# Patient Record
Sex: MALE | Race: WHITE | NOT HISPANIC OR LATINO | Employment: OTHER | URBAN - METROPOLITAN AREA
[De-identification: names, ages, dates, MRNs, and addresses within clinical notes are randomized per-mention and may not be internally consistent; named-entity substitution may affect disease eponyms.]

---

## 2019-05-27 ENCOUNTER — TRANSFERRED RECORDS (OUTPATIENT)
Dept: HEALTH INFORMATION MANAGEMENT | Facility: CLINIC | Age: 64
End: 2019-05-27

## 2019-05-28 ENCOUNTER — TRANSFERRED RECORDS (OUTPATIENT)
Dept: HEALTH INFORMATION MANAGEMENT | Facility: CLINIC | Age: 64
End: 2019-05-28

## 2019-05-28 ENCOUNTER — APPOINTMENT (OUTPATIENT)
Dept: GENERAL RADIOLOGY | Facility: CLINIC | Age: 64
DRG: 025 | End: 2019-05-28
Attending: STUDENT IN AN ORGANIZED HEALTH CARE EDUCATION/TRAINING PROGRAM
Payer: COMMERCIAL

## 2019-05-28 ENCOUNTER — ANESTHESIA EVENT (OUTPATIENT)
Dept: SURGERY | Facility: CLINIC | Age: 64
DRG: 025 | End: 2019-05-28
Payer: COMMERCIAL

## 2019-05-28 ENCOUNTER — HOSPITAL ENCOUNTER (INPATIENT)
Facility: CLINIC | Age: 64
LOS: 4 days | Discharge: HOME | DRG: 025 | End: 2019-06-01
Attending: NEUROLOGICAL SURGERY | Admitting: NEUROLOGICAL SURGERY
Payer: COMMERCIAL

## 2019-05-28 ENCOUNTER — HOSPITAL ENCOUNTER (INPATIENT)
Facility: CLINIC | Age: 64
Setting detail: SURGERY ADMIT
End: 2019-05-28
Attending: NEUROLOGICAL SURGERY | Admitting: NEUROLOGICAL SURGERY
Payer: COMMERCIAL

## 2019-05-28 DIAGNOSIS — D49.6 BRAIN TUMOR (H): Primary | ICD-10-CM

## 2019-05-28 LAB
ABO + RH BLD: NORMAL
ABO + RH BLD: NORMAL
ANION GAP SERPL CALCULATED.3IONS-SCNC: 6 MMOL/L (ref 3–14)
APTT PPP: 25 SEC (ref 22–37)
BASOPHILS # BLD AUTO: 0.1 10E9/L (ref 0–0.2)
BASOPHILS NFR BLD AUTO: 0.6 %
BLD GP AB SCN SERPL QL: NORMAL
BLOOD BANK CMNT PATIENT-IMP: NORMAL
BUN SERPL-MCNC: 15 MG/DL (ref 7–30)
CALCIUM SERPL-MCNC: 9.3 MG/DL (ref 8.5–10.1)
CHLORIDE SERPL-SCNC: 105 MMOL/L (ref 94–109)
CO2 SERPL-SCNC: 28 MMOL/L (ref 20–32)
CREAT SERPL-MCNC: 1.09 MG/DL (ref 0.66–1.25)
DIFFERENTIAL METHOD BLD: NORMAL
EOSINOPHIL # BLD AUTO: 0.3 10E9/L (ref 0–0.7)
EOSINOPHIL NFR BLD AUTO: 2.5 %
ERYTHROCYTE [DISTWIDTH] IN BLOOD BY AUTOMATED COUNT: 13.7 % (ref 10–15)
GFR SERPL CREATININE-BSD FRML MDRD: 72 ML/MIN/{1.73_M2}
GLUCOSE SERPL-MCNC: 111 MG/DL (ref 70–99)
HCT VFR BLD AUTO: 50.3 % (ref 40–53)
HGB BLD-MCNC: 16.5 G/DL (ref 13.3–17.7)
IMM GRANULOCYTES # BLD: 0 10E9/L (ref 0–0.4)
IMM GRANULOCYTES NFR BLD: 0.4 %
INR PPP: 1.04 (ref 0.86–1.14)
LYMPHOCYTES # BLD AUTO: 2.4 10E9/L (ref 0.8–5.3)
LYMPHOCYTES NFR BLD AUTO: 23.8 %
MAGNESIUM SERPL-MCNC: 2.3 MG/DL (ref 1.6–2.3)
MCH RBC QN AUTO: 29.3 PG (ref 26.5–33)
MCHC RBC AUTO-ENTMCNC: 32.8 G/DL (ref 31.5–36.5)
MCV RBC AUTO: 89 FL (ref 78–100)
MONOCYTES # BLD AUTO: 1.2 10E9/L (ref 0–1.3)
MONOCYTES NFR BLD AUTO: 12.1 %
NEUTROPHILS # BLD AUTO: 6.2 10E9/L (ref 1.6–8.3)
NEUTROPHILS NFR BLD AUTO: 60.6 %
NRBC # BLD AUTO: 0 10*3/UL
NRBC BLD AUTO-RTO: 0 /100
PHOSPHATE SERPL-MCNC: 4.1 MG/DL (ref 2.5–4.5)
PLATELET # BLD AUTO: 326 10E9/L (ref 150–450)
POTASSIUM SERPL-SCNC: 3.3 MMOL/L (ref 3.4–5.3)
RBC # BLD AUTO: 5.64 10E12/L (ref 4.4–5.9)
SODIUM SERPL-SCNC: 139 MMOL/L (ref 133–144)
SPECIMEN EXP DATE BLD: NORMAL
WBC # BLD AUTO: 10.2 10E9/L (ref 4–11)

## 2019-05-28 PROCEDURE — 84100 ASSAY OF PHOSPHORUS: CPT | Performed by: STUDENT IN AN ORGANIZED HEALTH CARE EDUCATION/TRAINING PROGRAM

## 2019-05-28 PROCEDURE — 12000001 ZZH R&B MED SURG/OB UMMC

## 2019-05-28 PROCEDURE — 36415 COLL VENOUS BLD VENIPUNCTURE: CPT | Performed by: STUDENT IN AN ORGANIZED HEALTH CARE EDUCATION/TRAINING PROGRAM

## 2019-05-28 PROCEDURE — 25800030 ZZH RX IP 258 OP 636: Performed by: STUDENT IN AN ORGANIZED HEALTH CARE EDUCATION/TRAINING PROGRAM

## 2019-05-28 PROCEDURE — 25000132 ZZH RX MED GY IP 250 OP 250 PS 637: Performed by: STUDENT IN AN ORGANIZED HEALTH CARE EDUCATION/TRAINING PROGRAM

## 2019-05-28 PROCEDURE — 93010 ELECTROCARDIOGRAM REPORT: CPT | Performed by: INTERNAL MEDICINE

## 2019-05-28 PROCEDURE — 86901 BLOOD TYPING SEROLOGIC RH(D): CPT | Performed by: STUDENT IN AN ORGANIZED HEALTH CARE EDUCATION/TRAINING PROGRAM

## 2019-05-28 PROCEDURE — 85610 PROTHROMBIN TIME: CPT | Performed by: STUDENT IN AN ORGANIZED HEALTH CARE EDUCATION/TRAINING PROGRAM

## 2019-05-28 PROCEDURE — 86850 RBC ANTIBODY SCREEN: CPT | Performed by: STUDENT IN AN ORGANIZED HEALTH CARE EDUCATION/TRAINING PROGRAM

## 2019-05-28 PROCEDURE — 86900 BLOOD TYPING SEROLOGIC ABO: CPT | Performed by: STUDENT IN AN ORGANIZED HEALTH CARE EDUCATION/TRAINING PROGRAM

## 2019-05-28 PROCEDURE — 80048 BASIC METABOLIC PNL TOTAL CA: CPT | Performed by: STUDENT IN AN ORGANIZED HEALTH CARE EDUCATION/TRAINING PROGRAM

## 2019-05-28 PROCEDURE — 40000141 ZZH STATISTIC PERIPHERAL IV START W/O US GUIDANCE

## 2019-05-28 PROCEDURE — 85730 THROMBOPLASTIN TIME PARTIAL: CPT | Performed by: STUDENT IN AN ORGANIZED HEALTH CARE EDUCATION/TRAINING PROGRAM

## 2019-05-28 PROCEDURE — 40000065 ZZH STATISTIC EKG NON-CHARGEABLE

## 2019-05-28 PROCEDURE — 71045 X-RAY EXAM CHEST 1 VIEW: CPT

## 2019-05-28 PROCEDURE — 85025 COMPLETE CBC W/AUTO DIFF WBC: CPT | Performed by: STUDENT IN AN ORGANIZED HEALTH CARE EDUCATION/TRAINING PROGRAM

## 2019-05-28 PROCEDURE — 25000128 H RX IP 250 OP 636: Performed by: STUDENT IN AN ORGANIZED HEALTH CARE EDUCATION/TRAINING PROGRAM

## 2019-05-28 PROCEDURE — 83735 ASSAY OF MAGNESIUM: CPT | Performed by: STUDENT IN AN ORGANIZED HEALTH CARE EDUCATION/TRAINING PROGRAM

## 2019-05-28 RX ORDER — POTASSIUM CHLORIDE 29.8 MG/ML
20 INJECTION INTRAVENOUS
Status: DISCONTINUED | OUTPATIENT
Start: 2019-05-28 | End: 2019-05-30

## 2019-05-28 RX ORDER — ACETAMINOPHEN 650 MG/1
650 SUPPOSITORY RECTAL EVERY 4 HOURS PRN
Status: DISCONTINUED | OUTPATIENT
Start: 2019-05-28 | End: 2019-05-30

## 2019-05-28 RX ORDER — CEFAZOLIN SODIUM 2 G/100ML
2 INJECTION, SOLUTION INTRAVENOUS
Status: DISCONTINUED | OUTPATIENT
Start: 2019-05-28 | End: 2019-05-30 | Stop reason: HOSPADM

## 2019-05-28 RX ORDER — SPIRONOLACTONE 25 MG
12.5 TABLET ORAL DAILY
Status: COMPLETED | OUTPATIENT
Start: 2019-05-29 | End: 2019-05-29

## 2019-05-28 RX ORDER — BISACODYL 10 MG
10 SUPPOSITORY, RECTAL RECTAL DAILY PRN
Status: DISCONTINUED | OUTPATIENT
Start: 2019-05-28 | End: 2019-05-30

## 2019-05-28 RX ORDER — LOSARTAN POTASSIUM 100 MG/1
100 TABLET ORAL AT BEDTIME
Status: COMPLETED | OUTPATIENT
Start: 2019-05-28 | End: 2019-05-28

## 2019-05-28 RX ORDER — PANTOPRAZOLE SODIUM 40 MG/1
40 TABLET, DELAYED RELEASE ORAL
Status: DISCONTINUED | OUTPATIENT
Start: 2019-05-28 | End: 2019-05-30

## 2019-05-28 RX ORDER — LATANOPROST 50 UG/ML
1 SOLUTION/ DROPS OPHTHALMIC AT BEDTIME
Status: DISCONTINUED | OUTPATIENT
Start: 2019-05-28 | End: 2019-05-30

## 2019-05-28 RX ORDER — METOCLOPRAMIDE HYDROCHLORIDE 5 MG/ML
10 INJECTION INTRAMUSCULAR; INTRAVENOUS EVERY 6 HOURS PRN
Status: DISCONTINUED | OUTPATIENT
Start: 2019-05-28 | End: 2019-05-30

## 2019-05-28 RX ORDER — POTASSIUM CL/LIDO/0.9 % NACL 10MEQ/0.1L
10 INTRAVENOUS SOLUTION, PIGGYBACK (ML) INTRAVENOUS
Status: DISCONTINUED | OUTPATIENT
Start: 2019-05-28 | End: 2019-05-30

## 2019-05-28 RX ORDER — POTASSIUM CHLORIDE 7.45 MG/ML
10 INJECTION INTRAVENOUS
Status: DISCONTINUED | OUTPATIENT
Start: 2019-05-28 | End: 2019-05-30

## 2019-05-28 RX ORDER — PROCHLORPERAZINE 25 MG
25 SUPPOSITORY, RECTAL RECTAL EVERY 12 HOURS PRN
Status: DISCONTINUED | OUTPATIENT
Start: 2019-05-28 | End: 2019-05-30

## 2019-05-28 RX ORDER — SODIUM CHLORIDE 9 MG/ML
INJECTION, SOLUTION INTRAVENOUS CONTINUOUS
Status: DISCONTINUED | OUTPATIENT
Start: 2019-05-28 | End: 2019-05-30 | Stop reason: HOSPADM

## 2019-05-28 RX ORDER — MAGNESIUM CARB/ALUMINUM HYDROX 105-160MG
148 TABLET,CHEWABLE ORAL
Status: DISCONTINUED | OUTPATIENT
Start: 2019-05-28 | End: 2019-05-30

## 2019-05-28 RX ORDER — OXYCODONE HYDROCHLORIDE 5 MG/1
5-10 TABLET ORAL EVERY 4 HOURS PRN
Status: DISCONTINUED | OUTPATIENT
Start: 2019-05-28 | End: 2019-05-30

## 2019-05-28 RX ORDER — MAGNESIUM SULFATE HEPTAHYDRATE 40 MG/ML
4 INJECTION, SOLUTION INTRAVENOUS EVERY 4 HOURS PRN
Status: DISCONTINUED | OUTPATIENT
Start: 2019-05-28 | End: 2019-05-30

## 2019-05-28 RX ORDER — HYDROCHLOROTHIAZIDE 12.5 MG/1
25 CAPSULE ORAL DAILY
Status: DISCONTINUED | OUTPATIENT
Start: 2019-05-29 | End: 2019-05-30

## 2019-05-28 RX ORDER — ONDANSETRON 2 MG/ML
4 INJECTION INTRAMUSCULAR; INTRAVENOUS EVERY 6 HOURS PRN
Status: DISCONTINUED | OUTPATIENT
Start: 2019-05-28 | End: 2019-05-30

## 2019-05-28 RX ORDER — ATORVASTATIN CALCIUM 80 MG/1
80 TABLET, FILM COATED ORAL EVERY EVENING
Status: DISCONTINUED | OUTPATIENT
Start: 2019-05-28 | End: 2019-05-29

## 2019-05-28 RX ORDER — PROCHLORPERAZINE MALEATE 5 MG
10 TABLET ORAL EVERY 6 HOURS PRN
Status: DISCONTINUED | OUTPATIENT
Start: 2019-05-28 | End: 2019-05-30

## 2019-05-28 RX ORDER — POTASSIUM CHLORIDE 750 MG/1
20-40 TABLET, EXTENDED RELEASE ORAL
Status: DISCONTINUED | OUTPATIENT
Start: 2019-05-28 | End: 2019-05-30

## 2019-05-28 RX ORDER — AMOXICILLIN 250 MG
2 CAPSULE ORAL 2 TIMES DAILY
Status: DISCONTINUED | OUTPATIENT
Start: 2019-05-28 | End: 2019-05-30

## 2019-05-28 RX ORDER — METOCLOPRAMIDE 10 MG/1
10 TABLET ORAL EVERY 6 HOURS PRN
Status: DISCONTINUED | OUTPATIENT
Start: 2019-05-28 | End: 2019-05-30

## 2019-05-28 RX ORDER — ONDANSETRON 4 MG/1
4 TABLET, ORALLY DISINTEGRATING ORAL EVERY 6 HOURS PRN
Status: DISCONTINUED | OUTPATIENT
Start: 2019-05-28 | End: 2019-05-30

## 2019-05-28 RX ORDER — LIDOCAINE 40 MG/G
CREAM TOPICAL
Status: DISCONTINUED | OUTPATIENT
Start: 2019-05-28 | End: 2019-05-30 | Stop reason: HOSPADM

## 2019-05-28 RX ORDER — POLYETHYLENE GLYCOL 3350 17 G/17G
17 POWDER, FOR SOLUTION ORAL DAILY PRN
Status: DISCONTINUED | OUTPATIENT
Start: 2019-05-28 | End: 2019-05-30

## 2019-05-28 RX ORDER — DEXAMETHASONE 4 MG/1
4 TABLET ORAL EVERY 6 HOURS SCHEDULED
Status: DISCONTINUED | OUTPATIENT
Start: 2019-05-29 | End: 2019-05-30

## 2019-05-28 RX ORDER — DEXAMETHASONE SODIUM PHOSPHATE 4 MG/ML
4 INJECTION, SOLUTION INTRA-ARTICULAR; INTRALESIONAL; INTRAMUSCULAR; INTRAVENOUS; SOFT TISSUE EVERY 6 HOURS
Status: DISCONTINUED | OUTPATIENT
Start: 2019-05-28 | End: 2019-05-28

## 2019-05-28 RX ORDER — POTASSIUM CHLORIDE 1.5 G/1.58G
20-40 POWDER, FOR SOLUTION ORAL
Status: DISCONTINUED | OUTPATIENT
Start: 2019-05-28 | End: 2019-05-30

## 2019-05-28 RX ORDER — CEFAZOLIN SODIUM 1 G/3ML
1 INJECTION, POWDER, FOR SOLUTION INTRAMUSCULAR; INTRAVENOUS SEE ADMIN INSTRUCTIONS
Status: DISCONTINUED | OUTPATIENT
Start: 2019-05-28 | End: 2019-05-30 | Stop reason: HOSPADM

## 2019-05-28 RX ORDER — AMOXICILLIN 250 MG
1 CAPSULE ORAL 2 TIMES DAILY
Status: DISCONTINUED | OUTPATIENT
Start: 2019-05-28 | End: 2019-05-30

## 2019-05-28 RX ORDER — ACETAMINOPHEN 325 MG/1
650 TABLET ORAL EVERY 4 HOURS PRN
Status: DISCONTINUED | OUTPATIENT
Start: 2019-05-28 | End: 2019-05-30

## 2019-05-28 RX ORDER — NALOXONE HYDROCHLORIDE 0.4 MG/ML
.1-.4 INJECTION, SOLUTION INTRAMUSCULAR; INTRAVENOUS; SUBCUTANEOUS
Status: DISCONTINUED | OUTPATIENT
Start: 2019-05-28 | End: 2019-05-30

## 2019-05-28 RX ADMIN — LOSARTAN POTASSIUM 100 MG: 100 TABLET ORAL at 21:39

## 2019-05-28 RX ADMIN — PANTOPRAZOLE SODIUM 40 MG: 40 TABLET, DELAYED RELEASE ORAL at 19:08

## 2019-05-28 RX ADMIN — ATORVASTATIN CALCIUM 80 MG: 80 TABLET, FILM COATED ORAL at 20:58

## 2019-05-28 RX ADMIN — LATANOPROST 1 DROP: 50 SOLUTION OPHTHALMIC at 21:39

## 2019-05-28 RX ADMIN — SODIUM CHLORIDE, PRESERVATIVE FREE: 5 INJECTION INTRAVENOUS at 21:02

## 2019-05-28 RX ADMIN — DEXAMETHASONE SODIUM PHOSPHATE 4 MG: 4 INJECTION, SOLUTION INTRAMUSCULAR; INTRAVENOUS at 19:08

## 2019-05-28 ASSESSMENT — VISUAL ACUITY
OU: GLASSES
OU: GLASSES

## 2019-05-28 ASSESSMENT — ACTIVITIES OF DAILY LIVING (ADL)
WHICH_OF_THE_ABOVE_FUNCTIONAL_RISKS_HAD_A_RECENT_ONSET_OR_CHANGE?: COMMUNICATION/SPEECH
TRANSFERRING: 0-->INDEPENDENT
SWALLOWING: 0-->SWALLOWS FOODS/LIQUIDS WITHOUT DIFFICULTY
RETIRED_COMMUNICATION: 0-->UNDERSTANDS/COMMUNICATES WITHOUT DIFFICULTY
ADLS_ACUITY_SCORE: 12
FALL_HISTORY_WITHIN_LAST_SIX_MONTHS: NO
TOILETING: 0-->INDEPENDENT
DRESS: 0-->INDEPENDENT
COGNITION: 0 - NO COGNITION ISSUES REPORTED
BATHING: 0-->INDEPENDENT
AMBULATION: 0-->INDEPENDENT
RETIRED_EATING: 0-->INDEPENDENT

## 2019-05-28 ASSESSMENT — LIFESTYLE VARIABLES: TOBACCO_USE: 1

## 2019-05-28 NOTE — H&P
[facility]  NEUROSURGERY H&P NOTE    Reason for Admission:  Brain tumor    History of Present Illness:  Mr. Farfan is a 64 year old right handed man who works as an , who was admitted to [facility] in [place] today for progressive short term memory loss over the past several weeks. Wife reports that he has difficulty concentrating and sometimes forgets to reply to people. He denies word finding difficulties however. He also reports having gait disturbance and feels he walks slower and more deliberate. He has intermittent headaches which have not changed from baseline. No nausea or vomiting. He was brought to [facility] ED and head CT/MRI revealed a large left frontal mass concerning for high grade glioma. He was then transferred to [facility] for higher level of care. Patient takes aspirin 81mg for primary prevention but his last dose was on 19. He has not had any seizures so far.    PAST MEDICAL HISTORY:   1- Hypertension  2- Hyperlipidemia  3- Osteoarthritis  4- GERD  5- Cervical spine injury 30 years ago    PAST SURGICAL HISTORY:   1- Gastric implant (compatible with MRI up to 1.5T)    FAMILY HISTORY:   Mother  from stroke  Father  from heart attack    SOCIAL HISTORY:   Social History     Tobacco Use     Smoking status: Not on file   Substance Use Topics     Alcohol use: Not on file       MEDICATIONS:  No current outpatient medications on file.     Allergies:  Allergies not on file    ROS: 10 point ROS of systems including Constitutional, Eyes, Respiratory, Cardiovascular, Gastroenterology, Genitourinary, Integumentary, Muscularskeletal, Psychiatric were all negative except for pertinent positives noted in my HPI.    EXAM:  There were no vitals taken for this visit.  Neuro:   AAOx3, NAD.   PERRL, EOMI. Face symmetric, tongue midline.   Motor: 5/5 BUE, 5/5 BLE. No pronator drift.  Sensation: intact to light touch and pinprick throughout.    Reflexes 2+ throughout.   Normal FNF, normal HTS.   Gait deferred.    LABS/IMAGING:  MRI brain w/wo contrast (5/27/19): left frontal heterogeneously enhancing mass that extends across the corpus callosum, and associated with vasogenic edema. Findings concerning for high grade glioma.    ASSESSMENT:  Mr. Farfan is a 64 year old man with high grade left frontal mass.    RECOMMENDATIONS:  - Admit to 6A  - Decadron 4mg Q6 hrs for treatment of cerebral edema  - Protonix for peptic ulcer prophylaxis while on steroids  - Jono and consent for left craniotomy and tumor resection  - OR scheduled on 5/30/19 with Dr. Downing  - Keep NPO on Thursday midnight  - Consult anesthesia for pre-op evaluation and risk assessment on 5/29/19  - IV fluids at 75cc/hr  - CT stealth with fiducials on 5/29/19  - Hold aspirin  - EKG, chest x-ray    The patient was discussed with Dr. Gilbert, neurosurgery chief resident, and Dr. Downing, neurosurgery staff.  -----------------------------------  Milton Roy MD, MS  Neurosurgery PGY-2  Pager #[phone]

## 2019-05-28 NOTE — ANESTHESIA PREPROCEDURE EVALUATION
Anesthesia Pre-Procedure Evaluation    Patient: Gopi Farfan   MRN:     [id number] Gender:   male   Age:    64 year old :      1955        Preoperative Diagnosis: Brain Tumor   Procedure(s):  Stealth Guided Left Craniotomy And Tumor Resection     No past medical history on file.   No past surgical history on file.       Anesthesia Evaluation     . Pt has had prior anesthetic.     No history of anesthetic complications          ROS/MED HX    ENT/Pulmonary:     (+)tobacco use (Quit ), Past use , . .    Neurologic: Comment:   CT/MRI :large left frontal mass concerning for high grade glioma    Progressive short term memory loss     MRI brain w/wo contrast (19): left frontal heterogeneously enhancing mass that extends across the corpus callosum, and associated with vasogenic edema. Findings concerning for high grade glioma.      (+)migraines, other neuro     Cardiovascular:     (+) Dyslipidemia, hypertension----. Taking blood thinners (ASA 81mg, last dose ) : . . . :. . Previous cardiac testing Echodate:2017results:  ECHO:  Final Impressions:   1. Normal left ventricular size, normal wall thickness, normal global systolic function, calculated EF of 59 %. Indeterminate filling pressure.   2. Right ventricular cavity size is normal, global systolic RV function is normal. Estimated RVSP 42 mmHg. Estimated RVSP 42 mmHg.   3. Trace mitral regurgitation.   4. Trileaflet aortic valve without stenosis or regurgitation.   5. Mildly enlarged left atrium.   6. No pericardial effusion.  Stress Testdate:2017 results:  STRESS ECHO:Final Impressions:   1. Negative stress echo for ischemia with a non specific blunted increase in EF post exercise.   2. Post stress, normal left ventricular size, normal global systolic function with an estimated EF of 60 to 65%.  ECG reviewed date:19 results:  EKG: Sinus rhythm in 70's, no ectopy, no ST changes. Flattened T waves in inferior leads (III, AVF), QTc 450ms.    date: results:          METS/Exercise Tolerance:     Hematologic:  - neg hematologic  ROS       Musculoskeletal: Comment: Cervicalgia     (+) arthritis,  other musculoskeletal-       GI/Hepatic:     (+) GERD Asymptomatic on medication,       Renal/Genitourinary: Comment: GFR 50-60's   - ROS Renal section negative       Endo: Comment: Initiated on 5/28/19 - Decadron 4mg Q6 hrs for treatment of cerebral edema        Psychiatric:  - neg psychiatric ROS       Infectious Disease:  - neg infectious disease ROS       Malignancy:      - no malignancy   Other:                         PHYSICAL EXAM:   Mental Status/Neuro: A/A/O   Airway: Facies: Feasible  Mallampati: I  Mouth/Opening: Full  TM distance: > 6 cm  Neck ROM: Full   Respiratory: Auscultation: CTAB     Resp. Rate: Normal     Resp. Effort: Normal      CV: Rhythm: Regular  Rate: Age appropriate  Heart: Normal Sounds   Comments:      Dental:  Dental Comments: Multiple missing teeth; poor dentition                Lab Results   Component Value Date    WBC 10.2 05/28/2019    HGB 16.5 05/28/2019    HCT 50.3 05/28/2019     05/28/2019       Preop Vitals  BP Readings from Last 3 Encounters:   05/28/19 126/76    Pulse Readings from Last 3 Encounters:   05/28/19 68      Resp Readings from Last 3 Encounters:   05/28/19 16    SpO2 Readings from Last 3 Encounters:   05/28/19 98%      Temp Readings from Last 1 Encounters:   05/28/19 36.2  C (97.2  F) (Oral)    Ht Readings from Last 1 Encounters:   No data found for Ht      Wt Readings from Last 1 Encounters:   No data found for Wt    There is no height or weight on file to calculate BMI.     LDA:            Assessment:   ASA SCORE: 3    NPO Status: > 6 hours since completed [employer]   Documentation: H&P complete; Preop Testing complete; Consents complete   Proceeding: Proceed without further delay  Tobacco Use:  NO Active use of Tobacco/UNKNOWN Tobacco use status     Plan:   Anes. Type:  General   Pre-Induction: None    Induction:  IV (Standard)   Airway: Oral ETT   Access/Monitoring: PIV; 2nd PIV; A-Line   Maintenance: Balanced   Emergence: Procedure Site   Logistics: Same Day Surgery     Postop Pain/Sedation Strategy:  Standard-Options: Opioids PRN     PONV Management:  Adult Risk Factors:, Non-Smoker, Postop Opioids  Prevention: Ondansetron; Dexamethasone     CONSENT: Direct conversation   Plan and risks discussed with: Patient                            Derek Bone MD

## 2019-05-29 LAB
ALBUMIN UR-MCNC: NEGATIVE MG/DL
ANION GAP SERPL CALCULATED.3IONS-SCNC: 8 MMOL/L (ref 3–14)
APPEARANCE UR: CLEAR
BILIRUB UR QL STRIP: NEGATIVE
BUN SERPL-MCNC: 14 MG/DL (ref 7–30)
CALCIUM SERPL-MCNC: 8.7 MG/DL (ref 8.5–10.1)
CHLORIDE SERPL-SCNC: 106 MMOL/L (ref 94–109)
CO2 SERPL-SCNC: 25 MMOL/L (ref 20–32)
COLOR UR AUTO: YELLOW
CREAT SERPL-MCNC: 0.94 MG/DL (ref 0.66–1.25)
ERYTHROCYTE [DISTWIDTH] IN BLOOD BY AUTOMATED COUNT: 13.7 % (ref 10–15)
GFR SERPL CREATININE-BSD FRML MDRD: 86 ML/MIN/{1.73_M2}
GLUCOSE SERPL-MCNC: 148 MG/DL (ref 70–99)
GLUCOSE UR STRIP-MCNC: 70 MG/DL
HCT VFR BLD AUTO: 48.2 % (ref 40–53)
HGB BLD-MCNC: 15.6 G/DL (ref 13.3–17.7)
HGB UR QL STRIP: ABNORMAL
INTERPRETATION ECG - MUSE: NORMAL
KETONES UR STRIP-MCNC: NEGATIVE MG/DL
LEUKOCYTE ESTERASE UR QL STRIP: NEGATIVE
MCH RBC QN AUTO: 29.6 PG (ref 26.5–33)
MCHC RBC AUTO-ENTMCNC: 32.4 G/DL (ref 31.5–36.5)
MCV RBC AUTO: 92 FL (ref 78–100)
MUCOUS THREADS #/AREA URNS LPF: PRESENT /LPF
NITRATE UR QL: NEGATIVE
PH UR STRIP: 5.5 PH (ref 5–7)
PLATELET # BLD AUTO: 281 10E9/L (ref 150–450)
POTASSIUM SERPL-SCNC: 3.8 MMOL/L (ref 3.4–5.3)
RBC # BLD AUTO: 5.27 10E12/L (ref 4.4–5.9)
RBC #/AREA URNS AUTO: 0 /HPF (ref 0–2)
SODIUM SERPL-SCNC: 139 MMOL/L (ref 133–144)
SOURCE: ABNORMAL
SP GR UR STRIP: 1.02 (ref 1–1.03)
TRANS CELLS #/AREA URNS HPF: <1 /HPF (ref 0–1)
UROBILINOGEN UR STRIP-MCNC: NORMAL MG/DL (ref 0–2)
WBC # BLD AUTO: 8.2 10E9/L (ref 4–11)
WBC #/AREA URNS AUTO: 1 /HPF (ref 0–5)

## 2019-05-29 PROCEDURE — 25000132 ZZH RX MED GY IP 250 OP 250 PS 637: Performed by: STUDENT IN AN ORGANIZED HEALTH CARE EDUCATION/TRAINING PROGRAM

## 2019-05-29 PROCEDURE — 25000131 ZZH RX MED GY IP 250 OP 636 PS 637: Performed by: STUDENT IN AN ORGANIZED HEALTH CARE EDUCATION/TRAINING PROGRAM

## 2019-05-29 PROCEDURE — 80048 BASIC METABOLIC PNL TOTAL CA: CPT | Performed by: STUDENT IN AN ORGANIZED HEALTH CARE EDUCATION/TRAINING PROGRAM

## 2019-05-29 PROCEDURE — 36415 COLL VENOUS BLD VENIPUNCTURE: CPT | Performed by: STUDENT IN AN ORGANIZED HEALTH CARE EDUCATION/TRAINING PROGRAM

## 2019-05-29 PROCEDURE — 81001 URINALYSIS AUTO W/SCOPE: CPT | Performed by: STUDENT IN AN ORGANIZED HEALTH CARE EDUCATION/TRAINING PROGRAM

## 2019-05-29 PROCEDURE — 25000132 ZZH RX MED GY IP 250 OP 250 PS 637: Performed by: NURSE PRACTITIONER

## 2019-05-29 PROCEDURE — 85027 COMPLETE CBC AUTOMATED: CPT | Performed by: STUDENT IN AN ORGANIZED HEALTH CARE EDUCATION/TRAINING PROGRAM

## 2019-05-29 PROCEDURE — 12000001 ZZH R&B MED SURG/OB UMMC

## 2019-05-29 RX ORDER — ASPIRIN 81 MG/1
81 TABLET ORAL DAILY
Status: ON HOLD | COMMUNITY
End: 2019-06-01

## 2019-05-29 RX ORDER — HYDROCHLOROTHIAZIDE 25 MG/1
25 TABLET ORAL DAILY
COMMUNITY
Start: 2017-01-05 | End: 2019-12-16 | Stop reason: ALTCHOICE

## 2019-05-29 RX ORDER — LOSARTAN POTASSIUM 100 MG/1
100 TABLET ORAL EVERY EVENING
COMMUNITY
Start: 2019-05-24

## 2019-05-29 RX ORDER — ATORVASTATIN CALCIUM 80 MG/1
40 TABLET, FILM COATED ORAL EVERY EVENING
COMMUNITY
Start: 2019-05-24

## 2019-05-29 RX ORDER — LATANOPROST 50 UG/ML
1 SOLUTION/ DROPS OPHTHALMIC EVERY EVENING
COMMUNITY
Start: 2017-05-16

## 2019-05-29 RX ORDER — SPIRONOLACTONE 25 MG/1
12.5 TABLET ORAL DAILY
COMMUNITY
Start: 2017-01-05 | End: 2019-12-16 | Stop reason: ALTCHOICE

## 2019-05-29 RX ORDER — ESOMEPRAZOLE MAGNESIUM 40 MG/1
40 CAPSULE, DELAYED RELEASE ORAL 2 TIMES DAILY PRN
COMMUNITY
Start: 2017-01-05 | End: 2019-06-13

## 2019-05-29 RX ORDER — ATORVASTATIN CALCIUM 40 MG/1
40 TABLET, FILM COATED ORAL EVERY EVENING
Status: DISCONTINUED | OUTPATIENT
Start: 2019-05-29 | End: 2019-05-30

## 2019-05-29 RX ADMIN — LATANOPROST 1 DROP: 50 SOLUTION OPHTHALMIC at 21:06

## 2019-05-29 RX ADMIN — ATORVASTATIN CALCIUM 40 MG: 40 TABLET, FILM COATED ORAL at 19:58

## 2019-05-29 RX ADMIN — HYDROCHLOROTHIAZIDE 25 MG: 12.5 CAPSULE ORAL at 08:31

## 2019-05-29 RX ADMIN — DEXAMETHASONE 4 MG: 4 TABLET ORAL at 17:58

## 2019-05-29 RX ADMIN — Medication 12.5 MG: at 08:30

## 2019-05-29 RX ADMIN — DEXAMETHASONE 4 MG: 4 TABLET ORAL at 13:29

## 2019-05-29 RX ADMIN — SENNOSIDES AND DOCUSATE SODIUM 1 TABLET: 8.6; 5 TABLET ORAL at 08:32

## 2019-05-29 RX ADMIN — DEXAMETHASONE 4 MG: 4 TABLET ORAL at 00:16

## 2019-05-29 RX ADMIN — DEXAMETHASONE 4 MG: 4 TABLET ORAL at 07:13

## 2019-05-29 RX ADMIN — PANTOPRAZOLE SODIUM 40 MG: 40 TABLET, DELAYED RELEASE ORAL at 08:30

## 2019-05-29 ASSESSMENT — ACTIVITIES OF DAILY LIVING (ADL)
ADLS_ACUITY_SCORE: 12

## 2019-05-29 ASSESSMENT — VISUAL ACUITY
OU: GLASSES;NORMAL ACUITY
OU: GLASSES;NORMAL ACUITY

## 2019-05-29 NOTE — PLAN OF CARE
Status: Pt admitted for large L frontal mass.   Vitals: VSS on RA  Neuros: AO4, strength 5/5 throughout,  per pt memory deficits. L droop, diminished hearing in the L at baseline. Per report, word finding difficulty, none noticed this shift   IV: PIV SL. Fluids to begin at 0000  Resp/trach: Clear throughout  Diet: Regular diet, tolerating well. NPO at midnight   Bowel status: BM yesterday the 28th   : Voiding spontaneously, pre-op UA to be collected, pt aware  Skin: Intact   Pain: Denies  Activity: Up in room independently. Showered this AM in anticipation for CT with fiducials, CT moved to tomorrow morning   Social: Many friends and family present throughout the shift, very supportive  Plan: CT planned for tomorrow morning. OR at 1530 for crani and resection. Continue to monitor and follow POC

## 2019-05-29 NOTE — PLAN OF CARE
Status: large left frontal mass  Vitals: VSS ex shell on RA  Neuros: A&Ox4, L facial droop, intermittent difficulty word finding, memory loss since february.   IV: PIV infusing 75ml/hr  Resp/trach: WNL  Diet: Regular tolerated well  Bowel status: Last BM today  : Voiding spont.  Skin: Intact  Pain: Denies  Activity: SBA independant  Social: Wife present and supportive  Plan: CT and fiducial's tomorrow, Surgery for Left crani and tumor resection Thursday. Continue to monitor and follow POC. NPO tomorrow at midnight.

## 2019-05-29 NOTE — PROGRESS NOTES
Neurosurgery Progress Note      S: No Acute Events Overnight.     O:  Exam:  General: Awake;  Alert, In No Acute Distress  Pulm: Breathing Comfortably on Room Air  Mental status: Oriented x Person, Place and Time  Cranial Nerves: Cranial Nerves II-XII Intact Bilaterally  Strength:      Del Tr Bi WE WF Gr  R 5 5 5 5 5 5  L 5 5 5 5 5 5     HF KE KF DF PF   R 5 5 5 5 5   L 5 5 5 5 5     Pronator Drift: Absent  Sensory: Intact to Light Touch    Assessment: Mr. Farfan is a 64 year old male with a newly diagnosed Left Frontal Brain Tumor. MRI Images concerning for primary intracranial CNS tumor.     Plan by problem:     Neuro:   Neuro Examination Q 4 HR  CT Stealth w/ Fiducials for Pre-Operative Planning  Plan for Surgical Resection on Thursday 5/30/2019 w/ Dr. Downing  Anti-epileptics: Not Indicated; No H/O Seizures w/ presentation   Decadron 4 mg Q 6 HR for treatment of cerebral edema    Cardiovascular: Maintain SBP < 140 mmHg; Obtain Pre-Op EKG; Continue to hold home Aspirin (Last Dose 5/26/2019)    Pulmonary: Breathing Comfortably on Room Air; Pre-Operative Chest X-Ray to assess for Acute Cardiopulmonary Process    Gastrointestinal:Regular Diet; Protonix 40 mg Daily while on Decadron; H/O Gastric Implant for GERD (MRI Compatible)    Renal: Voiding     Heme: Pre-Operative Labs (CBC w/ Platelets, PT/INR, Type and Screen)    Endocrine: Insulin Sliding Scale while on Decadron     Infectious: Obtain Pre-Operative UA     PT/OT: Evaluate Post-Operatively     DVT prophylaxis: Sequential compression devices to Bilateral Lower Extremities    DISPO:  6A    Barriers: surgery    -----------------------------------  Milton Roy MD, MS  Neurosurgery PGY-2

## 2019-05-29 NOTE — PLAN OF CARE
/66 (BP Location: Right arm)   Pulse 76   Temp 97.1  F (36.2  C) (Oral)   Resp 18   Wt 97.1 kg (214 lb)   SpO2 96%   Status:  large left frontal mass  VS: VSS ex intermittently shell on RA  Neuro: A&Ox4, L facial droop, intermittent difficulty word finding, memory loss since february.   Respiratory: LS CTA  GI: Regular tolerated well, Last BM yesterday  : Voiding spont  IV: PIV infusing 75ml/hr  Skin: Intact  Pain: Denies  Activity: SBA independent  Drains: Denies  Social: Wife present and supportive  See flow sheets for further details and assessments.  Plan of Care: CT and fiducial's today, Surgery for Left crani and tumor resection Tomorrow 5/30. Continue to monitor and follow POC. NPO tomorrow at midnight.

## 2019-05-30 ENCOUNTER — APPOINTMENT (OUTPATIENT)
Dept: CT IMAGING | Facility: CLINIC | Age: 64
DRG: 025 | End: 2019-05-30
Attending: STUDENT IN AN ORGANIZED HEALTH CARE EDUCATION/TRAINING PROGRAM
Payer: COMMERCIAL

## 2019-05-30 ENCOUNTER — ANESTHESIA (OUTPATIENT)
Dept: SURGERY | Facility: CLINIC | Age: 64
DRG: 025 | End: 2019-05-30
Payer: COMMERCIAL

## 2019-05-30 LAB
ANION GAP SERPL CALCULATED.3IONS-SCNC: 8 MMOL/L (ref 3–14)
BASE DEFICIT BLDA-SCNC: 0.7 MMOL/L
BASE EXCESS BLDA CALC-SCNC: 0 MMOL/L
BASE EXCESS BLDA CALC-SCNC: 0.1 MMOL/L
BUN SERPL-MCNC: 17 MG/DL (ref 7–30)
CA-I BLD-MCNC: 4.5 MG/DL (ref 4.4–5.2)
CA-I BLD-MCNC: 4.5 MG/DL (ref 4.4–5.2)
CA-I BLD-MCNC: 4.7 MG/DL (ref 4.4–5.2)
CALCIUM SERPL-MCNC: 8.7 MG/DL (ref 8.5–10.1)
CHLORIDE SERPL-SCNC: 107 MMOL/L (ref 94–109)
CO2 SERPL-SCNC: 26 MMOL/L (ref 20–32)
CREAT SERPL-MCNC: 0.93 MG/DL (ref 0.66–1.25)
ERYTHROCYTE [DISTWIDTH] IN BLOOD BY AUTOMATED COUNT: 13.8 % (ref 10–15)
GFR SERPL CREATININE-BSD FRML MDRD: 87 ML/MIN/{1.73_M2}
GLUCOSE BLD-MCNC: 143 MG/DL (ref 70–99)
GLUCOSE BLD-MCNC: 171 MG/DL (ref 70–99)
GLUCOSE BLD-MCNC: 178 MG/DL (ref 70–99)
GLUCOSE BLDC GLUCOMTR-MCNC: 115 MG/DL (ref 70–99)
GLUCOSE BLDC GLUCOMTR-MCNC: 119 MG/DL (ref 70–99)
GLUCOSE BLDC GLUCOMTR-MCNC: 157 MG/DL (ref 70–99)
GLUCOSE SERPL-MCNC: 138 MG/DL (ref 70–99)
HCO3 BLD-SCNC: 23 MMOL/L (ref 21–28)
HCO3 BLD-SCNC: 23 MMOL/L (ref 21–28)
HCO3 BLD-SCNC: 24 MMOL/L (ref 21–28)
HCT VFR BLD AUTO: 46.3 % (ref 40–53)
HGB BLD-MCNC: 12.5 G/DL (ref 13.3–17.7)
HGB BLD-MCNC: 13.1 G/DL (ref 13.3–17.7)
HGB BLD-MCNC: 15 G/DL (ref 13.3–17.7)
HGB BLD-MCNC: 15.5 G/DL (ref 13.3–17.7)
MCH RBC QN AUTO: 30.1 PG (ref 26.5–33)
MCHC RBC AUTO-ENTMCNC: 33.5 G/DL (ref 31.5–36.5)
MCV RBC AUTO: 90 FL (ref 78–100)
O2/TOTAL GAS SETTING VFR VENT: 30 %
O2/TOTAL GAS SETTING VFR VENT: 30 %
O2/TOTAL GAS SETTING VFR VENT: ABNORMAL %
PCO2 BLD: 32 MM HG (ref 35–45)
PCO2 BLD: 34 MM HG (ref 35–45)
PCO2 BLD: 35 MM HG (ref 35–45)
PH BLD: 7.44 PH (ref 7.35–7.45)
PH BLD: 7.44 PH (ref 7.35–7.45)
PH BLD: 7.47 PH (ref 7.35–7.45)
PLATELET # BLD AUTO: 310 10E9/L (ref 150–450)
PO2 BLD: 85 MM HG (ref 80–105)
PO2 BLD: 87 MM HG (ref 80–105)
PO2 BLD: 91 MM HG (ref 80–105)
POTASSIUM BLD-SCNC: 3.2 MMOL/L (ref 3.4–5.3)
POTASSIUM BLD-SCNC: 3.2 MMOL/L (ref 3.4–5.3)
POTASSIUM BLD-SCNC: 3.3 MMOL/L (ref 3.4–5.3)
POTASSIUM SERPL-SCNC: 3.6 MMOL/L (ref 3.4–5.3)
RADIOLOGIST FLAGS: ABNORMAL
RBC # BLD AUTO: 5.15 10E12/L (ref 4.4–5.9)
SODIUM BLD-SCNC: 137 MMOL/L (ref 133–144)
SODIUM BLD-SCNC: 137 MMOL/L (ref 133–144)
SODIUM BLD-SCNC: 138 MMOL/L (ref 133–144)
SODIUM SERPL-SCNC: 141 MMOL/L (ref 133–144)
WBC # BLD AUTO: 10.9 10E9/L (ref 4–11)

## 2019-05-30 PROCEDURE — 27210995 ZZH RX 272: Performed by: NEUROLOGICAL SURGERY

## 2019-05-30 PROCEDURE — 84295 ASSAY OF SERUM SODIUM: CPT | Performed by: NEUROLOGICAL SURGERY

## 2019-05-30 PROCEDURE — 84132 ASSAY OF SERUM POTASSIUM: CPT | Performed by: NEUROLOGICAL SURGERY

## 2019-05-30 PROCEDURE — 25000125 ZZHC RX 250: Performed by: NURSE ANESTHETIST, CERTIFIED REGISTERED

## 2019-05-30 PROCEDURE — 88307 TISSUE EXAM BY PATHOLOGIST: CPT | Performed by: NEUROLOGICAL SURGERY

## 2019-05-30 PROCEDURE — C1713 ANCHOR/SCREW BN/BN,TIS/BN: HCPCS | Performed by: NEUROLOGICAL SURGERY

## 2019-05-30 PROCEDURE — 36000074 ZZH SURGERY LEVEL 6 1ST 30 MIN - UMMC: Performed by: NEUROLOGICAL SURGERY

## 2019-05-30 PROCEDURE — 25000566 ZZH SEVOFLURANE, EA 15 MIN: Performed by: NEUROLOGICAL SURGERY

## 2019-05-30 PROCEDURE — 84132 ASSAY OF SERUM POTASSIUM: CPT | Performed by: PHYSICIAN ASSISTANT

## 2019-05-30 PROCEDURE — 25800030 ZZH RX IP 258 OP 636: Performed by: NEUROLOGICAL SURGERY

## 2019-05-30 PROCEDURE — 25000128 H RX IP 250 OP 636: Performed by: NURSE ANESTHETIST, CERTIFIED REGISTERED

## 2019-05-30 PROCEDURE — 80048 BASIC METABOLIC PNL TOTAL CA: CPT | Performed by: STUDENT IN AN ORGANIZED HEALTH CARE EDUCATION/TRAINING PROGRAM

## 2019-05-30 PROCEDURE — 88275 CYTOGENETICS 100-300: CPT | Performed by: PATHOLOGY

## 2019-05-30 PROCEDURE — 27210794 ZZH OR GENERAL SUPPLY STERILE: Performed by: NEUROLOGICAL SURGERY

## 2019-05-30 PROCEDURE — 00000159 ZZHCL STATISTIC H-SEND OUTS PREP: Performed by: NEUROLOGICAL SURGERY

## 2019-05-30 PROCEDURE — 71000014 ZZH RECOVERY PHASE 1 LEVEL 2 FIRST HR: Performed by: NEUROLOGICAL SURGERY

## 2019-05-30 PROCEDURE — 36415 COLL VENOUS BLD VENIPUNCTURE: CPT | Performed by: STUDENT IN AN ORGANIZED HEALTH CARE EDUCATION/TRAINING PROGRAM

## 2019-05-30 PROCEDURE — 00000146 ZZHCL STATISTIC GLUCOSE BY METER IP

## 2019-05-30 PROCEDURE — 25000131 ZZH RX MED GY IP 250 OP 636 PS 637: Performed by: STUDENT IN AN ORGANIZED HEALTH CARE EDUCATION/TRAINING PROGRAM

## 2019-05-30 PROCEDURE — 25800030 ZZH RX IP 258 OP 636: Performed by: NURSE ANESTHETIST, CERTIFIED REGISTERED

## 2019-05-30 PROCEDURE — C1763 CONN TISS, NON-HUMAN: HCPCS | Performed by: NEUROLOGICAL SURGERY

## 2019-05-30 PROCEDURE — 00B70ZZ EXCISION OF CEREBRAL HEMISPHERE, OPEN APPROACH: ICD-10-PCS | Performed by: NEUROLOGICAL SURGERY

## 2019-05-30 PROCEDURE — 82947 ASSAY GLUCOSE BLOOD QUANT: CPT | Performed by: PHYSICIAN ASSISTANT

## 2019-05-30 PROCEDURE — 00B70ZX EXCISION OF CEREBRAL HEMISPHERE, OPEN APPROACH, DIAGNOSTIC: ICD-10-PCS | Performed by: NEUROLOGICAL SURGERY

## 2019-05-30 PROCEDURE — 25000128 H RX IP 250 OP 636: Performed by: NURSE PRACTITIONER

## 2019-05-30 PROCEDURE — 25800030 ZZH RX IP 258 OP 636: Performed by: ANESTHESIOLOGY

## 2019-05-30 PROCEDURE — 70450 CT HEAD/BRAIN W/O DYE: CPT

## 2019-05-30 PROCEDURE — 85027 COMPLETE CBC AUTOMATED: CPT | Performed by: STUDENT IN AN ORGANIZED HEALTH CARE EDUCATION/TRAINING PROGRAM

## 2019-05-30 PROCEDURE — 88161 CYTOPATH SMEAR OTHER SOURCE: CPT | Performed by: NEUROLOGICAL SURGERY

## 2019-05-30 PROCEDURE — 36000076 ZZH SURGERY LEVEL 6 EA 15 ADDTL MIN - UMMC: Performed by: NEUROLOGICAL SURGERY

## 2019-05-30 PROCEDURE — 25000125 ZZHC RX 250: Performed by: STUDENT IN AN ORGANIZED HEALTH CARE EDUCATION/TRAINING PROGRAM

## 2019-05-30 PROCEDURE — 40000170 ZZH STATISTIC PRE-PROCEDURE ASSESSMENT II: Performed by: NEUROLOGICAL SURGERY

## 2019-05-30 PROCEDURE — 25800030 ZZH RX IP 258 OP 636: Performed by: STUDENT IN AN ORGANIZED HEALTH CARE EDUCATION/TRAINING PROGRAM

## 2019-05-30 PROCEDURE — 88271 CYTOGENETICS DNA PROBE: CPT | Performed by: PATHOLOGY

## 2019-05-30 PROCEDURE — 25000132 ZZH RX MED GY IP 250 OP 250 PS 637: Performed by: STUDENT IN AN ORGANIZED HEALTH CARE EDUCATION/TRAINING PROGRAM

## 2019-05-30 PROCEDURE — P9041 ALBUMIN (HUMAN),5%, 50ML: HCPCS | Performed by: NURSE ANESTHETIST, CERTIFIED REGISTERED

## 2019-05-30 PROCEDURE — 27810169 ZZH OR IMPLANT GENERAL: Performed by: NEUROLOGICAL SURGERY

## 2019-05-30 PROCEDURE — 37000009 ZZH ANESTHESIA TECHNICAL FEE, EACH ADDTL 15 MIN: Performed by: NEUROLOGICAL SURGERY

## 2019-05-30 PROCEDURE — 25000125 ZZHC RX 250: Performed by: NEUROLOGICAL SURGERY

## 2019-05-30 PROCEDURE — 82330 ASSAY OF CALCIUM: CPT | Performed by: PHYSICIAN ASSISTANT

## 2019-05-30 PROCEDURE — 84295 ASSAY OF SERUM SODIUM: CPT | Performed by: PHYSICIAN ASSISTANT

## 2019-05-30 PROCEDURE — 37000008 ZZH ANESTHESIA TECHNICAL FEE, 1ST 30 MIN: Performed by: NEUROLOGICAL SURGERY

## 2019-05-30 PROCEDURE — 87641 MR-STAPH DNA AMP PROBE: CPT | Performed by: INTERNAL MEDICINE

## 2019-05-30 PROCEDURE — 88341 IMHCHEM/IMCYTCHM EA ADD ANTB: CPT | Performed by: NEUROLOGICAL SURGERY

## 2019-05-30 PROCEDURE — 82330 ASSAY OF CALCIUM: CPT | Performed by: NEUROLOGICAL SURGERY

## 2019-05-30 PROCEDURE — 82803 BLOOD GASES ANY COMBINATION: CPT | Performed by: NEUROLOGICAL SURGERY

## 2019-05-30 PROCEDURE — 20000004 ZZH R&B ICU UMMC

## 2019-05-30 PROCEDURE — 82803 BLOOD GASES ANY COMBINATION: CPT | Performed by: PHYSICIAN ASSISTANT

## 2019-05-30 PROCEDURE — 25000128 H RX IP 250 OP 636: Performed by: NEUROLOGICAL SURGERY

## 2019-05-30 PROCEDURE — 88331 PATH CONSLTJ SURG 1 BLK 1SPC: CPT | Performed by: NEUROLOGICAL SURGERY

## 2019-05-30 PROCEDURE — 25000132 ZZH RX MED GY IP 250 OP 250 PS 637: Performed by: NURSE ANESTHETIST, CERTIFIED REGISTERED

## 2019-05-30 PROCEDURE — 87640 STAPH A DNA AMP PROBE: CPT | Performed by: INTERNAL MEDICINE

## 2019-05-30 PROCEDURE — 82947 ASSAY GLUCOSE BLOOD QUANT: CPT | Performed by: NEUROLOGICAL SURGERY

## 2019-05-30 PROCEDURE — 8E09XBZ COMPUTER ASSISTED PROCEDURE OF HEAD AND NECK REGION: ICD-10-PCS | Performed by: NEUROLOGICAL SURGERY

## 2019-05-30 PROCEDURE — 88342 IMHCHEM/IMCYTCHM 1ST ANTB: CPT | Performed by: NEUROLOGICAL SURGERY

## 2019-05-30 DEVICE — IMP PLATE SYN STR DOG BONE LOW PROFILE 2H TI 421.502: Type: IMPLANTABLE DEVICE | Site: SCALP | Status: FUNCTIONAL

## 2019-05-30 DEVICE — IMP BUR HOLE COVER 17MM LOW PROFILE TI 421.527: Type: IMPLANTABLE DEVICE | Site: SCALP | Status: FUNCTIONAL

## 2019-05-30 DEVICE — GRAFT DURAGEN 3X3" ID330: Type: IMPLANTABLE DEVICE | Site: SCALP | Status: FUNCTIONAL

## 2019-05-30 DEVICE — IMP PLATE SYN X LOW PROFILE 4H TI 421.510: Type: IMPLANTABLE DEVICE | Site: SCALP | Status: FUNCTIONAL

## 2019-05-30 DEVICE — IMP SCR SYN MATRIX LOW PROFILE 2.0X04MM EMERG 04.503.114.01: Type: IMPLANTABLE DEVICE | Site: SCALP | Status: FUNCTIONAL

## 2019-05-30 RX ORDER — FENTANYL CITRATE 50 UG/ML
25-50 INJECTION, SOLUTION INTRAMUSCULAR; INTRAVENOUS EVERY 5 MIN PRN
Status: DISCONTINUED | OUTPATIENT
Start: 2019-05-30 | End: 2019-05-30 | Stop reason: HOSPADM

## 2019-05-30 RX ORDER — OXYCODONE HYDROCHLORIDE 5 MG/1
5-10 TABLET ORAL
Status: DISCONTINUED | OUTPATIENT
Start: 2019-05-30 | End: 2019-06-01 | Stop reason: HOSPADM

## 2019-05-30 RX ORDER — HYDROMORPHONE HYDROCHLORIDE 1 MG/ML
.3-.5 INJECTION, SOLUTION INTRAMUSCULAR; INTRAVENOUS; SUBCUTANEOUS
Status: DISCONTINUED | OUTPATIENT
Start: 2019-05-30 | End: 2019-06-01 | Stop reason: HOSPADM

## 2019-05-30 RX ORDER — POTASSIUM CL/LIDO/0.9 % NACL 10MEQ/0.1L
10 INTRAVENOUS SOLUTION, PIGGYBACK (ML) INTRAVENOUS
Status: DISCONTINUED | OUTPATIENT
Start: 2019-05-30 | End: 2019-06-01 | Stop reason: HOSPADM

## 2019-05-30 RX ORDER — SODIUM CHLORIDE, SODIUM LACTATE, POTASSIUM CHLORIDE, CALCIUM CHLORIDE 600; 310; 30; 20 MG/100ML; MG/100ML; MG/100ML; MG/100ML
INJECTION, SOLUTION INTRAVENOUS CONTINUOUS
Status: DISCONTINUED | OUTPATIENT
Start: 2019-05-30 | End: 2019-05-30 | Stop reason: HOSPADM

## 2019-05-30 RX ORDER — ZINC SULFATE 50(220)MG
220 CAPSULE ORAL DAILY
Status: DISCONTINUED | OUTPATIENT
Start: 2019-05-31 | End: 2019-06-01 | Stop reason: HOSPADM

## 2019-05-30 RX ORDER — PANTOPRAZOLE SODIUM 40 MG/1
40 TABLET, DELAYED RELEASE ORAL
Status: DISCONTINUED | OUTPATIENT
Start: 2019-05-31 | End: 2019-06-01 | Stop reason: HOSPADM

## 2019-05-30 RX ORDER — ONDANSETRON 4 MG/1
4-8 TABLET, ORALLY DISINTEGRATING ORAL EVERY 6 HOURS PRN
Status: DISCONTINUED | OUTPATIENT
Start: 2019-05-30 | End: 2019-06-01 | Stop reason: HOSPADM

## 2019-05-30 RX ORDER — CEFAZOLIN SODIUM 2 G/100ML
2 INJECTION, SOLUTION INTRAVENOUS
Status: COMPLETED | OUTPATIENT
Start: 2019-05-30 | End: 2019-05-30

## 2019-05-30 RX ORDER — ACETAMINOPHEN 325 MG/1
650 TABLET ORAL EVERY 4 HOURS PRN
Status: DISCONTINUED | OUTPATIENT
Start: 2019-05-30 | End: 2019-06-01 | Stop reason: HOSPADM

## 2019-05-30 RX ORDER — POTASSIUM CHLORIDE 1.5 G/1.58G
20-40 POWDER, FOR SOLUTION ORAL
Status: DISCONTINUED | OUTPATIENT
Start: 2019-05-30 | End: 2019-06-01 | Stop reason: HOSPADM

## 2019-05-30 RX ORDER — SODIUM CHLORIDE 9 MG/ML
INJECTION, SOLUTION INTRAVENOUS CONTINUOUS
Status: DISCONTINUED | OUTPATIENT
Start: 2019-05-30 | End: 2019-05-31

## 2019-05-30 RX ORDER — NICOTINE POLACRILEX 4 MG
15-30 LOZENGE BUCCAL
Status: DISCONTINUED | OUTPATIENT
Start: 2019-05-30 | End: 2019-06-01 | Stop reason: HOSPADM

## 2019-05-30 RX ORDER — OXYCODONE HYDROCHLORIDE 5 MG/1
5 TABLET ORAL EVERY 4 HOURS PRN
Status: DISCONTINUED | OUTPATIENT
Start: 2019-05-30 | End: 2019-05-30

## 2019-05-30 RX ORDER — HYDROCHLOROTHIAZIDE 25 MG/1
25 TABLET ORAL DAILY
Status: DISCONTINUED | OUTPATIENT
Start: 2019-05-31 | End: 2019-06-01 | Stop reason: HOSPADM

## 2019-05-30 RX ORDER — POTASSIUM CHLORIDE 7.45 MG/ML
INJECTION INTRAVENOUS PRN
Status: DISCONTINUED | OUTPATIENT
Start: 2019-05-30 | End: 2019-05-30

## 2019-05-30 RX ORDER — DEXAMETHASONE 4 MG/1
4 TABLET ORAL EVERY 6 HOURS SCHEDULED
Status: DISCONTINUED | OUTPATIENT
Start: 2019-05-31 | End: 2019-05-31

## 2019-05-30 RX ORDER — LIDOCAINE 40 MG/G
CREAM TOPICAL
Status: DISCONTINUED | OUTPATIENT
Start: 2019-05-30 | End: 2019-06-01 | Stop reason: HOSPADM

## 2019-05-30 RX ORDER — MEPERIDINE HYDROCHLORIDE 25 MG/ML
12.5 INJECTION INTRAMUSCULAR; INTRAVENOUS; SUBCUTANEOUS
Status: DISCONTINUED | OUTPATIENT
Start: 2019-05-30 | End: 2019-05-30 | Stop reason: HOSPADM

## 2019-05-30 RX ORDER — BUPIVACAINE HYDROCHLORIDE AND EPINEPHRINE 2.5; 5 MG/ML; UG/ML
INJECTION, SOLUTION INFILTRATION; PERINEURAL PRN
Status: DISCONTINUED | OUTPATIENT
Start: 2019-05-30 | End: 2019-05-30 | Stop reason: HOSPADM

## 2019-05-30 RX ORDER — FENTANYL CITRATE 50 UG/ML
25-50 INJECTION, SOLUTION INTRAMUSCULAR; INTRAVENOUS
Status: DISCONTINUED | OUTPATIENT
Start: 2019-05-30 | End: 2019-05-30 | Stop reason: HOSPADM

## 2019-05-30 RX ORDER — CEFAZOLIN SODIUM 1 G/3ML
1 INJECTION, POWDER, FOR SOLUTION INTRAMUSCULAR; INTRAVENOUS SEE ADMIN INSTRUCTIONS
Status: DISCONTINUED | OUTPATIENT
Start: 2019-05-30 | End: 2019-05-30 | Stop reason: HOSPADM

## 2019-05-30 RX ORDER — ONDANSETRON 2 MG/ML
INJECTION INTRAMUSCULAR; INTRAVENOUS PRN
Status: DISCONTINUED | OUTPATIENT
Start: 2019-05-30 | End: 2019-05-30

## 2019-05-30 RX ORDER — MAGNESIUM SULFATE HEPTAHYDRATE 40 MG/ML
4 INJECTION, SOLUTION INTRAVENOUS EVERY 4 HOURS PRN
Status: DISCONTINUED | OUTPATIENT
Start: 2019-05-30 | End: 2019-06-01 | Stop reason: HOSPADM

## 2019-05-30 RX ORDER — ASCORBIC ACID 250 MG
500 TABLET,CHEWABLE ORAL 2 TIMES DAILY
Status: DISCONTINUED | OUTPATIENT
Start: 2019-05-30 | End: 2019-06-01 | Stop reason: HOSPADM

## 2019-05-30 RX ORDER — LABETALOL 20 MG/4 ML (5 MG/ML) INTRAVENOUS SYRINGE
PRN
Status: DISCONTINUED | OUTPATIENT
Start: 2019-05-30 | End: 2019-05-30

## 2019-05-30 RX ORDER — LABETALOL HYDROCHLORIDE 5 MG/ML
INJECTION, SOLUTION INTRAVENOUS PRN
Status: DISCONTINUED | OUTPATIENT
Start: 2019-05-30 | End: 2019-05-30

## 2019-05-30 RX ORDER — GLYCOPYRROLATE 0.2 MG/ML
INJECTION, SOLUTION INTRAMUSCULAR; INTRAVENOUS PRN
Status: DISCONTINUED | OUTPATIENT
Start: 2019-05-30 | End: 2019-05-30

## 2019-05-30 RX ORDER — LIDOCAINE HYDROCHLORIDE 20 MG/ML
INJECTION, SOLUTION INFILTRATION; PERINEURAL PRN
Status: DISCONTINUED | OUTPATIENT
Start: 2019-05-30 | End: 2019-05-30

## 2019-05-30 RX ORDER — SPIRONOLACTONE 25 MG
12.5 TABLET ORAL DAILY
Status: DISCONTINUED | OUTPATIENT
Start: 2019-05-31 | End: 2019-06-01 | Stop reason: HOSPADM

## 2019-05-30 RX ORDER — POLYETHYLENE GLYCOL 3350 17 G/17G
17 POWDER, FOR SOLUTION ORAL 3 TIMES DAILY
Status: DISCONTINUED | OUTPATIENT
Start: 2019-05-31 | End: 2019-06-01 | Stop reason: HOSPADM

## 2019-05-30 RX ORDER — DEXAMETHASONE SODIUM PHOSPHATE 4 MG/ML
4 INJECTION, SOLUTION INTRA-ARTICULAR; INTRALESIONAL; INTRAMUSCULAR; INTRAVENOUS; SOFT TISSUE
Status: DISCONTINUED | OUTPATIENT
Start: 2019-05-30 | End: 2019-05-30 | Stop reason: HOSPADM

## 2019-05-30 RX ORDER — ALBUMIN, HUMAN INJ 5% 5 %
SOLUTION INTRAVENOUS CONTINUOUS PRN
Status: DISCONTINUED | OUTPATIENT
Start: 2019-05-30 | End: 2019-05-30

## 2019-05-30 RX ORDER — ONDANSETRON 4 MG/1
4 TABLET, ORALLY DISINTEGRATING ORAL EVERY 30 MIN PRN
Status: DISCONTINUED | OUTPATIENT
Start: 2019-05-30 | End: 2019-05-30 | Stop reason: HOSPADM

## 2019-05-30 RX ORDER — LABETALOL 20 MG/4 ML (5 MG/ML) INTRAVENOUS SYRINGE
10
Status: DISCONTINUED | OUTPATIENT
Start: 2019-05-30 | End: 2019-05-30 | Stop reason: HOSPADM

## 2019-05-30 RX ORDER — ATORVASTATIN CALCIUM 40 MG/1
40 TABLET, FILM COATED ORAL DAILY
Status: DISCONTINUED | OUTPATIENT
Start: 2019-05-31 | End: 2019-06-01 | Stop reason: HOSPADM

## 2019-05-30 RX ORDER — EPHEDRINE SULFATE 50 MG/ML
INJECTION, SOLUTION INTRAMUSCULAR; INTRAVENOUS; SUBCUTANEOUS PRN
Status: DISCONTINUED | OUTPATIENT
Start: 2019-05-30 | End: 2019-05-30

## 2019-05-30 RX ORDER — POTASSIUM CHLORIDE 7.45 MG/ML
10 INJECTION INTRAVENOUS
Status: DISCONTINUED | OUTPATIENT
Start: 2019-05-30 | End: 2019-06-01 | Stop reason: HOSPADM

## 2019-05-30 RX ORDER — ONDANSETRON 2 MG/ML
4 INJECTION INTRAMUSCULAR; INTRAVENOUS EVERY 30 MIN PRN
Status: DISCONTINUED | OUTPATIENT
Start: 2019-05-30 | End: 2019-05-30 | Stop reason: HOSPADM

## 2019-05-30 RX ORDER — LOSARTAN POTASSIUM 100 MG/1
100 TABLET ORAL DAILY
Status: DISCONTINUED | OUTPATIENT
Start: 2019-05-31 | End: 2019-06-01 | Stop reason: HOSPADM

## 2019-05-30 RX ORDER — PROPOFOL 10 MG/ML
INJECTION, EMULSION INTRAVENOUS PRN
Status: DISCONTINUED | OUTPATIENT
Start: 2019-05-30 | End: 2019-05-30

## 2019-05-30 RX ORDER — NALOXONE HYDROCHLORIDE 0.4 MG/ML
.1-.4 INJECTION, SOLUTION INTRAMUSCULAR; INTRAVENOUS; SUBCUTANEOUS
Status: DISCONTINUED | OUTPATIENT
Start: 2019-05-30 | End: 2019-05-30 | Stop reason: HOSPADM

## 2019-05-30 RX ORDER — HYDRALAZINE HYDROCHLORIDE 20 MG/ML
2.5-5 INJECTION INTRAMUSCULAR; INTRAVENOUS EVERY 10 MIN PRN
Status: DISCONTINUED | OUTPATIENT
Start: 2019-05-30 | End: 2019-05-30 | Stop reason: HOSPADM

## 2019-05-30 RX ORDER — LIDOCAINE 40 MG/G
CREAM TOPICAL
Status: DISCONTINUED | OUTPATIENT
Start: 2019-05-30 | End: 2019-05-30 | Stop reason: HOSPADM

## 2019-05-30 RX ORDER — ONDANSETRON 2 MG/ML
4-8 INJECTION INTRAMUSCULAR; INTRAVENOUS EVERY 6 HOURS PRN
Status: DISCONTINUED | OUTPATIENT
Start: 2019-05-30 | End: 2019-06-01 | Stop reason: HOSPADM

## 2019-05-30 RX ORDER — DEXTROSE MONOHYDRATE 25 G/50ML
25-50 INJECTION, SOLUTION INTRAVENOUS
Status: DISCONTINUED | OUTPATIENT
Start: 2019-05-30 | End: 2019-06-01 | Stop reason: HOSPADM

## 2019-05-30 RX ORDER — HYDROMORPHONE HYDROCHLORIDE 1 MG/ML
.3-.5 INJECTION, SOLUTION INTRAMUSCULAR; INTRAVENOUS; SUBCUTANEOUS EVERY 5 MIN PRN
Status: DISCONTINUED | OUTPATIENT
Start: 2019-05-30 | End: 2019-05-30 | Stop reason: HOSPADM

## 2019-05-30 RX ORDER — LABETALOL 20 MG/4 ML (5 MG/ML) INTRAVENOUS SYRINGE
10-40 EVERY 10 MIN PRN
Status: DISCONTINUED | OUTPATIENT
Start: 2019-05-30 | End: 2019-06-01 | Stop reason: HOSPADM

## 2019-05-30 RX ORDER — HYDROMORPHONE HYDROCHLORIDE 1 MG/ML
.3-.5 INJECTION, SOLUTION INTRAMUSCULAR; INTRAVENOUS; SUBCUTANEOUS EVERY 10 MIN PRN
Status: DISCONTINUED | OUTPATIENT
Start: 2019-05-30 | End: 2019-05-30 | Stop reason: HOSPADM

## 2019-05-30 RX ORDER — POTASSIUM CHLORIDE 29.8 MG/ML
20 INJECTION INTRAVENOUS
Status: DISCONTINUED | OUTPATIENT
Start: 2019-05-30 | End: 2019-06-01 | Stop reason: HOSPADM

## 2019-05-30 RX ORDER — MEPERIDINE HYDROCHLORIDE 25 MG/ML
12.5 INJECTION INTRAMUSCULAR; INTRAVENOUS; SUBCUTANEOUS EVERY 5 MIN PRN
Status: DISCONTINUED | OUTPATIENT
Start: 2019-05-30 | End: 2019-05-30 | Stop reason: HOSPADM

## 2019-05-30 RX ORDER — HYDRALAZINE HYDROCHLORIDE 20 MG/ML
10-20 INJECTION INTRAMUSCULAR; INTRAVENOUS EVERY 30 MIN PRN
Status: DISCONTINUED | OUTPATIENT
Start: 2019-05-30 | End: 2019-06-01 | Stop reason: HOSPADM

## 2019-05-30 RX ORDER — LATANOPROST 50 UG/ML
1 SOLUTION/ DROPS OPHTHALMIC EVERY EVENING
Status: DISCONTINUED | OUTPATIENT
Start: 2019-05-30 | End: 2019-06-01 | Stop reason: HOSPADM

## 2019-05-30 RX ORDER — ALBUTEROL SULFATE 0.83 MG/ML
2.5 SOLUTION RESPIRATORY (INHALATION) EVERY 4 HOURS PRN
Status: DISCONTINUED | OUTPATIENT
Start: 2019-05-30 | End: 2019-05-30 | Stop reason: HOSPADM

## 2019-05-30 RX ORDER — AMOXICILLIN 250 MG
2 CAPSULE ORAL 2 TIMES DAILY
Status: DISCONTINUED | OUTPATIENT
Start: 2019-05-30 | End: 2019-06-01 | Stop reason: HOSPADM

## 2019-05-30 RX ORDER — ESOMEPRAZOLE MAGNESIUM 40 MG/1
40 CAPSULE, DELAYED RELEASE ORAL
Status: DISCONTINUED | OUTPATIENT
Start: 2019-05-31 | End: 2019-05-30 | Stop reason: ALTCHOICE

## 2019-05-30 RX ORDER — CALCIUM CARBONATE 500(1250)
500 TABLET ORAL 2 TIMES DAILY WITH MEALS
Status: DISCONTINUED | OUTPATIENT
Start: 2019-05-31 | End: 2019-06-01 | Stop reason: HOSPADM

## 2019-05-30 RX ORDER — POTASSIUM CHLORIDE 750 MG/1
20-40 TABLET, EXTENDED RELEASE ORAL
Status: DISCONTINUED | OUTPATIENT
Start: 2019-05-30 | End: 2019-06-01 | Stop reason: HOSPADM

## 2019-05-30 RX ORDER — NALOXONE HYDROCHLORIDE 0.4 MG/ML
.1-.4 INJECTION, SOLUTION INTRAMUSCULAR; INTRAVENOUS; SUBCUTANEOUS
Status: DISCONTINUED | OUTPATIENT
Start: 2019-05-30 | End: 2019-06-01 | Stop reason: HOSPADM

## 2019-05-30 RX ORDER — FENTANYL CITRATE 50 UG/ML
INJECTION, SOLUTION INTRAMUSCULAR; INTRAVENOUS PRN
Status: DISCONTINUED | OUTPATIENT
Start: 2019-05-30 | End: 2019-05-30

## 2019-05-30 RX ORDER — NALOXONE HYDROCHLORIDE 0.4 MG/ML
.1-.4 INJECTION, SOLUTION INTRAMUSCULAR; INTRAVENOUS; SUBCUTANEOUS
Status: DISCONTINUED | OUTPATIENT
Start: 2019-05-30 | End: 2019-05-30

## 2019-05-30 RX ADMIN — FENTANYL CITRATE 100 MCG: 50 INJECTION, SOLUTION INTRAMUSCULAR; INTRAVENOUS at 14:47

## 2019-05-30 RX ADMIN — Medication 5 MG: at 20:31

## 2019-05-30 RX ADMIN — HYDROCHLOROTHIAZIDE 25 MG: 12.5 CAPSULE ORAL at 08:58

## 2019-05-30 RX ADMIN — HYDROMORPHONE HYDROCHLORIDE 0.5 MG: 1 INJECTION, SOLUTION INTRAMUSCULAR; INTRAVENOUS; SUBCUTANEOUS at 21:08

## 2019-05-30 RX ADMIN — PANTOPRAZOLE SODIUM 40 MG: 40 TABLET, DELAYED RELEASE ORAL at 08:58

## 2019-05-30 RX ADMIN — ROCURONIUM BROMIDE 10 MG: 10 INJECTION INTRAVENOUS at 20:10

## 2019-05-30 RX ADMIN — DEXAMETHASONE 4 MG: 4 TABLET ORAL at 12:41

## 2019-05-30 RX ADMIN — POLYETHYLENE GLYCOL 400 AND PROPYLENE GLYCOL 3 DROP: 4; 3 SOLUTION/ DROPS OPHTHALMIC at 14:20

## 2019-05-30 RX ADMIN — Medication 5 MG: at 16:17

## 2019-05-30 RX ADMIN — HYDROMORPHONE HYDROCHLORIDE 0.5 MG: 1 INJECTION, SOLUTION INTRAMUSCULAR; INTRAVENOUS; SUBCUTANEOUS at 20:11

## 2019-05-30 RX ADMIN — Medication 5 MG: at 20:19

## 2019-05-30 RX ADMIN — PHENYLEPHRINE HYDROCHLORIDE 100 MCG: 10 INJECTION INTRAVENOUS at 21:17

## 2019-05-30 RX ADMIN — ROCURONIUM BROMIDE 20 MG: 10 INJECTION INTRAVENOUS at 16:06

## 2019-05-30 RX ADMIN — PHENYLEPHRINE HYDROCHLORIDE 100 MCG: 10 INJECTION INTRAVENOUS at 21:13

## 2019-05-30 RX ADMIN — Medication 5 MG: at 19:18

## 2019-05-30 RX ADMIN — ROCURONIUM BROMIDE 20 MG: 10 INJECTION INTRAVENOUS at 19:11

## 2019-05-30 RX ADMIN — Medication 5 MG: at 19:07

## 2019-05-30 RX ADMIN — SODIUM CHLORIDE, POTASSIUM CHLORIDE, SODIUM LACTATE AND CALCIUM CHLORIDE: 600; 310; 30; 20 INJECTION, SOLUTION INTRAVENOUS at 14:00

## 2019-05-30 RX ADMIN — Medication 5 MG: at 20:37

## 2019-05-30 RX ADMIN — PROPOFOL 50 MG: 10 INJECTION, EMULSION INTRAVENOUS at 21:08

## 2019-05-30 RX ADMIN — ALBUMIN HUMAN: 0.05 INJECTION, SOLUTION INTRAVENOUS at 19:46

## 2019-05-30 RX ADMIN — SODIUM CHLORIDE: 9 INJECTION, SOLUTION INTRAVENOUS at 22:09

## 2019-05-30 RX ADMIN — ROCURONIUM BROMIDE 10 MG: 10 INJECTION INTRAVENOUS at 20:23

## 2019-05-30 RX ADMIN — Medication 5 MG: at 20:23

## 2019-05-30 RX ADMIN — POTASSIUM CHLORIDE 10 MEQ: 7.46 INJECTION, SOLUTION INTRAVENOUS at 17:10

## 2019-05-30 RX ADMIN — SUGAMMADEX 200 MG: 100 INJECTION, SOLUTION INTRAVENOUS at 21:25

## 2019-05-30 RX ADMIN — PROPOFOL 50 MG: 10 INJECTION, EMULSION INTRAVENOUS at 15:31

## 2019-05-30 RX ADMIN — FENTANYL CITRATE 100 MCG: 50 INJECTION, SOLUTION INTRAMUSCULAR; INTRAVENOUS at 14:18

## 2019-05-30 RX ADMIN — Medication 5 MG: at 20:40

## 2019-05-30 RX ADMIN — CEFAZOLIN SODIUM 2 G: 2 INJECTION, SOLUTION INTRAVENOUS at 15:15

## 2019-05-30 RX ADMIN — ONDANSETRON 4 MG: 2 INJECTION INTRAMUSCULAR; INTRAVENOUS at 20:57

## 2019-05-30 RX ADMIN — AMINOLEVULINIC ACID HYDROCHLORIDE 1941 MG: 1500 POWDER, FOR SOLUTION ORAL at 12:37

## 2019-05-30 RX ADMIN — CEFAZOLIN 1 G: 1 INJECTION, POWDER, FOR SOLUTION INTRAMUSCULAR; INTRAVENOUS at 17:20

## 2019-05-30 RX ADMIN — DEXAMETHASONE 4 MG: 4 TABLET ORAL at 01:18

## 2019-05-30 RX ADMIN — ROCURONIUM BROMIDE 10 MG: 10 INJECTION INTRAVENOUS at 17:43

## 2019-05-30 RX ADMIN — FENTANYL CITRATE 50 MCG: 50 INJECTION, SOLUTION INTRAMUSCULAR; INTRAVENOUS at 15:20

## 2019-05-30 RX ADMIN — Medication 5 MG: at 19:28

## 2019-05-30 RX ADMIN — Medication 5 MG: at 19:01

## 2019-05-30 RX ADMIN — LABETALOL 20 MG/4 ML (5 MG/ML) INTRAVENOUS SYRINGE 5 MG: at 15:44

## 2019-05-30 RX ADMIN — PHENYLEPHRINE HYDROCHLORIDE 100 MCG: 10 INJECTION INTRAVENOUS at 18:51

## 2019-05-30 RX ADMIN — PHENYLEPHRINE HYDROCHLORIDE 100 MCG: 10 INJECTION INTRAVENOUS at 18:33

## 2019-05-30 RX ADMIN — Medication 5 MG: at 16:00

## 2019-05-30 RX ADMIN — Medication 5 MG: at 19:58

## 2019-05-30 RX ADMIN — ROCURONIUM BROMIDE 10 MG: 10 INJECTION INTRAVENOUS at 17:45

## 2019-05-30 RX ADMIN — PHENYLEPHRINE HYDROCHLORIDE 100 MCG: 10 INJECTION INTRAVENOUS at 21:23

## 2019-05-30 RX ADMIN — SODIUM CHLORIDE, PRESERVATIVE FREE: 5 INJECTION INTRAVENOUS at 01:19

## 2019-05-30 RX ADMIN — ALBUMIN HUMAN: 0.05 INJECTION, SOLUTION INTRAVENOUS at 16:47

## 2019-05-30 RX ADMIN — Medication 5 MG: at 18:52

## 2019-05-30 RX ADMIN — PHENYLEPHRINE HYDROCHLORIDE 0.1 MCG/KG/MIN: 10 INJECTION INTRAVENOUS at 16:18

## 2019-05-30 RX ADMIN — FENTANYL CITRATE 150 MCG: 50 INJECTION, SOLUTION INTRAMUSCULAR; INTRAVENOUS at 15:38

## 2019-05-30 RX ADMIN — GLYCOPYRROLATE 0.2 MG: 0.2 INJECTION, SOLUTION INTRAMUSCULAR; INTRAVENOUS at 16:07

## 2019-05-30 RX ADMIN — SENNOSIDES AND DOCUSATE SODIUM 2 TABLET: 8.6; 5 TABLET ORAL at 08:57

## 2019-05-30 RX ADMIN — LABETALOL HYDROCHLORIDE 10 MG: 5 INJECTION INTRAVENOUS at 21:27

## 2019-05-30 RX ADMIN — ROCURONIUM BROMIDE 50 MG: 10 INJECTION INTRAVENOUS at 14:59

## 2019-05-30 RX ADMIN — ALBUMIN HUMAN: 0.05 INJECTION, SOLUTION INTRAVENOUS at 16:56

## 2019-05-30 RX ADMIN — DEXAMETHASONE 4 MG: 4 TABLET ORAL at 07:01

## 2019-05-30 RX ADMIN — PROPOFOL 80 MG: 10 INJECTION, EMULSION INTRAVENOUS at 14:35

## 2019-05-30 RX ADMIN — HUMAN INSULIN 2 UNITS/HR: 100 INJECTION, SOLUTION SUBCUTANEOUS at 17:05

## 2019-05-30 RX ADMIN — LIDOCAINE HYDROCHLORIDE 100 MG: 20 INJECTION, SOLUTION INFILTRATION; PERINEURAL at 18:48

## 2019-05-30 RX ADMIN — POTASSIUM CHLORIDE 10 MEQ: 7.46 INJECTION, SOLUTION INTRAVENOUS at 20:04

## 2019-05-30 RX ADMIN — CEFAZOLIN 1 G: 1 INJECTION, POWDER, FOR SOLUTION INTRAMUSCULAR; INTRAVENOUS at 21:07

## 2019-05-30 RX ADMIN — POTASSIUM CHLORIDE 10 MEQ: 7.46 INJECTION, SOLUTION INTRAVENOUS at 19:08

## 2019-05-30 RX ADMIN — PHENYLEPHRINE HYDROCHLORIDE 150 MCG: 10 INJECTION INTRAVENOUS at 18:31

## 2019-05-30 RX ADMIN — LIDOCAINE HYDROCHLORIDE 100 MG: 20 INJECTION, SOLUTION INFILTRATION; PERINEURAL at 14:18

## 2019-05-30 RX ADMIN — Medication 5 MG: at 16:15

## 2019-05-30 RX ADMIN — CEFAZOLIN 1 G: 1 INJECTION, POWDER, FOR SOLUTION INTRAMUSCULAR; INTRAVENOUS at 19:11

## 2019-05-30 RX ADMIN — PHENYLEPHRINE HYDROCHLORIDE: 10 INJECTION INTRAVENOUS at 19:57

## 2019-05-30 RX ADMIN — PROPOFOL 30 MG: 10 INJECTION, EMULSION INTRAVENOUS at 21:22

## 2019-05-30 RX ADMIN — PHENYLEPHRINE HYDROCHLORIDE: 10 INJECTION INTRAVENOUS at 19:28

## 2019-05-30 RX ADMIN — LABETALOL 20 MG/4 ML (5 MG/ML) INTRAVENOUS SYRINGE 10 MG: at 15:53

## 2019-05-30 RX ADMIN — HYDROMORPHONE HYDROCHLORIDE 0.5 MG: 1 INJECTION, SOLUTION INTRAMUSCULAR; INTRAVENOUS; SUBCUTANEOUS at 18:25

## 2019-05-30 RX ADMIN — SODIUM CHLORIDE, POTASSIUM CHLORIDE, SODIUM LACTATE AND CALCIUM CHLORIDE: 600; 310; 30; 20 INJECTION, SOLUTION INTRAVENOUS at 17:46

## 2019-05-30 RX ADMIN — SODIUM CHLORIDE, POTASSIUM CHLORIDE, SODIUM LACTATE AND CALCIUM CHLORIDE: 600; 310; 30; 20 INJECTION, SOLUTION INTRAVENOUS at 18:42

## 2019-05-30 RX ADMIN — PROPOFOL 50 MG: 10 INJECTION, EMULSION INTRAVENOUS at 18:25

## 2019-05-30 RX ADMIN — ROCURONIUM BROMIDE 20 MG: 10 INJECTION INTRAVENOUS at 16:53

## 2019-05-30 RX ADMIN — Medication 5 MG: at 19:40

## 2019-05-30 RX ADMIN — Medication 5 MG: at 20:28

## 2019-05-30 RX ADMIN — FENTANYL CITRATE 100 MCG: 50 INJECTION, SOLUTION INTRAMUSCULAR; INTRAVENOUS at 15:32

## 2019-05-30 RX ADMIN — PROPOFOL 120 MG: 10 INJECTION, EMULSION INTRAVENOUS at 14:19

## 2019-05-30 RX ADMIN — ROCURONIUM BROMIDE 20 MG: 10 INJECTION INTRAVENOUS at 18:28

## 2019-05-30 RX ADMIN — ROCURONIUM BROMIDE 50 MG: 10 INJECTION INTRAVENOUS at 14:20

## 2019-05-30 RX ADMIN — Medication 5 MG: at 16:06

## 2019-05-30 RX ADMIN — POTASSIUM CHLORIDE 10 MEQ: 7.46 INJECTION, SOLUTION INTRAVENOUS at 18:10

## 2019-05-30 ASSESSMENT — ACTIVITIES OF DAILY LIVING (ADL)
ADLS_ACUITY_SCORE: 12

## 2019-05-30 ASSESSMENT — VISUAL ACUITY: OU: NORMAL ACUITY

## 2019-05-30 NOTE — ANESTHESIA PROCEDURE NOTES
Arterial Line Procedure Note  Staff:     Anesthesiologist:  Merlene Mane MD    Resident/CRNA:  Ashlee Hurd MD    Arterial line performed by resident/CRNA in presence of a teaching physician    Location: In OR After Induction  Procedure Start/Stop Times:     patient identified, IV checked, site marked, risks and benefits discussed, informed consent, monitors and equipment checked, pre-op evaluation and at physician/surgeon's request      Correct Patient: Yes      Correct Position: Yes      Correct Site: Yes      Correct Procedure: Yes      Correct Laterality:  Yes    Site Marked:  Yes  Line Placement:     Procedure:  Arterial Line    Insertion Site:  Radial    Insertion laterality:  Right    Skin Prep: Chloraprep      Patient Prep: patient draped, mask, sterile gloves and hat      Local skin infiltration:  None    Catheter size:  20 gauge, 12 cm    Cath secured with: suture      Dressing:  Tegaderm    Complications:  None obvious    Arterial waveform: Yes      IBP within 10% of NIBP: Yes

## 2019-05-30 NOTE — PLAN OF CARE
[facility] contacted 6A floor from read results of CT scan: Possible early ventricular enlargement w/pt w/brain mass. Writer contacted NSG team (Rocio ESCALANTE NP) returned call immediately, Assigned floor RN also updated on CT results, the plan is for OR this afternoon, will continue to monitor.

## 2019-05-30 NOTE — PROGRESS NOTES
Status: L frontal mass, OR today for crani and resection  Vitals: VSS/A on RA  Neuros: [place] L ear, A&Ox4, STMD, L facial droop, word-finding diff  IV: PIV, NS@75mL/hr  Resp/trach: LS clear  Diet: NPO since midnight  Bowel status: BS+  : Voiding spont  Skin: intact  Pain: denies  Activity: independent  Social: family at bedside  Plan: Going to 3C at this time.

## 2019-05-30 NOTE — PLAN OF CARE
Status: Pt admitted for large L frontal mass; OR scheduled this afternoon for  crani.   Vitals: VSS on RA  Neuros: A&O x4; STDM. L droop, [place] in L. Word-finding per report; none noted this shift   IV: PIV running NS at 75  mL/hr  Resp/trach: LS clear  Diet: NPO since midnight   Bowel status: BS +  : Voiding spontaneously  Skin: Intact   Pain: Denies  Activity: Up independently.   Social: Wife in room and supportive  Plan: CT and fiducials this AM. OR at 1530 for crani and resection. Continue to monitor and follow POC

## 2019-05-30 NOTE — PHARMACY-ADMISSION MEDICATION HISTORY
Admission medication history interview status for the 5/28/2019 admission is complete. See Epic admission navigator for allergy information, pharmacy, prior to admission medications and immunization status.     Medication history interview sources:  patient, wife, Nahid    Changes made to PTA medication list (reason)  Added: all meds were added to list  Deleted: none  Changed: none    Additional medication history information (including reliability of information, actions taken by pharmacist):  - Patient and his wife recognized all his medications and knew when he took them. They were unsure about the dosage strengths of some medications, [facility]   - Patient reported that he has not been needing esomeprazole recently, with the last dose about a month ago, but would still like to have it in case he needs them again.      Prior to Admission medications    Medication Sig Last Dose Taking? Auth Provider   aspirin 81 MG EC tablet Take 81 mg by mouth daily 5/26/2019 Yes Unknown, Entered By History   atorvastatin (LIPITOR) 80 MG tablet Take 40 mg (0.5 of the 80 mg tablet) by mouth daily 5/26/2019 Yes Unknown, Entered By History   esomeprazole (NEXIUM) 40 MG DR capsule Take 40 mg by mouth 2 times daily as needed  Past Month Yes Unknown, Entered By History   hydrochlorothiazide (HYDRODIURIL) 25 MG tablet Take 25 mg by mouth daily 5/26/2019 Yes Unknown, Entered By History   latanoprost (XALATAN) 0.005 % ophthalmic solution Place 1 drop into both eyes every evening  5/26/2019 Yes Unknown, Entered By History   losartan (COZAAR) 100 MG tablet Take 100 mg by mouth daily 5/26/2019 Yes Unknown, Entered By History   spironolactone (ALDACTONE) 25 MG tablet Take 12.5 mg (0.5 of the 25 mg tablet) by mouth daily 5/26/2019 Yes Unknown, Entered By History         Medication history completed by: Jaleesa Martin, PharmD  5/30/2019 9:00 AM

## 2019-05-30 NOTE — PROGRESS NOTES
"S: No Acute Events Overnight.     O:  Exam:  General: Awake;  Alert, In No Acute Distress  Pulm: Breathing Comfortably on Room Air  Mental status: Oriented x Person, Place and Time  Cranial Nerves: Cranial Nerves II-XII Intact Bilaterally  Strength:      Del Tr Bi WE WF Gr  R 5 5 5 5 5 5  L 5 5 5 5 5 5     HF KE KF DF PF   R 5 5 5 5 5   L 5 5 5 5 5     Pronator Drift: Absent  Sensory: Intact to Light Touch    Assessment: Mr. Farfan is a 64 year old male with a newly diagnosed Left Frontal Brain Tumor. MRI Images concerning for primary intracranial CNS tumor.     Plan by problem:     Neuro:   Neuro Examination Q 4 HR  CT Stealth w/ Fiducials for Pre-Operative Planning  Plan for Surgical Resection on Thursday 5/30/2019 w/ Dr. Downing  Anti-epileptics: Not Indicated; No H/O Seizures w/ presentation   Decadron 4 mg Q 6 HR for treatment of cerebral edema    Cardiovascular: Maintain SBP < 140 mmHg; Continue to hold home Aspirin (Last Dose 5/26/2019)    Pulmonary: Breathing Comfortably on Room Air    Gastrointestinal: NPO for Surgical Procedure;  Protonix 40 mg Daily while on Decadron; H/O Gastric Implant for GERD (MRI Compatible)    Renal: Voiding     Heme: No Issues    Endocrine: Insulin Sliding Scale while on Decadron     Infectious: No Issues     PT/OT: Evaluate Post-Operatively     DVT prophylaxis: Sequential compression devices to Bilateral Lower Extremities    DISPO:  6A    Barriers: surgery    Hyde \"Topher\" MD [place]   Neurosurgery, PGY-2    Please contact neurosurgery resident on call with questions.    Dial * * [phone], enter [phone] when prompted.     "

## 2019-05-31 ENCOUNTER — APPOINTMENT (OUTPATIENT)
Dept: OCCUPATIONAL THERAPY | Facility: CLINIC | Age: 64
DRG: 025 | End: 2019-05-31
Attending: STUDENT IN AN ORGANIZED HEALTH CARE EDUCATION/TRAINING PROGRAM
Payer: COMMERCIAL

## 2019-05-31 ENCOUNTER — APPOINTMENT (OUTPATIENT)
Dept: MRI IMAGING | Facility: CLINIC | Age: 64
DRG: 025 | End: 2019-05-31
Attending: STUDENT IN AN ORGANIZED HEALTH CARE EDUCATION/TRAINING PROGRAM
Payer: COMMERCIAL

## 2019-05-31 ENCOUNTER — APPOINTMENT (OUTPATIENT)
Dept: PHYSICAL THERAPY | Facility: CLINIC | Age: 64
DRG: 025 | End: 2019-05-31
Attending: STUDENT IN AN ORGANIZED HEALTH CARE EDUCATION/TRAINING PROGRAM
Payer: COMMERCIAL

## 2019-05-31 LAB
ANION GAP SERPL CALCULATED.3IONS-SCNC: 7 MMOL/L (ref 3–14)
BUN SERPL-MCNC: 14 MG/DL (ref 7–30)
CALCIUM SERPL-MCNC: 7.7 MG/DL (ref 8.5–10.1)
CHLORIDE SERPL-SCNC: 107 MMOL/L (ref 94–109)
CO2 SERPL-SCNC: 27 MMOL/L (ref 20–32)
CREAT SERPL-MCNC: 0.9 MG/DL (ref 0.66–1.25)
ERYTHROCYTE [DISTWIDTH] IN BLOOD BY AUTOMATED COUNT: 14.1 % (ref 10–15)
ERYTHROCYTE [DISTWIDTH] IN BLOOD BY AUTOMATED COUNT: 14.2 % (ref 10–15)
GFR SERPL CREATININE-BSD FRML MDRD: >90 ML/MIN/{1.73_M2}
GLUCOSE BLDC GLUCOMTR-MCNC: 162 MG/DL (ref 70–99)
GLUCOSE BLDC GLUCOMTR-MCNC: 177 MG/DL (ref 70–99)
GLUCOSE BLDC GLUCOMTR-MCNC: 195 MG/DL (ref 70–99)
GLUCOSE BLDC GLUCOMTR-MCNC: 210 MG/DL (ref 70–99)
GLUCOSE SERPL-MCNC: 140 MG/DL (ref 70–99)
HBA1C MFR BLD: 5.7 % (ref 0–5.6)
HCT VFR BLD AUTO: 33.5 % (ref 40–53)
HCT VFR BLD AUTO: 35 % (ref 40–53)
HGB BLD-MCNC: 11 G/DL (ref 13.3–17.7)
HGB BLD-MCNC: 11.5 G/DL (ref 13.3–17.7)
MAGNESIUM SERPL-MCNC: 1.5 MG/DL (ref 1.6–2.3)
MAGNESIUM SERPL-MCNC: 2.7 MG/DL (ref 1.6–2.3)
MCH RBC QN AUTO: 29.6 PG (ref 26.5–33)
MCH RBC QN AUTO: 29.6 PG (ref 26.5–33)
MCHC RBC AUTO-ENTMCNC: 32.8 G/DL (ref 31.5–36.5)
MCHC RBC AUTO-ENTMCNC: 32.9 G/DL (ref 31.5–36.5)
MCV RBC AUTO: 90 FL (ref 78–100)
MCV RBC AUTO: 90 FL (ref 78–100)
MRSA DNA SPEC QL NAA+PROBE: NEGATIVE
PHOSPHATE SERPL-MCNC: 4.3 MG/DL (ref 2.5–4.5)
PLATELET # BLD AUTO: 231 10E9/L (ref 150–450)
PLATELET # BLD AUTO: 250 10E9/L (ref 150–450)
POTASSIUM SERPL-SCNC: 3.3 MMOL/L (ref 3.4–5.3)
POTASSIUM SERPL-SCNC: 4 MMOL/L (ref 3.4–5.3)
RBC # BLD AUTO: 3.71 10E12/L (ref 4.4–5.9)
RBC # BLD AUTO: 3.89 10E12/L (ref 4.4–5.9)
SODIUM SERPL-SCNC: 141 MMOL/L (ref 133–144)
SPECIMEN SOURCE: NORMAL
WBC # BLD AUTO: 16.1 10E9/L (ref 4–11)
WBC # BLD AUTO: 17.1 10E9/L (ref 4–11)

## 2019-05-31 PROCEDURE — 25000131 ZZH RX MED GY IP 250 OP 636 PS 637: Performed by: STUDENT IN AN ORGANIZED HEALTH CARE EDUCATION/TRAINING PROGRAM

## 2019-05-31 PROCEDURE — 84100 ASSAY OF PHOSPHORUS: CPT | Performed by: NEUROLOGICAL SURGERY

## 2019-05-31 PROCEDURE — 97165 OT EVAL LOW COMPLEX 30 MIN: CPT | Mod: GO | Performed by: OCCUPATIONAL THERAPIST

## 2019-05-31 PROCEDURE — 97535 SELF CARE MNGMENT TRAINING: CPT | Mod: GO | Performed by: OCCUPATIONAL THERAPIST

## 2019-05-31 PROCEDURE — 97110 THERAPEUTIC EXERCISES: CPT | Mod: GO | Performed by: OCCUPATIONAL THERAPIST

## 2019-05-31 PROCEDURE — 97161 PT EVAL LOW COMPLEX 20 MIN: CPT | Mod: GP

## 2019-05-31 PROCEDURE — 97530 THERAPEUTIC ACTIVITIES: CPT | Mod: GO | Performed by: OCCUPATIONAL THERAPIST

## 2019-05-31 PROCEDURE — 97116 GAIT TRAINING THERAPY: CPT | Mod: GP

## 2019-05-31 PROCEDURE — 83735 ASSAY OF MAGNESIUM: CPT | Performed by: NEUROLOGICAL SURGERY

## 2019-05-31 PROCEDURE — 97530 THERAPEUTIC ACTIVITIES: CPT | Mod: GP

## 2019-05-31 PROCEDURE — A9585 GADOBUTROL INJECTION: HCPCS | Performed by: NEUROLOGICAL SURGERY

## 2019-05-31 PROCEDURE — 25000132 ZZH RX MED GY IP 250 OP 250 PS 637: Performed by: STUDENT IN AN ORGANIZED HEALTH CARE EDUCATION/TRAINING PROGRAM

## 2019-05-31 PROCEDURE — 70553 MRI BRAIN STEM W/O & W/DYE: CPT

## 2019-05-31 PROCEDURE — 80048 BASIC METABOLIC PNL TOTAL CA: CPT | Performed by: NEUROLOGICAL SURGERY

## 2019-05-31 PROCEDURE — 12000001 ZZH R&B MED SURG/OB UMMC

## 2019-05-31 PROCEDURE — 84132 ASSAY OF SERUM POTASSIUM: CPT | Performed by: STUDENT IN AN ORGANIZED HEALTH CARE EDUCATION/TRAINING PROGRAM

## 2019-05-31 PROCEDURE — 83036 HEMOGLOBIN GLYCOSYLATED A1C: CPT | Performed by: NEUROLOGICAL SURGERY

## 2019-05-31 PROCEDURE — 83735 ASSAY OF MAGNESIUM: CPT | Performed by: STUDENT IN AN ORGANIZED HEALTH CARE EDUCATION/TRAINING PROGRAM

## 2019-05-31 PROCEDURE — 25000128 H RX IP 250 OP 636: Performed by: STUDENT IN AN ORGANIZED HEALTH CARE EDUCATION/TRAINING PROGRAM

## 2019-05-31 PROCEDURE — 25500064 ZZH RX 255 OP 636: Performed by: NEUROLOGICAL SURGERY

## 2019-05-31 PROCEDURE — 36415 COLL VENOUS BLD VENIPUNCTURE: CPT | Performed by: STUDENT IN AN ORGANIZED HEALTH CARE EDUCATION/TRAINING PROGRAM

## 2019-05-31 PROCEDURE — 85027 COMPLETE CBC AUTOMATED: CPT | Performed by: NEUROLOGICAL SURGERY

## 2019-05-31 PROCEDURE — 00000146 ZZHCL STATISTIC GLUCOSE BY METER IP

## 2019-05-31 RX ORDER — DEXAMETHASONE 4 MG/1
4 TABLET ORAL EVERY 8 HOURS SCHEDULED
Status: DISCONTINUED | OUTPATIENT
Start: 2019-05-31 | End: 2019-05-31

## 2019-05-31 RX ORDER — DEXAMETHASONE 2 MG/1
2 TABLET ORAL EVERY 8 HOURS SCHEDULED
Status: DISCONTINUED | OUTPATIENT
Start: 2019-06-06 | End: 2019-05-31

## 2019-05-31 RX ORDER — GADOBUTROL 604.72 MG/ML
10 INJECTION INTRAVENOUS ONCE
Status: COMPLETED | OUTPATIENT
Start: 2019-05-31 | End: 2019-05-31

## 2019-05-31 RX ORDER — DEXAMETHASONE 1 MG
2 TABLET ORAL DAILY
Status: DISCONTINUED | OUTPATIENT
Start: 2019-06-12 | End: 2019-06-01

## 2019-05-31 RX ORDER — DEXAMETHASONE 1 MG
3 TABLET ORAL EVERY 8 HOURS SCHEDULED
Status: DISCONTINUED | OUTPATIENT
Start: 2019-06-04 | End: 2019-06-01

## 2019-05-31 RX ORDER — DEXAMETHASONE 1 MG
2 TABLET ORAL EVERY 8 HOURS SCHEDULED
Status: DISCONTINUED | OUTPATIENT
Start: 2019-06-08 | End: 2019-06-01

## 2019-05-31 RX ORDER — DEXAMETHASONE 2 MG/1
2 TABLET ORAL DAILY
Status: DISCONTINUED | OUTPATIENT
Start: 2019-06-09 | End: 2019-05-31

## 2019-05-31 RX ORDER — DEXAMETHASONE 4 MG/1
4 TABLET ORAL EVERY 8 HOURS SCHEDULED
Status: DISCONTINUED | OUTPATIENT
Start: 2019-05-31 | End: 2019-06-01

## 2019-05-31 RX ADMIN — ZINC SULFATE CAP 220 MG (50 MG ELEMENTAL ZN) 220 MG: 220 (50 ZN) CAP at 08:25

## 2019-05-31 RX ADMIN — PANTOPRAZOLE SODIUM 40 MG: 40 TABLET, DELAYED RELEASE ORAL at 08:24

## 2019-05-31 RX ADMIN — INSULIN ASPART 1 UNITS: 100 INJECTION, SOLUTION INTRAVENOUS; SUBCUTANEOUS at 08:34

## 2019-05-31 RX ADMIN — SENNOSIDES AND DOCUSATE SODIUM 2 TABLET: 8.6; 5 TABLET ORAL at 00:16

## 2019-05-31 RX ADMIN — ACETAMINOPHEN 650 MG: 325 TABLET, FILM COATED ORAL at 14:10

## 2019-05-31 RX ADMIN — POTASSIUM CHLORIDE 40 MEQ: 750 TABLET, EXTENDED RELEASE ORAL at 04:43

## 2019-05-31 RX ADMIN — CALCIUM 500 MG: 500 TABLET ORAL at 08:25

## 2019-05-31 RX ADMIN — POLYETHYLENE GLYCOL 3350 17 G: 17 POWDER, FOR SOLUTION ORAL at 08:25

## 2019-05-31 RX ADMIN — MAGNESIUM SULFATE HEPTAHYDRATE 4 G: 40 INJECTION, SOLUTION INTRAVENOUS at 06:16

## 2019-05-31 RX ADMIN — INSULIN ASPART 1 UNITS: 100 INJECTION, SOLUTION INTRAVENOUS; SUBCUTANEOUS at 17:14

## 2019-05-31 RX ADMIN — DEXAMETHASONE 4 MG: 4 TABLET ORAL at 14:53

## 2019-05-31 RX ADMIN — SENNOSIDES AND DOCUSATE SODIUM 2 TABLET: 8.6; 5 TABLET ORAL at 08:25

## 2019-05-31 RX ADMIN — SENNOSIDES AND DOCUSATE SODIUM 2 TABLET: 8.6; 5 TABLET ORAL at 19:49

## 2019-05-31 RX ADMIN — Medication 500 MG: at 00:16

## 2019-05-31 RX ADMIN — LATANOPROST 1 DROP: 50 SOLUTION OPHTHALMIC at 19:58

## 2019-05-31 RX ADMIN — ATORVASTATIN CALCIUM 40 MG: 40 TABLET, FILM COATED ORAL at 08:25

## 2019-05-31 RX ADMIN — DEXAMETHASONE 4 MG: 4 TABLET ORAL at 06:03

## 2019-05-31 RX ADMIN — ACETAMINOPHEN 650 MG: 325 TABLET, FILM COATED ORAL at 09:41

## 2019-05-31 RX ADMIN — Medication 20000 UNITS: at 08:25

## 2019-05-31 RX ADMIN — LATANOPROST 1 DROP: 50 SOLUTION OPHTHALMIC at 00:17

## 2019-05-31 RX ADMIN — HYDROCHLOROTHIAZIDE 25 MG: 25 TABLET ORAL at 08:26

## 2019-05-31 RX ADMIN — CALCIUM 500 MG: 500 TABLET ORAL at 17:22

## 2019-05-31 RX ADMIN — Medication 12.5 MG: at 08:28

## 2019-05-31 RX ADMIN — POTASSIUM CHLORIDE 20 MEQ: 750 TABLET, EXTENDED RELEASE ORAL at 22:05

## 2019-05-31 RX ADMIN — VITAMIN E CAP 100 UNIT 100 UNITS: 100 CAP at 08:25

## 2019-05-31 RX ADMIN — DEXAMETHASONE 4 MG: 4 TABLET ORAL at 22:05

## 2019-05-31 RX ADMIN — DEXAMETHASONE 4 MG: 4 TABLET ORAL at 00:16

## 2019-05-31 RX ADMIN — LOSARTAN POTASSIUM 100 MG: 100 TABLET, FILM COATED ORAL at 08:24

## 2019-05-31 RX ADMIN — POTASSIUM CHLORIDE 20 MEQ: 750 TABLET, EXTENDED RELEASE ORAL at 06:40

## 2019-05-31 RX ADMIN — ACETAMINOPHEN 650 MG: 325 TABLET, FILM COATED ORAL at 19:58

## 2019-05-31 RX ADMIN — GADOBUTROL 10 ML: 604.72 INJECTION INTRAVENOUS at 16:12

## 2019-05-31 RX ADMIN — Medication 500 MG: at 19:49

## 2019-05-31 RX ADMIN — INSULIN ASPART 2 UNITS: 100 INJECTION, SOLUTION INTRAVENOUS; SUBCUTANEOUS at 11:44

## 2019-05-31 RX ADMIN — Medication 500 MG: at 08:25

## 2019-05-31 ASSESSMENT — VISUAL ACUITY
OU: NORMAL ACUITY

## 2019-05-31 ASSESSMENT — ACTIVITIES OF DAILY LIVING (ADL)
ADLS_ACUITY_SCORE: 12
ADLS_ACUITY_SCORE: 11
IADL_COMMENTS: OT: PT WAS IND, FAMILY CAN A PRN
ADLS_ACUITY_SCORE: 12
ADLS_ACUITY_SCORE: 11

## 2019-05-31 ASSESSMENT — PAIN DESCRIPTION - DESCRIPTORS
DESCRIPTORS: SORE;ACHING
DESCRIPTORS: SORE
DESCRIPTORS: SORE

## 2019-05-31 ASSESSMENT — MIFFLIN-ST. JEOR: SCORE: 1897.75

## 2019-05-31 NOTE — ANESTHESIA CARE TRANSFER NOTE
Patient: Gopi Farfan    Procedure(s):  Stealth Guided Left Craniotomy And Tumor Resection    Diagnosis: Brain Tumor  Diagnosis Additional Information: No value filed.    Anesthesia Type:   No value filed.     Note:  Airway :Face Mask  Patient transferred to:PACU  Handoff Report: Identifed the Patient, Identified the Reponsible Provider, Reviewed the pertinent medical history, Discussed the surgical course, Reviewed Intra-OP anesthesia mangement and issues during anesthesia, Set expectations for post-procedure period and Allowed opportunity for questions and acknowledgement of understanding      Vitals: (Last set prior to Anesthesia Care Transfer)    CRNA VITALS  5/30/2019 2102 - 5/30/2019 2136 5/30/2019             Resp Rate (observed):  20    Resp Rate (set):  10                Electronically Signed By: LEONID Menard CRNA  May 30, 2019  9:36 PM

## 2019-05-31 NOTE — PROGRESS NOTES
Neurosurgery Progress Note      S: underwent surgery yesterday. Denies headaches, nausea, vomiting.     O:  Exam:  General: Awake;  Alert, In No Acute Distress  Pulm: Breathing Comfortably on Room Air  Mental status: Oriented x Person, Place and Time; intact repetition and naming of common objects  Cranial Nerves: brow lift, shoulder shrug, tongue protrusion, and smile symmetric. Extraocular muscles intact. Pupils react equally and bilaterally.   Strength:      Del Tr Bi WE WF Gr  R 5 5 5 5 5 5  L 5 5 5 5 5 5     HF KE KF DF PF   R 5 5 5 5 5   L 5 5 5 5 5     Pronator Drift: Absent  Sensory: Intact to Light Touch    Assessment: Mr. Farfan is a 64 year old male with a newly diagnosed left frontal brain tumor. Status post craniotomy for resection 5/30/19. Final pathology: pending.    Plan by problem:     Neuro:   PT/OT  Decadron taper for treatment of cerebral edema  MRI brain post-op to assess extent of tumor resection     Cardiovascular: Maintain SBP < 140 mmHg; Continue to hold home Aspirin (Last Dose 5/26/2019)    Pulmonary: incentive spirometry     Gastrointestinal: Advance diet as tolerated;  Protonix 40 mg Daily while on Decadron; H/O Gastric Implant for GERD (MRI Compatible)    Renal: monitor intake and output     Heme: No Issues    Endocrine: Insulin Sliding Scale while on Decadron     Infectious: monitor for fever     PT/OT: Evaluate Post-Operatively     DVT prophylaxis: Sequential compression devices to Bilateral Lower Extremities    DISPO: 4A followed by 6A pending review of MRI    Barriers: evaluation by therapies, post-op brain MRI      LEONID Ceballos, CNP  Department of Neurosurgery  Pager: [phone]

## 2019-05-31 NOTE — PROGRESS NOTES
Admitted/transferred from:   Reason for admission/transfer: TRANSFER FROM PACU, post-op Craniotomy.  Patient status upon admission/transfer: Stable  Interventions: N/A  Plan: Continue to monitor  2 RN skin assessment: completed by Perez Duron RN and Padmini Givens RN.   Result of skin assessment and interventions/actions: Devices intact, Craniotomy incision.   Height, weight, drug calc weight: done   Patient belongings: Suitcase and several bags  MDRO education (if applicable): N/A

## 2019-05-31 NOTE — PROGRESS NOTES
05/31/19 0800   Quick Adds   Type of Visit Initial Occupational Therapy Evaluation   Living Environment   Lives With spouse   Living Arrangements house   Home Accessibility stairs within home   Number of Stairs, Within Home, Primary   (18)   Stair Railings, Within Home, Primary railing on left side (ascending)   Transportation Anticipated family or friend will provide;car, drives self   Living Environment Comment Pt lives with SO in 2 story home, bedroom and BR on upper level.. full flight of stairs with L ascending handrail. 1 LUÍS home (threshold) Pt was driving prior to sx.    Self-Care   Usual Activity Tolerance excellent   Current Activity Tolerance good   Regular Exercise No   Equipment Currently Used at Home none   Activity/Exercise/Self-Care Comment Pt was indep with self cares, working as an  prior to admit    Functional Level   Ambulation 0-->independent   Transferring 0-->independent   Toileting 0-->independent   Bathing 0-->independent   Dressing 0-->independent   Eating 0-->independent   Communication 0-->understands/communicates without difficulty   Swallowing 0-->swallows foods/liquids without difficulty   Cognition 0 - no cognition issues reported   Fall history within last six months no   Which of the above functional risks had a recent onset or change? ambulation;transferring   Prior Functional Level Comment Pt was indep with mobility prior to admission   General Information   Onset of Illness/Injury or Date of Surgery - Date 05/28/19   Referring Physician Barrett Mcdaniels MD   Patient/Family Goals Statement 64 year old right handed man who works as an , who was admitted to [facility] in [place] today for progressive short term memory loss over the past several weeks. Wife reports that he has difficulty concentrating and sometimes forgets to reply to people. He denies word finding difficulties however. He also reports having gait disturbance and feels he walks slower and  "more deliberate. He has intermittent headaches which have not changed from baseline. No nausea or vomiting. 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 He was then transferred to [facility] for higher level of care. Patient takes aspirin 81mg for primary prevention but his last dose was on 5/26/19. He has not had any seizures so far.. post op crani 5/30.   Additional Occupational Profile Info/Pertinent History of Current Problem per chart \"Mr. aFrfan is a 64 year old male with a newly diagnosed left frontal brain tumor. Status post craniotomy for resection 5/30/19\"   Precautions/Limitations fall precautions  (crani)   Weight-Bearing Status - LUE   (10# lifting restriction 2/2 crani)   Weight-Bearing Status - RUE   (10# lifting restriction 2/2 crani)   Visual Perception   Visual Perception No deficits were identified   Sensory Examination   Sensory Quick Adds No deficits were identified   Range of Motion (ROM)   ROM Comment OT: BUE WFL   Strength   Strength Comments OT: BUE WFL, not formally test 2/2 post surgical precautions   Muscle Tone Assessment   Muscle Tone Comments OT: BUE overall deconditioned   Mobility   Bed Mobility Comments OT: SBA after education   Transfer Skills   Transfer Comments OT: CGA   Instrumental Activities of Daily Living (IADL)   IADL Comments OT: Pt was ind, family can A prn   Activities of Daily Living Analysis   Impairments Contributing to Impaired Activities of Daily Living cognition impaired;balance impaired;strength decreased;post surgical precautions   General Therapy Interventions   Planned Therapy Interventions ADL retraining;IADL retraining;balance training;bed mobility training;cognition;strengthening;transfer training;home program guidelines;progressive activity/exercise   Clinical Impression   Criteria for Skilled Therapeutic Interventions Met yes, treatment indicated   OT Diagnosis decreased ind in ADLS/IADLS " "  Influenced by the following impairments generalized weakness and post surgical precautions   Assessment of Occupational Performance 1-3 Performance Deficits   Identified Performance Deficits decreased ind in ADLS/IADLS   Clinical Decision Making (Complexity) Low complexity   Therapy Frequency daily   Predicted Duration of Therapy Intervention (days/wks) 6/5/19   Anticipated Discharge Disposition Home with Outpatient Therapy   Risks and Benefits of Treatment have been explained. Yes   Patient, Family & other staff in agreement with plan of care Yes   [facility] TM \"6 Clicks\"   2016, Trustees of [facility], under license to [employer].  All rights reserved.   6 Clicks Short Forms Daily Activity Inpatient Short Form   [facility]  \"6 Clicks\" Daily Activity Inpatient Short Form   1. Putting on and taking off regular lower body clothing? 3 - A Little   2. Bathing (including washing, rinsing, drying)? 3 - A Little   3. Toileting, which includes using toilet, bedpan or urinal? 3 - A Little   4. Putting on and taking off regular upper body clothing? 3 - A Little   5. Taking care of personal grooming such as brushing teeth? 4 - None   6. Eating meals? 4 - None   Daily Activity Raw Score (Score out of 24.Lower scores equate to lower levels of function) 20   Total Evaluation Time   Total Evaluation Time (Minutes) 6     "

## 2019-05-31 NOTE — PLAN OF CARE
Discharge Planner PT   Patient plan for discharge: home   Current status: PT eval completed, tx indicated. Pt completes bed mobility and supine>sit with HOB slightly elevated and supervision. STS with minor UE use. Ambulated ~15' with CGA and LE started to buckle, chair pulled in and seated rest break provided. Ambulated additional ~150' with CGA - no knee buckling noted. Utilized BR- mod indep. AVSS on RA.   Barriers to return to prior living situation: medical needs, stairs   Recommendations for discharge: anticipate home with support of family when medically stable   Rationale for recommendations: would benefit from ongoing PT for initiation of stair training as well as higher level balance to promote safe return to PLOF.        Entered by: Latosha Flores 05/31/2019 2:22 PM

## 2019-05-31 NOTE — BRIEF OP NOTE
[facility] Brief Operative Note    Pre-operative diagnosis: Brain Tumor   Post-operative diagnosis Intrinsic brain tumor   Procedure: Procedure(s):  Stealth Guided Left Craniotomy And Tumor Resection   Surgeon(s): Surgeon(s) and Role:     * Abdullahi Downing MD - Primary     * Wsiam Foley MD - Resident - Assisting     * Barrett Mcdaniels MD - Resident - Assisting   Estimated blood loss: 1000 mL    Specimens: ID Type Source Tests Collected by Time Destination   A : LEFT FRONTAL MASS Tissue Brain SURGICAL PATHOLOGY EXAM Abdullahi Downing MD 5/30/2019  4:17 PM    B : Left Frontal Mass Tissue Brain SURGICAL PATHOLOGY EXAM Abdullahi Downing MD 5/30/2019  4:41 PM    C : LEFT Frontal Mass Tissue Brain SURGICAL PATHOLOGY EXAM Abdullahi Downing MD 5/30/2019  6:20 PM       Findings: Oligo vs. glial

## 2019-05-31 NOTE — ANESTHESIA POSTPROCEDURE EVALUATION
Anesthesia POST Procedure Evaluation    Patient: Gopi Farfan   MRN:     [id number] Gender:   male   Age:    64 year old :      1955        Preoperative Diagnosis: Brain Tumor   Procedure(s):  Stealth Guided Left Craniotomy And Tumor Resection   Postop Comments: No value filed.       Anesthesia Type:  General  No value filed.    Reportable Event: NO     PAIN: Uncomplicated   Sign Out status: Comfortable, Well controlled pain     PONV: No PONV   Sign Out status:  No Nausea or Vomiting     Neuro/Psych: Uneventful perioperative course   Sign Out Status: Preoperative baseline; Age appropriate mentation     Airway/Resp.: Uneventful perioperative course   Sign Out Status: Non labored breathing, age appropriate RR; Resp. Status within EXPECTED Parameters     CV: Uneventful perioperative course   Sign Out status: Appropriate BP and perfusion indices; Appropriate HR/Rhythm     Disposition:   Sign Out in:  ICU  Disposition:  ICU  Recovery Course: Recovery in ICU  Follow-Up: Not required           Last Anesthesia Record Vitals:  CRNA VITALS  2019 2102 - 2019 2202      2019             Resp Rate (observed):  20    Resp Rate (set):  10          Last PACU Vitals:  Vitals Value Taken Time   /76 2019  9:40 PM   Temp 37.2  C (98.9  F) 2019  9:40 PM   Pulse     Resp 20 2019  9:40 PM   SpO2 97 % 2019  9:40 PM   Temp src     NIBP     Pulse     SpO2     Resp     Temp     Ht Rate     Temp 2           Electronically Signed By: Brando Figueroa MD, May 30, 2019, 10:10 PM

## 2019-05-31 NOTE — PLAN OF CARE
Discharge Planner OT   Patient plan for discharge: home  Current status: OT educated pt on ADLS and ADL transfers w/in post surgical precautions, pt CGA-SBA LB dressing and functional transfers during OT tx session.   Barriers to return to prior living situation: medical status  Recommendations for discharge: Home w/ OP therapy  Rationale for recommendations: to increase ind in ADLS/IADLS       Entered by: Thu Cervantes 05/31/2019 4:16 PM

## 2019-05-31 NOTE — ANESTHESIA CARE TRANSFER NOTE
Patient: Gopi Farfan    Procedure(s):  Stealth Guided Left Craniotomy And Tumor Resection    Diagnosis: Brain Tumor  Diagnosis Additional Information: No value filed.    Anesthesia Type:   No value filed.     Note:  Anesthesia Care Transfer Notewriter    Vitals: (Last set prior to Anesthesia Care Transfer)    CRNA VITALS  5/30/2019 2102 - 5/30/2019 2136 5/30/2019             Resp Rate (observed):  20    Resp Rate (set):  10                Electronically Signed By: LEONID Menard CRNA  May 30, 2019  9:36 PM

## 2019-05-31 NOTE — PLAN OF CARE
D/I: Patient on unit 4A Surgical/Neuro ICU     Neuro: Intact; A&O x 4; Follows commands; Speech clear/logical; Denies numbness and tingling; c/o L. Jaw pain declines intervention; +5 str in all extremities.    CV: Afebrile; SB-SR, slight ST depression; BP nominal, MAP> 65; CMS intact; Good pulses.  Pulm:4L NC sating mid 90s; clear/dim; good cough.  GI:clear liquid diet, advanced as tolerated; BS active; No BM overnight;  Endo:  upon arrival from PACU  : Douglas removed @ 0600, UO nominal. Due to void @ 1000.   Skin: L. craniotomy  Incision and devices.   Access:  PIV x 2.  Drains: N/A  Gtt:NS 75  Labs/Replacement: K 3.3, replacing PO; Mg 1.5, replacing IV.     A: Stable    P: Will continue to monitor Pt closely and notify MD of any significant changes.

## 2019-06-01 ENCOUNTER — APPOINTMENT (OUTPATIENT)
Dept: PHYSICAL THERAPY | Facility: CLINIC | Age: 64
DRG: 025 | End: 2019-06-01
Attending: NEUROLOGICAL SURGERY
Payer: COMMERCIAL

## 2019-06-01 ENCOUNTER — APPOINTMENT (OUTPATIENT)
Dept: OCCUPATIONAL THERAPY | Facility: CLINIC | Age: 64
DRG: 025 | End: 2019-06-01
Attending: NEUROLOGICAL SURGERY
Payer: COMMERCIAL

## 2019-06-01 VITALS
HEART RATE: 77 BPM | WEIGHT: 227.74 LBS | HEIGHT: 74 IN | RESPIRATION RATE: 18 BRPM | DIASTOLIC BLOOD PRESSURE: 73 MMHG | OXYGEN SATURATION: 96 % | BODY MASS INDEX: 29.23 KG/M2 | TEMPERATURE: 98.4 F | SYSTOLIC BLOOD PRESSURE: 126 MMHG

## 2019-06-01 LAB
ANION GAP SERPL CALCULATED.3IONS-SCNC: 4 MMOL/L (ref 3–14)
BUN SERPL-MCNC: 13 MG/DL (ref 7–30)
CALCIUM SERPL-MCNC: 9.3 MG/DL (ref 8.5–10.1)
CHLORIDE SERPL-SCNC: 102 MMOL/L (ref 94–109)
CO2 SERPL-SCNC: 29 MMOL/L (ref 20–32)
CREAT SERPL-MCNC: 0.88 MG/DL (ref 0.66–1.25)
ERYTHROCYTE [DISTWIDTH] IN BLOOD BY AUTOMATED COUNT: 14.1 % (ref 10–15)
GFR SERPL CREATININE-BSD FRML MDRD: >90 ML/MIN/{1.73_M2}
GLUCOSE BLDC GLUCOMTR-MCNC: 143 MG/DL (ref 70–99)
GLUCOSE SERPL-MCNC: 140 MG/DL (ref 70–99)
HCT VFR BLD AUTO: 36.8 % (ref 40–53)
HGB BLD-MCNC: 12.1 G/DL (ref 13.3–17.7)
MAGNESIUM SERPL-MCNC: 2.4 MG/DL (ref 1.6–2.3)
MCH RBC QN AUTO: 30 PG (ref 26.5–33)
MCHC RBC AUTO-ENTMCNC: 32.9 G/DL (ref 31.5–36.5)
MCV RBC AUTO: 91 FL (ref 78–100)
PHOSPHATE SERPL-MCNC: 2.8 MG/DL (ref 2.5–4.5)
PLATELET # BLD AUTO: 261 10E9/L (ref 150–450)
POTASSIUM SERPL-SCNC: 3.9 MMOL/L (ref 3.4–5.3)
RBC # BLD AUTO: 4.04 10E12/L (ref 4.4–5.9)
SODIUM SERPL-SCNC: 135 MMOL/L (ref 133–144)
WBC # BLD AUTO: 17.8 10E9/L (ref 4–11)

## 2019-06-01 PROCEDURE — 00000146 ZZHCL STATISTIC GLUCOSE BY METER IP

## 2019-06-01 PROCEDURE — 97535 SELF CARE MNGMENT TRAINING: CPT | Mod: GO

## 2019-06-01 PROCEDURE — 84100 ASSAY OF PHOSPHORUS: CPT | Performed by: NEUROLOGICAL SURGERY

## 2019-06-01 PROCEDURE — 25000132 ZZH RX MED GY IP 250 OP 250 PS 637: Performed by: STUDENT IN AN ORGANIZED HEALTH CARE EDUCATION/TRAINING PROGRAM

## 2019-06-01 PROCEDURE — 80048 BASIC METABOLIC PNL TOTAL CA: CPT | Performed by: NEUROLOGICAL SURGERY

## 2019-06-01 PROCEDURE — 83735 ASSAY OF MAGNESIUM: CPT | Performed by: NEUROLOGICAL SURGERY

## 2019-06-01 PROCEDURE — 36415 COLL VENOUS BLD VENIPUNCTURE: CPT | Performed by: NEUROLOGICAL SURGERY

## 2019-06-01 PROCEDURE — 97116 GAIT TRAINING THERAPY: CPT | Mod: GP | Performed by: PHYSICAL THERAPIST

## 2019-06-01 PROCEDURE — 85027 COMPLETE CBC AUTOMATED: CPT | Performed by: NEUROLOGICAL SURGERY

## 2019-06-01 PROCEDURE — 25000131 ZZH RX MED GY IP 250 OP 636 PS 637: Performed by: STUDENT IN AN ORGANIZED HEALTH CARE EDUCATION/TRAINING PROGRAM

## 2019-06-01 PROCEDURE — 97530 THERAPEUTIC ACTIVITIES: CPT | Mod: GP | Performed by: PHYSICAL THERAPIST

## 2019-06-01 RX ORDER — DEXAMETHASONE 1.5 MG/1
3 TABLET ORAL EVERY 12 HOURS
Qty: 16 TABLET | Refills: 0 | Status: SHIPPED | OUTPATIENT
Start: 2019-06-05 | End: 2019-06-14

## 2019-06-01 RX ORDER — DEXAMETHASONE 1 MG
2 TABLET ORAL DAILY
Status: DISCONTINUED | OUTPATIENT
Start: 2019-06-14 | End: 2019-06-01 | Stop reason: HOSPADM

## 2019-06-01 RX ORDER — DEXAMETHASONE 4 MG/1
4 TABLET ORAL EVERY 12 HOURS
Qty: 8 TABLET | Refills: 0 | Status: SHIPPED | OUTPATIENT
Start: 2019-06-01 | End: 2019-06-14

## 2019-06-01 RX ORDER — DEXAMETHASONE 1 MG
2 TABLET ORAL EVERY 12 HOURS SCHEDULED
Status: DISCONTINUED | OUTPATIENT
Start: 2019-06-09 | End: 2019-06-01 | Stop reason: HOSPADM

## 2019-06-01 RX ORDER — DEXAMETHASONE 1 MG
3 TABLET ORAL EVERY 12 HOURS SCHEDULED
Status: DISCONTINUED | OUTPATIENT
Start: 2019-06-05 | End: 2019-06-01 | Stop reason: HOSPADM

## 2019-06-01 RX ORDER — DEXAMETHASONE 2 MG/1
2 TABLET ORAL EVERY 12 HOURS
Qty: 8 TABLET | Refills: 0 | Status: SHIPPED | OUTPATIENT
Start: 2019-06-09 | End: 2019-06-14

## 2019-06-01 RX ORDER — ASPIRIN 81 MG/1
81 TABLET ORAL DAILY
COMMUNITY
Start: 2019-06-14 | End: 2019-06-14

## 2019-06-01 RX ORDER — DEXAMETHASONE 2 MG/1
2 TABLET ORAL DAILY
Qty: 60 TABLET | Refills: 11 | Status: SHIPPED | OUTPATIENT
Start: 2019-06-14 | End: 2019-09-16

## 2019-06-01 RX ORDER — DEXAMETHASONE 4 MG/1
4 TABLET ORAL EVERY 12 HOURS SCHEDULED
Status: DISCONTINUED | OUTPATIENT
Start: 2019-06-01 | End: 2019-06-01 | Stop reason: HOSPADM

## 2019-06-01 RX ORDER — OXYCODONE HYDROCHLORIDE 5 MG/1
5-10 TABLET ORAL EVERY 6 HOURS PRN
Qty: 30 TABLET | Refills: 0 | Status: SHIPPED | OUTPATIENT
Start: 2019-06-01 | End: 2019-06-13

## 2019-06-01 RX ADMIN — CALCIUM 500 MG: 500 TABLET ORAL at 10:26

## 2019-06-01 RX ADMIN — POTASSIUM CHLORIDE 20 MEQ: 750 TABLET, EXTENDED RELEASE ORAL at 10:25

## 2019-06-01 RX ADMIN — DEXAMETHASONE 4 MG: 4 TABLET ORAL at 06:20

## 2019-06-01 RX ADMIN — PANTOPRAZOLE SODIUM 40 MG: 40 TABLET, DELAYED RELEASE ORAL at 10:27

## 2019-06-01 RX ADMIN — ZINC SULFATE CAP 220 MG (50 MG ELEMENTAL ZN) 220 MG: 220 (50 ZN) CAP at 10:27

## 2019-06-01 RX ADMIN — VITAMIN E CAP 100 UNIT 100 UNITS: 100 CAP at 10:27

## 2019-06-01 RX ADMIN — HYDROCHLOROTHIAZIDE 25 MG: 25 TABLET ORAL at 10:26

## 2019-06-01 RX ADMIN — Medication 12.5 MG: at 10:26

## 2019-06-01 RX ADMIN — ACETAMINOPHEN 650 MG: 325 TABLET, FILM COATED ORAL at 04:25

## 2019-06-01 RX ADMIN — ACETAMINOPHEN 650 MG: 325 TABLET, FILM COATED ORAL at 10:27

## 2019-06-01 RX ADMIN — INSULIN ASPART 1 UNITS: 100 INJECTION, SOLUTION INTRAVENOUS; SUBCUTANEOUS at 10:28

## 2019-06-01 RX ADMIN — Medication 500 MG: at 10:26

## 2019-06-01 RX ADMIN — LOSARTAN POTASSIUM 100 MG: 100 TABLET, FILM COATED ORAL at 10:26

## 2019-06-01 RX ADMIN — Medication 20000 UNITS: at 10:26

## 2019-06-01 RX ADMIN — ATORVASTATIN CALCIUM 40 MG: 40 TABLET, FILM COATED ORAL at 10:26

## 2019-06-01 RX ADMIN — SENNOSIDES AND DOCUSATE SODIUM 2 TABLET: 8.6; 5 TABLET ORAL at 10:27

## 2019-06-01 ASSESSMENT — ACTIVITIES OF DAILY LIVING (ADL)
ADLS_ACUITY_SCORE: 11

## 2019-06-01 NOTE — PLAN OF CARE
PT/6a:  Discharge Planner PT   Patient plan for discharge: home  Current status: Pt is IND w/ all bed mobility, transfers, gait, and stairs (12 w/ railing). Pt did need occasional cues for adhering to craniotomy precautions (especially to avoid breath holding w/ bed mobility). Instructed pt and spouse in craniotomy precautions and provided them w/ handout.  Barriers to return to prior living situation: none  Recommendations for discharge: home  Rationale for recommendations: no further PT needs.    Physical Therapy Discharge Summary    Reason for therapy discharge:    All goals and outcomes met, no further needs identified.    Progress towards therapy goal(s). See goals on Care Plan in [facility] electronic health record for goal details.  Goals met  Except did not perform formal balance assessment     Therapy recommendation(s):    No further therapy is recommended.           Entered by: Natalie Castillo 06/01/2019 10:29 AM

## 2019-06-01 NOTE — PLAN OF CARE
OT 6A  Discharge Planner OT   Patient plan for discharge: home   Current status: Pt SBA for LB dressing but requiring cues x 2 for adherence to crani precautions. Further education on implications of crani precautions provided regarding IADLs and appropriate AE/DME to promote IND and adherence.   Barriers to return to prior living situation: none   Recommendations for discharge: Home with assist   Rationale for recommendations: Pt safe to discharge home; assist from spouse for management of heavy IADLs for adherence to precautions.        Entered by: Nahomi Herbert 06/01/2019 11:03 AM     Occupational Therapy Discharge Summary    Reason for therapy discharge:    Discharged to home.    Progress towards therapy goal(s). See goals on Care Plan in [facility] electronic health record for goal details.  Goals partially met.  Barriers to achieving goals:   discharge from facility.    Therapy recommendation(s):    See above

## 2019-06-01 NOTE — PLAN OF CARE
Status: POD#2 s/p stealthy guided left craniotomy & tumor resection  Vitals: stable on RA  Neuros: A&O x4. No numbness or tingling  IV: R PIV SL  Resp: LS clear   Diet: Regular  Bowel status: No BM this shift, +BS  : Voiding  Skin: Head incision w/ sutures, WDL, ALFREDO  Pain: Denies pain  Activity: HOB @ 30 degrees. Up independent per report, No OOB this shift   Social: family at bedside overnight  Plan: K+ & mag level recheck this AM. Cont to manage pain.

## 2019-06-01 NOTE — DISCHARGE SUMMARY
[facility] Discharge Summary and Instructions    Gopi Farfan MRN# [id number]   Age: 64 year old YOB: 1955     Date of Admission:  5/28/2019  Date of Discharge::  6/1/2019  Admitting Physician:  Abdullahi Downing MD  Discharge Physician:  Abdullahi Downing MD          Admission Diagnoses:   Brain tumor (H) [D49.6]          Discharge Diagnosis:   Brain tumor (H) [D49.6]          Procedures:              Brief History of Illness:   Mr. Farfan is a 64 year old right handed man who works as an , who was admitted to [facility] in [place] today for progressive short term memory loss over the past several weeks. Wife reports that he has difficulty concentrating and sometimes forgets to reply to people. He denies word finding difficulties however. He also reports having gait disturbance and feels he walks slower and more deliberate. He has intermittent headaches which have not changed from baseline. No nausea or vomiting. He was brought to [facility] ED and head CT/MRI revealed a large left frontal mass concerning for high grade glioma. He was then transferred to [facility] for higher level of care. Patient takes aspirin 81mg for primary prevention but his last dose was on 5/26/19. He has not had any seizures.          Hospital Course:   Patient underwent above-mentioned procedure on 5/30/19. The operation was uncomplicated and he was admitted to the surgical ICU for routine post operative cares. On post operative day 1, he was doing well and transferred to the floor. On post operative day 2, he was ambulating, voiding without a carney, eating a regular diet, pain was well controlled. He was evaluated by PTOT who recommended discharge home.  Post-op, he was started on 2 weeks taper of Decadron to 2mg daily. He'll be discussed in tumor board and further treatment recommendations will be relayed to him.          Discharge Medications:     Current Discharge Medication List      START  taking these medications    Details   !! dexamethasone (DECADRON) 1.5 MG tablet Take 2 tablets (3 mg) by mouth every 12 hours for 4 days  Qty: 16 tablet, Refills: 0    Associated Diagnoses: Brain tumor (H)      !! dexamethasone (DECADRON) 2 MG tablet Take 1 tablet (2 mg) by mouth every 12 hours for 4 days  Qty: 8 tablet, Refills: 0    Associated Diagnoses: Brain tumor (H)      !! dexamethasone (DECADRON) 2 MG tablet Take 1 tablet (2 mg) by mouth daily  Qty: 60 tablet, Refills: 11    Associated Diagnoses: Brain tumor (H)      !! dexamethasone (DECADRON) 4 MG tablet Take 1 tablet (4 mg) by mouth every 12 hours for 4 days  Qty: 8 tablet, Refills: 0    Associated Diagnoses: Brain tumor (H)      oxyCODONE (ROXICODONE) 5 MG tablet Take 1-2 tablets (5-10 mg) by mouth every 6 hours as needed for moderate to severe pain  Qty: 30 tablet, Refills: 0    Comments: Max 6 tablets per day  Associated Diagnoses: Brain tumor (H)       !! - Potential duplicate medications found. Please discuss with provider.      CONTINUE these medications which have CHANGED    Details   aspirin 81 MG EC tablet Take 1 tablet (81 mg) by mouth daily    Comments: Restart aspirin on 6/14/19         CONTINUE these medications which have NOT CHANGED    Details   atorvastatin (LIPITOR) 80 MG tablet Take 40 mg by mouth daily      esomeprazole (NEXIUM) 40 MG DR capsule Take 40 mg by mouth 2 times daily as needed       hydrochlorothiazide (HYDRODIURIL) 25 MG tablet Take 25 mg by mouth daily      latanoprost (XALATAN) 0.005 % ophthalmic solution Place 1 drop into both eyes every evening       losartan (COZAAR) 100 MG tablet Take 100 mg by mouth daily      spironolactone (ALDACTONE) 25 MG tablet Take 12.5 mg by mouth daily                     Discharge Instructions and Follow-Up:   Discharge diet: Regular   Discharge activity: You may advance activity as tolerated. No strenuous exercise or heavy lifting greater than 10 lbs for 4 weeks or until seen and cleared in  clinic.   Discharge follow-up: Follow-up with nurse practitioner in 2 weeks for wound check and suture removal.  Follow-up with Dr. Downing in 4-6 weeks.   Wound care: Ok to shower,however no scrubbing of the wound and no soaking of the wound, meaning no bathtubs or swimming pools. Pat dry only. Leave wound open to air.  Sutures are not absorbable and need to be removed in 2 weeks.     Please call if you have:  1. increased pain, redness, drainage, swelling at your incision  2. fevers > 101.5 F degrees  3. with any questions or concerns.  You may reach the Neurosurgery clinic at [phone] during regular work hours. ER at [phone].    and ask for the Neurosurgery Resident on call at [phone], during off hours or weekends.         Discharge Disposition:   Discharged to home          -----------------------------------  Milton Roy MD, MS  Neurosurgery PGY-2  Pager #[phone]

## 2019-06-01 NOTE — PLAN OF CARE
"  /73   Pulse 77   Temp 98.4  F (36.9  C) (Oral)   Resp 18   Ht 1.88 m (6' 2\")   Wt 103.3 kg (227 lb 11.8 oz)   SpO2 96%   BMI 29.24 kg/m      Time: 5010-8644.  Reason for admission: POD #2 s/p left craniotomy and tumor resection.  VS: VSS on RA with O2 sats in mid to high 90s. Afebrile.   Activity: Up independently, steady on feet. Calls appropriately.   Neuros: A&Ox4. Neuros intact except swelling noted under left eye, MD aware, no changes. C/o slight headache managed with PRN PO Tylenol.   Cardiac: WDL. HR stable in 70s. Denies chest pain.   Respiratory: WDL. LS clear. Denies SOB. IS self-administered.   GI/: Voiding without difficulty. X1 Medium BM on this shift. +BS, +gas. Denies nausea or vomiting.   Diet: Regular diet, tolerating well. BG stable at 143.  Skin: Head incision, ALFREDO, no drainage noted.   Lines: Right PIV removed.   Labs: K+ low at 3.9, replacement given.   New changes this shift/Plan: Cleared by PT & OT. Medically cleared for discharge home. PIV removed. Belongings gathered by pt. Medications picked up from pharmacy by pt's spouse. AVS reviewed with pt and pt's spouse. Pt left unit around 1115 via wheelchair.  "

## 2019-06-01 NOTE — PLAN OF CARE
D/I: No changes in neuro status. VSS and room air. Pt lethargic to awake, oriented x 4. C/o some head incisional pain. Medicated with PRN Tylenol with good relief. Pt up in chair and ambulated in halls with PT/OT.   Using IS while awake.   A: Pt tolerating regular diet and fluid intakes. Voiding, but missed couple of times in the toilet for collection.   P: Plan to transfer to  when bed available.

## 2019-06-02 ENCOUNTER — PATIENT OUTREACH (OUTPATIENT)
Dept: CARE COORDINATION | Facility: CLINIC | Age: 64
End: 2019-06-02

## 2019-06-03 ENCOUNTER — TUMOR CONFERENCE (OUTPATIENT)
Dept: ONCOLOGY | Facility: CLINIC | Age: 64
End: 2019-06-03

## 2019-06-03 NOTE — TUMOR CONFERENCE
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)  Treatment to Date:  Surgical intervention(s)  Clinical Trial Eligibility:  Not discussed  Recommended Plan:  Concurrent chemoradiation (Comment: final path still pending, referral to radiation oncology and medical oncology for post-operative treatment)  Did the review exceed 30 minutes?:  did not           Documentation / Disclaimer Cancer Tumor Board Note  Cancer tumor board recommendations do not override what is determined to be reasonable care and treatment, which is dependent on the circumstances of a patient's case; the patient's medical, social, and personal concerns; and the clinical judgment of the oncologist [physician].

## 2019-06-06 NOTE — OP NOTE
Procedure Date: 05/30/2019      PREOPERATIVE DIAGNOSIS:  Brain tumor.      POSTOPERATIVE DIAGNOSIS:  Brain tumor.       PROCEDURES PERFORMED:   1.  Left-sided craniotomy for tumor resection.   2.  Stealth-guided.   3.  Intraoperative use of microscope.      ATTENDING SURGEON:  Abdullahi Downing MD      RESIDENT SURGEONS:     1.  Wisam Foley MD   2.  Barrett Mcdaniels MD      ESTIMATED BLOOD LOSS:  1000 mL.      ANESTHESIA:  General.      OPERATIVE INDICATIONS:  Mr. Farfan is a 64-year-old gentleman who presented originally to [facility] in [place] after difficulty with short-term memory loss and difficulty concentrating.  He was transferred to the [facility] after discovery of a large mass in the left frontal lobe with significant edema.  For this reason, he underwent the informed consent process for removal of this mass for treatment and diagnosis.  He and his family underwent the informed consent process including discussion of potential benefits, potential risks, and alternatives.      OPERATIVE DETAILS:  After the informed consent was verified, Mr. Farfan was brought to the operating room where he underwent successful endotracheal intubation with general anesthesia.  He was placed in the supine position.  A Rowe headholder was attached after placement of an arterial line and a Douglas catheter.  The Rowe was affixed to the bed and his preoperatively obtained stereotactic imaging was transferred to the operating room, navigation station, where an optimal entry and target trajectories were picked for resection of the mass.  His head was co-registered to the navigation system with excellent accuracy and his head was shaved, cleaned, prepped and draped in the usual sterile fashion.  A timeout was performed.      20 mL of 0.25% Marcaine with 1:200,000 epinephrine was injected in the subcutaneous tissue underlying the planned pterional incision.  After waiting a sufficient amount of time for maximal effect  of local anesthetic and epinephrine, a #10 scalpel was used to sharply incise the scalp.  Hemostasis and further dissection were performed with monopolar cautery, and the myocutaneous flap was retracted anteriorly with [place]uca[place]a retractors.  Navigation was used to identify the optimal craniotomy size and a bur hole was made near the sinus and near the keyhole.  The edges of the bone were waxed, and microdissectors were used to release the dura from the inner table of the skull.  A high-speed drill with B1 bit and footplate was used to turn a craniotomy flap and the bone flap was placed in bacitracin irrigation.  Next, a C1 bit was used to place tack-up sutures with 4-0 Nurolon peripherally and the opening in the dura was then expanded with Metzenbaum scissors, exposing the left frontal lobe.  A corticectomy was made anteriorly with bipolar cautery and microscissors through which biopsies were taken and sent for frozen pathology review.  Next, the corticectomy was expanded along the gyrus, and we began the process of finding the tumor margins.  A significant amount of the left frontal lobe was found to be involved with tumor and the CUSA was brought into the field and used to aid in further dissection. The microscope was sterilely draped and brought into the field.  We made our way anteriorly and found a tumor margin with white matter and proceeded to move laterally and posteriorly, lining the edges with cottonoid pledgets.  We moved medially and worked around the cingulate gyrus finding that there were significant holes in the falx where tumor had pushed its way through to the contralateral side.  Microscissors were used to cut the remaining fenestrations, and we were able to find a plane medially with the ACAS present and intact.  Hemostasis was obtained with bipolar cautery and Surgicel, as well as Gelfoam.  Once satisfied medially, we continued posteriorly and inferiorly until reaching the edge of the basal  ganglia.  The wound was then copiously irrigated and hemostasis was obtained with bipolar cautery and Surgicel.  Once satisfied with hemostasis, a large Duragen was placed after reapproximating the dura with 4-0 Nurolons.  The bone flap was plated peripherally with low profile Synthes titanium plates and 4 mm screws and placed back into the cranial defect where it was affixed peripherally with 4 mm titanium screws.  [place] retractors were removed and hemostasis was obtained with bipolar cautery.  The wound was copiously irrigated with bacitracin irrigation, and 2-0 Vicryl sutures were used to reapproximate the galea in inverted fashion.  A running 3-0 nylon was used to reapproximate the skin.  The wound was cleaned, prepped, and bandaged.      All counts were correct at the end of the case x 2.      Dr. Castillo was present for all critical portions of the operation and immediately available at all times.         STACY CASTILLO MD       As dictated by SANTI MOROCHO MD      I, Dr. Stacy Castillo, was present for the key portions of the procedure and immediately available for the entire operation.         D: 2019   T: 2019   MT: EMEKA      Name:     LORETO RAMIREZ   MRN:      [id number]        Account:        [id number]   :      1955           Procedure Date: 2019      Document: [id number]

## 2019-06-11 ENCOUNTER — TELEPHONE (OUTPATIENT)
Dept: NEUROSURGERY | Facility: CLINIC | Age: 64
End: 2019-06-11

## 2019-06-11 DIAGNOSIS — C71.1 ANAPLASTIC OLIGODENDROGLIOMA OF FRONTAL LOBE (H): Primary | ICD-10-CM

## 2019-06-11 LAB
COPATH REPORT: NORMAL
COPATH REPORT: NORMAL

## 2019-06-11 NOTE — TELEPHONE ENCOUNTER
Spoke to patient and his wife today about his pathology report. Discussed the anaplastic oligo and prognosis. He is doing well. He is scheduled to see Neuro-onc and rad onc in follow up later this week.     Answered question about activity and travel.

## 2019-06-11 NOTE — TELEPHONE ENCOUNTER
ONCOLOGY INTAKE: Records Information      APPT INFORMATION: 6/14/19 - DONNA Kirkland CSC  Referring provider:  Milton Hernandez  Referring provider s clinic:  [facility]  Reason for visit/diagnosis:  Follow-up with Dr. Lozano for further chemo treatment s/p glioma resection  Has patient been notified of appointment date and time?: Yes    RECORDS INFORMATION:  Were the records received with the referral (via [employer])? No (internal referral)    Has patient been seen for any external appt for this diagnosis? Yes    If yes, where? [facility]     Has patient had any imaging or procedures outside of [facility] for this condition? Yes      If Yes, where? [facility]    ADDITIONAL INFORMATION:  Scheduled via inbasket from [facility] / called pt to confirm  All records/imaging received from [facility] per [facility]

## 2019-06-11 NOTE — TELEPHONE ENCOUNTER
----- Message from Danika Duron PA-C sent at 6/10/2019  5:46 PM CDT -----  Regarding: FW: Consult with Radiation Oncology  Hey-    I have gotten several messages about this che wanting to know path. I see it is back now. Can you please call him?     Thanks,     Danika    ----- Message -----  From: Silvio Luis  Sent: 6/10/2019  10:16 AM  To: Danika Duron PA-C  Subject: Consult with Radiation Oncology                  Danika,     Would you please advise on scheduling for Gopi?  He has a referral to Radiation Oncology and when I spoke to him last he stated he was awaiting the results of a biopsy before he scheduled any new appointments.      Please advise.    Thank you,   Peter Luis  Rad Onc Intake

## 2019-06-13 ENCOUNTER — ONCOLOGY VISIT (OUTPATIENT)
Dept: ONCOLOGY | Facility: CLINIC | Age: 64
End: 2019-06-13
Attending: PHYSICIAN ASSISTANT
Payer: COMMERCIAL

## 2019-06-13 VITALS
TEMPERATURE: 97.4 F | RESPIRATION RATE: 16 BRPM | OXYGEN SATURATION: 98 % | DIASTOLIC BLOOD PRESSURE: 85 MMHG | WEIGHT: 215.61 LBS | BODY MASS INDEX: 27.68 KG/M2 | HEART RATE: 75 BPM | SYSTOLIC BLOOD PRESSURE: 153 MMHG

## 2019-06-13 DIAGNOSIS — C71.9 OLIGODENDROGLIOMA, ANAPLASTIC (H): Primary | ICD-10-CM

## 2019-06-13 PROCEDURE — G0463 HOSPITAL OUTPT CLINIC VISIT: HCPCS | Mod: ZF

## 2019-06-13 PROCEDURE — 99215 OFFICE O/P EST HI 40 MIN: CPT | Mod: ZP | Performed by: PHYSICIAN ASSISTANT

## 2019-06-13 ASSESSMENT — PAIN SCALES - GENERAL: PAINLEVEL: NO PAIN (0)

## 2019-06-13 NOTE — PROGRESS NOTES
Neurosurgery Post-Op Followup  Jun 13, 2019    Date of Surgery: 5/30/2019  Performed by: Dr. Abdullahi Downing PROCEDURES:   1.  Left-sided craniotomy for tumor resection.     Discharged on: POD#4, 6/1    HPI:  Mr. Farfan is a 64 year old right handed man who works as an , who was admitted to [facility] in [place] today for progressive short term memory loss over the past several weeks. Wife reports that he has difficulty concentrating and sometimes forgets to reply to people. He denies word finding difficulties however. He also reports having gait disturbance and feels he walks slower and more deliberate. He has intermittent headaches which have not changed from baseline. No nausea or vomiting. He was brought to [facility] ED and head CT/MRI revealed a large left frontal mass concerning for high grade glioma. He was then transferred to [facility] for higher level of care. Patient takes aspirin 81mg for primary prevention but his last dose was on 5/26/19. He has not had any seizures.    He underwent the above procedure and is here today for surgery followup.     Interim History:  Gopi presents today with his wife.    He is recovering from surgery well.  He was previously having problems with memory and concentration as well as gait though these things are improving.  He does not have much pain from his incision site or headaches though he will take Tylenol at night to help him sleep.  Denies any seizures.  Denies any visual changes.  Denies any new numbness or tingling.  He is still tapering off of dexamethasone.  Tapered to 2 mg of dexamethasone this morning.    Him and his wife have a lot of questions today.      They have a trip to [place] in July which they were asking about.     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, see comment)             -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, by immunohistochemistry)             -ATRX wild type by immunohistochemistry     Exam:  /85   Pulse 75   Temp 97.4  F (36.3  C) (Oral)   Resp 16   Wt 97.8 kg (215 lb 9.8 oz)   SpO2 98%   BMI 27.68 kg/m    General: Alert, Oriented  HEENT: PERRLA, no sclera icterus, mucus membranes moist, no lesions or thrush  CV: RRR, no m/r/g  Pulm: CTA-B, no wheezes or rales  Ext: No pitting edema  Skin: No rashes or lesions seen on exposed skin, besides wound below  Neuro: Visual fields intact, no pronation, drift or orbiting, finger to nose intact, strength 5/5 throughout, reflexes 2+ b/l, gait normal, able to walk on toes, heels and tandem walk    Wound: No redness, drainage or swelling. Well healed, intermittent scabbing. Area cleaned with Chloroprep, sutures removed     Assessment and Plan:     Dx:  1. Anaplastic Oligodendroglioma   2. S/p Craniotomy   3. Post operative State    Briefly discussed the plan for treatment.  Discussed 6 weeks of chemoradiation followed by a month off followed by adjuvant chemotherapy for 6 months.  Briefly discussed side effects of chemo and what to expect.  He will meet with Dr. Lozano tomorrow as well as Dr. Galindo to discuss further details.  Answered all questions to the best of my ability.    Wound  -keep wound open to air  -ok to use mild shampoo after 40 hours, no conditioner for 2 weeks   -can swim/submerge wound when all scabbing disappears    Steroids  -currently on 2 mg daily. Will have him take 2 mg for 4 days  then 2 mg every other day for 3 doses and then stop    Seizures  -No known seizures. No need for AEDs at this time    Pain  -using APAP at night. Can continue prn    Activity   -able to drive as long as not taking narcotics or had any seizures within 3 months  -activity restriction for 1 month postop, no lifting over 10 lbs   -able to resume normal activities, such as running/walking, cleaning, bending    Followup: Will f/u with Dr. Lozano, neurooncology, and Dr. Galindo, Radiation oncology, tomorrow    He will need to miss his trip to [place] in July. Will write any letters that we need to.     They had [place] paperwork for his wife and son. Gave this to triage today.    They had lots of finance questions today. Had SW meet with them after my appt. They are also [facility] patients and will need to correspond with the [facility] to approve his visits.    Danika Duron PA-C  [facility]  [address], [place] [id number] [phone]

## 2019-06-13 NOTE — NURSING NOTE
"Oncology Rooming Note    June 13, 2019 12:56 PM   Gopi Farfan is a 64 year old male who presents for:    Chief Complaint   Patient presents with     Oncology Clinic Visit     Post op Brain Tumor      Initial Vitals: /85   Pulse 75   Temp 97.4  F (36.3  C) (Oral)   Resp 16   Wt 97.8 kg (215 lb 9.8 oz)   SpO2 98%   BMI 27.68 kg/m   Estimated body mass index is 27.68 kg/m  as calculated from the following:    Height as of 5/31/19: 1.88 m (6' 2\").    Weight as of this encounter: 97.8 kg (215 lb 9.8 oz). Body surface area is 2.26 meters squared.  No Pain (0) Comment: Data Unavailable   No LMP for male patient.  Allergies reviewed: Yes  Medications reviewed: Yes    Medications: Medication refills not needed today.  Pharmacy name entered into EPIC: Data Unavailable    Clinical concerns: no  [place]  was notified.      Violeta Leon MA              "

## 2019-06-13 NOTE — LETTER
6/13/2019      RE: Gopi Farfan  [id number]  [facility] [address]       Neurosurgery Post-Op Followup  Jun 13, 2019    Date of Surgery: 5/30/2019  Performed by: Dr. Abdullahi Downing PROCEDURES:   1.  Left-sided craniotomy for tumor resection.     Discharged on: POD#4, 6/1    HPI:  Mr. Farfan is a 64 year old right handed man who works as an , who was admitted to [facility] in [place] today for progressive short term memory loss over the past several weeks. Wife reports that he has difficulty concentrating and sometimes forgets to reply to people. He denies word finding difficulties however. He also reports having gait disturbance and feels he walks slower and more deliberate. He has intermittent headaches which have not changed from baseline. No nausea or vomiting. He was brought to [facility] ED and head CT/MRI revealed a large left frontal mass concerning for high grade glioma. He was then transferred to [facility] for higher level of care. Patient takes aspirin 81mg for primary prevention but his last dose was on 5/26/19. He has not had any seizures.    He underwent the above procedure and is here today for surgery followup.     Interim History:  Gopi presents today with his wife.    He is recovering from surgery well.  He was previously having problems with memory and concentration as well as gait though these things are improving.  He does not have much pain from his incision site or headaches though he will take Tylenol at night to help him sleep.  Denies any seizures.  Denies any visual changes.  Denies any new numbness or tingling.  He is still tapering off of dexamethasone.  Tapered to 2 mg of dexamethasone this morning.    Him and his wife have a lot of questions today.      They have a trip to [place] in July which they were asking about.     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, see comment)             -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, by immunohistochemistry)             -ATRX wild type by immunohistochemistry     Exam:  /85   Pulse 75   Temp 97.4  F (36.3  C) (Oral)   Resp 16   Wt 97.8 kg (215 lb 9.8 oz)   SpO2 98%   BMI 27.68 kg/m     General: Alert, Oriented  HEENT: PERRLA, no sclera icterus, mucus membranes moist, no lesions or thrush  CV: RRR, no m/r/g  Pulm: CTA-B, no wheezes or rales  Ext: No pitting edema  Skin: No rashes or lesions seen on exposed skin, besides wound below  Neuro: Visual fields intact, no pronation, drift or orbiting, finger to nose intact, strength 5/5 throughout, reflexes 2+ b/l, gait normal, able to walk on toes, heels and tandem walk    Wound: No redness, drainage or swelling. Well healed, intermittent scabbing. Area cleaned with Chloroprep, sutures removed     Assessment and Plan:     Dx:  1. Anaplastic Oligodendroglioma   2. S/p Craniotomy   3. Post operative State    Briefly discussed the plan for treatment.  Discussed 6 weeks of chemoradiation followed by a month off followed by adjuvant chemotherapy for 6 months.  Briefly discussed side effects of chemo and what to expect.  He will meet with Dr. Lozano tomorrow as well as Dr. Galinod to discuss further details.  Answered all questions to the best of my ability.    Wound  -keep wound open to air  -ok to use mild shampoo after 40 hours, no conditioner for 2 weeks   -can swim/submerge wound when all scabbing  disappears    Steroids  -currently on 2 mg daily. Will have him take 2 mg for 4 days then 2 mg every other day for 3 doses and then stop    Seizures  -No known seizures. No need for AEDs at this time    Pain  -using APAP at night. Can continue prn    Activity   -able to drive as long as not taking narcotics or had any seizures within 3 months  -activity restriction for 1 month postop, no lifting over 10 lbs   -able to resume normal activities, such as running/walking, cleaning, bending    Followup: Will f/u with Dr. Lozano, neurooncology, and Dr. Galindo, Radiation oncology, tomorrow    He will need to miss his trip to [place] in July. Will write any letters that we need to.     They had [place] paperwork for his wife and son. Gave this to triage today.    They had lots of finance questions today. Had SW meet with them after my appt. They are also [facility] patients and will need to correspond with the [facility] to approve his visits.    Danika Duron PA-C  [facility]  [address], [place] [id number] [phone]

## 2019-06-13 NOTE — PROGRESS NOTES
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 Downing    HISTORY OF PRESENT ILLNESS:   Mr. Farfan is a 64-year-old gentleman recently diagnosed with anaplastic oligodendroglioma involving the bilateral frontal lobes.  He initially presented to [facility] in [place] on 2019 with a chief complaint of progressive short-term memory loss over the past several weeks.  His wife and coworkers also noted that he had difficulty with concentration over this time and he would forget to reply to people in conversation.  He also had some gait disturbance and felt like he was walking more slowly and deliberately. In retrospect, he thinks he had increased fatigue for at least 6 months prior to presentation. He attributed much of this fatigue to his recent gastric banding procedure. He has a history of intermittent headaches which were unchanged from baseline.  No nausea or vomiting.  No apparent seizure activity. He had a CT scan on presentation which showed a heterogeneous mass with some calcification located in the left frontal lobe with extension into the right hemisphere, crossing the corpus callosum.  A subsequent MRI again noted a large, heterogeneous, intra-axial neoplasm centered within the left frontal lobe with prominent surrounding edema and mass-effect.  There was deviation of the midline septum pellucidum by approximately 1 cm from left to right.  The mass had poorly  defined margins but measured approximately 6 x 5 x 5 cm.  There was also apparent involvement of the anterior left basal ganglia and mass-effect causing effacement of the left and right frontal horns.  He was subsequently transferred to the [facility] for further management.    On admission, he was started on 4 mg dexamethasone every 6 hours and was scheduled for surgical removal of the mass.  On 5/30/2019 he underwent a Stealth guided left craniotomy and resection of tumor. It was felt that a GTR was achieved intraoperatively, but postoperative MRI on 5/31/2019 showed extensive postsurgical changes with possible residual tumor along the genuine of the corpus callosum, most consistent with a subtotal resection.  Surgical pathology ([id number]) showed oligodendroglioma with borderline anaplastic features, WHO grade 3.  IDH 1 was mutated by IHC and ATRX was wild-type by IHC.  1p/19q were deleted by FISH consistent with the diagnosis of oligodendroglioma. He did well post-operatively and was discharged on 6/1/19 with a 2 week decadron taper to 2 mg daily.  He was discussed that disciplinary neuro tumor conference on 6/3/2019 at which time consensus recommendation was for adjuvant chemoradiation.  He is referred to our department for discussion of adjuvant CRT.    On exam today, reports that he is overall doing well since the time of surgery.  He denies any postoperative pain and has not had any headaches.  He has not noticed any focal neurologic deficits.  He overall thinks that his energy is improving and he is noticed improvement in his memory of his walking ability.  Overall he feels that he is getting back to his normal state of health.  No recent fevers, chills, nausea, vomiting.  No erythema or drainage from his surgical incision.  A complete review of systems was otherwise unremarkable.      PAST MEDICAL HISTORY:   Past Medical History:   Diagnosis Date     Brain tumor (H)      Cardiac arrhythmia       Glaucoma (increased eye pressure)      Hypercholesteremia      Past Surgical History:   Procedure Laterality Date     HRW ANES TOTAL KNEE REPLACEMENT, MDA 1 Left 2015     OPTICAL TRACKING SYSTEM CRANIOTOMY, EXCISE TUMOR, COMBINED Left 5/30/2019    Procedure: Stealth Guided Left Craniotomy And Tumor Resection;  Surgeon: Abdullahi Downing MD;  Location: [facility] OR     STOMACH SURGERY  02/12/2019    LINX, a magnetic implant in the area of the gastroesphogeal junction, needs special MRI       CHEMOTHERAPY HISTORY: None     PAST RADIATION THERAPY HISTORY: None     IMPLANTED DEVICES: Gastric band which is reportedly safe for MRI up to 1.5T    AUTOIMMUNE/CONNECTIVE TISSUE DISORDERS: None    MEDICATIONS:   Current Outpatient Medications   Medication Sig Dispense Refill     atorvastatin (LIPITOR) 80 MG tablet Take 40 mg by mouth daily       dexamethasone (DECADRON) 2 MG tablet Take 1 tablet (2 mg) by mouth daily 60 tablet 11     hydrochlorothiazide (HYDRODIURIL) 25 MG tablet Take 25 mg by mouth daily       latanoprost (XALATAN) 0.005 % ophthalmic solution Place 1 drop into both eyes every evening        losartan (COZAAR) 100 MG tablet Take 100 mg by mouth daily       ondansetron (ZOFRAN) 4 MG tablet Take 1 tablet (4 mg) by mouth At Bedtime ; 30 minutes prior to chemotherapy dosing and repeat every 8 hours as needed for nausea. 30 tablet 3     spironolactone (ALDACTONE) 25 MG tablet Take 12.5 mg by mouth daily       sulfamethoxazole-trimethoprim (BACTRIM DS/SEPTRA DS) 800-160 MG tablet Take 1 tablet by mouth Every Mon, Wed, Fri Morning 25 tablet 0     Temozolomide (TEMODAR) 180 MG capsule Take 1 capsule (180 mg) by mouth At Bedtime Take on an empty stomach. Take a dose of nausea medication 30-60 min before temozolomide. 42 capsule 0       ALLERGIES:  allergic to amlodipine.    SOCIAL HISTORY:  . Lives in [place], [place]. Works as an . Never smoker. Occasional EtOH.    FAMILY HISTORY: No family  history of cancer    REVIEW OF SYMPTOMS:  A full 14-point review of systems was performed.     PHYSICAL EXAMINATION:    /74   Pulse 76   Wt 97.8 kg (215 lb 11.2 oz)   SpO2 96%   BMI 27.68 kg/m    General: Alert, oriented, NAD  Skin: No rashes or lesions appreciated  HEENT: NCAT, EOMI, PERRL  Neck: Supple, full ROM, no cervical LAD  Breasts: Deferred  Heart: WWP  Lungs: Breathing comfortably on RA  Abd: Nondistended  /Rectal: Deferred  Ext: Atraumatic, grossly intact strength  Neuro: CNs grossly intact, normal gait, strength 5/5 in upper and lower extremities bilaterally, coordination grossly intact, sensation grossly intact, negative romberg    ECOG PS: 0    IMAGING:  Pre-op MRI      Post-op MRI:        ASSESSMENT: 64 year old male with recently diagnosed 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 Farfan regarding the role of radiotherapy in the treatment of his recently resected anaplastic oligodendroglioma.  We discussed that the current standard of care following definitive surgery is for adjuvant chemoradiation with concurrent temozolomide followed by adjuvant temozolomide.  The patient met with Dr. Lozano prior to today's appointment and she is planning on offering concurrent and adjuvant temozolomide which has been ordered. We discussed that radiotherapy will begin at the same time temozolomide therapy will take place over the course of approximately 6 weeks.  We reviewed the logistics as well as expected acute and potential late side effects of this treatment in detail with the patient.  The patient had a number of questions which were answered to the best of our ability.      The patient states that he wishes to receive his radiation closer to home and is planning on going to [place] for RT. He will continue to follow up with Dr. Lozano at the [place] campus. We discussed that we think this is a reasonable option.   We provided him with the phone number and names of providers at [facility] in [place] and he will call to set up a consultation there.     Thank you for allowing us to participate in this patient's care.  Please feel free to call with any questions or concerns.    The patient was seen and discussed with staff, Dr. Galindo.     Silvio Vitale MD  Resident, PGY-4  Department of Radiation Oncology  [facility]  [phone]           I saw and examined the patient with the resident.  I have reviewed and edited the resident's note and agree with the plan of care.      Gunner Galindo MD      Patient Care Team:  Chip Tan as PCP - General (Family Practice)  Danika Duron PA-C as Physician Assistant (Hematology & Oncology)  Leeanne Lozano MD as MD (Neurology)  Akiko Che, ABISAI as Specialty Care Coordinator (Hematology & Oncology)  DEBORAH NINO

## 2019-06-14 ENCOUNTER — ONCOLOGY VISIT (OUTPATIENT)
Dept: ONCOLOGY | Facility: CLINIC | Age: 64
End: 2019-06-14

## 2019-06-14 ENCOUNTER — ONCOLOGY VISIT (OUTPATIENT)
Dept: ONCOLOGY | Facility: CLINIC | Age: 64
End: 2019-06-14
Attending: PSYCHIATRY & NEUROLOGY
Payer: COMMERCIAL

## 2019-06-14 ENCOUNTER — OFFICE VISIT (OUTPATIENT)
Dept: RADIATION ONCOLOGY | Facility: CLINIC | Age: 64
End: 2019-06-14
Attending: STUDENT IN AN ORGANIZED HEALTH CARE EDUCATION/TRAINING PROGRAM
Payer: COMMERCIAL

## 2019-06-14 ENCOUNTER — PRE VISIT (OUTPATIENT)
Dept: ONCOLOGY | Facility: CLINIC | Age: 64
End: 2019-06-14

## 2019-06-14 ENCOUNTER — TELEPHONE (OUTPATIENT)
Dept: ONCOLOGY | Facility: CLINIC | Age: 64
End: 2019-06-14

## 2019-06-14 VITALS
SYSTOLIC BLOOD PRESSURE: 115 MMHG | WEIGHT: 213.3 LBS | DIASTOLIC BLOOD PRESSURE: 64 MMHG | RESPIRATION RATE: 14 BRPM | BODY MASS INDEX: 27.37 KG/M2 | HEART RATE: 80 BPM | OXYGEN SATURATION: 97 % | HEIGHT: 74 IN | TEMPERATURE: 98.7 F

## 2019-06-14 VITALS — BODY MASS INDEX: 27.66 KG/M2 | WEIGHT: 215.5 LBS

## 2019-06-14 VITALS
WEIGHT: 215.7 LBS | OXYGEN SATURATION: 96 % | HEART RATE: 76 BPM | SYSTOLIC BLOOD PRESSURE: 120 MMHG | DIASTOLIC BLOOD PRESSURE: 74 MMHG | BODY MASS INDEX: 27.68 KG/M2

## 2019-06-14 DIAGNOSIS — Z51.11 CHEMOTHERAPY MANAGEMENT, ENCOUNTER FOR: ICD-10-CM

## 2019-06-14 DIAGNOSIS — T45.1X5A CHEMOTHERAPY-INDUCED NAUSEA AND VOMITING: ICD-10-CM

## 2019-06-14 DIAGNOSIS — Z91.89 HIGH RISK FOR CHEMOTHERAPY-INDUCED INFECTIOUS COMPLICATION: ICD-10-CM

## 2019-06-14 DIAGNOSIS — C71.9 OLIGODENDROGLIOMA, ANAPLASTIC (H): Primary | ICD-10-CM

## 2019-06-14 DIAGNOSIS — R11.2 CHEMOTHERAPY-INDUCED NAUSEA AND VOMITING: ICD-10-CM

## 2019-06-14 DIAGNOSIS — T45.1X5A CHEMOTHERAPY INDUCED NEUTROPENIA (H): ICD-10-CM

## 2019-06-14 DIAGNOSIS — C71.9 OLIGODENDROGLIOMA, ANAPLASTIC (H): ICD-10-CM

## 2019-06-14 DIAGNOSIS — D70.1 CHEMOTHERAPY INDUCED NEUTROPENIA (H): ICD-10-CM

## 2019-06-14 LAB
ALBUMIN SERPL-MCNC: 3.7 G/DL (ref 3.4–5)
ALP SERPL-CCNC: 85 U/L (ref 40–150)
ALT SERPL W P-5'-P-CCNC: 40 U/L (ref 0–70)
ANION GAP SERPL CALCULATED.3IONS-SCNC: 7 MMOL/L (ref 3–14)
AST SERPL W P-5'-P-CCNC: 13 U/L (ref 0–45)
BASOPHILS # BLD AUTO: 0 10E9/L (ref 0–0.2)
BASOPHILS NFR BLD AUTO: 0.3 %
BILIRUB SERPL-MCNC: 0.5 MG/DL (ref 0.2–1.3)
BUN SERPL-MCNC: 22 MG/DL (ref 7–30)
CALCIUM SERPL-MCNC: 9 MG/DL (ref 8.5–10.1)
CHLORIDE SERPL-SCNC: 102 MMOL/L (ref 94–109)
CO2 SERPL-SCNC: 27 MMOL/L (ref 20–32)
CREAT SERPL-MCNC: 0.91 MG/DL (ref 0.66–1.25)
DIFFERENTIAL METHOD BLD: ABNORMAL
EOSINOPHIL # BLD AUTO: 0.4 10E9/L (ref 0–0.7)
EOSINOPHIL NFR BLD AUTO: 2.5 %
ERYTHROCYTE [DISTWIDTH] IN BLOOD BY AUTOMATED COUNT: 14.9 % (ref 10–15)
GFR SERPL CREATININE-BSD FRML MDRD: 90 ML/MIN/{1.73_M2}
GLUCOSE SERPL-MCNC: 94 MG/DL (ref 70–99)
HBV CORE AB SERPL QL IA: NONREACTIVE
HBV SURFACE AG SERPL QL IA: NONREACTIVE
HCT VFR BLD AUTO: 45.1 % (ref 40–53)
HGB BLD-MCNC: 14.5 G/DL (ref 13.3–17.7)
IMM GRANULOCYTES # BLD: 0.3 10E9/L (ref 0–0.4)
IMM GRANULOCYTES NFR BLD: 2.2 %
LYMPHOCYTES # BLD AUTO: 1.4 10E9/L (ref 0.8–5.3)
LYMPHOCYTES NFR BLD AUTO: 9.7 %
MCH RBC QN AUTO: 30 PG (ref 26.5–33)
MCHC RBC AUTO-ENTMCNC: 32.2 G/DL (ref 31.5–36.5)
MCV RBC AUTO: 93 FL (ref 78–100)
MONOCYTES # BLD AUTO: 1.4 10E9/L (ref 0–1.3)
MONOCYTES NFR BLD AUTO: 9.3 %
NEUTROPHILS # BLD AUTO: 11.3 10E9/L (ref 1.6–8.3)
NEUTROPHILS NFR BLD AUTO: 76 %
NRBC # BLD AUTO: 0 10*3/UL
NRBC BLD AUTO-RTO: 0 /100
PLATELET # BLD AUTO: 352 10E9/L (ref 150–450)
POTASSIUM SERPL-SCNC: 4 MMOL/L (ref 3.4–5.3)
PROT SERPL-MCNC: 7 G/DL (ref 6.8–8.8)
RBC # BLD AUTO: 4.84 10E12/L (ref 4.4–5.9)
SODIUM SERPL-SCNC: 136 MMOL/L (ref 133–144)
WBC # BLD AUTO: 14.9 10E9/L (ref 4–11)

## 2019-06-14 PROCEDURE — 87340 HEPATITIS B SURFACE AG IA: CPT | Performed by: PSYCHIATRY & NEUROLOGY

## 2019-06-14 PROCEDURE — 86704 HEP B CORE ANTIBODY TOTAL: CPT | Performed by: PSYCHIATRY & NEUROLOGY

## 2019-06-14 PROCEDURE — 99205 OFFICE O/P NEW HI 60 MIN: CPT | Mod: ZP | Performed by: PSYCHIATRY & NEUROLOGY

## 2019-06-14 PROCEDURE — 85025 COMPLETE CBC W/AUTO DIFF WBC: CPT | Performed by: PSYCHIATRY & NEUROLOGY

## 2019-06-14 PROCEDURE — 80053 COMPREHEN METABOLIC PANEL: CPT | Performed by: PSYCHIATRY & NEUROLOGY

## 2019-06-14 PROCEDURE — G0463 HOSPITAL OUTPT CLINIC VISIT: HCPCS | Performed by: RADIOLOGY

## 2019-06-14 PROCEDURE — G0463 HOSPITAL OUTPT CLINIC VISIT: HCPCS | Mod: ZF

## 2019-06-14 RX ORDER — ONDANSETRON 4 MG/1
4 TABLET, FILM COATED ORAL AT BEDTIME
Qty: 30 TABLET | Refills: 3 | Status: SHIPPED | OUTPATIENT
Start: 2019-06-14 | End: 2019-07-19

## 2019-06-14 RX ORDER — SULFAMETHOXAZOLE/TRIMETHOPRIM 800-160 MG
1 TABLET ORAL
Qty: 25 TABLET | Refills: 0 | Status: SHIPPED | OUTPATIENT
Start: 2019-06-14 | End: 2019-07-19

## 2019-06-14 RX ORDER — TEMOZOLOMIDE 180 MG/1
75 CAPSULE ORAL AT BEDTIME
Qty: 42 CAPSULE | Refills: 0 | Status: SHIPPED | OUTPATIENT
Start: 2019-06-14 | End: 2019-09-15

## 2019-06-14 RX ORDER — TEMOZOLOMIDE 180 MG/1
75 CAPSULE ORAL AT BEDTIME
Qty: 42 CAPSULE | Refills: 0 | Status: SHIPPED | OUTPATIENT
Start: 2019-06-14 | End: 2019-06-14

## 2019-06-14 ASSESSMENT — ENCOUNTER SYMPTOMS
DIAPHORESIS: 0
FEVER: 0
NECK PAIN: 0
DYSURIA: 0
INSOMNIA: 0
DEPRESSION: 0
DIARRHEA: 0
SEIZURES: 0
FALLS: 0
SHORTNESS OF BREATH: 0
BLURRED VISION: 0
TINGLING: 0
CONSTIPATION: 0
FREQUENCY: 0
SORE THROAT: 0
BACK PAIN: 0
DOUBLE VISION: 0
VOMITING: 0
BLOOD IN STOOL: 0
WEIGHT LOSS: 1
NAUSEA: 0
NERVOUS/ANXIOUS: 0
DIZZINESS: 0
CHILLS: 0
HEMATURIA: 0
HEADACHES: 0
EYE PAIN: 0
BRUISES/BLEEDS EASILY: 0

## 2019-06-14 ASSESSMENT — MIFFLIN-ST. JEOR: SCORE: 1832.52

## 2019-06-14 ASSESSMENT — PAIN SCALES - GENERAL: PAINLEVEL: MILD PAIN (3)

## 2019-06-14 NOTE — NURSING NOTE
"Oncology Rooming Note    June 14, 2019 8:01 AM   Gopi Farfan is a 64 year old male who presents for:    Chief Complaint   Patient presents with     Oncology Clinic Visit     [facility] NEW- OLIGODENDROGLIOMA      Initial Vitals: /64 (BP Location: Left arm, Patient Position: Chair, Cuff Size: Adult Regular)   Pulse 80   Temp 98.7  F (37.1  C) (Oral)   Resp 14   Ht 1.88 m (6' 2.02\")   Wt 96.8 kg (213 lb 4.8 oz)   SpO2 97%   BMI 27.37 kg/m   Estimated body mass index is 27.37 kg/m  as calculated from the following:    Height as of this encounter: 1.88 m (6' 2.02\").    Weight as of this encounter: 96.8 kg (213 lb 4.8 oz). Body surface area is 2.25 meters squared.  Mild Pain (3) Comment: Data Unavailable   No LMP for male patient.  Allergies reviewed: Yes  Medications reviewed: Yes    Medications: Medication refills not needed today.  Pharmacy name entered into EPIC: Data Unavailable    Clinical concerns: No new concerns. Blake was notified.      Adama Joseph LPN            "

## 2019-06-14 NOTE — PATIENT INSTRUCTIONS
Treatment for anaplastic oligodendrogliomas is radiation therapy + chemotherapy with temozolomide (Temodar)  How to take your chemotherapy:  -Your temozolomide dose is 180 mg  -Start date: Evening before the start of radiation; TBD  -Continue every day, regardless of radiation schedule, until the night before your last day of radiation  -Take on an empty stomach and after taking anti-nausea (Zofran) medication;    Stop eating 2 hours before bedtime   After 1.5 hours of fasting, take Zofran   Wait at least another 30 minutes   Take temozolomide and go to bed  -Only the patient should handle the chemotherapy capsules    -Weekly blood count testing: Plan: Local.     Anticipated side effects:  -Nausea   -Decreased blood counts   -Constipation   -Elevation in liver enzymes   -Fatigue/ Malaise* (*worse with combine radiation therapy)  -Rash (rare)     Supportive medications:  -Nausea: Zofran 4mg; 1 to 2 tabs 30 minutes prior to chemotherapy and then every 8 hours as needed  -Constipation: Docusate 100 mg; 1 cap orally 3 times a day (stool softener)**                         Senna 8.6 m tabs orally at bedtime (laxative)**  -Steroids: Dexamethasone taper.    Recommend taking with breakfast/ lunch   Dose may need to be increased during radiation  -Antibiotic precaution: Sulfamethoxazole-trimethoprim (Bactrim) 800 mg-160 mg; 1 tab orally Monday, Wednesday, and Friday for pneumocystis prophylaxis, continue until radiation therapy is completed    Other supportive measures:  -Oral hygiene/ keep mouth and lips moist by rinsing with water every 2 hours while awake. If needed, add salt or baking soda (1/2 to 1 teaspoon in 8oz of water), use commercially available saliva substitute (ex. Biotene), apply lipmoisturizer/chap stick, and/ or suck on hard candies.    Always call with any questions or concerns or to report new complaints:  -Cancer patients are at increased risk of developing clots. Please call if you notice leg  swelling.  -Mood changes (depression/ agitation/ anxiety) are to be expected and may be worsened by some medications, but please note that untreated/undertreated depression can decrease one s energy, appetite, and mood.   -It is important to maintain a healthy, well-balanced diet. Please disclose all alternative medications/ supplements, as these substances may interact with or even reverse the effect of your cancer-directed treatments.  -Partake in moderate physical activity, as tolerated, to help boost energy and combat constipation.  -Strongly recommend annual flu vaccination, but vaccines should never be with a live virus.  -Call 9-1-1 or go to the nearest emergency room if you develop a severe headache, abrupt change in mental status, seizures, or significantly and new neurological symptoms.    Overall plan:  Following completion of radiation therapy + chemotherapy, there will be a 1 month period without treatment. After this time, there will be a new post-radiation MR brain scan and we will discuss starting temozolomide at a higher dose taken for 5 consecutive days/ month.      Orders for today:  Labs    Return to clinic: At the end of chemoradiotherapy with CARLOS Cavazos.     Leeanne Lozano MD  Neuro-oncology  6/14/2019

## 2019-06-14 NOTE — LETTER
2019       RE: Gopi Farfan  [address] [facility] [address]     Dear Colleague,    Thank you for referring your patient, Gopi Farfan, to the RADIATION ONCOLOGY CLINIC. Please see a copy of my visit note below.    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 Downing    HISTORY OF PRESENT ILLNESS:   Mr. Farfan is a 64-year-old gentleman recently diagnosed with anaplastic oligodendroglioma involving the bilateral frontal lobes.  He initially presented to [facility] in [place] on 2019 with a chief complaint of progressive short-term memory loss over the past several weeks.  His wife and coworkers also noted that he had difficulty with concentration over this time and he would forget to reply to people in conversation.  He also had some gait disturbance and felt like he was walking more slowly and deliberately. In retrospect, he thinks he had increased fatigue for at least 6 months prior to presentation. He attributed much of this fatigue to his recent gastric banding procedure. He has a history of intermittent headaches which were unchanged from baseline.  No nausea or vomiting.  No apparent seizure activity. He had a CT scan on presentation which showed a heterogeneous mass with some calcification located in the left frontal lobe with extension into the right hemisphere, crossing the corpus callosum.  A subsequent MRI again noted a large, heterogeneous,  intra-axial neoplasm centered within the left frontal lobe with prominent surrounding edema and mass-effect.  There was deviation of the midline septum pellucidum by approximately 1 cm from left to right.  The mass had poorly defined margins but measured approximately 6 x 5 x 5 cm.  There was also apparent involvement of the anterior left basal ganglia and mass-effect causing effacement of the left and right frontal horns.  He was subsequently transferred to the [facility] for further management.    On admission, he was started on 4 mg dexamethasone every 6 hours and was scheduled for surgical removal of the mass.  On 5/30/2019 he underwent a Stealth guided left craniotomy and resection of tumor. It was felt that a GTR was achieved intraoperatively, but postoperative MRI on 5/31/2019 showed extensive postsurgical changes with possible residual tumor along the genuine of the corpus callosum, most consistent with a subtotal resection.  Surgical pathology ([id number]) showed oligodendroglioma with borderline anaplastic features, WHO grade 3.  IDH 1 was mutated by IHC and ATRX was wild-type by IHC.  1p/19q were deleted by FISH consistent with the diagnosis of oligodendroglioma. He did well post-operatively and was discharged on 6/1/19 with a 2 week decadron taper to 2 mg daily.  He was discussed that disciplinary neuro tumor conference on 6/3/2019 at which time consensus recommendation was for adjuvant chemoradiation.  He is referred to our department for discussion of adjuvant CRT.    On exam today, reports that he is overall doing well since the time of surgery.  He denies any postoperative pain and has not had any headaches.  He has not noticed any focal neurologic deficits.  He overall thinks that his energy is improving and he is noticed improvement in his memory of his walking ability.  Overall he feels that he is getting back to his normal state of health.  No recent fevers, chills, nausea, vomiting.  No  erythema or drainage from his surgical incision.  A complete review of systems was otherwise unremarkable.      PAST MEDICAL HISTORY:   Past Medical History:   Diagnosis Date     Brain tumor (H)      Cardiac arrhythmia      Glaucoma (increased eye pressure)      Hypercholesteremia      Past Surgical History:   Procedure Laterality Date     HRW ANES TOTAL KNEE REPLACEMENT, MDA 1 Left 2015     OPTICAL TRACKING SYSTEM CRANIOTOMY, EXCISE TUMOR, COMBINED Left 5/30/2019    Procedure: Stealth Guided Left Craniotomy And Tumor Resection;  Surgeon: Abdullahi Downing MD;  Location: [facility] OR     STOMACH SURGERY  02/12/2019    LINX, a magnetic implant in the area of the gastroesphogeal junction, needs special MRI       CHEMOTHERAPY HISTORY: None     PAST RADIATION THERAPY HISTORY: None     IMPLANTED DEVICES: Gastric band which is reportedly safe for MRI up to 1.5T    AUTOIMMUNE/CONNECTIVE TISSUE DISORDERS: None    MEDICATIONS:   Current Outpatient Medications   Medication Sig Dispense Refill     atorvastatin (LIPITOR) 80 MG tablet Take 40 mg by mouth daily       dexamethasone (DECADRON) 2 MG tablet Take 1 tablet (2 mg) by mouth daily 60 tablet 11     hydrochlorothiazide (HYDRODIURIL) 25 MG tablet Take 25 mg by mouth daily       latanoprost (XALATAN) 0.005 % ophthalmic solution Place 1 drop into both eyes every evening        losartan (COZAAR) 100 MG tablet Take 100 mg by mouth daily       ondansetron (ZOFRAN) 4 MG tablet Take 1 tablet (4 mg) by mouth At Bedtime ; 30 minutes prior to chemotherapy dosing and repeat every 8 hours as needed for nausea. 30 tablet 3     spironolactone (ALDACTONE) 25 MG tablet Take 12.5 mg by mouth daily       sulfamethoxazole-trimethoprim (BACTRIM DS/SEPTRA DS) 800-160 MG tablet Take 1 tablet by mouth Every Mon, Wed, Fri Morning 25 tablet 0     Temozolomide (TEMODAR) 180 MG capsule Take 1 capsule (180 mg) by mouth At Bedtime Take on an empty stomach. Take a dose of nausea medication 30-60 min before  temozolomide. 42 capsule 0       ALLERGIES:  allergic to amlodipine.    SOCIAL HISTORY:  . Lives in [place], [place]. Works as an . Never smoker. Occasional EtOH.    FAMILY HISTORY: No family history of cancer    REVIEW OF SYMPTOMS:  A full 14-point review of systems was performed.     PHYSICAL EXAMINATION:    /74   Pulse 76   Wt 97.8 kg (215 lb 11.2 oz)   SpO2 96%   BMI 27.68 kg/m     General: Alert, oriented, NAD  Skin: No rashes or lesions appreciated  HEENT: NCAT, EOMI, PERRL  Neck: Supple, full ROM, no cervical LAD  Breasts: Deferred  Heart: WWP  Lungs: Breathing comfortably on RA  Abd: Nondistended  /Rectal: Deferred  Ext: Atraumatic, grossly intact strength  Neuro: CNs grossly intact, normal gait, strength 5/5 in upper and lower extremities bilaterally, coordination grossly intact, sensation grossly intact, negative romberg    ECOG PS: 0    IMAGING:  Pre-op MRI      Post-op MRI:        ASSESSMENT: 64 year old male with recently diagnosed 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 Farfan regarding the role of radiotherapy in the treatment of his recently resected anaplastic oligodendroglioma.  We discussed that the current standard of care following definitive surgery is for adjuvant chemoradiation with concurrent temozolomide followed by adjuvant temozolomide.  The patient met with Dr. Lozano prior to today's appointment and she is planning on offering concurrent and adjuvant temozolomide which has been ordered. We discussed that radiotherapy will begin at the same time temozolomide therapy will take place over the course of approximately 6 weeks.  We reviewed the logistics as well as expected acute and potential late side effects of this treatment in detail with the patient.  The patient had a number of questions which were answered to the best of our ability.      The patient  states that he wishes to receive his radiation closer to home and is planning on going to [place] for RT. [facility] will [facility] We discussed that we think this is a reasonable option.  We provided him with the phone number and names of providers at [facility] in [place] and he will call to set up a consultation there.     Thank you for allowing us to participate in this patient's care.  Please feel free to call with any questions or concerns.    The patient was seen and discussed with staff, Dr. Galindo.     Silvio Vitale MD  Resident, PGY-4  Department of Radiation Oncology  [facility]  [phone]           I saw and examined the patient with the resident.  I have reviewed and edited the resident's note and agree with the plan of care.      Gunner Galindo MD    CC  Patient Care Team:  Chip Tan as PCP - General (Family Practice)  Danika Duron PA-C as Physician Assistant (Hematology & Oncology)  Leeanne Lozano MD as MD (Neurology)  Akiko Che, ABISAI as Specialty Care Coordinator (Hematology & Oncology)  DEBORAH NINO        [facility]  INITIAL PATIENT ASSESSMENT    Diagnosis: Oligodedroglioma, surgery 5/30/19    Prior radiation therapy: None    Prior chemotherapy: None. Dr. Lozano, Will start Temodar    Prior hormonal therapy:No    Pain Eval:  Denies    Psychosocial  Living arrangements: [place]  Fall Risk: independent   referral needs: Not needed    Advanced Directive: Yes - Location: Pt has a copy to fill out  Implantable Cardiac Device? No     Review of Systems   Constitutional: Positive for malaise/fatigue (Before surgery very fatigued, getting better since surgery) and weight loss (about 10 -15 lbs since surgery). Negative for chills, diaphoresis and fever.   HENT: Negative for ear pain, hearing loss, nosebleeds and sore throat.    Eyes: Negative for blurred vision, double vision and pain.   Respiratory: Negative for shortness of  breath.    Cardiovascular: Negative for chest pain and leg swelling.   Gastrointestinal: Negative for blood in stool, constipation, diarrhea, nausea and vomiting.   Genitourinary: Negative for dysuria (sometimes does not have the presure), frequency, hematuria and urgency.   Musculoskeletal: Negative for back pain, falls, joint pain and neck pain.   Skin: Negative for rash.   Neurological: Negative for dizziness, tingling, seizures and headaches.   Endo/Heme/Allergies: Does not bruise/bleed easily.   Psychiatric/Behavioral: Negative for depression and suicidal ideas. The patient is not nervous/anxious and does not have insomnia.            Nurse face-to-face time: Level 4:  15 min face to face time      Again, thank you for allowing me to participate in the care of your patient.      Sincerely,    Gunner Galindo MD

## 2019-06-14 NOTE — LETTER
"6/14/2019       RE: Gopi Farfan  [id number]  [facility] [address]     Dear Colleague,    Thank you for referring your patient, Gopi Farfan, to the [facility]. Please see a copy of my visit note below.    NEURO-ONCOLOGY INITIAL VISIT  Jun 14, 2019    CHIEF COMPLAINT: Mr. 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 He is presenting to this initial clinic visit as referred by Dr. Abdullahi Downing, neurosurgery at the [facility], for evaluation and recommendations on treatment.     Accompanying him to this visit is Dana (wife).       HISTORY OF PRESENT ILLNESS  A summary of the patient s oncologic history is as follows;   -5/2019 PRESENTATION: Progressive confusion, memory loss plus difficulty with walking.   -5/28/2019 [facility] in [place] for evaluation, then transferred to [facility] for higher level of care.   -5/28/2019 MR brain imaging revealed a large left frontal mass   -5/30/2019 SURGERY: Craniotomy for  mass resection by Dr. Downing.  PATHOLOGY: Oligodendroglioma with borderline anaplastic features (WHO grade III; Co-deletion of chromosomal regions 1p and 19q, IDH-1 (R132H, by immunohistochemistry) mutated. ATRX wild type by immunohistochemistry. Mitoses were present, not sufficient in number to warrant a diagnosis of anaplasia by that criterion.  However, the presence of vascular proliferation, even though mild, is indicative of early anaplasia.  -6/13/2019 NEURO-ONC: Recommending chemoradiotherapy.     Today in clinic;   -Melvin has no complaints. Recovered well from surgery.   -Walking is good, full strength. No headaches. No nausea. No word finding/ language dificits.   -Continued issues with short term memory.   -Energy is fair, improving each day.   -Denies any changes in sensation or abnormalities with vision.  -Denies any " episodes concerning for a seizure, including unexplained episodes of loss of consciousness, unexplained falls, unexplained loss of bowel/ bladder control or tongue biting, unexplained bruising/ myalgia, periods of loss of time, or witnessed shaking/ jerking movements.   -Dexamethasone dose is currently 2mg daily, taper well tolerated. Sleeping well.   -Mood is good.   -Fertility preservation was not discussed.    REVIEW OF SYSTEMS  A comprehensive ROS negative except as in HPI.      MEDICATIONS   Current Outpatient Medications   Medication Sig Dispense Refill     atorvastatin (LIPITOR) 80 MG tablet Take 40 mg by mouth daily       dexamethasone (DECADRON) 2 MG tablet Take 1 tablet (2 mg) by mouth daily 60 tablet 11     hydrochlorothiazide (HYDRODIURIL) 25 MG tablet Take 25 mg by mouth daily       latanoprost (XALATAN) 0.005 % ophthalmic solution Place 1 drop into both eyes every evening        losartan (COZAAR) 100 MG tablet Take 100 mg by mouth daily       ondansetron (ZOFRAN) 4 MG tablet Take 1 tablet (4 mg) by mouth At Bedtime ; 30 minutes prior to chemotherapy dosing and repeat every 8 hours as needed for nausea. 30 tablet 3     spironolactone (ALDACTONE) 25 MG tablet Take 12.5 mg by mouth daily       sulfamethoxazole-trimethoprim (BACTRIM DS/SEPTRA DS) 800-160 MG tablet Take 1 tablet by mouth Every Mon, Wed, Fri Morning 25 tablet 0     Temozolomide (TEMODAR) 180 MG capsule Take 1 capsule (180 mg) by mouth At Bedtime Take on an empty stomach. Take a dose of nausea medication 30-60 min before temozolomide. 42 capsule 0     DRUG ALLERGIES   Allergies   Allergen Reactions     Amlodipine Swelling     PAST MEDICAL HISTORY   Cardiac arrhthymias  High cholesterol  Brain tumor  Glaucoma     PAST SURGICAL HISTORY   Past Surgical History:   Procedure Laterality Date     HRW ANES TOTAL KNEE REPLACEMENT, MDA 1 Left 2015     OPTICAL TRACKING SYSTEM CRANIOTOMY, EXCISE TUMOR, COMBINED Left 5/30/2019    Procedure: Stealth Guided  "Left Craniotomy And Tumor Resection;  Surgeon: Abdullahi Downing MD;  Location: [facility] OR     STOMACH SURGERY  02/12/2019    LINX, a magnetic implant in the area of the gastroesphogeal junction, needs special MRI     SOCIAL HISTORY   History   Smoking Status     Former Smoker   Smokeless Tobacco     Former User    Social History    Substance and Sexual Activity      Alcohol use: Yes        Comment: a couple daily after work, on hold while under cancer treatment     History   Drug Use Not on file   . 1 son + grandchildren.   Employment: .    FAMILY HISTORY   No family history of cancer.       PHYSICAL EXAMINATION  /64 (BP Location: Left arm, Patient Position: Chair, Cuff Size: Adult Regular)   Pulse 80   Temp 98.7  F (37.1  C) (Oral)   Resp 14   Ht 1.88 m (6' 2.02\")   Wt 96.8 kg (213 lb 4.8 oz)   SpO2 97%   BMI 27.37 kg/m      Wt Readings from Last 2 Encounters:   06/14/19 97.8 kg (215 lb 11.2 oz)   06/14/19 97.8 kg (215 lb 8 oz)      Ht Readings from Last 2 Encounters:   06/14/19 1.88 m (6' 2.02\")   05/31/19 1.88 m (6' 2\")     KPS: 100    -Generally well appearing.  -Throat: No oral thrush.  -Respiratory: Normal breath sounds, no audible wheezing.   -Skin: No rashes. Healing head incision.  -Hematologic/ lymphatic: Some bruising, healing. No leg swelling. Psoriasis on arms and legs.   -Psychiatric: Normal mood and affect. Pleasant, talkative.  -Neurologic:   MENTAL STATUS:     Alert, oriented to date.    Recall: Immediate 3/3, delayed 2/3 improved to 3/3 with clues.    Speech fluent. Comprehension intact to multi-step commands.   Normal naming, repetition. Able to read.   Good right-left orientation.     CRANIAL NERVES:     Discs flat on fundoscopy.    L pupil 1mm > R pupil, round, reactive to light.     Extraocular movements full, patient denies diplopia.     Visual fields full.     Facial sensation intact to light touch.   Decreased activation of the face on the right.    Hearing " intact.   Palate moves symmetrically.     Sternocleidomastoid and trapezius strength intact.   Tongue midline.  MOTOR:    Normal and symmetric tone.   Grossly 5/5 throughout.    No pronation or drift. No orbiting.   Able to rise from a chair without use of arms.   On toe/ heel walk, equal distance from floor to heels/ toes.   SENSATION:    Intact to light touch throughout.  COORDINATION:   Intact finger-nose with eyes open and closed.   REFLEXES:    Normal and symmetric.    Toes not tested. No clonus. No Hoffmans.   No grasp.    GAIT:  Walks without assistance.   Good speed. Normal stride length and heel strike. Normal turns. Normal arm swing.   Able to toe, heel walk. Able to tandem walk.       MEDICAL RECORDS  Obtained and personally reviewed all available outside medical records in addition to reviewing any records available in our electronic system.     LABS  Personally reviewed all available lab results.     IMAGING  Personally reviewed pre- and post-operative MR brain imaging from last month. To my eye, there was near totally resected with questionable residual tumor crossing the corpus callosum.     Imaging was shown to and results were reviewed with Iveth.      Imaging and case reviewed and discussed at Brain Tumor Conference.       IMPRESSION  For the 60 minute appointment, more than 50% of the encounter was spent discussing in detail the nature of this tumor, providing emotional support, answering questions pertaining to my recommendations, and devising the treatment plan as outlined below. It was explained to Iveth that he has a brain tumor for which there is currently no cure. Therefore, cancer-directed treatment strives to slow further growth and increase the time interval to recurrence.    With regard to cancer-directed therapy, a multimodal treatment approach first involves attempting a maximum safe surgical resection. In this case, a gross total resection was performed. Following  surgery, there are no formal treatment standards for anaplastic or grade III gliomas, however, there is a general consensus that treatment should include a combination chemoradiotherapy with temozolomide dosed at 75 mg/m2 daily concomitantly with radiation therapy.    Risks/ benefits of temozolomide were reviewed and the following common, anticipated side effects of this treatment were discussed today including, but not limited to, fatigue, nausea, and constipation. Any AST/ ALT elevations are typically reversible and any rash is manageable with antihistaminics and steroid premedication. Concomitant radiation can result in increased cerebral edema and therefore, symptoms of malaise and fatigue generally worsen, but do eventually improve. As a result, dexamethasone dosage may need to be increased. The combination therapy can result in bone marrow suppression; leukopenia and thrombocytopenia.     PROBLEM LIST  Anaplastic oligodendroglioma (grade III)  Memory complaints    PLAN  -CANCER-DIRECTED THERAPY-  Will initiate chemoradiotherapy with temozolomide 75mg/m2 (180mg).   -Instructed to start temozolomide the evening before the start date of radiation and continue daily until the completion of radiation.   -Take temozolomide on an empty stomach, 30 minutes after Zofran dosing.  -Additional temozolomide teaching performed by RN/ pharmacy staff.     -Wanted to have radiation therapy done closer to home with [facility].     -Initial labs ordered; CBC with differential and CMP without concerns, Hepatitis B serologies (non-reactive)  -Will continue weekly CBC and repeat CBC, renal, and LFT at follow-up.    -Next generation sequencing can be ordered if further disease progression necessitates evaluating for targeted therapy.    -Medications prescribed;        -Zofran 4mg (1 to 2 tabs qHS 30 minutes prior to chemotherapy and then PRN nausea).       -For lymphopenia, prophylactic antibiotics are recommended during  concurrent treatment. Will start Sulfamethoxazole-trimethoprim (Bactrim) 800 mg-160 mg; 1 tab orally Monday, Wednesday, and Friday for pneumocystis prophylaxis.       -Docusate 100 mg; 1 cap orally 3 times a day (stool softener for constipation)       -Senna 8.6 m tabs orally at bedtime (laxative as needed for constipation)    -STEROIDS-  -Continue to wean dexamethasone to off as tolerated.   -Dose may increase while undergoing radiation.    -SEIZURE MANAGEMENT-  -While this patient is at increased risk of having seizures, given the lack of seizure history, there is no indication to prescribe an antiepileptic at this time.     -Quality of life/ MOOD/ FATIGUE-  -Denies any mood issues.  -Continue to monitor mood as untreated/ undertreated depression can worsen fatigue, dysorexia, and quality of life.    Return to clinic at completion of radiation.     In the meantime, Melvin and Dana know to call with questions or concerns or to report new complaints and can be seen sooner if needed. Urgent evaluation is needed in the setting of acute onset of severe headache, abrupt change in mental status, on-going seizures, new focal deficits, or new leg swelling/ pain. Everyone in attendance voiced understanding.    Leeanne Lozano MD  Neuro-oncology

## 2019-06-14 NOTE — ORAL ONC MGMT
"Oral Chemotherapy Monitoring Program    Primary Oncologist: Dr. Lozano  Primary Oncology Clinic: [facility]  Cancer Diagnosis: GBM    Drug: Temozolomide 180mg by mouth every evening for 42 days with radiation, take 30-60 minutes following ondansetron on an empty stomach.  Start Date: TBD, around 6/24  Dose is appropriate for patients: 2.25 m2 BSA   Expected duration of therapy: Continuous for 42 days with radiation    Drug Interaction Assessment:  There were no significant drug interactions identified upon review of medication list with chemotherapy agents.    Lab Monitoring Plan  CBC weekly  CMP Q3 weeks  Subjective/Objective:  Gopi Farfan is a 64 year old male seen in clinic for an initial visit for oral chemotherapy education.      ORAL CHEMOTHERAPY 6/14/2019   Drug Name Temodar (Temozolomide)   Current Dosage 180mg   Current Schedule Daily   Cycle Details Continuous for 42 days during XRT   Planned next cycle start date 6/24/2019       Vitals:  BP:   BP Readings from Last 1 Encounters:   06/14/19 115/64     Wt Readings from Last 1 Encounters:   06/14/19 96.8 kg (213 lb 4.8 oz)     Estimated body surface area is 2.25 meters squared as calculated from the following:    Height as of an earlier encounter on 6/14/19: 1.88 m (6' 2.02\").    Weight as of an earlier encounter on 6/14/19: 96.8 kg (213 lb 4.8 oz).      Labs:  _  Result Component Current Result Ref Range   Sodium 135 (6/1/2019) 133 - 144 mmol/L     _  Result Component Current Result Ref Range   Potassium 3.9 (6/1/2019) 3.4 - 5.3 mmol/L     _  Result Component Current Result Ref Range   Calcium 9.3 (6/1/2019) 8.5 - 10.1 mg/dL     _  Result Component Current Result Ref Range   Magnesium 2.4 (H) (6/1/2019) 1.6 - 2.3 mg/dL     _  Result Component Current Result Ref Range   Phosphorus 2.8 (6/1/2019) 2.5 - 4.5 mg/dL     No results found for ALBUMIN within last 30 days.     _  Result Component Current Result Ref Range   Urea Nitrogen 13 (6/1/2019) 7 - " 30 mg/dL     _  Result Component Current Result Ref Range   Creatinine 0.88 (6/1/2019) 0.66 - 1.25 mg/dL       No results found for AST within last 30 days.     No results found for ALT within last 30 days.     No results found for BILITOTAL within last 30 days.       _  Result Component Current Result Ref Range   WBC 17.8 (H) (6/1/2019) 4.0 - 11.0 10e9/L     _  Result Component Current Result Ref Range   Hemoglobin 12.1 (L) (6/1/2019) 13.3 - 17.7 g/dL     _  Result Component Current Result Ref Range   Platelet Count 261 (6/1/2019) 150 - 450 10e9/L     _  Result Component Current Result Ref Range   Absolute Neutrophil 6.2 (5/28/2019) 1.6 - 8.3 10e9/L       Assessment:  Patient is appropriate to start therapy.    Plan:  Basic chemotherapy teaching was reviewed with the patient and the patient's family including indication, start date of therapy, dose, administration, adverse effects, missed doses, food and drug interactions, monitoring, side effect management, office contact information, and safe handling. Written materials were provided and all questions answered.    Follow-Up:  6/14 determine radiation plan  7/1 pharmacist phone follow up     Alex Hart.D., [facility]  [phone]  06/14/19

## 2019-06-14 NOTE — TELEPHONE ENCOUNTER
Family FMLA forms completed and sent to desired locations.   Patient notified of this.  Original forms are located in triage folder.    Maryjo Loaiza CMA ([facility])

## 2019-06-14 NOTE — LETTER
6/14/2019       RE: Gopi Farfan  [id number]  [facility] [address]     Dear Colleague,    Thank you for referring your patient, Gopi Farfan, to the [facility]. Please see a copy of my visit note below.    See Oral Onc Mngmnt Note    Again, thank you for allowing me to participate in the care of your patient.      Sincerely,    Ashley Herbert RPH

## 2019-06-14 NOTE — LETTER
Date:June 17, 2019      Patient was self referred, no letter generated. Do not send.        [facility]

## 2019-06-14 NOTE — PROGRESS NOTES
"NEURO-ONCOLOGY INITIAL VISIT  Jun 14, 2019    CHIEF COMPLAINT: Mr. 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 He is presenting to this initial clinic visit as referred by Dr. Abdullahi Downing, neurosurgery at the [facility], for evaluation and recommendations on treatment.     Accompanying him to this visit is Dana (wife).       HISTORY OF PRESENT ILLNESS  A summary of the patient s oncologic history is as follows;   -5/2019 PRESENTATION: Progressive confusion, memory loss plus difficulty with walking.   -5/28/2019 Admitted to [facility] in [place] for evaluation, then transferred to [facility] for higher level of care.   -5/28/2019 MR brain imaging revealed a large left frontal mass   -5/30/2019 SURGERY: Craniotomy for  mass resection by Dr. Downing.  PATHOLOGY: Oligodendroglioma with borderline anaplastic features (WHO grade III; Co-deletion of chromosomal regions 1p and 19q, IDH-1 (R132H, by immunohistochemistry) mutated. ATRX wild type by immunohistochemistry. Mitoses were present, not sufficient in number to warrant a diagnosis of anaplasia by that criterion.  However, the presence of vascular proliferation, even though mild, is indicative of early anaplasia.  -6/13/2019 NEURO-ONC: Recommending chemoradiotherapy.     Today in clinic;   -Melvin has no complaints. Recovered well from surgery.   -Walking is good, full strength. No headaches. No nausea. No word finding/ language dificits.   -Continued issues with short term memory.   -Energy is fair, improving each day.   -Denies any changes in sensation or abnormalities with vision.  -Denies any episodes concerning for a seizure, including unexplained episodes of loss of consciousness, unexplained falls, unexplained loss of bowel/ bladder control or tongue biting, unexplained bruising/ myalgia, periods of loss of time, or " witnessed shaking/ jerking movements.   -Dexamethasone dose is currently 2mg daily, taper well tolerated. Sleeping well.   -Mood is good.   -Fertility preservation was not discussed.    REVIEW OF SYSTEMS  A comprehensive ROS negative except as in HPI.      MEDICATIONS   Current Outpatient Medications   Medication Sig Dispense Refill     atorvastatin (LIPITOR) 80 MG tablet Take 40 mg by mouth daily       dexamethasone (DECADRON) 2 MG tablet Take 1 tablet (2 mg) by mouth daily 60 tablet 11     hydrochlorothiazide (HYDRODIURIL) 25 MG tablet Take 25 mg by mouth daily       latanoprost (XALATAN) 0.005 % ophthalmic solution Place 1 drop into both eyes every evening        losartan (COZAAR) 100 MG tablet Take 100 mg by mouth daily       ondansetron (ZOFRAN) 4 MG tablet Take 1 tablet (4 mg) by mouth At Bedtime ; 30 minutes prior to chemotherapy dosing and repeat every 8 hours as needed for nausea. 30 tablet 3     spironolactone (ALDACTONE) 25 MG tablet Take 12.5 mg by mouth daily       sulfamethoxazole-trimethoprim (BACTRIM DS/SEPTRA DS) 800-160 MG tablet Take 1 tablet by mouth Every Mon, Wed, Fri Morning 25 tablet 0     Temozolomide (TEMODAR) 180 MG capsule Take 1 capsule (180 mg) by mouth At Bedtime Take on an empty stomach. Take a dose of nausea medication 30-60 min before temozolomide. 42 capsule 0     DRUG ALLERGIES   Allergies   Allergen Reactions     Amlodipine Swelling     PAST MEDICAL HISTORY   Cardiac arrhthymias  High cholesterol  Brain tumor  Glaucoma     PAST SURGICAL HISTORY   Past Surgical History:   Procedure Laterality Date     HRW ANES TOTAL KNEE REPLACEMENT, MDA 1 Left 2015     OPTICAL TRACKING SYSTEM CRANIOTOMY, EXCISE TUMOR, COMBINED Left 5/30/2019    Procedure: Stealth Guided Left Craniotomy And Tumor Resection;  Surgeon: Abdullahi Downing MD;  Location: [facility] OR     STOMACH SURGERY  02/12/2019    LINX, a magnetic implant in the area of the gastroesphogeal junction, needs special MRI     SOCIAL HISTORY  "  History   Smoking Status     Former Smoker   Smokeless Tobacco     Former User    Social History    Substance and Sexual Activity      Alcohol use: Yes        Comment: a couple daily after work, on hold while under cancer treatment     History   Drug Use Not on file   . 1 son + grandchildren.   Employment: .    FAMILY HISTORY   No family history of cancer.       PHYSICAL EXAMINATION  /64 (BP Location: Left arm, Patient Position: Chair, Cuff Size: Adult Regular)   Pulse 80   Temp 98.7  F (37.1  C) (Oral)   Resp 14   Ht 1.88 m (6' 2.02\")   Wt 96.8 kg (213 lb 4.8 oz)   SpO2 97%   BMI 27.37 kg/m     Wt Readings from Last 2 Encounters:   06/14/19 97.8 kg (215 lb 11.2 oz)   06/14/19 97.8 kg (215 lb 8 oz)      Ht Readings from Last 2 Encounters:   06/14/19 1.88 m (6' 2.02\")   05/31/19 1.88 m (6' 2\")     KPS: 100    -Generally well appearing.  -Throat: No oral thrush.  -Respiratory: Normal breath sounds, no audible wheezing.   -Skin: No rashes. Healing head incision.  -Hematologic/ lymphatic: Some bruising, healing. No leg swelling. Psoriasis on arms and legs.   -Psychiatric: Normal mood and affect. Pleasant, talkative.  -Neurologic:   MENTAL STATUS:     Alert, oriented to date.    Recall: Immediate 3/3, delayed 2/3 improved to 3/3 with clues.    Speech fluent. Comprehension intact to multi-step commands.   Normal naming, repetition. Able to read.   Good right-left orientation.     CRANIAL NERVES:     Discs flat on fundoscopy.    L pupil 1mm > R pupil, round, reactive to light.     Extraocular movements full, patient denies diplopia.     Visual fields full.     Facial sensation intact to light touch.   Decreased activation of the face on the right.    Hearing intact.   Palate moves symmetrically.     Sternocleidomastoid and trapezius strength intact.   Tongue midline.  MOTOR:    Normal and symmetric tone.   Grossly 5/5 throughout.    No pronation or drift. No orbiting.   Able to rise from a " chair without use of arms.   On toe/ heel walk, equal distance from floor to heels/ toes.   SENSATION:    Intact to light touch throughout.  COORDINATION:   Intact finger-nose with eyes open and closed.   REFLEXES:    Normal and symmetric.    Toes not tested. No clonus. No Hoffmans.   No grasp.    GAIT:  Walks without assistance.   Good speed. Normal stride length and heel strike. Normal turns. Normal arm swing.   Able to toe, heel walk. Able to tandem walk.       MEDICAL RECORDS  Obtained and personally reviewed all available outside medical records in addition to reviewing any records available in our electronic system.     LABS  Personally reviewed all available lab results.     IMAGING  Personally reviewed pre- and post-operative MR brain imaging from last month. To my eye, there was near totally resected with questionable residual tumor crossing the corpus callosum.     Imaging was shown to and results were reviewed with Iveth.      Imaging and case reviewed and discussed at Brain Tumor Conference.       IMPRESSION  For the 60 minute appointment, more than 50% of the encounter was spent discussing in detail the nature of this tumor, providing emotional support, answering questions pertaining to my recommendations, and devising the treatment plan as outlined below. It was explained to Iveth that he has a brain tumor for which there is currently no cure. Therefore, cancer-directed treatment strives to slow further growth and increase the time interval to recurrence.    With regard to cancer-directed therapy, a multimodal treatment approach first involves attempting a maximum safe surgical resection. In this case, a gross total resection was performed. Following surgery, there are no formal treatment standards for anaplastic or grade III gliomas, however, there is a general consensus that treatment should include a combination chemoradiotherapy with temozolomide dosed at 75 mg/m2 daily  concomitantly with radiation therapy.    Risks/ benefits of temozolomide were reviewed and the following common, anticipated side effects of this treatment were discussed today including, but not limited to, fatigue, nausea, and constipation. Any AST/ ALT elevations are typically reversible and any rash is manageable with antihistaminics and steroid premedication. Concomitant radiation can result in increased cerebral edema and therefore, symptoms of malaise and fatigue generally worsen, but do eventually improve. As a result, dexamethasone dosage may need to be increased. The combination therapy can result in bone marrow suppression; leukopenia and thrombocytopenia.     PROBLEM LIST  Anaplastic oligodendroglioma (grade III)  Memory complaints    PLAN  -CANCER-DIRECTED THERAPY-  Will initiate chemoradiotherapy with temozolomide 75mg/m2 (180mg).   -Instructed to start temozolomide the evening before the start date of radiation and continue daily until the completion of radiation.   -Take temozolomide on an empty stomach, 30 minutes after Zofran dosing.  -Additional temozolomide teaching performed by RN/ pharmacy staff.     -Wanted to have radiation therapy done closer to home with [facility] in [place].     -Initial labs ordered; CBC with differential and CMP without concerns, Hepatitis B serologies (non-reactive)  -Will continue weekly CBC and repeat CBC, renal, and LFT at follow-up.    -Next generation sequencing can be ordered if further disease progression necessitates evaluating for targeted therapy.    -Medications prescribed;        -Zofran 4mg (1 to 2 tabs qHS 30 minutes prior to chemotherapy and then PRN nausea).       -For lymphopenia, prophylactic antibiotics are recommended during concurrent treatment. Will start Sulfamethoxazole-trimethoprim (Bactrim) 800 mg-160 mg; 1 tab orally Monday, Wednesday, and Friday for pneumocystis prophylaxis.       -Docusate 100 mg; 1 cap orally 3 times a day (stool softener for  constipation)       -Senna 8.6 m tabs orally at bedtime (laxative as needed for constipation)    -STEROIDS-  -Continue to wean dexamethasone to off as tolerated.   -Dose may increase while undergoing radiation.    -SEIZURE MANAGEMENT-  -While this patient is at increased risk of having seizures, given the lack of seizure history, there is no indication to prescribe an antiepileptic at this time.     -Quality of life/ MOOD/ FATIGUE-  -Denies any mood issues.  -Continue to monitor mood as untreated/ undertreated depression can worsen fatigue, dysorexia, and quality of life.    Return to clinic at completion of radiation.     In the meantime, Melvin and Dana know to call with questions or concerns or to report new complaints and can be seen sooner if needed. Urgent evaluation is needed in the setting of acute onset of severe headache, abrupt change in mental status, on-going seizures, new focal deficits, or new leg swelling/ pain. Everyone in attendance voiced understanding.    Leeanne Lozano MD  Neuro-oncology

## 2019-06-14 NOTE — NURSING NOTE
Chief Complaint   Patient presents with     Blood Draw     Left AC butterfly  X 1, labs drawn and sent, no vitals - pt going over to hospital for next appointment.   Gerri Casanova,RN

## 2019-06-14 NOTE — PROGRESS NOTES
HPI  INITIAL PATIENT ASSESSMENT    Diagnosis: Oligodedroglioma, surgery 5/30/19    Prior radiation therapy: None    Prior chemotherapy: None. Dr. Lozano, Will start Temodar    Prior hormonal therapy:No    Pain Eval:  Denies    Psychosocial  Living arrangements: [place]  Fall Risk: independent   referral needs: Not needed    Advanced Directive: Yes - Location: Pt has a copy to fill out  Implantable Cardiac Device? No     Review of Systems   Constitutional: Positive for malaise/fatigue (Before surgery very fatigued, getting better since surgery) and weight loss (about 10 -15 lbs since surgery). Negative for chills, diaphoresis and fever.   HENT: Negative for ear pain, hearing loss, nosebleeds and sore throat.    Eyes: Negative for blurred vision, double vision and pain.   Respiratory: Negative for shortness of breath.    Cardiovascular: Negative for chest pain and leg swelling.   Gastrointestinal: Negative for blood in stool, constipation, diarrhea, nausea and vomiting.   Genitourinary: Negative for dysuria (sometimes does not have the presure), frequency, hematuria and urgency.   Musculoskeletal: Negative for back pain, falls, joint pain and neck pain.   Skin: Negative for rash.   Neurological: Negative for dizziness, tingling, seizures and headaches.   Endo/Heme/Allergies: Does not bruise/bleed easily.   Psychiatric/Behavioral: Negative for depression and suicidal ideas. The patient is not nervous/anxious and does not have insomnia.            Nurse face-to-face time: Level 4:  15 min face to face time

## 2019-06-18 ENCOUNTER — TRANSFERRED RECORDS (OUTPATIENT)
Dept: HEALTH INFORMATION MANAGEMENT | Facility: CLINIC | Age: 64
End: 2019-06-18

## 2019-06-19 ENCOUNTER — PATIENT OUTREACH (OUTPATIENT)
Dept: ONCOLOGY | Facility: CLINIC | Age: 64
End: 2019-06-19

## 2019-06-21 NOTE — PROGRESS NOTES
[facility] referral received. Scanned into media tab.     Any questions regarding referral [facility] contract is Alina at [phone].

## 2019-06-25 ENCOUNTER — TRANSFERRED RECORDS (OUTPATIENT)
Dept: HEALTH INFORMATION MANAGEMENT | Facility: CLINIC | Age: 64
End: 2019-06-25

## 2019-07-01 ENCOUNTER — MEDICAL CORRESPONDENCE (OUTPATIENT)
Dept: HEALTH INFORMATION MANAGEMENT | Facility: CLINIC | Age: 64
End: 2019-07-01

## 2019-07-01 ENCOUNTER — TELEPHONE (OUTPATIENT)
Dept: ONCOLOGY | Facility: CLINIC | Age: 64
End: 2019-07-01

## 2019-07-01 DIAGNOSIS — C71.9 OLIGODENDROGLIOMA, ANAPLASTIC (H): Primary | ICD-10-CM

## 2019-07-01 NOTE — ORAL ONC MGMT
Oral Chemotherapy Monitoring Program     Placed call to patient in follow up of Temodar/XRT therapy. Melvin said he plans to start radiation at  in [place] on 7/8/2019. Confirmed that he has the first month's supply of Temodar on hand. He will start taking it the night before his first radiation visit, and will take ondansetron 30-60 minutes prior to each Temodar dose. All of his questions were answered to his stated satisfaction. He will call with any interim questions or concerns. He thanked me for the call and care.     Kirby Waggoner PharmD  [facility]  [phone]  July 1, 2019

## 2019-07-16 ENCOUNTER — TELEPHONE (OUTPATIENT)
Dept: ONCOLOGY | Facility: CLINIC | Age: 64
End: 2019-07-16

## 2019-07-16 NOTE — ORAL ONC MGMT
Oral Chemotherapy Monitoring Program    Primary Oncologist: Dr. Lozano  Primary Oncology Clinic: [facility]  Cancer Diagnosis: GBM     Drug: Temozolomide 180mg by mouth every evening for 45 days with radiation, take 30-60 minutes following ondansetron on an empty stomach.  Start Date: 7/7/2019 night before radiation start on 7/8/2019  Dose is appropriate for patients: 2.25 m2 BSA            Expected duration of therapy: Continuous for 45 days with radiation (radiation for 6.5 weeks at [facility] in [place], [place])     Drug Interaction Assessment:  There were no significant drug interactions identified upon review of medication list with chemotherapy agents.     Lab Monitoring Plan  CBC weekly  CMP Q3 weeks    Subjective/Objective:  Gopi Farfan is a 64 year old male contacted by phone for a follow-up visit for oral chemotherapy.  Called Gopi first and he said it would be better to speak with Dana (wife). Spoke to Dana (wife) who confirmed Gopi is taking 1 x 180mg capsule once daily at night about 30 minutes after ondansetron. She said Gopi is tolerating well without any perceivable side effects. Denies side effects including nausea, vomiting, constipation, diarrhea, and rash. No missed doses since starting. He will continue weekly CBC labs. Dana confirmed that Gopi is going to have 6.5 weeks of radiation at [facility] in [place], [place] and will need 45 days of temozolomide. [facility] filled a 30 day supply. Since the original rx was written for 42 day supply, patient will need 15 day supply with refill. Active temozolomide rx will be deactivated at [facility] and new rx to be released. Dana is working with a local [facility] to get approval for future refills of temozolomide.     ORAL CHEMOTHERAPY 6/14/2019 7/16/2019 7/16/2019   Drug Name Temodar (Temozolomide) Temodar (Temozolomide) Temodar (Temozolomide)   Current Dosage 180mg - 180mg   Current Schedule Daily - Daily   Cycle  "Details Continuous for 42 days during XRT - Other   Planned next cycle start date 6/24/2019 - 7/7/2019   Doses missed in last 2 weeks - - 0   Adherence Assessment - - Adherent   Adverse Effects - - No AE identified during assessment   Any new drug interactions? - - No   Is the dose as ordered appropriate for the patient? - - Yes   Has the patient missed any days of school, work, or other routine activity? - - No       Last PHQ-2 Score on record:   PHQ-2 ( 1999 [employer]) 6/14/2019   Q1: Little interest or pleasure in doing things 0   Q2: Feeling down, depressed or hopeless 0   PHQ-2 Score 0       Patient does not report depression symptoms.      Vitals:  BP:   BP Readings from Last 1 Encounters:   06/14/19 120/74     Wt Readings from Last 1 Encounters:   06/14/19 97.8 kg (215 lb 11.2 oz)     Estimated body surface area is 2.26 meters squared as calculated from the following:    Height as of 6/14/19: 1.88 m (6' 2.02\").    Weight as of 6/14/19: 97.8 kg (215 lb 11.2 oz).    Labs:  No results found for NA within last 30 days.     No results found for K within last 30 days.     No results found for CA within last 30 days.     No results found for Mag within last 30 days.     No results found for Phos within last 30 days.     No results found for ALBUMIN within last 30 days.     No results found for BUN within last 30 days.     No results found for CR within last 30 days.       No results found for AST within last 30 days.     No results found for ALT within last 30 days.     No results found for BILITOTAL within last 30 days.       No results found for WBC within last 30 days.     No results found for HGB within last 30 days.     No results found for PLT within last 30 days.     No results found for ANC within last 30 days.     Spoke with [facility] lab staff, patient has CBC on 7/17.     Assessment:  Gopi is tolerating temozolomide well without and side effects after starting on 7/7.    Plan:  Continue temozolomide 180mg " once daily x 45 days.   Last radiation is planned for 8/21 (6.5 weeks total) therefore patient will need medication for 45 days rather than 42 days.  Deactivate temozolomide rx with remaining quantity at [facility] (quantity 12 capsules). Plan to release new temozolomide rx quantity 15 capsules to pharmacy.     Follow-Up:  7/17: Labs    Refill Due:  Patient was dispensed 30 day supply and will need refill by 8/6.     Thank you for the opportunity to participate in the care of the above patient,  Laurent Kate, PharmD  Hematology/Oncology Clinical Pharmacist  [facility]  [facility]

## 2019-07-16 NOTE — ORAL ONC MGMT
Oral chemotherapy monitoring program    Placed call in follow-up of Temodar (temozolomide) therapy. Gopi answered, but not able to talk at this time. He plans to have his wife Dana call back today or tomorrow. Will update when response received.     Thank you for the opportunity to participate in the care of the above patient,  Laurent Kate, PharmD  Hematology/Oncology Clinical Pharmacist  [facility]  [facility]

## 2019-07-17 ENCOUNTER — TRANSFERRED RECORDS (OUTPATIENT)
Dept: HEALTH INFORMATION MANAGEMENT | Facility: CLINIC | Age: 64
End: 2019-07-17

## 2019-07-18 ENCOUNTER — TELEPHONE (OUTPATIENT)
Dept: ONCOLOGY | Facility: CLINIC | Age: 64
End: 2019-07-18

## 2019-07-18 DIAGNOSIS — C71.9 OLIGODENDROGLIOMA, ANAPLASTIC (H): Primary | ICD-10-CM

## 2019-07-18 NOTE — ORAL ONC MGMT
Oral Chemotherapy Monitoring Program   Lab follow up    Primary Oncologist: Dr. Lozano  Primary Oncology Clinic: [facility]  Cancer Diagnosis: GBM     Drug: Temozolomide 180mg by mouth every evening for 45 days with radiation, take 30-60 minutes following ondansetron on an empty stomach.  Start Date: 7/7/2019 night before radiation start on 7/8/2019  Dose is appropriate for patients: 2.25 m2 BSA            Expected duration of therapy: Continuous for 45 days with radiation (radiation for 6.5 weeks at [facility] in [place], [place])     Drug Interaction Assessment:  There were no significant drug interactions identified upon review of medication list with chemotherapy agents.     Lab Monitoring Plan  CBC weekly  CMP Q3 weeks        Assessment/Plan:  Placed call to patient wife Dana to review labs. Discussed labs from 7/17, no concerning abnormalities. Gopi will continue current plan and labs on Wednesday at [facility]. Plan to local print temozolomide 15 capsules, ondansetron, and Bactrim rx to be faxed by oncology liaison to [facility] pharmacy.     Follow-up:  7/24: Labs (in Care Everywhere)      Laurent Kate, PharmD  Hematology/Oncology Clinical Pharmacist  [facility]  [facility]

## 2019-07-19 RX ORDER — TEMOZOLOMIDE 180 MG/1
75 CAPSULE ORAL AT BEDTIME
Qty: 15 CAPSULE | Refills: 0 | Status: SHIPPED | OUTPATIENT
Start: 2019-07-19 | End: 2019-09-15

## 2019-07-19 RX ORDER — ONDANSETRON 4 MG/1
4 TABLET, FILM COATED ORAL AT BEDTIME
Qty: 30 TABLET | Refills: 1 | Status: SHIPPED | OUTPATIENT
Start: 2019-07-19 | End: 2019-09-15

## 2019-07-19 RX ORDER — SULFAMETHOXAZOLE/TRIMETHOPRIM 800-160 MG
1 TABLET ORAL
Qty: 18 TABLET | Refills: 0 | Status: SHIPPED | OUTPATIENT
Start: 2019-07-19 | End: 2019-09-15

## 2019-07-25 ENCOUNTER — TELEPHONE (OUTPATIENT)
Dept: ONCOLOGY | Facility: CLINIC | Age: 64
End: 2019-07-25

## 2019-07-25 ENCOUNTER — TRANSFERRED RECORDS (OUTPATIENT)
Dept: HEALTH INFORMATION MANAGEMENT | Facility: CLINIC | Age: 64
End: 2019-07-25

## 2019-07-25 NOTE — ORAL ONC MGMT
Oral Chemotherapy Monitoring Program    Placed call to patient in follow up of 7/24 labs. I spoke with Gopi and his wife about how his labs were stable and had no concerning abnormalities. He said Temodar therapy is going well and has no other questions or concerns.    Michell Pace  Pharmacy Intern  [facility]  [phone]

## 2019-07-31 ENCOUNTER — TELEPHONE (OUTPATIENT)
Dept: ONCOLOGY | Facility: CLINIC | Age: 64
End: 2019-07-31

## 2019-07-31 NOTE — ORAL ONC MGMT
Oral Chemotherapy Monitoring Program     Placed call to patient in follow up of Temodar/XRT weekly CBC lab results. There are no concerning abnormalities with today's lab results. Melvin expressed understanding and denied any other questions or concerns. He thanked me for the call and care.     Kirby Waggoner PharmD  [facility]  [phone]  July 31, 2019

## 2019-08-02 ENCOUNTER — TELEPHONE (OUTPATIENT)
Dept: ONCOLOGY | Facility: CLINIC | Age: 64
End: 2019-08-02

## 2019-08-02 NOTE — TELEPHONE ENCOUNTER
"[facility] Telephone Triage Note    Assessment: Patient called in to triage reporting the following symptoms: Red, watery eyes since starting Dexamethasone . Patient reports his eyes are red and \"crusty\". Believes symptoms started around the time he started Dex. Wondering if he could be prescribed eye drops? Pt currently recieving Temodar/XRT. Denies eye pain,vision changes, itching, or fever.     Recommendations: Encounter routed to Dr. Lozano for recommendations.    Per Dr. Lozano,   \"Not related to dex or other treatment.   I would have him see PCP/ PCP office today for evaluation.   Could be pink eye (?).   Leeanne Lozano MD   Neuro-oncology   8/2/2019\"    Writer attempted to reach patient, left VM with recommendations and requested call back to discuss.    Patient returned call. Agrees to recommendations and plan. Will see PCP or urgent care for symptoms. Writer advised patient notify [facility] Triage if unable to be seen by PCP. No further questions or concerns.    Yvonne Davis RN   [facility] Triage        "

## 2019-08-06 DIAGNOSIS — C71.9 OLIGODENDROGLIOMA, ANAPLASTIC (H): Primary | ICD-10-CM

## 2019-08-07 ENCOUNTER — PATIENT OUTREACH (OUTPATIENT)
Dept: CARE COORDINATION | Facility: CLINIC | Age: 64
End: 2019-08-07

## 2019-08-07 ENCOUNTER — TELEPHONE (OUTPATIENT)
Dept: PHARMACY | Facility: CLINIC | Age: 64
End: 2019-08-07

## 2019-08-07 NOTE — PROGRESS NOTES
Per Patient's request,  completed and faxed [employer] request for lodging dates 9/16-9/17. [place] [facility] will contact Patient for confirmation of reservation.  will continue to provide support as needed.    Soo Yeon Han, Stephens Memorial HospitalSW  Pager:  [phone]

## 2019-08-07 NOTE — TELEPHONE ENCOUNTER
Oral Chemotherapy Monitoring Program      Placed call to patient in follow up of Temodar/XRT weekly CBC lab results.   WBC 11.8, Hgb 15.4, Plt 133, ANC 9.8. ALC 0.5  I verified with Gopi that he is taking his bactrim on M/W/F    He had no questions     Wisam Swain, PharmD, BCOP  August 7, 2019

## 2019-08-08 ENCOUNTER — TRANSFERRED RECORDS (OUTPATIENT)
Dept: HEALTH INFORMATION MANAGEMENT | Facility: CLINIC | Age: 64
End: 2019-08-08

## 2019-08-15 ENCOUNTER — TELEPHONE (OUTPATIENT)
Dept: PHARMACY | Facility: CLINIC | Age: 64
End: 2019-08-15

## 2019-08-15 NOTE — TELEPHONE ENCOUNTER
Oral Chemotherapy Team    In basket message sent to Dr Lozano from oral chemotherapy team.    Dr. Lozano,     I wanted to make you aware of lab results on Gopi from 8/14.  His WBC was 9.7,ANC 8.2, Hgb 15, and Plt 82.  His platelets took a bit of hit and I know sometimes you like above 100,000.  As you know patient is on daily Temodar/Radiation.  I believe his last Radiation will be on 8/21.  Please let us know if you want any changes to his care and then we can follow up with results to Gopi.     Response from Dr Lozano-  Continue with chemo. Recheck CBC on Monday, 8/19 and if platelets are < 70, can stop TMZ.   Please have him stop Bactrim.     Spoke to Gopi today-  He will stop his bactrim today  He informed me that he will not have his labs drawn until early 8/21 and his last dose of Temodar will be 8/20.  He will not have his labs drawn on 8/19.  I informed him that I would let Dr Lozano know.    Wisam Swain, PharmD, BCOP  August 15, 2019

## 2019-08-19 ENCOUNTER — TELEPHONE (OUTPATIENT)
Dept: ONCOLOGY | Facility: CLINIC | Age: 64
End: 2019-08-19

## 2019-08-19 NOTE — ORAL ONC MGMT
"Oral Chemotherapy Monitoring Program  Placed call to patient in follow up of temozolomide therapy regarding labs from 8/16. Lab results concerning for platelets=70k, per message below I informed Melvin that he could stop taking temozolomide as of today.          Ok to follow rad onc recommendations.   I think it is fine to continue with last 2 doses, but it looks like the patient has been educated to stop.   Leeanne Lozano MD   Neuro-oncology   8/19/2019    Previous Messages      ----- Message -----   From: Kenneth Ricketts   Sent: 8/19/2019  12:24 PM   To: Akiko Che RN, Leeanne Lozano MD, *   Subject: Temozolomide Labs                                 Dr. Lozano,     As you know, Gopi Farfan is receiving temozolomide for anaplastic oligodendroglioma in addition to radiation at [facility] in [place]. Per your previous communication, he should continue TMZ with platelets 70k or above as he only has two doses remaining. His labs today resulted with platelets=70 and we received the following message from his provider in [place]:     \"Hold temozolomide for final 2 radiation treatments? -Alex Dwyer, Rad Onc [phone].\"     From our perspective, Gopi is fine to continue for his final two doses as his platelets meet parameters. Just passing along Dr. Dwyer's message in case you wanted to follow up.         Kenneth Ricketts  Pharmacy Intern  Oral Chemotherapy Monitoring Program  [facility]  [phone]    "

## 2019-08-21 ENCOUNTER — TRANSFERRED RECORDS (OUTPATIENT)
Dept: HEALTH INFORMATION MANAGEMENT | Facility: CLINIC | Age: 64
End: 2019-08-21

## 2019-09-16 ENCOUNTER — ANCILLARY PROCEDURE (OUTPATIENT)
Dept: MRI IMAGING | Facility: CLINIC | Age: 64
End: 2019-09-16
Attending: PSYCHIATRY & NEUROLOGY
Payer: COMMERCIAL

## 2019-09-16 ENCOUNTER — ONCOLOGY VISIT (OUTPATIENT)
Dept: ONCOLOGY | Facility: CLINIC | Age: 64
End: 2019-09-16
Attending: PSYCHIATRY & NEUROLOGY
Payer: COMMERCIAL

## 2019-09-16 ENCOUNTER — TELEPHONE (OUTPATIENT)
Dept: ONCOLOGY | Facility: CLINIC | Age: 64
End: 2019-09-16

## 2019-09-16 ENCOUNTER — APPOINTMENT (OUTPATIENT)
Dept: LAB | Facility: CLINIC | Age: 64
End: 2019-09-16
Attending: PSYCHIATRY & NEUROLOGY
Payer: COMMERCIAL

## 2019-09-16 VITALS
TEMPERATURE: 98.4 F | HEART RATE: 48 BPM | RESPIRATION RATE: 16 BRPM | SYSTOLIC BLOOD PRESSURE: 133 MMHG | WEIGHT: 226.7 LBS | BODY MASS INDEX: 29.09 KG/M2 | DIASTOLIC BLOOD PRESSURE: 59 MMHG | OXYGEN SATURATION: 97 %

## 2019-09-16 DIAGNOSIS — C71.9 OLIGODENDROGLIOMA, ANAPLASTIC (H): ICD-10-CM

## 2019-09-16 DIAGNOSIS — C71.9 OLIGODENDROGLIOMA, ANAPLASTIC (H): Primary | ICD-10-CM

## 2019-09-16 PROBLEM — I49.8 BIGEMINY: Status: ACTIVE | Noted: 2017-12-27

## 2019-09-16 PROBLEM — K21.9 GASTROESOPHAGEAL REFLUX DISEASE: Status: ACTIVE | Noted: 2017-01-05

## 2019-09-16 PROBLEM — M19.90 OSTEOARTHROSIS: Status: ACTIVE | Noted: 2019-09-16

## 2019-09-16 PROBLEM — E78.5 HYPERLIPIDEMIA: Status: ACTIVE | Noted: 2019-09-16

## 2019-09-16 PROBLEM — I10 ESSENTIAL HYPERTENSION: Status: ACTIVE | Noted: 2019-09-16

## 2019-09-16 PROBLEM — M47.812 OSTEOARTHRITIS OF CERVICAL SPINE: Status: ACTIVE | Noted: 2017-01-05

## 2019-09-16 LAB
ALBUMIN SERPL-MCNC: 3.6 G/DL (ref 3.4–5)
ALP SERPL-CCNC: 87 U/L (ref 40–150)
ALT SERPL W P-5'-P-CCNC: 33 U/L (ref 0–70)
ANION GAP SERPL CALCULATED.3IONS-SCNC: 5 MMOL/L (ref 3–14)
AST SERPL W P-5'-P-CCNC: 16 U/L (ref 0–45)
BASOPHILS # BLD AUTO: 0 10E9/L (ref 0–0.2)
BASOPHILS NFR BLD AUTO: 0 %
BILIRUB SERPL-MCNC: 0.4 MG/DL (ref 0.2–1.3)
BUN SERPL-MCNC: 12 MG/DL (ref 7–30)
CALCIUM SERPL-MCNC: 9 MG/DL (ref 8.5–10.1)
CHLORIDE SERPL-SCNC: 104 MMOL/L (ref 94–109)
CO2 SERPL-SCNC: 28 MMOL/L (ref 20–32)
CREAT SERPL-MCNC: 1.21 MG/DL (ref 0.66–1.25)
DIFFERENTIAL METHOD BLD: ABNORMAL
EOSINOPHIL # BLD AUTO: 0 10E9/L (ref 0–0.7)
EOSINOPHIL NFR BLD AUTO: 0 %
ERYTHROCYTE [DISTWIDTH] IN BLOOD BY AUTOMATED COUNT: 16.1 % (ref 10–15)
GFR SERPL CREATININE-BSD FRML MDRD: 63 ML/MIN/{1.73_M2}
GLUCOSE SERPL-MCNC: 106 MG/DL (ref 70–99)
HCT VFR BLD AUTO: 42.6 % (ref 40–53)
HGB BLD-MCNC: 14.6 G/DL (ref 13.3–17.7)
LYMPHOCYTES # BLD AUTO: 0.7 10E9/L (ref 0.8–5.3)
LYMPHOCYTES NFR BLD AUTO: 25.2 %
MCH RBC QN AUTO: 31.2 PG (ref 26.5–33)
MCHC RBC AUTO-ENTMCNC: 34.3 G/DL (ref 31.5–36.5)
MCV RBC AUTO: 91 FL (ref 78–100)
MONOCYTES # BLD AUTO: 0.6 10E9/L (ref 0–1.3)
MONOCYTES NFR BLD AUTO: 21.7 %
MYELOCYTES # BLD: 0 10E9/L
MYELOCYTES NFR BLD MANUAL: 0.9 %
NEUTROPHILS # BLD AUTO: 1.5 10E9/L (ref 1.6–8.3)
NEUTROPHILS NFR BLD AUTO: 52.2 %
PLATELET # BLD AUTO: 203 10E9/L (ref 150–450)
PLATELET # BLD EST: ABNORMAL 10*3/UL
POTASSIUM SERPL-SCNC: 3.4 MMOL/L (ref 3.4–5.3)
PROT SERPL-MCNC: 6.9 G/DL (ref 6.8–8.8)
RBC # BLD AUTO: 4.68 10E12/L (ref 4.4–5.9)
RBC MORPH BLD: NORMAL
SODIUM SERPL-SCNC: 136 MMOL/L (ref 133–144)
WBC # BLD AUTO: 2.9 10E9/L (ref 4–11)

## 2019-09-16 PROCEDURE — 80053 COMPREHEN METABOLIC PANEL: CPT | Performed by: PSYCHIATRY & NEUROLOGY

## 2019-09-16 PROCEDURE — 36415 COLL VENOUS BLD VENIPUNCTURE: CPT

## 2019-09-16 PROCEDURE — 99215 OFFICE O/P EST HI 40 MIN: CPT | Mod: ZP | Performed by: PSYCHIATRY & NEUROLOGY

## 2019-09-16 PROCEDURE — G0463 HOSPITAL OUTPT CLINIC VISIT: HCPCS | Mod: ZF

## 2019-09-16 PROCEDURE — 85025 COMPLETE CBC W/AUTO DIFF WBC: CPT | Performed by: PSYCHIATRY & NEUROLOGY

## 2019-09-16 RX ORDER — TEMOZOLOMIDE 180 MG/1
150 CAPSULE ORAL DAILY
Qty: 10 CAPSULE | Refills: 0 | Status: SHIPPED | OUTPATIENT
Start: 2019-09-16 | End: 2019-11-10

## 2019-09-16 RX ORDER — GADOBUTROL 604.72 MG/ML
10 INJECTION INTRAVENOUS ONCE
Status: COMPLETED | OUTPATIENT
Start: 2019-09-16 | End: 2019-09-16

## 2019-09-16 RX ORDER — ONDANSETRON 4 MG/1
4 TABLET, FILM COATED ORAL AT BEDTIME
Qty: 30 TABLET | Refills: 3 | Status: SHIPPED | OUTPATIENT
Start: 2019-09-16 | End: 2020-05-20

## 2019-09-16 RX ADMIN — GADOBUTROL 10 ML: 604.72 INJECTION INTRAVENOUS at 12:41

## 2019-09-16 ASSESSMENT — PAIN SCALES - GENERAL: PAINLEVEL: NO PAIN (0)

## 2019-09-16 NOTE — DISCHARGE INSTRUCTIONS
MRI Contrast Discharge Instructions    The IV contrast you received today will pass out of your body in your  urine. This will happen in the next 24 hours. You will not feel this process.  Your urine will not change color.    Drink at least 4 extra glasses of water or juice today (unless your doctor  has restricted your fluids). This reduces the stress on your kidneys.  You may take your regular medicines.    If you are on dialysis: It is best to have dialysis today.    If you have a reaction: Most reactions happen right away. If you have  any new symptoms after leaving the hospital (such as hives or swelling),  call your hospital at the correct number below. Or call your family doctor.  If you have breathing distress or wheezing, call 911.    Special instructions: ***    I have read and understand the above information.    Signature:______________________________________ Date:___________    Staff:__________________________________________ Date:___________     Time:__________    [facility] Departments:    ___[place]: [phone]  ___[employer]: [phone]  ___[place]: [phone] ___[facility]: [phone]  ___[facility]: [phone]  ___[facility]: [phone]  ___[phone]  ___[facility]: [phone]  ___[phone]

## 2019-09-16 NOTE — PROGRESS NOTES
"NEURO-ONCOLOGY VISIT  Sep 16, 2019    CHIEF COMPLAINT: Mr. 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 He completed chemoradiotherapy with concurrent temozolomide as of 8/21/2019, course complicated by thrombocytopenia. The plan is to start adjuvant dosed temozolomide.     He is presenting in follow-up for continued evaluation and recommendations on treatment accompanied by Dana (wife).       HISTORY OF PRESENT ILLNESS  -Melvin has no complaints. Overall, tolerated chemoradiotherapy well. Nausea is well controlled on supportive medications, denies issues with constipation on current bowel regimen. Enjoyed his break from therapy.   -Denies any new neurological complaints; full strength (but has some generalized weakness), no headaches, no word finding/ language deficits, no abnormalities with vision, and no sensation changes.   -Tremor in both hands.   -Cramping in hands/ feet.   -Continued issues with short term memory.   -Continued issues with ED.   -Energy is fair, improving each day. He is taking more breaks/ naps.   -Denies any episodes concerning for a seizure.   -Off dexamethasone.   -Mood is good.     REVIEW OF SYSTEMS  A comprehensive ROS negative except as in HPI.      MEDICATIONS   Current Outpatient Medications   Medication Sig Dispense Refill     atorvastatin (LIPITOR) 80 MG tablet Take 40 mg by mouth daily       hydrochlorothiazide (HYDRODIURIL) 25 MG tablet Take 25 mg by mouth daily       latanoprost (XALATAN) 0.005 % ophthalmic solution Place 1 drop into both eyes every evening        losartan (COZAAR) 100 MG tablet Take 100 mg by mouth daily       spironolactone (ALDACTONE) 25 MG tablet Take 12.5 mg by mouth daily       ondansetron (ZOFRAN) 4 MG tablet Take 1 tablet (4 mg) by mouth At Bedtime 30 minutes prior to chemotherapy dosing and repeat every 8 hours " as needed for nausea. 30 tablet 3     Temozolomide (TEMODAR) 180 MG capsule Take 2 capsules (360 mg) by mouth daily for 5 days Take ondansetron 30-60 min before temozolomide. Take at bedtime on an empty stomach. (Patient not taking: Reported on 9/16/2019) 10 capsule 0     DRUG ALLERGIES   Allergies   Allergen Reactions     Amlodipine Swelling       ONCOLOGIC HISTORY  -5/2019 PRESENTATION: Progressive confusion, memory loss plus difficulty with walking.   -5/28/2019 Admitted to [facility] in [place] for evaluation, then transferred to [facility] for higher level of care.   -5/28/2019 MR brain imaging revealed a large left frontal mass   -5/30/2019 SURGERY: Craniotomy for  mass resection by Dr. Downing.  PATHOLOGY: Oligodendroglioma with borderline anaplastic features (WHO grade III); Co-deletion of chromosomal regions 1p and 19q, IDH-1 (R132H by immunohistochemistry mutated. ATRX wild type by immunohistochemistry. Mitoses were present, not sufficient in number to warrant a diagnosis of anaplasia by that criterion.  However, the presence of vascular proliferation, even though mild, is indicative of early anaplasia.  -6/13/2019 NEURO-ONC: Recommending chemoradiotherapy.   -7/8 - 8/21/2019 CHEMORADS: 59.4 Gy in 1.8 Gy daily fractions x 30 fractions per Dr. Kirby Dwyer  With [facility] plus concurrent temozolomide 75mg/m2 (180mg).   -9/16/2019 NEURO-ONC/ MRB/ CHEMO: Clinically well. Imaging with no concerns. Starting adjuvant-dosed temozolomide 150mg/m2 (360mg), cycle 1 (start date of 9/18/2019).     SOCIAL HISTORY   Tobacco use: Former smoker.  Alcohol use: Social.   Drug use: Denies marijuana use.  Supplement, complimentary/ alternative medicine: None.    . 1 son + grandchildren.   Employment: .      PHYSICAL EXAMINATION  /59   Pulse (!) 48   Temp 98.4  F (36.9  C)   Resp 16   Wt 102.8 kg (226 lb 11.2 oz)   SpO2 97%   BMI 29.09 kg/m     Wt Readings from Last 2 Encounters:  "  09/16/19 102.8 kg (226 lb 11.2 oz)   06/14/19 97.8 kg (215 lb 11.2 oz)      Ht Readings from Last 2 Encounters:   06/14/19 1.88 m (6' 2.02\")   05/31/19 1.88 m (6' 2\")     KPS: 100    -Generally well appearing.  -Throat: No oral thrush.  -Respiratory: Normal breath sounds, no audible wheezing.   -Skin: No rashes. Healed head incision.  -Hematologic/ lymphatic: Some bruising, healing. No leg swelling. Psoriasis on arms and legs.   -Psychiatric: Normal mood and affect. Pleasant, talkative.  -Neurologic:   MENTAL STATUS:     Alert, oriented to date.    Recall: Intact.    Speech fluent. Comprehension intact to multi-step commands.   Normal naming, repetition. Able to read.   Good right-left orientation.     CRANIAL NERVES:     Discs flat on fundoscopy.    L pupil 1mm > R pupil, round, reactive to light.     Extraocular movements full, patient denies diplopia.     Visual fields full.     Facial sensation intact to light touch.   Decreased activation of the face on the right.    Hearing intact.   Palate moves symmetrically.     Sternocleidomastoid and trapezius strength intact.   Tongue midline.  MOTOR:    Normal and symmetric tone.   Grossly 5/5 throughout.    No pronation or drift. No orbiting.   Able to rise from a chair without use of arms.   On toe/ heel walk, equal distance from floor to heels/ toes.   SENSATION:    Intact to light touch throughout.  COORDINATION:   Intact finger-nose with eyes open and closed.   REFLEXES:    Normal and symmetric.    Toes not tested. No clonus. No Hoffmans.   No grasp.    GAIT:  Walks without assistance.   Good speed. Normal stride length and heel strike. Normal turns. Normal arm swing.   Able to toe, heel walk. Able to tandem walk.       MEDICAL RECORDS  Obtained and personally reviewed all available outside medical records in addition to reviewing any records available in our electronic system.     LABS  Personally reviewed all available lab results.     IMAGING  Personally " reviewed baseline, post-radiation MR brain imaging from today. To my eye, there is no concerns. Positive treatment response noted about the right and inferior (corpus callosal) region of the left frontal resection cavity with a reduction in contrast enhancement and T2 FLAIR.     Imaging was shown to and results were reviewed with Iveth.      Imaging and case reviewed and discussed at Brain Tumor Conference.       IMPRESSION  Clinic was spent discussing in detail the nature of this tumor, providing emotional support, answering questions pertaining to my recommendations, and devising the treatment plan as outlined below.     With regard to cancer-directed therapy, a multimodal treatment approach first involves maximal surgical resection, followed by upfront chemoradiotherapy with temozolomide, and then, adjuvant temozolomide. In the adjuvant phase, temozolomide is dosed at 150mg/m2 for days 1-5 of a 28 day cycle for the first cycle, and then increased to 200mg/m2 if tolerated. The previously reviewed common side effects of temozolomide can still be anticipated and were discussed as including, but not limited to, fatigue, nausea, and constipation. Any AST/ ALT elevations are typically reversible and any rash is manageable with antihistaminics and steroid premedication. Bone marrow suppression can result in leukopenia and thrombocytopenia.     PROBLEM LIST  Anaplastic oligodendroglioma (grade III)  Memory complaints    PLAN  -CANCER DIRECTED THERAPY-  Will initiate adjuvant temozolomide at 150mg/m2 (360mg), cycle 1 (start date of 9/18).   -If this dose is well tolerated, will increase to 200mg/m2 for cycle 2.  -Instructed to take temozolomide in the evening on an empty stomach, 30 minutes after Zofran dosing.  -Supportive medications; Zofran and bowel regimen.   -Chemotherapy teaching by pharmacy today.  -If ALC is persistently < 0.5, will restart Bactrim for pneumocystis prophylaxis. If thrombocytopenia  becomes an issue, can change to Dapsone, Atovaquone, or Pentamidine.  -Repeat 28 day cycle if WBC >= 3, ANC >= 1.5, HgB >= 10, and platelets >= 100.    -Surveillance labs reviewed, at goal for chemotherapy.   -Will continue weekly CBC  (starting week of 9/30) at [facility] in [place]  and repeat CMP every 4 weeks here.  -Next generation sequencing can be ordered if further disease progression necessitates evaluating for targeted therapy.    -Post-radiation imaging without any concerns. Repeat imaging in 8 weeks, prior to cycle 3.    -STEROIDS-  -Off dexamethasone.    -SEIZURE MANAGEMENT-  -While this patient is at increased risk of having seizures, given the lack of seizure history, there is no indication to prescribe an antiepileptic at this time.     -Quality of life/ MOOD/ FATIGUE-  -Denies any mood issues.  -Continue to monitor mood as untreated/ undertreated depression can worsen fatigue, dysorexia, and quality of life.    -OTHER-  -Recommend seeing ophtho locally.   -Recommend seeing a dentist locally.     Return to clinic in 4 weeks with CARLOS Cavazos + labs.     In the meantime, Melvin and Dana know to call with questions or concerns or to report new complaints and can be seen sooner if needed. Urgent evaluation is needed in the setting of acute onset of severe headache, abrupt change in mental status, on-going seizures, new focal deficits, or new leg swelling/ pain.    Leeanne Lozano MD  Neuro-oncology

## 2019-09-16 NOTE — NURSING NOTE
"Oncology Rooming Note    September 16, 2019 2:17 PM   Gopi Farfan is a 64 year old male who presents for:    Chief Complaint   Patient presents with     Labs Only     venipuncture, vitals checked     Oncology Clinic Visit     Return; Oligodendroglioma     Initial Vitals: /59   Pulse (!) 48   Temp 98.4  F (36.9  C)   Resp 16   Wt 102.8 kg (226 lb 11.2 oz)   SpO2 97%   BMI 29.09 kg/m   Estimated body mass index is 29.09 kg/m  as calculated from the following:    Height as of 6/14/19: 1.88 m (6' 2.02\").    Weight as of this encounter: 102.8 kg (226 lb 11.2 oz). Body surface area is 2.32 meters squared.  No Pain (0) Comment: Data Unavailable   No LMP for male patient.  Allergies reviewed: Yes  Medications reviewed: Yes    Medications: Medication refills not needed today.  Pharmacy [facility] entered into [employer]: [facility]    Clinical concerns: List of concerns.       Monique Duron CMA              "

## 2019-09-16 NOTE — ORAL ONC MGMT
"Oral Chemotherapy Monitoring Program    Primary Oncologist: Dr. Lozano  Primary Oncology Clinic: [facility]  Cancer Diagnosis: anaplastic oligodendroglioma     Drug: Temodar 360mg daily for 5 days of a 28 day cycle  [facility]  Dose is appropriate for patients: BSA     Drug Interaction Assessment: no clinically significant drug interaction    Lab Monitoring Plan  CBC weekly starting 9/30  CMP monthly  Subjective/Objective:  Gopi Farfan is a 64 year old male seen in clinic for an initial visit for oral chemotherapy education. He recently finished temodar in combination with radiation so he had a good understanding of the medication and plan.     ORAL CHEMOTHERAPY 6/14/2019 7/16/2019 7/16/2019 9/16/2019   Drug Name Temodar (Temozolomide) Temodar (Temozolomide) Temodar (Temozolomide) Temodar (Temozolomide)   Current Dosage 180mg - 180mg 360mg   Current Schedule Daily - Daily Daily   Cycle Details Continuous for 42 days during XRT - Other 5 days on, then 23 days off   Planned next cycle start date 6/24/2019 - 7/7/2019 -   Doses missed in last 2 weeks - - 0 -   Adherence Assessment - - Adherent -   Adverse Effects - - No AE identified during assessment -   Any new drug interactions? - - No -   Is the dose as ordered appropriate for the patient? - - Yes -   Has the patient missed any days of school, work, or other routine activity? - - No -       Last PHQ-2 Score on record:   PHQ-2 ( 1999 [employer]) 6/14/2019   Q1: Little interest or pleasure in doing things 0   Q2: Feeling down, depressed or hopeless 0   PHQ-2 Score 0       Vitals:  BP:   BP Readings from Last 1 Encounters:   09/16/19 133/59     Wt Readings from Last 1 Encounters:   09/16/19 102.8 kg (226 lb 11.2 oz)     Estimated body surface area is 2.32 meters squared as calculated from the following:    Height as of 6/14/19: 1.88 m (6' 2.02\").    Weight as of an earlier encounter on 9/16/19: 102.8 kg (226 lb 11.2 " oz).      Labs:  _  Result Component Current Result Ref Range   Sodium 136 (9/16/2019) 133 - 144 mmol/L     _  Result Component Current Result Ref Range   Potassium 3.4 (9/16/2019) 3.4 - 5.3 mmol/L     _  Result Component Current Result Ref Range   Calcium 9.0 (9/16/2019) 8.5 - 10.1 mg/dL     No results found for Mag within last 30 days.     No results found for Phos within last 30 days.     _  Result Component Current Result Ref Range   Albumin 3.6 (9/16/2019) 3.4 - 5.0 g/dL     _  Result Component Current Result Ref Range   Urea Nitrogen 12 (9/16/2019) 7 - 30 mg/dL     _  Result Component Current Result Ref Range   Creatinine 1.21 (9/16/2019) 0.66 - 1.25 mg/dL       _  Result Component Current Result Ref Range   AST 16 (9/16/2019) 0 - 45 U/L     _  Result Component Current Result Ref Range   ALT 33 (9/16/2019) 0 - 70 U/L     _  Result Component Current Result Ref Range   Bilirubin Total 0.4 (9/16/2019) 0.2 - 1.3 mg/dL       _  Result Component Current Result Ref Range   WBC 2.9 (L) (9/16/2019) 4.0 - 11.0 10e9/L     _  Result Component Current Result Ref Range   Hemoglobin 14.6 (9/16/2019) 13.3 - 17.7 g/dL     _  Result Component Current Result Ref Range   Platelet Count 203 (9/16/2019) 150 - 450 10e9/L     _  Result Component Current Result Ref Range   Absolute Neutrophil 1.5 (L) (9/16/2019) 1.6 - 8.3 10e9/L       Assessment:  Patient is appropriate to start therapy.    Plan:  Basic chemotherapy teaching was reviewed with the patient and the patient's family including indication, start date of therapy, dose, administration, adverse effects, missed doses, food and drug interactions, monitoring, side effect management, office contact information, and safe handling. Written materials were provided and all questions answered.    Follow-Up:  9/25: 1 week follow up assessment     Siddhartha Krishnamurthy, PharmD, MS  Hematology/Oncology Clinical Pharmacist  [facility]  [facility]  [phone]

## 2019-09-16 NOTE — LETTER
"     RE: Gopi Farfan  [address]  [facility] [address]     Dear Colleague,    Thank you for referring your patient, Gopi Farfan, to the [facility]. Please see a copy of my visit note below.    NEURO-ONCOLOGY VISIT  Sep 16, 2019    CHIEF COMPLAINT: Mr. 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 He completed chemoradiotherapy with concurrent temozolomide as of 8/21/2019, course complicated by thrombocytopenia. The plan is to start adjuvant dosed temozolomide.     He is presenting in follow-up for continued evaluation and recommendations on treatment accompanied by Dana (wife).     HISTORY OF PRESENT ILLNESS  -Melvin has no complaints. Overall, tolerated chemoradiotherapy well. Nausea is well controlled on supportive medications, denies issues with constipation on current bowel regimen. Enjoyed his break from therapy.   -Denies any new neurological complaints; full strength (but has some generalized weakness), no headaches, no word finding/ language deficits, no abnormalities with vision, and no sensation changes.   -Tremor in both hands.   -Cramping in hands/ feet.   -Continued issues with short term memory.   -Continued issues with ED.   -Energy is fair, improving each day. He is taking more breaks/ naps.   -Denies any episodes concerning for a seizure.   -Off dexamethasone.   -Mood is good.     REVIEW OF SYSTEMS  A comprehensive ROS negative except as in HPI.    MEDICATIONS   Current Outpatient Medications   Medication Sig Dispense Refill     atorvastatin (LIPITOR) 80 MG tablet Take 40 mg by mouth daily       hydrochlorothiazide (HYDRODIURIL) 25 MG tablet Take 25 mg by mouth daily       latanoprost (XALATAN) 0.005 % ophthalmic solution Place 1 drop into both eyes every evening        losartan (COZAAR) 100 MG tablet Take 100 mg by mouth " daily       spironolactone (ALDACTONE) 25 MG tablet Take 12.5 mg by mouth daily       ondansetron (ZOFRAN) 4 MG tablet Take 1 tablet (4 mg) by mouth At Bedtime 30 minutes prior to chemotherapy dosing and repeat every 8 hours as needed for nausea. 30 tablet 3     Temozolomide (TEMODAR) 180 MG capsule Take 2 capsules (360 mg) by mouth daily for 5 days Take ondansetron 30-60 min before temozolomide. Take at bedtime on an empty stomach. (Patient not taking: Reported on 9/16/2019) 10 capsule 0     DRUG ALLERGIES   Allergies   Allergen Reactions     Amlodipine Swelling       ONCOLOGIC HISTORY  -5/2019 PRESENTATION: Progressive confusion, memory loss plus difficulty with walking.   -5/28/2019 Admitted to [facility] in [place] for evaluation, then transferred to [facility] for higher level of care.   -5/28/2019 MR brain imaging revealed a large left frontal mass   -5/30/2019 SURGERY: Craniotomy for  mass resection by Dr. Downing.  PATHOLOGY: Oligodendroglioma with borderline anaplastic features (WHO grade III); Co-deletion of chromosomal regions 1p and 19q, IDH-1 (R132H by immunohistochemistry mutated. ATRX wild type by immunohistochemistry. Mitoses were present, not sufficient in number to warrant a diagnosis of anaplasia by that criterion.  However, the presence of vascular proliferation, even though mild, is indicative of early anaplasia.  -6/13/2019 NEURO-ONC: Recommending chemoradiotherapy.   -7/8 - 8/21/2019 CHEMORADS: 59.4 Gy in 1.8 Gy daily fractions x 30 fractions per Dr. Kirby Dwyer  With [facility] plus concurrent temozolomide 75mg/m2 (180mg).   -9/16/2019 NEURO-ONC/ MRB/ CHEMO: Clinically well. Imaging with no concerns. Starting adjuvant-dosed temozolomide 150mg/m2 (360mg), cycle 1 (start date of 9/18/2019).     SOCIAL HISTORY   Tobacco use: Former smoker.  Alcohol use: Social.   Drug use: Denies marijuana use.  Supplement, complimentary/ alternative medicine: None.    . 1 son +  "grandchildren.   Employment: .    PHYSICAL EXAMINATION  /59   Pulse (!) 48   Temp 98.4  F (36.9  C)   Resp 16   Wt 102.8 kg (226 lb 11.2 oz)   SpO2 97%   BMI 29.09 kg/m      Wt Readings from Last 2 Encounters:   09/16/19 102.8 kg (226 lb 11.2 oz)   06/14/19 97.8 kg (215 lb 11.2 oz)      Ht Readings from Last 2 Encounters:   06/14/19 1.88 m (6' 2.02\")   05/31/19 1.88 m (6' 2\")     KPS: 100    -Generally well appearing.  -Throat: No oral thrush.  -Respiratory: Normal breath sounds, no audible wheezing.   -Skin: No rashes. Healed head incision.  -Hematologic/ lymphatic: Some bruising, healing. No leg swelling. Psoriasis on arms and legs.   -Psychiatric: Normal mood and affect. Pleasant, talkative.  -Neurologic:   MENTAL STATUS:     Alert, oriented to date.    Recall: Intact.    Speech fluent. Comprehension intact to multi-step commands.   Normal naming, repetition. Able to read.   Good right-left orientation.     CRANIAL NERVES:     Discs flat on fundoscopy.    L pupil 1mm > R pupil, round, reactive to light.     Extraocular movements full, patient denies diplopia.     Visual fields full.     Facial sensation intact to light touch.   Decreased activation of the face on the right.    Hearing intact.   Palate moves symmetrically.     Sternocleidomastoid and trapezius strength intact.   Tongue midline.  MOTOR:    Normal and symmetric tone.   Grossly 5/5 throughout.    No pronation or drift. No orbiting.   Able to rise from a chair without use of arms.   On toe/ heel walk, equal distance from floor to heels/ toes.   SENSATION:    Intact to light touch throughout.  COORDINATION:   Intact finger-nose with eyes open and closed.   REFLEXES:    Normal and symmetric.    Toes not tested. No clonus. No Hoffmans.   No grasp.    GAIT:  Walks without assistance.   Good speed. Normal stride length and heel strike. Normal turns. Normal arm swing.   Able to toe, heel walk. Able to tandem walk.     MEDICAL " RECORDS  Obtained and personally reviewed all available outside medical records in addition to reviewing any records available in our electronic system.     LABS  Personally reviewed all available lab results.     IMAGING  Personally reviewed baseline, post-radiation MR brain imaging from today. To my eye, there is no concerns. Positive treatment response noted about the right and inferior (corpus callosal) region of the left frontal resection cavity with a reduction in contrast enhancement and T2 FLAIR.     Imaging was shown to and results were reviewed with Iveth.      Imaging and case reviewed and discussed at Brain Tumor Conference.     IMPRESSION  Clinic was spent discussing in detail the nature of this tumor, providing emotional support, answering questions pertaining to my recommendations, and devising the treatment plan as outlined below.     With regard to cancer-directed therapy, a multimodal treatment approach first involves maximal surgical resection, followed by upfront chemoradiotherapy with temozolomide, and then, adjuvant temozolomide. In the adjuvant phase, temozolomide is dosed at 150mg/m2 for days 1-5 of a 28 day cycle for the first cycle, and then increased to 200mg/m2 if tolerated. The previously reviewed common side effects of temozolomide can still be anticipated and were discussed as including, but not limited to, fatigue, nausea, and constipation. Any AST/ ALT elevations are typically reversible and any rash is manageable with antihistaminics and steroid premedication. Bone marrow suppression can result in leukopenia and thrombocytopenia.     PROBLEM LIST  Anaplastic oligodendroglioma (grade III)  Memory complaints    PLAN  -CANCER DIRECTED THERAPY-  Will initiate adjuvant temozolomide at 150mg/m2 (360mg), cycle 1 (start date of 9/18).   -If this dose is well tolerated, will increase to 200mg/m2 for cycle 2.  -Instructed to take temozolomide in the evening on an empty stomach, 30  minutes after Zofran dosing.  -Supportive medications; Zofran and bowel regimen.   -Chemotherapy teaching by pharmacy today.  -If ALC is persistently < 0.5, will restart Bactrim for pneumocystis prophylaxis. If thrombocytopenia becomes an issue, can change to Dapsone, Atovaquone, or Pentamidine.  -Repeat 28 day cycle if WBC >= 3, ANC >= 1.5, HgB >= 10, and platelets >= 100.    -Surveillance labs reviewed, at goal for chemotherapy.   -Will continue weekly CBC  (starting week of 9/30) at [facility] in [place]  and repeat CMP every 4 weeks here.  -Next generation sequencing can be ordered if further disease progression necessitates evaluating for targeted therapy.    -Post-radiation imaging without any concerns. Repeat imaging in 8 weeks, prior to cycle 3.    -STEROIDS-  -Off dexamethasone.    -SEIZURE MANAGEMENT-  -While this patient is at increased risk of having seizures, given the lack of seizure history, there is no indication to prescribe an antiepileptic at this time.     -Quality of life/ MOOD/ FATIGUE-  -Denies any mood issues.  -Continue to monitor mood as untreated/ undertreated depression can worsen fatigue, dysorexia, and quality of life.    -OTHER-  -Recommend seeing ophtho locally.   -Recommend seeing a dentist locally.     Return to clinic in 4 weeks with CARLOS Cavazos + labs.     In the meantime, Melvin and Dana know to call with questions or concerns or to report new complaints and can be seen sooner if needed. Urgent evaluation is needed in the setting of acute onset of severe headache, abrupt change in mental status, on-going seizures, new focal deficits, or new leg swelling/ pain.    Leeanne Lozano MD Neuro-oncology

## 2019-09-16 NOTE — PATIENT INSTRUCTIONS
Starting adjuvant-dosed chemotherapy with temozolomide  -5 consectutive nights (In a 28 day cycle; 5 days on treatment and 23 days off.)  -Dose: 360mg  -Start date: 9/18  -Take at bedtime on an empty stomach  -Take 30 minutes after Zofran dosing (can increase to 8mg if needed for control of nausea)  -Continue bowel regimen  -Continue to have weekly lab draws (starting week of 9/30) at [facility] in [place].  -No antibiotics needed at this time  -Ok to take a multi-vitamin.     Imaging with no concerns.   Repeat in 2-3 months.     Recommend seeing ophtho locally.   See the dentist as needed.    Return to clinic in 4 weeks with CARLOS Cavazos + labs to review cycle 1 and discuss dose escalation for cycle 2.    Leeanne Lozano MD  Neuro-oncology  9/16/2019

## 2019-09-23 ENCOUNTER — MYC MEDICAL ADVICE (OUTPATIENT)
Dept: ONCOLOGY | Facility: CLINIC | Age: 64
End: 2019-09-23

## 2019-09-24 NOTE — TELEPHONE ENCOUNTER
Cold Sx. Pt is afebrile, has slight cough that is non-productive, nose is not runny, when blown is clear mucous. No fevers/chills, sore throat, congestion, SOB, edema, n/v/c/d, abdominal pain, rash, or worsening fatigue.

## 2019-09-24 NOTE — TELEPHONE ENCOUNTER
Called pt to address 3) cold symptoms with runny nose and sneezing. LM on home phone to call triage back [phone].

## 2019-09-27 DIAGNOSIS — Z51.11 CHEMOTHERAPY MANAGEMENT, ENCOUNTER FOR: Primary | ICD-10-CM

## 2019-09-27 DIAGNOSIS — R11.2 CHEMOTHERAPY-INDUCED NAUSEA AND VOMITING: ICD-10-CM

## 2019-09-27 DIAGNOSIS — T45.1X5A CHEMOTHERAPY-INDUCED NAUSEA AND VOMITING: ICD-10-CM

## 2019-09-27 NOTE — TELEPHONE ENCOUNTER
Spoke to Gopi regarding his questions related to the followin. Labs being faxed to the [facility] in [place], [place]   2. Scheduling a follow-up appt at the [facility]  3. Cold symptoms of runny nose and scratchy throat    I detailed to him that the labs were faxed to the clinic this afternoon. Patient confirms he will schedule an appt to get these completed next week.     Provided appt details for 10/14 with labs at 3pm and an office appt with Danika Duron at 3:30pm. Patient confirmed this appt.     Educated the patient to take an allergy tablet for his cold symptoms as the symptoms he is describing can be addressed by taking either Claritin (loratadine) 10 mg daily or Zyrtec (cetirizine) 10 mg daily.     All questions were answered to the patient's satisfaction.     Cherelle Porter, PharmD  [facility]  [phone]

## 2019-10-01 ENCOUNTER — TELEPHONE (OUTPATIENT)
Dept: ONCOLOGY | Facility: CLINIC | Age: 64
End: 2019-10-01

## 2019-10-01 NOTE — ORAL ONC MGMT
"Oral Chemotherapy Monitoring Program    Primary Oncologist: Dr. Lozano  Primary Oncology Clinic: [facility]  Cancer Diagnosis: Anaplastic Oligodendroglioma    Therapy History:  Temodar (temozolomide) 360mg daily for 5 days on, 23 days off.  Take 2 capsules ([id number]) by mouth daily for 5 days. Take ondansetron 30-60 min before.  Adjuvant cycle started 09/19/2019.    Drug Interaction Assessment: There are no new notable drug interactions with Temodar.  Drugs assessed:  -Ondansetron-Temozolomide-AtorvaSTATin-Losartan-Latanoprost-HydroCHLOROthiazide-Spironolactone    Lab Monitoring Plan  CMP weekly and CBC every 3 weeks  Subjective/Objective:  Gopi Farfan is a 64 year old male contacted by phone for a follow-up visit for oral chemotherapy. Gopi took his first adjuvant cycle of Temodar from 09/19-09/23. Gopi confirmed his dose of 2 capsules daily for 5 days, and denied missing any doses. Gopi noted that he had felt more tired since starting Temodar again, but said it has improved lately. He also mentioned he was \"a little blocked up\" but not as much as he experienced with his previous cycles. He denied any additional side effects including nausea or vomiting.     ORAL CHEMOTHERAPY 6/14/2019 7/16/2019 7/16/2019 9/16/2019 10/1/2019   Drug Name Temodar (Temozolomide) Temodar (Temozolomide) Temodar (Temozolomide) Temodar (Temozolomide) Temodar (Temozolomide)   Current Dosage 180mg - 180mg 360mg 360mg   Current Schedule Daily - Daily Daily Daily   Cycle Details Continuous for 42 days during XRT - Other 5 days on, then 23 days off 5 days on, then 23 days off   Start Date of Last Cycle - - - - 9/19/2019   Planned next cycle start date 6/24/2019 - 7/7/2019 - 10/17/2019   Doses missed in last 2 weeks - - 0 - 0   Adherence Assessment - - Adherent - Adherent   Adverse Effects - - No AE identified during assessment - Fatigue   Any new drug interactions? - - No - No   Is the dose as ordered appropriate for the " "patient? - - Yes - -   Has the patient missed any days of school, work, or other routine activity? - - No - -     Vitals:  BP:   BP Readings from Last 1 Encounters:   09/16/19 133/59     Wt Readings from Last 1 Encounters:   09/16/19 102.8 kg (226 lb 11.2 oz)     Estimated body surface area is 2.32 meters squared as calculated from the following:    Height as of 6/14/19: 1.88 m (6' 2.02\").    Weight as of 9/16/19: 102.8 kg (226 lb 11.2 oz).    Labs:    Component Name 09/30/2019   WHITE BLOOD COUNT         5.0   RED BLOOD COUNT           4.82   HEMOGLOBIN                14.6   HEMATOCRIT                42.1   MCV                       87   MCH                       30.3   MCHC                      34.7   RDW                       16.0 (H)   PLATELET COUNT            308   IMMATURE PLATELET FRACTION 1.9   NRBC                      0.0   NEUTROPHILS               67.6   LYMPHOCYTES               14.9 (L)   MONOCYTES                 14.9 (H)   EOSINOPHILS               1.8   BASOPHILS                 0.4   IMMATURE GRANULOCYTES(METAS,MYELOS,PROS) 0.4   ABSOLUTE NEUTROPHILS      3.4   ABSOLUTE LYMPHOCYTES      0.8 (L)   ABSOLUTE MONOCYTES        0.8   ABSOLUTE EOSINOPHILS      0.1   ABSOLUTE BASOPHILS        0.0   ABSOLUTE IMMATURE GRANULOCYTES(METAS,MYELOS,PROS) 0.0   ABS NRBC 0.0       Assessment:  Gopi is tolerating Temodar well. He had fatigue that has slightly improved since starting.     Absolute lymphocytes are low but slightly higher than on 09/16 (0.7). Absolute Neutrophils increased from 1.5 on 09/16 to 3.4 on 09/30. Monocytes and RDW are elevated. There are no major concerning abnormalities at this time.     Plan:  -Continue adjuvant Temodar  -Monitor for additional side effects with possible dose increase after 10/14 appt    Follow-Up:  -Review labs for week of 10/07-10/11  -Review oncology provider appt with Danika on 10/14    Refill Due:  10/16/2019    Sabine Garcia  Pharmacy Intern  [facility] " [facility]  [phone]

## 2019-10-02 ENCOUNTER — TELEPHONE (OUTPATIENT)
Dept: CARE COORDINATION | Facility: CLINIC | Age: 64
End: 2019-10-02

## 2019-10-02 DIAGNOSIS — C71.9 OLIGODENDROGLIOMA, ANAPLASTIC (H): ICD-10-CM

## 2019-10-02 NOTE — ORAL ONC MGMT
Oral Chemotherapy Monitoring Program    Per Dr. Lozano request, I called back the patient to ensure he was okay with a dose increase for next cycle. He confirmed that he is in agreement for a dose increase. I also instructed the patient to wait until after his appointment with Danika Duron on 10/14 to start his next cycle of Temodar. He was in agreement with this plan. Patient said he started on 09/19 which would make his next cycle due on 10/17.     Sabine Garcia  Pharmacy Intern  [facility]  [phone]

## 2019-10-02 NOTE — TELEPHONE ENCOUNTER
Per Patient's request,  completed and faxed [employer] [place] request for lodging dates 10/14/2019 - 10/15/2019. [employer] will contact Patient for confirmation of reservation.  will continue to provide support as needed.    Dana Ann [facility]  Outpatient Specialty Clinics  Direct Phone: [phone]  Pager:  [phone]

## 2019-10-03 DIAGNOSIS — C71.9 OLIGODENDROGLIOMA, ANAPLASTIC (H): Primary | ICD-10-CM

## 2019-10-03 RX ORDER — TEMOZOLOMIDE 180 MG/1
360 CAPSULE ORAL DAILY
Qty: 10 CAPSULE | Refills: 0 | Status: SHIPPED | OUTPATIENT
Start: 2019-10-03 | End: 2019-11-10

## 2019-10-03 RX ORDER — TEMOZOLOMIDE 100 MG/1
100 CAPSULE ORAL DAILY
Qty: 5 CAPSULE | Refills: 0 | Status: SHIPPED | OUTPATIENT
Start: 2019-10-03 | End: 2019-11-10

## 2019-10-07 ENCOUNTER — TELEPHONE (OUTPATIENT)
Dept: ONCOLOGY | Facility: CLINIC | Age: 64
End: 2019-10-07

## 2019-10-07 NOTE — ORAL ONC MGMT
Oral Chemotherapy Monitoring Program  Placed call to patient in follow up of temozolomide therapy. Lab results show no concerning abnormalities and are stable from last week.   Follow up at next clinic visit, scheduled for 10/14.   _  CBC WITH AUTO DIFFERENTIAL (10/07/2019 9:15 AM CDT)  Component Value Ref Range   WHITE BLOOD COUNT  4.2 (L) 4.5 - 11.0 thou/cu mm   RED BLOOD COUNT  4.77 4.30 - 5.90 mil/cu mm   HEMOGLOBIN  14.7 13.5 - 17.5 g/dL   HEMATOCRIT  42.6 37.0 - 53.0 %   MCV  89 80 - 100 fL   MCH  30.8 26.0 - 34.0 pg   MCHC  34.5 32.0 - 36.0 g/dL   RDW  16.0 (H) 11.5 - 15.5 %   PLATELET COUNT  246 140 - 440 thou/cu mm   NRBC  0.0 %   IMMATURE PLATELET FRACTION 1.9 0.9 - 11.2 %   NEUTROPHILS  66.8 42.0 - 72.0 %   LYMPHOCYTES  13.5 (L) 20.0 - 44.0 %   MONOCYTES  16.6 (H) <12.0 %   EOSINOPHILS  1.9 <8.0 %   BASOPHILS  0.2 <3.0 %   IMMATURE GRANULOCYTES(METAS,MYELOS,PROS) 1.0 %   ABSOLUTE NEUTROPHILS  2.8 1.7 - 7.0 thou/cu mm   ABSOLUTE LYMPHOCYTES  0.6 (L) 0.9 - 2.9 thou/cu mm   ABSOLUTE MONOCYTES  0.7 <0.9 thou/cu mm   ABSOLUTE EOSINOPHILS  0.1 <0.5 thou/cu mm   ABSOLUTE BASOPHILS  0.0 <0.3 thou/cu mm   ABSOLUTE IMMATURE GRANULOCYTES(METAS,MYELOS,PROS) 0.0 <=0.3 thou/cu mm   ABS NRBC 0.0 thou /cu mm     Kenneth Ricketts  Pharmacy Intern  Oral Chemotherapy Monitoring Program  [facility]  [phone]

## 2019-10-08 ENCOUNTER — TRANSFERRED RECORDS (OUTPATIENT)
Dept: HEALTH INFORMATION MANAGEMENT | Facility: CLINIC | Age: 64
End: 2019-10-08

## 2019-10-14 ENCOUNTER — APPOINTMENT (OUTPATIENT)
Dept: LAB | Facility: CLINIC | Age: 64
End: 2019-10-14
Attending: PSYCHIATRY & NEUROLOGY
Payer: COMMERCIAL

## 2019-10-14 ENCOUNTER — ONCOLOGY VISIT (OUTPATIENT)
Dept: ONCOLOGY | Facility: CLINIC | Age: 64
End: 2019-10-14
Attending: PSYCHIATRY & NEUROLOGY
Payer: COMMERCIAL

## 2019-10-14 VITALS
SYSTOLIC BLOOD PRESSURE: 107 MMHG | OXYGEN SATURATION: 97 % | TEMPERATURE: 98.4 F | WEIGHT: 221 LBS | DIASTOLIC BLOOD PRESSURE: 63 MMHG | RESPIRATION RATE: 16 BRPM | HEIGHT: 74 IN | BODY MASS INDEX: 28.36 KG/M2 | HEART RATE: 55 BPM

## 2019-10-14 DIAGNOSIS — C71.9 OLIGODENDROGLIOMA, ANAPLASTIC (H): Primary | ICD-10-CM

## 2019-10-14 LAB
ALBUMIN SERPL-MCNC: 3.6 G/DL (ref 3.4–5)
ALP SERPL-CCNC: 83 U/L (ref 40–150)
ALT SERPL W P-5'-P-CCNC: 32 U/L (ref 0–70)
ANION GAP SERPL CALCULATED.3IONS-SCNC: 9 MMOL/L (ref 3–14)
AST SERPL W P-5'-P-CCNC: 17 U/L (ref 0–45)
BASOPHILS # BLD AUTO: 0 10E9/L (ref 0–0.2)
BASOPHILS NFR BLD AUTO: 0.4 %
BILIRUB SERPL-MCNC: 0.7 MG/DL (ref 0.2–1.3)
BUN SERPL-MCNC: 13 MG/DL (ref 7–30)
CALCIUM SERPL-MCNC: 8.9 MG/DL (ref 8.5–10.1)
CHLORIDE SERPL-SCNC: 101 MMOL/L (ref 94–109)
CO2 SERPL-SCNC: 26 MMOL/L (ref 20–32)
CREAT SERPL-MCNC: 1.11 MG/DL (ref 0.66–1.25)
DIFFERENTIAL METHOD BLD: ABNORMAL
EOSINOPHIL # BLD AUTO: 0.1 10E9/L (ref 0–0.7)
EOSINOPHIL NFR BLD AUTO: 2 %
ERYTHROCYTE [DISTWIDTH] IN BLOOD BY AUTOMATED COUNT: 16.4 % (ref 10–15)
GFR SERPL CREATININE-BSD FRML MDRD: 70 ML/MIN/{1.73_M2}
GLUCOSE SERPL-MCNC: 169 MG/DL (ref 70–99)
HCT VFR BLD AUTO: 41.3 % (ref 40–53)
HGB BLD-MCNC: 14.3 G/DL (ref 13.3–17.7)
IMM GRANULOCYTES # BLD: 0 10E9/L (ref 0–0.4)
IMM GRANULOCYTES NFR BLD: 0.2 %
LYMPHOCYTES # BLD AUTO: 0.8 10E9/L (ref 0.8–5.3)
LYMPHOCYTES NFR BLD AUTO: 15.5 %
MCH RBC QN AUTO: 31.2 PG (ref 26.5–33)
MCHC RBC AUTO-ENTMCNC: 34.6 G/DL (ref 31.5–36.5)
MCV RBC AUTO: 90 FL (ref 78–100)
MONOCYTES # BLD AUTO: 0.8 10E9/L (ref 0–1.3)
MONOCYTES NFR BLD AUTO: 14 %
NEUTROPHILS # BLD AUTO: 3.7 10E9/L (ref 1.6–8.3)
NEUTROPHILS NFR BLD AUTO: 67.9 %
NRBC # BLD AUTO: 0 10*3/UL
NRBC BLD AUTO-RTO: 0 /100
PLATELET # BLD AUTO: 115 10E9/L (ref 150–450)
POTASSIUM SERPL-SCNC: 3.1 MMOL/L (ref 3.4–5.3)
PROT SERPL-MCNC: 6.8 G/DL (ref 6.8–8.8)
RBC # BLD AUTO: 4.59 10E12/L (ref 4.4–5.9)
SODIUM SERPL-SCNC: 136 MMOL/L (ref 133–144)
WBC # BLD AUTO: 5.4 10E9/L (ref 4–11)

## 2019-10-14 PROCEDURE — 85025 COMPLETE CBC W/AUTO DIFF WBC: CPT | Performed by: PSYCHIATRY & NEUROLOGY

## 2019-10-14 PROCEDURE — 99214 OFFICE O/P EST MOD 30 MIN: CPT | Mod: ZP | Performed by: PHYSICIAN ASSISTANT

## 2019-10-14 PROCEDURE — 36415 COLL VENOUS BLD VENIPUNCTURE: CPT

## 2019-10-14 PROCEDURE — 80053 COMPREHEN METABOLIC PANEL: CPT | Performed by: PSYCHIATRY & NEUROLOGY

## 2019-10-14 RX ORDER — DEXAMETHASONE 2 MG/1
TABLET ORAL
Refills: 0 | COMMUNITY
Start: 2019-07-25 | End: 2020-02-24

## 2019-10-14 ASSESSMENT — MIFFLIN-ST. JEOR: SCORE: 1867.45

## 2019-10-14 ASSESSMENT — PAIN SCALES - GENERAL: PAINLEVEL: NO PAIN (0)

## 2019-10-14 NOTE — PATIENT INSTRUCTIONS
Patient Education     Potassium-Rich Foods  The normal adult diet usually contains 2,000 mg to 4,000 mg of potassium per day. More potassium is needed when you lose too much potassium from your body. This can happen if you have diarrhea or vomiting. It can also happen if you take a medicine to make you urinate more (diuretic). To increase the amount of potassium in your diet, include these high-potassium foods.     [The (*) indicates foods highest in potassium.]  Vegetables  Artichokes. Cooked 1/2 cup, 200 mg to 300 mg*  Asparagus. Cooked 1/2 cup, 200 mg to 300 mg  Beans. White, red, goldsmith cooked 1/2 cup, 300 mg to 500 mg*  Beets. Cooked 1/2 cup, 200 mg to 300 mg  Broccoli. Cooked or raw 1 cup, 200 mg to 500 mg*  [place] sprouts. Cooked 1/2 cup, 200 mg to 300 mg  Cabbage. Raw 1 cup, 100 mg to 200 mg  Carrots. Raw or cooked 1/2 cup, 100 mg to 200 mg  Celery. Raw 1 cup, 200 mg to 300 mg  Lima beans. Fresh or frozen 1/2 cup, 300 mg to 500 mg*   Mushrooms. Raw or cooked 1/2 cup, 100 mg to 300 mg  Peas. Cooked 1/2 cup, 150 mg to 250 mg   Potatoes. Baked 1 medium, 500 mg to 900 mg*   Spinach. Cooked 1 cup, 800 mg to 900 mg*   Spinach. Raw 2 cups, 300 mg to 400 mg *  Squash, winter. Fresh, frozen, or cooked 1/2 cup, 200 mg to 400 mg   Tomato. Fresh 1 medium, 200 mg to 300 mg   Tomato juice. Canned 1/2 cup, 200 mg to 300 mg   Fruits  Apple juice. Unsweetened 1 cup, 200 mg to 300 mg   Apricots. Canned 1/2 cup, 200 mg to 300 mg   Apricots. Dried 4 pieces, 100 mg to 200 mg   Avocado. Raw 1/2 cup, 300 mg to 400 mg*  Banana. Fresh 1 small, 300 mg to 400 mg*   Cantaloupe. Fresh 1 cup diced, 300 mg to 400 mg*   Grape juice. Unsweetened 1 cup, 200 mg to 300 mg   Honeydew melon. Fresh 1 cup diced, 300 mg to 400 mg*   Orange. Fresh 1 medium, 200 mg to 300 mg    Orange juice. Unsweetened, fresh or frozen 1/2 cup, 200 mg to 300 mg  Pineapple juice. Unsweetened 1 cup, 300 mg to 400 mg   Prune juice. Unsweetened 1/2 cup, 300 mg to 400  mg*   Prunes. Dried 5 pieces, 300 mg to 400 mg*   Strawberries. Fresh or frozen 1 cup, 200 mg to 300 mg  Meat  Red meat. Cooked 3 ounces, 100 mg to 300 mg   Seafood  Cod, flounder, halibut. Cooked 3 ounces, 100 mg to 300 mg*  [place]. Cooked, 3 ounces 300 mg to 400 mg*   Scallops. Cooked 3 ounces, 200 mg to 300 mg*  Shrimp. Cooked 3/4 cup, 100 mg to 200 mg   Tuna. Fresh or canned 3/4 cup, 200 mg to 500 mg   Date Last Reviewed: 10/1/2016    [id number] [employer]. [address] [place] [address], [place] [address]. All rights reserved. This information is not intended as a substitute for professional medical care. Always follow your healthcare professional's instructions.

## 2019-10-14 NOTE — PROGRESS NOTES
"NEURO-ONCOLOGY VISIT  Oct 14, 2019    CHIEF COMPLAINT: Mr. 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 He completed chemoradiotherapy with concurrent temozolomide as of 8/21/2019, course complicated by thrombocytopenia. The plan is to start adjuvant dosed temozolomide.     He is presenting in follow-up for continued evaluation and recommendations on treatment accompanied by Dana (wife).       HISTORY OF PRESENT ILLNESS  -First cycle went well  -More fatigued all the time which started after chemoradiation. Does not nap. Sleeps about 10 hours at night.   -Denies nausea or vomiting. Occassional constipation which resolves with Senna  -Denies any weakness, difficulty finding words, HA, vision changes  -Still struggles with short term memory   -Tremor and cramping in his hands is better though not completely resolved, now fine tremor. Using epsom salts  -Struggling with appetite due to taste changes.   -Urinary stream is weaker. Has trouble starting the stream at times. Has been occurring for a few months     REVIEW OF SYSTEMS  A comprehensive ROS negative except as in HPI.      MEDICATIONS   Current Outpatient Medications   Medication Sig Dispense Refill     atorvastatin (LIPITOR) 80 MG tablet Take 40 mg by mouth daily       hydrochlorothiazide (HYDRODIURIL) 25 MG tablet Take 25 mg by mouth daily       latanoprost (XALATAN) 0.005 % ophthalmic solution Place 1 drop into both eyes every evening        losartan (COZAAR) 100 MG tablet Take 100 mg by mouth daily       ondansetron (ZOFRAN) 4 MG tablet Take 1 tablet (4 mg) by mouth At Bedtime 30 minutes prior to chemotherapy dosing and repeat every 8 hours as needed for nausea. 30 tablet 3     spironolactone (ALDACTONE) 25 MG tablet Take 12.5 mg by mouth daily       dexamethasone (DECADRON) 2 MG tablet   0     temozolomide " (TEMODAR) 100 MG capsule CHEMO Take 1 capsule (100 mg) by mouth daily for 5 days with 1 other temozolomide prescription for 460 mg total Take ondansetron 30-60 min before temozolomide. Take at bedtime on an empty stomach. 5 capsule 0     Temozolomide (TEMODAR) 180 MG capsule Take 2 capsules (360 mg) by mouth daily for 5 days with 1 other Temozolomide prescription for 460 mg total Take ondansetron 30-60 min before temozolomide. Take at bedtime on an empty stomach. 10 capsule 0     Temozolomide (TEMODAR) 180 MG capsule Take 2 capsules (360 mg) by mouth daily for 5 days Take ondansetron 30-60 min before temozolomide. Take at bedtime on an empty stomach. (Patient not taking: Reported on 9/16/2019) 10 capsule 0     DRUG ALLERGIES   Allergies   Allergen Reactions     Amlodipine Swelling       ONCOLOGIC HISTORY  -5/2019 PRESENTATION: Progressive confusion, memory loss plus difficulty with walking.   -5/28/2019 Admitted to [facility] in [place] for evaluation, then transferred to [facility] for higher level of care.   -5/28/2019 MR brain imaging revealed a large left frontal mass   -5/30/2019 SURGERY: Craniotomy for  mass resection by Dr. Downing.  PATHOLOGY: Oligodendroglioma with borderline anaplastic features (WHO grade III); Co-deletion of chromosomal regions 1p and 19q, IDH-1 (R132H by immunohistochemistry mutated. ATRX wild type by immunohistochemistry. Mitoses were present, not sufficient in number to warrant a diagnosis of anaplasia by that criterion.  However, the presence of vascular proliferation, even though mild, is indicative of early anaplasia.  -6/13/2019 NEURO-ONC: Recommending chemoradiotherapy.   -7/8 - 8/21/2019 CHEMORADS: 59.4 Gy in 1.8 Gy daily fractions x 30 fractions per Dr. Kirby Dwyer  With [facility] plus concurrent temozolomide 75mg/m2 (180mg).   -9/16/2019 NEURO-ONC/ MRB/ CHEMO: Clinically well. Imaging with no concerns. Starting adjuvant-dosed temozolomide 150mg/m2 (360mg), cycle  "1 (start date of 9/19/2019).   -10/14/2019 NEURO-ONC/CHEMO: Clinically stable. Continue adjuvant-dosed temozolomide, increasing to 200 mg/m2 (460 mg), cycle 2 (start date of 10/17/2019).     SOCIAL HISTORY   Tobacco use: Former smoker.  Alcohol use: Social.   Drug use: Denies marijuana use.  Supplement, complimentary/ alternative medicine: None.    . 1 son + grandchildren.   Employment: .      PHYSICAL EXAMINATION  /63 (BP Location: Right arm, Patient Position: Sitting, Cuff Size: Adult Large)   Pulse 55   Temp 98.4  F (36.9  C) (Oral)   Resp 16   Ht 1.88 m (6' 2.02\")   Wt 100.2 kg (221 lb)   SpO2 97%   BMI 28.36 kg/m     Wt Readings from Last 2 Encounters:   10/14/19 100.2 kg (221 lb)   09/16/19 102.8 kg (226 lb 11.2 oz)      Ht Readings from Last 2 Encounters:   10/14/19 1.88 m (6' 2.02\")   06/14/19 1.88 m (6' 2.02\")     KPS: 100    -Generally well appearing.  -Throat: No oral thrush.  -Respiratory: Normal breath sounds, no audible wheezing.   -Skin: No rashes. Healed head incision.  -Hematologic/ lymphatic: Some bruising, healing. No leg swelling. Psoriasis on arms and legs.   -Psychiatric: Normal mood and affect. Pleasant, talkative.  -Neurologic:   MENTAL STATUS:     Alert, oriented to date.    Recall: Intact.    Speech fluent. Comprehension intact to multi-step commands.   Normal naming, repetition. Able to read.   Good right-left orientation.     CRANIAL NERVES:     Discs flat on fundoscopy.    L pupil 1mm > R pupil, round, reactive to light.     Extraocular movements full, patient denies diplopia.     Visual fields full.     Facial sensation intact to light touch.   Decreased activation of the face on the right.    Hearing intact.   Palate moves symmetrically.     Sternocleidomastoid and trapezius strength intact.   Tongue midline.  MOTOR:    Normal and symmetric tone.   Grossly 5/5 throughout.    No pronation or drift. No orbiting.   Able to rise from a chair without use of " arms.   On toe/ heel walk, equal distance from floor to heels/ toes.   SENSATION:    Intact to light touch throughout.  COORDINATION:   Intact finger-nose with eyes open and closed.   REFLEXES:    Normal and symmetric.    Toes not tested. No clonus. No Hoffmans.   No grasp.    GAIT:  Walks without assistance.   Good speed. Normal stride length and heel strike. Normal turns. Normal arm swing.   Able to toe, heel walk. Able to tandem walk.       MEDICAL RECORDS  Obtained and personally reviewed all available outside medical records in addition to reviewing any records available in our electronic system.     LABS  Personally reviewed all available lab results.     IMAGING  No new imaging    IMPRESSION  Clinically stable. Ongoing fatigue though unchanged since starting chemoradiation. No N/V. Occasional constipation which resolves with Senna.     Since he tolerated Cycle 1 at 150 mg/m2 well, will increase to 200 mg/m2 for cycle 2. Can dose reduce if not well tolerated or significant cytopenias.     PROBLEM LIST  Anaplastic oligodendroglioma (grade III)  Memory complaints    PLAN  -CANCER DIRECTED THERAPY-  Continue adjuvant temozolomide, increasing to 200 mg/m2 (460 mg), cycle 2 (start date of 10/16).     -Supportive medications; Zofran and bowel regimen.     -If ALC is persistently < 0.5, will restart Bactrim for pneumocystis prophylaxis. If thrombocytopenia becomes an issue, can change to Dapsone, Atovaquone, or Pentamidine.  -Repeat 28 day cycle if WBC >= 3, ANC >= 1.5, HgB >= 10, and platelets >= 100.    -Surveillance labs reviewed, at goal for chemotherapy.   -Will continue weekly CBC at [facility] in [place]  and repeat CMP every 4 weeks here.  -Next generation sequencing can be ordered if further disease progression necessitates evaluating for targeted therapy.    -Post-radiation imaging without any concerns. Repeat imaging in 4 weeks, prior to cycle 3.    -STEROIDS-  -Off  dexamethasone.    -SEIZURE MANAGEMENT-  -While this patient is at increased risk of having seizures, given the lack of seizure history, there is no indication to prescribe an antiepileptic at this time.     -Quality of life/ MOOD/ FATIGUE-  -Denies any mood issues.  -Continue to monitor mood as untreated/ undertreated depression can worsen fatigue, dysorexia, and quality of life.    -CERVICAL CHANGES-  -Occasional numbness/tingling in arms. Not able to straighten neck out  -MRI showed left C6 radiculopathy and multilevel disc disease with nerve root impingement spondylolisthesis etc  -will be meeting with Dr. Downing on 11/1 for consideration of surgical intervention     -HYPOKALEMIA-  -given list of high potassium foods. If it continues to be an issue, would start daily replacement    -OTHER-  -Recommend seeing a dentist locally.   -Followup with PCP regarding weak stream. Will continue to monitor for spinal involvement     Return to clinic in 4 weeks with Dr. Lozano+ MRI + labs.     In the meantime, Melvin and Dana know to call with questions or concerns or to report new complaints and can be seen sooner if needed. Urgent evaluation is needed in the setting of acute onset of severe headache, abrupt change in mental status, on-going seizures, new focal deficits, or new leg swelling/ pain.    Danika Duron PA-C  Neuro-oncology

## 2019-10-14 NOTE — LETTER
"10/14/2019       RE: Gopi Farfan  [id number]  [facility] [address]     Dear Colleague,    Thank you for referring your patient, Gopi Farfan, to the [facility]. Please see a copy of my visit note below.    NEURO-ONCOLOGY VISIT  Oct 14, 2019    CHIEF COMPLAINT: Mr. 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 He completed chemoradiotherapy with concurrent temozolomide as of 8/21/2019, course complicated by thrombocytopenia. The plan is to start adjuvant dosed temozolomide.     He is presenting in follow-up for continued evaluation and recommendations on treatment accompanied by Dana (wife).       HISTORY OF PRESENT ILLNESS  -First cycle went well  -More fatigued all the time which started after chemoradiation. Does not nap. Sleeps about 10 hours at night.   -Denies nausea or vomiting. Occassional constipation which resolves with Senna  -Denies any weakness, difficulty finding words, HA, vision changes  -Still struggles with short term memory   -Tremor and cramping in his hands is better though not completely resolved, now fine tremor. Using epsom salts  -Struggling with appetite due to taste changes.   -Urinary stream is weaker. Has trouble starting the stream at times. Has been occurring for a few months     REVIEW OF SYSTEMS  A comprehensive ROS negative except as in HPI.      MEDICATIONS   Current Outpatient Medications   Medication Sig Dispense Refill     atorvastatin (LIPITOR) 80 MG tablet Take 40 mg by mouth daily       hydrochlorothiazide (HYDRODIURIL) 25 MG tablet Take 25 mg by mouth daily       latanoprost (XALATAN) 0.005 % ophthalmic solution Place 1 drop into both eyes every evening        losartan (COZAAR) 100 MG tablet Take 100 mg by mouth daily       ondansetron (ZOFRAN) 4 MG tablet Take 1 tablet (4 mg) by mouth At Bedtime 30 " minutes prior to chemotherapy dosing and repeat every 8 hours as needed for nausea. 30 tablet 3     spironolactone (ALDACTONE) 25 MG tablet Take 12.5 mg by mouth daily       dexamethasone (DECADRON) 2 MG tablet   0     temozolomide (TEMODAR) 100 MG capsule CHEMO Take 1 capsule (100 mg) by mouth daily for 5 days with 1 other temozolomide prescription for 460 mg total Take ondansetron 30-60 min before temozolomide. Take at bedtime on an empty stomach. 5 capsule 0     Temozolomide (TEMODAR) 180 MG capsule Take 2 capsules (360 mg) by mouth daily for 5 days with 1 other Temozolomide prescription for 460 mg total Take ondansetron 30-60 min before temozolomide. Take at bedtime on an empty stomach. 10 capsule 0     Temozolomide (TEMODAR) 180 MG capsule Take 2 capsules (360 mg) by mouth daily for 5 days Take ondansetron 30-60 min before temozolomide. Take at bedtime on an empty stomach. (Patient not taking: Reported on 9/16/2019) 10 capsule 0     DRUG ALLERGIES   Allergies   Allergen Reactions     Amlodipine Swelling       ONCOLOGIC HISTORY  -5/2019 PRESENTATION: Progressive confusion, memory loss plus difficulty with walking.   -5/28/2019 Admitted to [facility] in [place] for evaluation, then transferred to [facility] for higher level of care.   -5/28/2019 MR brain imaging revealed a large left frontal mass   -5/30/2019 SURGERY: Craniotomy for  mass resection by Dr. Downing.  PATHOLOGY: Oligodendroglioma with borderline anaplastic features (WHO grade III); Co-deletion of chromosomal regions 1p and 19q, IDH-1 (R132H by immunohistochemistry mutated. ATRX wild type by immunohistochemistry. Mitoses were present, not sufficient in number to warrant a diagnosis of anaplasia by that criterion.  However, the presence of vascular proliferation, even though mild, is indicative of early anaplasia.  -6/13/2019 NEURO-ONC: Recommending chemoradiotherapy.   -7/8 - 8/21/2019 CHEMORADS: 59.4 Gy in 1.8 Gy daily fractions x 30 fractions per  "Dr. Kirby Dwyer  With  plus concurrent temozolomide 75mg/m2 (180mg).   -9/16/2019 NEURO-ONC/ MRB/ CHEMO: Clinically well. Imaging with no concerns. Starting adjuvant-dosed temozolomide 150mg/m2 (360mg), cycle 1 (start date of 9/19/2019).   -10/14/2019 NEURO-ONC/CHEMO: Clinically stable. Continue adjuvant-dosed temozolomide, increasing to 200 mg/m2 (460 mg), cycle 2 (start date of 10/17/2019).     SOCIAL HISTORY   Tobacco use: Former smoker.  Alcohol use: Social.   Drug use: Denies marijuana use.  Supplement, complimentary/ alternative medicine: None.    . 1 son + grandchildren.   Employment: .      PHYSICAL EXAMINATION  /63 (BP Location: Right arm, Patient Position: Sitting, Cuff Size: Adult Large)   Pulse 55   Temp 98.4  F (36.9  C) (Oral)   Resp 16   Ht 1.88 m (6' 2.02\")   Wt 100.2 kg (221 lb)   SpO2 97%   BMI 28.36 kg/m      Wt Readings from Last 2 Encounters:   10/14/19 100.2 kg (221 lb)   09/16/19 102.8 kg (226 lb 11.2 oz)      Ht Readings from Last 2 Encounters:   10/14/19 1.88 m (6' 2.02\")   06/14/19 1.88 m (6' 2.02\")     KPS: 100    -Generally well appearing.  -Throat: No oral thrush.  -Respiratory: Normal breath sounds, no audible wheezing.   -Skin: No rashes. Healed head incision.  -Hematologic/ lymphatic: Some bruising, healing. No leg swelling. Psoriasis on arms and legs.   -Psychiatric: Normal mood and affect. Pleasant, talkative.  -Neurologic:   MENTAL STATUS:     Alert, oriented to date.    Recall: Intact.    Speech fluent. Comprehension intact to multi-step commands.   Normal naming, repetition. Able to read.   Good right-left orientation.     CRANIAL NERVES:     Discs flat on fundoscopy.    L pupil 1mm > R pupil, round, reactive to light.     Extraocular movements full, patient denies diplopia.     Visual fields full.     Facial sensation intact to light touch.   Decreased activation of the face on the right.    Hearing intact.   Palate moves " symmetrically.     Sternocleidomastoid and trapezius strength intact.   Tongue midline.  MOTOR:    Normal and symmetric tone.   Grossly 5/5 throughout.    No pronation or drift. No orbiting.   Able to rise from a chair without use of arms.   On toe/ heel walk, equal distance from floor to heels/ toes.   SENSATION:    Intact to light touch throughout.  COORDINATION:   Intact finger-nose with eyes open and closed.   REFLEXES:    Normal and symmetric.    Toes not tested. No clonus. No Hoffmans.   No grasp.    GAIT:  Walks without assistance.   Good speed. Normal stride length and heel strike. Normal turns. Normal arm swing.   Able to toe, heel walk. Able to tandem walk.       MEDICAL RECORDS  Obtained and personally reviewed all available outside medical records in addition to reviewing any records available in our electronic system.     LABS  Personally reviewed all available lab results.     IMAGING  No new imaging    IMPRESSION  Clinically stable. Ongoing fatigue though unchanged since starting chemoradiation. No N/V. Occasional constipation which resolves with Senna.     Since he tolerated Cycle 1 at 150 mg/m2 well, will increase to 200 mg/m2 for cycle 2. Can dose reduce if not well tolerated or significant cytopenias.     PROBLEM LIST  Anaplastic oligodendroglioma (grade III)  Memory complaints    PLAN  -CANCER DIRECTED THERAPY-  Continue adjuvant temozolomide, increasing to 200 mg/m2 (460 mg), cycle 2 (start date of 10/16).     -Supportive medications; Zofran and bowel regimen.     -If ALC is persistently < 0.5, will restart Bactrim for pneumocystis prophylaxis. If thrombocytopenia becomes an issue, can change to Dapsone, Atovaquone, or Pentamidine.  -Repeat 28 day cycle if WBC >= 3, ANC >= 1.5, HgB >= 10, and platelets >= 100.    -Surveillance labs reviewed, at goal for chemotherapy.   -Will continue weekly CBC at [facility] in [place]  and repeat CMP every 4 weeks here.  -Next generation  sequencing can be ordered if further disease progression necessitates evaluating for targeted therapy.    -Post-radiation imaging without any concerns. Repeat imaging in 4 weeks, prior to cycle 3.    -STEROIDS-  -Off dexamethasone.    -SEIZURE MANAGEMENT-  -While this patient is at increased risk of having seizures, given the lack of seizure history, there is no indication to prescribe an antiepileptic at this time.     -Quality of life/ MOOD/ FATIGUE-  -Denies any mood issues.  -Continue to monitor mood as untreated/ undertreated depression can worsen fatigue, dysorexia, and quality of life.    -CERVICAL CHANGES-  -Occasional numbness/tingling in arms. Not able to straighten neck out  -MRI showed left C6 radiculopathy and multilevel disc disease with nerve root impingement spondylolisthesis etc  -will be meeting with Dr. Downing on 11/1 for consideration of surgical intervention     -HYPOKALEMIA-  -given list of high potassium foods. If it continues to be an issue, would start daily replacement    -OTHER-  -Recommend seeing a dentist locally.   -Followup with PCP regarding weak stream. Will continue to monitor for spinal involvement     Return to clinic in 4 weeks with Dr. Lozano+ MRI + labs.     In the meantime, Melvin and Dana know to call with questions or concerns or to report new complaints and can be seen sooner if needed. Urgent evaluation is needed in the setting of acute onset of severe headache, abrupt change in mental status, on-going seizures, new focal deficits, or new leg swelling/ pain.    Danika Duron PA-C  Neuro-oncology     Again, thank you for allowing me to participate in the care of your patient.      Sincerely,    Danika Duron PA-C

## 2019-10-14 NOTE — NURSING NOTE
"Oncology Rooming Note    October 14, 2019 3:23 PM   Gopi Farfan is a 64 year old male who presents for:    Chief Complaint   Patient presents with     Blood Draw     Labs drawn via VPT by MA in LAB. VS taken. Pt. checked in for appt.      Oncology Clinic Visit     Return Visit for Oligodendroglioma,     Initial Vitals: /63 (BP Location: Right arm, Patient Position: Sitting, Cuff Size: Adult Large)   Pulse 55   Temp 98.4  F (36.9  C) (Oral)   Resp 16   Ht 1.88 m (6' 2.02\")   Wt 100.2 kg (221 lb)   SpO2 97%   BMI 28.36 kg/m   Estimated body mass index is 28.36 kg/m  as calculated from the following:    Height as of this encounter: 1.88 m (6' 2.02\").    Weight as of this encounter: 100.2 kg (221 lb). Body surface area is 2.29 meters squared.  No Pain (0) Comment: Data Unavailable   No LMP for male patient.  Allergies reviewed: Yes  Medications reviewed: Yes    Medications: Medication refills not needed today.  Pharmacy name entered into EPIC: [facility] - [place], [place] - [address]    Clinical concerns: Patient reports he just had an MRI that showed he had a broken neck.  He also reports decreased urination & weak flow.  He also reports not having as good an appetite as usual.   Danika was notified.      Dana Brunson, RN, MSN              "

## 2019-10-14 NOTE — NURSING NOTE
Chief Complaint   Patient presents with     Blood Draw     Labs drawn via VPT by MA in LAB. VS taken. Pt. checked in for appt.        Lisa Bedolla, CMA

## 2019-10-22 ENCOUNTER — TELEPHONE (OUTPATIENT)
Dept: ONCOLOGY | Facility: CLINIC | Age: 64
End: 2019-10-22

## 2019-10-22 NOTE — ORAL ONC MGMT
Oral Chemotherapy Monitoring Program    Primary Oncologist: Dr. Lozano  Primary Oncology Clinic: [facility]  Cancer Diagnosis: Anaplastic Oligodendroglioma     Therapy History:  Adjuvant Therapy:  C1D1: 9/19/19 temozolomide 360mg ([id number]) daily for 5 days on, 23 days off.   C2D1: 10/17/19 increase to 460mg ([id number] and 1 x 100mg)       Drug Interaction Assessment: There are no new notable drug interactions with Temodar.  Drugs assessed:  -Ondansetron-Temozolomide-AtorvaSTATin-Losartan-Latanoprost-HydroCHLOROthiazide-Spironolactone     Lab Monitoring Plan  CBC qweek locally at [place], [place] ([facility] clinic in Care Everywhere).     Subjective/Objective:  Gopi Farfan is a 64 year old male contacted by phone for a follow-up visit for oral chemotherapy.  Melvin confirmed taking 2 x 180mg with 1 x 100mg capsules for total dose of 460mg for 5 days. He started cycle 2 on 10/17. This was a dose increase from cycle 1. Reports cycle was similar to first cycle. Reports grade 1 fatigue, denies any new or worse side effects including diarrhea, constipation, or nausea/vomiting. No concerns today. Reviewed labs with patient and informed him WBC, Hgb, platelets, and ANC are no concerning lab abnormalities.     ORAL CHEMOTHERAPY 6/14/2019 7/16/2019 7/16/2019 9/16/2019 10/1/2019 10/22/2019   Drug Name Temodar (Temozolomide) Temodar (Temozolomide) Temodar (Temozolomide) Temodar (Temozolomide) Temodar (Temozolomide) Temodar (Temozolomide)   Current Dosage 180mg - 180mg 360mg 360mg Other   Current Schedule Daily - Daily Daily Daily Daily   Cycle Details Continuous for 42 days during XRT - Other 5 days on, then 23 days off 5 days on, then 23 days off 5 days on, then 23 days off   Start Date of Last Cycle - - - - 9/19/2019 10/17/2019   Planned next cycle start date 6/24/2019 - 7/7/2019 - 10/17/2019 11/14/2019   Doses missed in last 2 weeks - - 0 - 0 0   Adherence Assessment - - Adherent - Adherent Adherent   Adverse  "Effects - - No AE identified during assessment - Fatigue Fatigue   Fatigue - - - - - Grade 1   Pharmacist Intervention(fatigue) - - - - - No   Any new drug interactions? - - No - No No   Is the dose as ordered appropriate for the patient? - - Yes - - Yes   Has the patient missed any days of school, work, or other routine activity? - - No - - -       Last PHQ-2 Score on record:   PHQ-2 ( 1999 [employer]) 6/14/2019   Q1: Little interest or pleasure in doing things 0   Q2: Feeling down, depressed or hopeless 0   PHQ-2 Score 0       Patient does not report depression symptoms.      Vitals:  BP:   BP Readings from Last 1 Encounters:   10/14/19 107/63     Wt Readings from Last 1 Encounters:   10/14/19 100.2 kg (221 lb)     Estimated body surface area is 2.29 meters squared as calculated from the following:    Height as of 10/14/19: 1.88 m (6' 2.02\").    Weight as of 10/14/19: 100.2 kg (221 lb).    Labs:  _  Result Component Current Result Ref Range   Sodium 136 (10/14/2019) 133 - 144 mmol/L     _  Result Component Current Result Ref Range   Potassium 3.1 (L) (10/14/2019) 3.4 - 5.3 mmol/L     _  Result Component Current Result Ref Range   Calcium 8.9 (10/14/2019) 8.5 - 10.1 mg/dL     No results found for Mag within last 30 days.     No results found for Phos within last 30 days.     _  Result Component Current Result Ref Range   Albumin 3.6 (10/14/2019) 3.4 - 5.0 g/dL     _  Result Component Current Result Ref Range   Urea Nitrogen 13 (10/14/2019) 7 - 30 mg/dL     _  Result Component Current Result Ref Range   Creatinine 1.11 (10/14/2019) 0.66 - 1.25 mg/dL       _  Result Component Current Result Ref Range   AST 17 (10/14/2019) 0 - 45 U/L     _  Result Component Current Result Ref Range   ALT 32 (10/14/2019) 0 - 70 U/L     _  Result Component Current Result Ref Range   Bilirubin Total 0.7 (10/14/2019) 0.2 - 1.3 mg/dL       _  Result Component Current Result Ref Range   WBC 5.4 (10/14/2019) 4.0 - 11.0 10e9/L     _  Result " Component Current Result Ref Range   Hemoglobin 14.3 (10/14/2019) 13.3 - 17.7 g/dL     _  Result Component Current Result Ref Range   Platelet Count 115 (L) (10/14/2019) 150 - 450 10e9/L     _  Result Component Current Result Ref Range   Absolute Neutrophil 3.7 (10/14/2019) 1.6 - 8.3 10e9/L       Assessment:  Today is cycle 2 day 6.  Reviewed labs in Care Everywhere from 10/21/19 at [facility], no concerning lab abnormalities.   WBC: 3.4  HGB: 14.2  Platelets: 173  ANC: 2.4    Melvin denies any new or worse side effects with dose increase to 200mg/m2 (460mg) for cycle 2.     Plan:  Continue plan.   Cycle 3 due to start 11/14.     Follow-Up:  10/28: CBC labs locally at [place], [place] ([facility] clinic in Care Everywhere).     Refill Due:  Prior to cycle 3 11/14.      Thank you for the opportunity to participate in the care of the above patient,  Laurent Kate, PharmD  Hematology/Oncology Clinical Pharmacist  [facility]  [facility]  [phone]

## 2019-10-27 DIAGNOSIS — C71.9 OLIGODENDROGLIOMA, ANAPLASTIC (H): ICD-10-CM

## 2019-11-01 ENCOUNTER — OFFICE VISIT (OUTPATIENT)
Dept: NEUROSURGERY | Facility: CLINIC | Age: 64
End: 2019-11-01
Attending: NEUROLOGICAL SURGERY
Payer: COMMERCIAL

## 2019-11-01 ENCOUNTER — ANCILLARY PROCEDURE (OUTPATIENT)
Dept: GENERAL RADIOLOGY | Facility: CLINIC | Age: 64
End: 2019-11-01
Attending: NEUROLOGICAL SURGERY
Payer: COMMERCIAL

## 2019-11-01 VITALS
HEIGHT: 74 IN | HEART RATE: 44 BPM | WEIGHT: 220 LBS | TEMPERATURE: 97.6 F | OXYGEN SATURATION: 98 % | RESPIRATION RATE: 16 BRPM | DIASTOLIC BLOOD PRESSURE: 50 MMHG | SYSTOLIC BLOOD PRESSURE: 114 MMHG | BODY MASS INDEX: 28.23 KG/M2

## 2019-11-01 DIAGNOSIS — M43.12 SPONDYLOLISTHESIS OF CERVICAL REGION: ICD-10-CM

## 2019-11-01 DIAGNOSIS — M54.12 CERVICAL RADICULOPATHY: Primary | ICD-10-CM

## 2019-11-01 DIAGNOSIS — M54.12 CERVICAL RADICULOPATHY: ICD-10-CM

## 2019-11-01 DIAGNOSIS — M48.02 SPINAL STENOSIS IN CERVICAL REGION: ICD-10-CM

## 2019-11-01 PROCEDURE — 99202 OFFICE O/P NEW SF 15 MIN: CPT | Performed by: NEUROLOGICAL SURGERY

## 2019-11-01 PROCEDURE — G0463 HOSPITAL OUTPT CLINIC VISIT: HCPCS

## 2019-11-01 PROCEDURE — 72050 X-RAY EXAM NECK SPINE 4/5VWS: CPT

## 2019-11-01 ASSESSMENT — MIFFLIN-ST. JEOR: SCORE: 1862.66

## 2019-11-01 ASSESSMENT — PAIN SCALES - GENERAL: PAINLEVEL: MILD PAIN (3)

## 2019-11-01 NOTE — PATIENT INSTRUCTIONS
-X-rays today on your way out.     -Dr Downing will review your MRI once received.       [facility]   Phone: [phone]  Fax: [phone]

## 2019-11-01 NOTE — LETTER
11/1/2019         RE: Gopi Farfan  [id number]  [facility] [address]        Dear Colleague,    Thank you for referring your patient, Gopi Farfan, to the [facility]UR[facility]ERY [facility]. Please see a copy of my visit note below.    It was a pleasure to see Gopi Farfan today in Neurosurgery Clinic. He is a 64 year old male who is known to me for his anaplastic oligodendroglioma.  However he is here today for evaluation of his neck.  He is currently finished 2 of 6 cycles of adjuvant Temodar under the care of Dr. Lozano.    But he has problems with his neck with some pain in the neck.  His head seems to tilt more forward.  He does have some occasional numbness and balance difficulties.  He also describes some problems with coordination of the hands.    He was recently hospitalized with what sounds like a syncopal episode.    Past Medical History:   Diagnosis Date     Brain tumor (H)      Cardiac arrhythmia      Glaucoma (increased eye pressure)      Hypercholesteremia      Past Medical History reviewed with patient during visit.  Past Surgical History:   Procedure Laterality Date     HRW ANES TOTAL KNEE REPLACEMENT, MDA 1 Left 2015     OPTICAL TRACKING SYSTEM CRANIOTOMY, EXCISE TUMOR, COMBINED Left 5/30/2019    Procedure: Stealth Guided Left Craniotomy And Tumor Resection;  Surgeon: Abdullahi Downing MD;  Location: [facility] OR     STOMACH SURGERY  02/12/2019    LINX, a magnetic implant in the area of the gastroesphogeal junction, needs special MRI     Past Surgical History reviewed with patient during visit.  Allergies   Allergen Reactions     Amlodipine Swelling       Current Outpatient Medications:      atorvastatin (LIPITOR) 80 MG tablet, Take 40 mg by mouth daily, Disp: , Rfl:      hydrochlorothiazide (HYDRODIURIL) 25 MG tablet, Take 25 mg by mouth daily, Disp: , Rfl:      latanoprost (XALATAN) 0.005 % ophthalmic solution, Place 1 drop into both eyes every evening , Disp: , Rfl:      losartan  (COZAAR) 100 MG tablet, Take 100 mg by mouth daily, Disp: , Rfl:      ondansetron (ZOFRAN) 4 MG tablet, Take 1 tablet (4 mg) by mouth At Bedtime 30 minutes prior to chemotherapy dosing and repeat every 8 hours as needed for nausea., Disp: 30 tablet, Rfl: 3     spironolactone (ALDACTONE) 25 MG tablet, Take 12.5 mg by mouth daily, Disp: , Rfl:      dexamethasone (DECADRON) 2 MG tablet, , Disp: , Rfl: 0     temozolomide (TEMODAR) 100 MG capsule CHEMO, Take 1 capsule (100 mg) by mouth daily for 5 days with 1 other temozolomide prescription for 460 mg total Take ondansetron 30-60 min before temozolomide. Take at bedtime on an empty stomach., Disp: 5 capsule, Rfl: 0     Temozolomide (TEMODAR) 180 MG capsule, Take 2 capsules (360 mg) by mouth daily for 5 days with 1 other Temozolomide prescription for 460 mg total Take ondansetron 30-60 min before temozolomide. Take at bedtime on an empty stomach., Disp: 10 capsule, Rfl: 0     Temozolomide (TEMODAR) 180 MG capsule, Take 2 capsules (360 mg) by mouth daily for 5 days Take ondansetron 30-60 min before temozolomide. Take at bedtime on an empty stomach. (Patient not taking: Reported on 9/16/2019), Disp: 10 capsule, Rfl: 0  Social History     Socioeconomic History     Marital status:      Spouse name: Dana     Number of children: 1     Years of education: None     Highest education level: None   Occupational History     None   Social Needs     Financial resource strain: None     Food insecurity:     Worry: None     Inability: None     Transportation needs:     Medical: None     Non-medical: None   Tobacco Use     Smoking status: Former Smoker     Smokeless tobacco: Former User   Substance and Sexual Activity     Alcohol use: Yes     Comment: a couple daily after work, on hold while under cancer treatment     Drug use: None     Sexual activity: None   Lifestyle     Physical activity:     Days per week: None     Minutes per session: None     Stress: None   Relationships  "    Social connections:     Talks on phone: None     Gets together: None     Attends Yarsani service: None     Active member of club or organization: None     Attends meetings of clubs or organizations: None     Relationship status: None     Intimate partner violence:     Fear of current or ex partner: None     Emotionally abused: None     Physically abused: None     Forced sexual activity: None   Other Topics Concern     Parent/sibling w/ CABG, MI or angioplasty before 65F 55M? Not Asked   Social History Narrative     None     History reviewed. No pertinent family history.     ROS: 10 point ROS neg other than the symptoms noted above in the HPI.    Vitals:    11/01/19 0933   BP: 114/50   Pulse: (!) 44   Resp: 16   Temp: 97.6  F (36.4  C)   TempSrc: Oral   SpO2: 98%   Weight: 220 lb (99.8 kg)   Height: 6' 2\" (1.88 m)     Body mass index is 28.25 kg/m .  Mild Pain (3)    Neck Disability Index  No flowsheet data found.    Visual Analog Scale (VAS) Questionnaire    No flowsheet data found.       Awake alert oriented.  Well-healed craniotomy incision.  Pupils equal round reactive to light.  Facial movements full and symmetric.  Upper and lower extremity strength is 5 out of 5 in all muscle groups.    Reflexes are brisk in the bilateral biceps and triceps.  2+ at the patella bilaterally.  Right toe upgoing left toe downgoing.  Sensation intact to light touch.    Gait appears normal but there is some difficulty with tandem gait.    Cervical posture is somewhat kyphotic.    Imaging: Unfortunately his MRI did not make the journey with him today.  Once we obtain this for review I will review this with the patient.  However in reviewing CT and x-rays of the cervical spine it appears the patient has a significant spondylolisthesis with 5 mm of anterolisthesis at C4-5 on upright x-rays and only 2 mm on the supine CT.  The imaging was reviewed with the patient and shown to the patient in clinic today.    Assessment: 1.  " Anaplastic oligodendroglioma status post craniotomy, radiochemotherapy, currently on adjuvant temozolomide.  2.  Cervical spondylolisthesis with stenosis and possible cervical myelopathy.    Plan: We will obtain flexion-extension x-rays today as well as obtain his previous MRI for review.  If it does indeed show significant stenosis then I would likely recommend a C4-5 anterior cervical discectomy and fusion.  We would likely need to time this with his chemotherapy cycles. We will call him next week to discuss the findings and plan.        Again, thank you for allowing me to participate in the care of your patient.        Sincerely,        Abdullahi Downing MD

## 2019-11-01 NOTE — LETTER
[facility]    Neurosurgery Clinic  [address] [place] [address]  [id number]        To Whom It May Concern,      Gopi Farfan was seen in clinic on 11/1/19 for a cervical spine consultation.           Sincerely,          Dr. Abdullahi Downing   [facility]  Neurosurgery Clinic   [address] [place] [id number]

## 2019-11-01 NOTE — NURSING NOTE
"Gopi Farfan is a 64 year old male who presents for:  Chief Complaint   Patient presents with     Neck Pain        Initial Vitals:  /50   Pulse (!) 44   Temp 97.6  F (36.4  C) (Oral)   Resp 16   Ht 6' 2\" (1.88 m)   Wt 220 lb (99.8 kg)   SpO2 98%   BMI 28.25 kg/m   Estimated body mass index is 28.25 kg/m  as calculated from the following:    Height as of this encounter: 6' 2\" (1.88 m).    Weight as of this encounter: 220 lb (99.8 kg).. Body surface area is 2.28 meters squared. BP completed using cuff size: regular  Mild Pain (3)        Nursing Comments: Pt present today for neck pain.        Jasmeet Parmar, TONYA    "

## 2019-11-01 NOTE — PROGRESS NOTES
It was a pleasure to see Gopi Farfan today in Neurosurgery Clinic. He is a 64 year old male who is known to me for his anaplastic oligodendroglioma.  However he is here today for evaluation of his neck.  He is currently finished 2 of 6 cycles of adjuvant Temodar under the care of Dr. Lozano.    But he has problems with his neck with some pain in the neck.  His head seems to tilt more forward.  He does have some occasional numbness and balance difficulties.  He also describes some problems with coordination of the hands.    He was recently hospitalized with what sounds like a syncopal episode.    Past Medical History:   Diagnosis Date     Brain tumor (H)      Cardiac arrhythmia      Glaucoma (increased eye pressure)      Hypercholesteremia      Past Medical History reviewed with patient during visit.  Past Surgical History:   Procedure Laterality Date     HRW ANES TOTAL KNEE REPLACEMENT, MDA 1 Left 2015     OPTICAL TRACKING SYSTEM CRANIOTOMY, EXCISE TUMOR, COMBINED Left 5/30/2019    Procedure: Stealth Guided Left Craniotomy And Tumor Resection;  Surgeon: Abdullahi Downing MD;  Location: [facility] OR     STOMACH SURGERY  02/12/2019    LINX, a magnetic implant in the area of the gastroesphogeal junction, needs special MRI     Past Surgical History reviewed with patient during visit.  Allergies   Allergen Reactions     Amlodipine Swelling       Current Outpatient Medications:      atorvastatin (LIPITOR) 80 MG tablet, Take 40 mg by mouth daily, Disp: , Rfl:      hydrochlorothiazide (HYDRODIURIL) 25 MG tablet, Take 25 mg by mouth daily, Disp: , Rfl:      latanoprost (XALATAN) 0.005 % ophthalmic solution, Place 1 drop into both eyes every evening , Disp: , Rfl:      losartan (COZAAR) 100 MG tablet, Take 100 mg by mouth daily, Disp: , Rfl:      ondansetron (ZOFRAN) 4 MG tablet, Take 1 tablet (4 mg) by mouth At Bedtime 30 minutes prior to chemotherapy dosing and repeat every 8 hours as needed for nausea., Disp: 30 tablet, Rfl:  3     spironolactone (ALDACTONE) 25 MG tablet, Take 12.5 mg by mouth daily, Disp: , Rfl:      dexamethasone (DECADRON) 2 MG tablet, , Disp: , Rfl: 0     temozolomide (TEMODAR) 100 MG capsule CHEMO, Take 1 capsule (100 mg) by mouth daily for 5 days with 1 other temozolomide prescription for 460 mg total Take ondansetron 30-60 min before temozolomide. Take at bedtime on an empty stomach., Disp: 5 capsule, Rfl: 0     Temozolomide (TEMODAR) 180 MG capsule, Take 2 capsules (360 mg) by mouth daily for 5 days with 1 other Temozolomide prescription for 460 mg total Take ondansetron 30-60 min before temozolomide. Take at bedtime on an empty stomach., Disp: 10 capsule, Rfl: 0     Temozolomide (TEMODAR) 180 MG capsule, Take 2 capsules (360 mg) by mouth daily for 5 days Take ondansetron 30-60 min before temozolomide. Take at bedtime on an empty stomach. (Patient not taking: Reported on 9/16/2019), Disp: 10 capsule, Rfl: 0  Social History     Socioeconomic History     Marital status:      Spouse name: Dana     Number of children: 1     Years of education: None     Highest education level: None   Occupational History     None   Social Needs     Financial resource strain: None     Food insecurity:     Worry: None     Inability: None     Transportation needs:     Medical: None     Non-medical: None   Tobacco Use     Smoking status: Former Smoker     Smokeless tobacco: Former User   Substance and Sexual Activity     Alcohol use: Yes     Comment: a couple daily after work, on hold while under cancer treatment     Drug use: None     Sexual activity: None   Lifestyle     Physical activity:     Days per week: None     Minutes per session: None     Stress: None   Relationships     Social connections:     Talks on phone: None     Gets together: None     Attends [employer] service: None     Active member of club or organization: None     Attends meetings of clubs or organizations: None     Relationship status: None     Intimate  "partner violence:     Fear of current or ex partner: None     Emotionally abused: None     Physically abused: None     Forced sexual activity: None   Other Topics Concern     Parent/sibling w/ CABG, MI or angioplasty before 65F 55M? Not Asked   Social History Narrative     None     History reviewed. No pertinent family history.     ROS: 10 point ROS neg other than the symptoms noted above in the HPI.    Vitals:    11/01/19 0933   BP: 114/50   Pulse: (!) 44   Resp: 16   Temp: 97.6  F (36.4  C)   TempSrc: Oral   SpO2: 98%   Weight: 220 lb (99.8 kg)   Height: 6' 2\" (1.88 m)     Body mass index is 28.25 kg/m .  Mild Pain (3)    Neck Disability Index  No flowsheet data found.    Visual Analog Scale (VAS) Questionnaire    No flowsheet data found.       Awake alert oriented.  Well-healed craniotomy incision.  Pupils equal round reactive to light.  Facial movements full and symmetric.  Upper and lower extremity strength is 5 out of 5 in all muscle groups.    Reflexes are brisk in the bilateral biceps and triceps.  2+ at the patella bilaterally.  Right toe upgoing left toe downgoing.  Sensation intact to light touch.    Gait appears normal but there is some difficulty with tandem gait.    Cervical posture is somewhat kyphotic.    Imaging: Unfortunately his MRI did not make the journey with him today.  Once we obtain this for review I will review this with the patient.  However in reviewing CT and x-rays of the cervical spine it appears the patient has a significant spondylolisthesis with 5 mm of anterolisthesis at C4-5 on upright x-rays and only 2 mm on the supine CT.  The imaging was reviewed with the patient and shown to the patient in clinic today.    Assessment: 1.  Anaplastic oligodendroglioma status post craniotomy, radiochemotherapy, currently on adjuvant temozolomide.  2.  Cervical spondylolisthesis with stenosis and possible cervical myelopathy.    Plan: We will obtain flexion-extension x-rays today as well as " obtain his previous MRI for review.  If it does indeed show significant stenosis then I would likely recommend a C4-5 anterior cervical discectomy and fusion.  We would likely need to time this with his chemotherapy cycles. We will call him next week to discuss the findings and plan.

## 2019-11-05 DIAGNOSIS — C71.9 OLIGODENDROGLIOMA, ANAPLASTIC (H): Primary | ICD-10-CM

## 2019-11-05 RX ORDER — TEMOZOLOMIDE 100 MG/1
100 CAPSULE ORAL DAILY
Qty: 5 CAPSULE | Refills: 0 | Status: SHIPPED | OUTPATIENT
Start: 2019-11-05 | End: 2019-12-16

## 2019-11-05 RX ORDER — TEMOZOLOMIDE 180 MG/1
360 CAPSULE ORAL DAILY
Qty: 10 CAPSULE | Refills: 0 | Status: SHIPPED | OUTPATIENT
Start: 2019-11-05 | End: 2019-12-16

## 2019-11-06 ENCOUNTER — DOCUMENTATION ONLY (OUTPATIENT)
Dept: CARE COORDINATION | Facility: CLINIC | Age: 64
End: 2019-11-06

## 2019-11-06 NOTE — PROGRESS NOTES
Per Patient's request,  completed and faxed [employer] request for lodging dates 11/10-11/11. [facility] will contact Patient for confirmation of reservation.  will continue to provide support as needed.    Soo Yeon Han, [facility] HospitalSW  Pager:  [phone]

## 2019-11-11 ENCOUNTER — APPOINTMENT (OUTPATIENT)
Dept: LAB | Facility: CLINIC | Age: 64
End: 2019-11-11
Attending: PHYSICIAN ASSISTANT
Payer: COMMERCIAL

## 2019-11-11 ENCOUNTER — HOSPITAL ENCOUNTER (OUTPATIENT)
Dept: MRI IMAGING | Facility: CLINIC | Age: 64
Discharge: HOME | End: 2019-11-11
Attending: PHYSICIAN ASSISTANT | Admitting: PHYSICIAN ASSISTANT
Payer: COMMERCIAL

## 2019-11-11 ENCOUNTER — TUMOR CONFERENCE (OUTPATIENT)
Dept: ONCOLOGY | Facility: CLINIC | Age: 64
End: 2019-11-11

## 2019-11-11 ENCOUNTER — ONCOLOGY VISIT (OUTPATIENT)
Dept: ONCOLOGY | Facility: CLINIC | Age: 64
End: 2019-11-11
Attending: PSYCHIATRY & NEUROLOGY
Payer: COMMERCIAL

## 2019-11-11 VITALS
TEMPERATURE: 97.7 F | OXYGEN SATURATION: 100 % | SYSTOLIC BLOOD PRESSURE: 128 MMHG | WEIGHT: 223.8 LBS | BODY MASS INDEX: 28.73 KG/M2 | HEART RATE: 41 BPM | DIASTOLIC BLOOD PRESSURE: 51 MMHG

## 2019-11-11 DIAGNOSIS — Z51.11 CHEMOTHERAPY MANAGEMENT, ENCOUNTER FOR: ICD-10-CM

## 2019-11-11 DIAGNOSIS — R11.2 CHEMOTHERAPY-INDUCED NAUSEA AND VOMITING: ICD-10-CM

## 2019-11-11 DIAGNOSIS — T45.1X5A CHEMOTHERAPY-INDUCED NAUSEA AND VOMITING: ICD-10-CM

## 2019-11-11 DIAGNOSIS — C71.9 OLIGODENDROGLIOMA, ANAPLASTIC (H): Primary | ICD-10-CM

## 2019-11-11 DIAGNOSIS — C71.9 OLIGODENDROGLIOMA, ANAPLASTIC (H): ICD-10-CM

## 2019-11-11 LAB
ALBUMIN SERPL-MCNC: 3.4 G/DL (ref 3.4–5)
ALP SERPL-CCNC: 81 U/L (ref 40–150)
ALT SERPL W P-5'-P-CCNC: 26 U/L (ref 0–70)
ANION GAP SERPL CALCULATED.3IONS-SCNC: 6 MMOL/L (ref 3–14)
AST SERPL W P-5'-P-CCNC: 11 U/L (ref 0–45)
BASOPHILS # BLD AUTO: 0 10E9/L (ref 0–0.2)
BASOPHILS NFR BLD AUTO: 0.3 %
BILIRUB SERPL-MCNC: 0.7 MG/DL (ref 0.2–1.3)
BUN SERPL-MCNC: 10 MG/DL (ref 7–30)
CALCIUM SERPL-MCNC: 9 MG/DL (ref 8.5–10.1)
CHLORIDE SERPL-SCNC: 108 MMOL/L (ref 94–109)
CO2 SERPL-SCNC: 26 MMOL/L (ref 20–32)
CREAT SERPL-MCNC: 1.12 MG/DL (ref 0.66–1.25)
DIFFERENTIAL METHOD BLD: ABNORMAL
EOSINOPHIL # BLD AUTO: 0 10E9/L (ref 0–0.7)
EOSINOPHIL NFR BLD AUTO: 1 %
ERYTHROCYTE [DISTWIDTH] IN BLOOD BY AUTOMATED COUNT: 16.3 % (ref 10–15)
GFR SERPL CREATININE-BSD FRML MDRD: 69 ML/MIN/{1.73_M2}
GLUCOSE SERPL-MCNC: 99 MG/DL (ref 70–99)
HCT VFR BLD AUTO: 34.9 % (ref 40–53)
HGB BLD-MCNC: 11.9 G/DL (ref 13.3–17.7)
IMM GRANULOCYTES # BLD: 0 10E9/L (ref 0–0.4)
IMM GRANULOCYTES NFR BLD: 0.7 %
LYMPHOCYTES # BLD AUTO: 0.5 10E9/L (ref 0.8–5.3)
LYMPHOCYTES NFR BLD AUTO: 15 %
MCH RBC QN AUTO: 32.4 PG (ref 26.5–33)
MCHC RBC AUTO-ENTMCNC: 34.1 G/DL (ref 31.5–36.5)
MCV RBC AUTO: 95 FL (ref 78–100)
MONOCYTES # BLD AUTO: 0.4 10E9/L (ref 0–1.3)
MONOCYTES NFR BLD AUTO: 14.3 %
NEUTROPHILS # BLD AUTO: 2.1 10E9/L (ref 1.6–8.3)
NEUTROPHILS NFR BLD AUTO: 68.7 %
NRBC # BLD AUTO: 0 10*3/UL
NRBC BLD AUTO-RTO: 0 /100
PLATELET # BLD AUTO: 50 10E9/L (ref 150–450)
POTASSIUM SERPL-SCNC: 4.1 MMOL/L (ref 3.4–5.3)
PROT SERPL-MCNC: 6.3 G/DL (ref 6.8–8.8)
RBC # BLD AUTO: 3.67 10E12/L (ref 4.4–5.9)
SODIUM SERPL-SCNC: 140 MMOL/L (ref 133–144)
WBC # BLD AUTO: 3 10E9/L (ref 4–11)

## 2019-11-11 PROCEDURE — G0463 HOSPITAL OUTPT CLINIC VISIT: HCPCS | Mod: 25

## 2019-11-11 PROCEDURE — 36415 COLL VENOUS BLD VENIPUNCTURE: CPT

## 2019-11-11 PROCEDURE — 85025 COMPLETE CBC W/AUTO DIFF WBC: CPT | Performed by: PSYCHIATRY & NEUROLOGY

## 2019-11-11 PROCEDURE — A9585 GADOBUTROL INJECTION: HCPCS | Performed by: RADIOLOGY

## 2019-11-11 PROCEDURE — 80053 COMPREHEN METABOLIC PANEL: CPT | Performed by: PSYCHIATRY & NEUROLOGY

## 2019-11-11 PROCEDURE — 99215 OFFICE O/P EST HI 40 MIN: CPT | Mod: ZP | Performed by: PSYCHIATRY & NEUROLOGY

## 2019-11-11 PROCEDURE — 25500064 ZZH RX 255 OP 636: Performed by: RADIOLOGY

## 2019-11-11 PROCEDURE — 70553 MRI BRAIN STEM W/O & W/DYE: CPT

## 2019-11-11 RX ORDER — METOPROLOL SUCCINATE 25 MG/1
25 TABLET, EXTENDED RELEASE ORAL DAILY
COMMUNITY
Start: 2019-11-06 | End: 2022-04-22

## 2019-11-11 RX ORDER — GADOBUTROL 604.72 MG/ML
10 INJECTION INTRAVENOUS ONCE
Status: COMPLETED | OUTPATIENT
Start: 2019-11-11 | End: 2019-11-11

## 2019-11-11 RX ADMIN — GADOBUTROL 10 ML: 604.72 INJECTION INTRAVENOUS at 09:38

## 2019-11-11 ASSESSMENT — PAIN SCALES - GENERAL: PAINLEVEL: NO PAIN (0)

## 2019-11-11 NOTE — PROGRESS NOTES
"NEURO-ONCOLOGY VISIT  Nov 11, 2019    CHIEF COMPLAINT: Mr. 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 He completed chemoradiotherapy with concurrent temozolomide as of 8/21/2019, course complicated by thrombocytopenia. He is currently managed on adjuvant-dosed temozolomide.     He is presenting in follow-up for continued evaluation and recommendations on treatment accompanied by Dana (wife).       HISTORY OF PRESENT ILLNESS  -Second cycle went well, even at escalated dose.   -Fatigued, energy is improving. Sleeping well.    -Denies nausea or vomiting. Struggling with appetite during chemo, but otherwise eating well.   -Moderate constipation with chemotherapy which resolves with Senna then notes diarrhea.  -Denies any weakness, difficulty finding words, headache, or vision changes.  -Still struggles with short term memory.  -Tremor and cramping in his hands is better though not completely resolved, now fine tremor.  -Neck is sore, no worsening pain.   -Heart rate today of 41. Not dizzy. Going for an ambulatory heart monitor today. Had a syncopal episode last month.     REVIEW OF SYSTEMS  A comprehensive ROS negative except as in HPI.      MEDICATIONS   Current Outpatient Medications   Medication Sig Dispense Refill     atorvastatin (LIPITOR) 80 MG tablet Take 40 mg by mouth daily       dexamethasone (DECADRON) 2 MG tablet   0     latanoprost (XALATAN) 0.005 % ophthalmic solution Place 1 drop into both eyes every evening        losartan (COZAAR) 100 MG tablet Take 100 mg by mouth daily       metoprolol succinate ER (TOPROL-XL) 25 MG 24 hr tablet Take 25 mg by mouth       ondansetron (ZOFRAN) 4 MG tablet Take 1 tablet (4 mg) by mouth At Bedtime 30 minutes prior to chemotherapy dosing and repeat every 8 hours as needed for nausea. 30 tablet 3     spironolactone " (ALDACTONE) 25 MG tablet Take 12.5 mg by mouth daily       hydrochlorothiazide (HYDRODIURIL) 25 MG tablet Take 25 mg by mouth daily       temozolomide (TEMODAR) 100 MG capsule Take 1 capsule (100 mg) by mouth daily for 5 days with 1 other temozolomide prescription for 460 mg total Take ondansetron 30-60 min before temozolomide. Take at bedtime on an empty stomach. 5 capsule 0     Temozolomide (TEMODAR) 180 MG capsule Take 2 capsules (360 mg) by mouth daily for 5 days with 1 other Temozolomide prescription for 460 mg total Take ondansetron 30-60 min before temozolomide. Take at bedtime on an empty stomach. 10 capsule 0     DRUG ALLERGIES   Allergies   Allergen Reactions     Amlodipine Swelling       ONCOLOGIC HISTORY  -5/2019 PRESENTATION: Progressive confusion, memory loss plus difficulty with walking.   -5/28/2019 Admitted to [facility] in [place] for evaluation, then transferred to [facility] for higher level of care.   -5/28/2019 MR brain imaging revealed a large left frontal mass   -5/30/2019 SURGERY: Craniotomy for  mass resection by Dr. Downing.  PATHOLOGY: Oligodendroglioma with borderline anaplastic features (WHO grade III); Co-deletion of chromosomal regions 1p and 19q, IDH-1 (R132H by immunohistochemistry mutated. ATRX wild type by immunohistochemistry. Mitoses were present, not sufficient in number to warrant a diagnosis of anaplasia by that criterion.  However, the presence of vascular proliferation, even though mild, is indicative of early anaplasia.  -6/13/2019 NEURO-ONC: Recommending chemoradiotherapy.   -7/8 - 8/21/2019 CHEMORADS: 59.4 Gy in 1.8 Gy daily fractions x 30 fractions per Dr. Kirby Dwyer  With [facility] plus concurrent temozolomide 75mg/m2 (180mg).   -9/16/2019 NEURO-ONC/ MRB/ CHEMO: Clinically well. Imaging with no concerns. Starting adjuvant-dosed temozolomide 150mg/m2 (360mg), cycle 1 (start date of 9/19/2019).   -10/14/2019 NEURO-ONC/CHEMO: Clinically stable. Continue  "adjuvant-dosed temozolomide, increasing to 200 mg/m2 (460 mg), cycle 2 (start date of 10/17/2019).   -11/11/2019 NEURO-ONC/ MRB/ CHEMO: Clinically stable. Imaging with no concerns. Adjuvant-dosed temozolomide 200 mg/m2 (460 mg), cycle 3 (start date of 11/14/2019, but delayed due to thrombocytopenia).     SOCIAL HISTORY   Tobacco use: Former smoker.  Alcohol use: Social.   Drug use: Denies marijuana use.  Supplement, complimentary/ alternative medicine: None.    . 1 son + grandchildren.   Employment: .      PHYSICAL EXAMINATION  /51 (BP Location: Left arm, Patient Position: Chair, Cuff Size: Adult Large)   Pulse (!) 41   Temp 97.7  F (36.5  C) (Oral)   Wt 101.5 kg (223 lb 12.8 oz)   SpO2 100%   BMI 28.73 kg/m     Wt Readings from Last 2 Encounters:   11/11/19 101.5 kg (223 lb 12.8 oz)   11/01/19 99.8 kg (220 lb)      Ht Readings from Last 2 Encounters:   11/01/19 1.88 m (6' 2\")   10/14/19 1.88 m (6' 2.02\")     KPS: 100    -Generally well appearing.  -Throat: No oral thrush.  -Respiratory: Normal breath sounds, no audible wheezing.   -Skin: No rashes. Healed head incision.  -Hematologic/ lymphatic: Some bruising, healing. No leg swelling. Psoriasis on arms and legs.   -Psychiatric: Normal mood and affect. Pleasant, talkative.  -Neurologic:   MENTAL STATUS:     Alert, oriented to date.    Recall: Intact.    Speech fluent. Comprehension intact to multi-step commands.   Good right-left orientation.     CRANIAL NERVES:     L pupil 1mm > R pupil, round, reactive to light.     Extraocular movements full, patient denies diplopia.     Visual fields full.     Facial sensation intact to light touch.   Decreased activation of the face on the right.    Hearing intact.   Palate moves symmetrically.     Sternocleidomastoid and trapezius strength intact.   Tongue midline.  MOTOR:    Normal and symmetric tone.   No pronation or drift. No orbiting.   Able to rise from a chair without use of arms.   On toe/ " heel walk, equal distance from floor to heels/ toes.   SENSATION:    Intact to light touch throughout.  COORDINATION:   Intact finger-nose with eyes open and closed.   GAIT:  Walks without assistance.   Good speed. Normal stride length and heel strike. Normal turns. Normal arm swing.   Able to toe, heel walk. Able to tandem walk.       MEDICAL RECORDS  Obtained and personally reviewed all available outside medical records in addition to reviewing any records available in our electronic system.     LABS  Personally reviewed all available lab results.     IMAGING  Personally reviewed MR brain imaging from today and compared to prior imaging. To my eye, there is no new contrast enhancement and no evidence of residual disease. T2 FLAIR changes stable to decreased.      Imaging was shown to and results were reviewed with Iveth.     Imaging and case reviewed and discussed at Brain Tumor Conference.        IMPRESSION  Clinic time was spent discussing in detail the nature of this tumor in light of repeat imaging. This was in addition to providing emotional support, answering questions pertaining to my recommendations and devising the treatment plan as outlined below.      Both clinically and radiographically stable. Continued fatigue.     Tolerated dose escalation to 200 mg/m2 for cycle 2, will likely continue at this dose through cycle 6 as long as hematologically tolerated. Currently, platelets are 50. He is due to start cycle 3 on 11/14. Will delay the start date and recheck labs on 11/18. If platelets are > 90, can start cycle 3. If not, will delay start date further, recheck labs again, and dose reduce cycle 3 to 150mg/m2.     PROBLEM LIST  Anaplastic oligodendroglioma (grade III)  Memory complaints  Low K+  Cervical radiculopathy  Chemotherapy-induced thrombocytopenia     PLAN  -CANCER DIRECTED THERAPY-  -Continue adjuvant temozolomide 200 mg/m2 (460 mg), cycle 3.   Start date as above.   -Supportive  medications; Zofran and bowel regimen.   -Repeat 28 day cycle if WBC >= 3, ANC >= 1.5, HgB >= 10, and platelets >= 90.  -Will continue weekly CBC at [facility] in [place]  and repeat CMP every 4 weeks here.  -Next generation sequencing can be ordered if further disease progression necessitates evaluating for targeted therapy.    -Repeat imaging in 3 months. All imaging needs to scheduled on a 1.5T scanner due to an implantable device that Melvin has at his hip.     -STEROIDS-  -Off dexamethasone.    -SEIZURE MANAGEMENT-  -While this patient is at increased risk of having seizures, given the lack of seizure history, there is no indication to prescribe an antiepileptic at this time.     -Quality of life/ MOOD/ FATIGUE-  -Denies any mood issues.  -Continue to monitor mood as untreated/ undertreated depression can worsen fatigue, dysorexia, and quality of life.    -CERVICAL SPINE CHANGES-  -Occasional numbness/tingling in arms.  -MRI showed left C6 radiculopathy and multilevel disc disease with nerve root impingement spondylolisthesis etc.  -Evaluated by Dr. Downing, who recommended flexion-extension x-rays and if consistent with significant stenosis, then a C4-5 anterior cervical discectomy and fusion would be recommended. I messaged Dr. Downing to follow-up with Melvin regarding his plan for surgery.     -HYPOKALEMIA-  -Managed by dietary modifications.     -OTHER-  -Recommend seeing a dentist locally.   -Following with PCP regarding weak stream. I also told Melvin to report his vitals today (BP and HR) to his PCP. May need to stop beta blocker.   -LA papers to be completed.   -Requ[facility] following every visit stating the date/ time/ reason of the clinic visit.     Return to clinic in 5 weeks with CARLOS Cavazos + patricia.     In the meantime, Melvin and Dana know to call with questions or concerns or to report new complaints and can be seen sooner if needed. Urgent evaluation is needed in the  setting of acute onset of severe headache, abrupt change in mental status, on-going seizures, new focal deficits, or new leg swelling/ pain.    Leeanne Lozano MD  Neuro-oncology

## 2019-11-11 NOTE — PATIENT INSTRUCTIONS
Imaging reviewed, no concerns.   Repeat in 3 months.   Needs 1.5T scanner.     Temozolomide 460 mg, cycle 3.   Labs with platelets today of 50. Needs to be > 100.   Recheck of your labs in 1 week ().   IF platelets are > 90, can start cycle 3. If not, hold chemo until next lab draw.    Suggested bowel regimen;       -Docusate 100 mg; 1-3 capsule(s) orally day (stool softener for constipation)       -Senna 8.6 m-2 tab(s) orally at bedtime (laxative as needed for constipation)       -MiraLax 1 package 1-2 times a day       * Adjust doses of the above medications as needed to meet a goal of one, soft bowel movement per day.    Communicate today's blood pressure and heart rate with your primary care doctor.      I messaged Dr. Downing about your neck and any plans for surgery.     FMLA papers to be completed.   Letter stating the date/ time/ reason why you were in clinic.     Return to clinic in 5 weeks + labs with CARLOS Cavazos.     Leeanne Lozano MD  Neuro-oncology  11/10/2019

## 2019-11-11 NOTE — NURSING NOTE
"Oncology Rooming Note    November 11, 2019 12:00 PM   Gopi Farfan is a 64 year old male who presents for:    Chief Complaint   Patient presents with     Blood Draw     labs drawn via venipuncture by RN in lab     Initial Vitals: /51 (BP Location: Left arm, Patient Position: Chair, Cuff Size: Adult Large)   Pulse (!) 41   Temp 97.7  F (36.5  C) (Oral)   Wt 101.5 kg (223 lb 12.8 oz)   SpO2 100%   BMI 28.73 kg/m   Estimated body mass index is 28.73 kg/m  as calculated from the following:    Height as of 11/1/19: 1.88 m (6' 2\").    Weight as of this encounter: 101.5 kg (223 lb 12.8 oz). Body surface area is 2.3 meters squared.  No Pain (0) Comment: Data Unavailable   No LMP for male patient.  Allergies reviewed: Yes  Medications reviewed: Yes    Medications: Medication refills not needed today.  Pharmacy name entered into EPIC: [facility] - [place], [place] - [address]    Clinical concerns: Patient states that he has fallen twice last week getting out of the shower. He states that his MRI shows a fractured neck.  Dr. Lozano was notified.      BARTOLOME CHING, TONYA            "

## 2019-11-11 NOTE — NURSING NOTE
Chief Complaint   Patient presents with     Blood Draw     labs drawn via venipuncture by RN in lab     /51 (BP Location: Left arm, Patient Position: Chair, Cuff Size: Adult Large)   Pulse (!) 37   Temp 97.7  F (36.5  C) (Oral)   Wt 101.5 kg (223 lb 12.8 oz)   SpO2 99%   BMI 28.73 kg/m    Clinic notified  Labs collected and sent from right antecubital venipuncture in lab by RN. Pt tolerated well.   Pt checked in for next appointment.    Alesia Ramos RN

## 2019-11-11 NOTE — TUMOR CONFERENCE
Tumor Conference Information  Tumor Conference:    Specialties Present:  Medical oncology, Radiation oncology, Radiology, Pathology  Patient Status:  A current patient  Pathology:  Not Discussed  Treatment to Date:  Surgical intervention(s), Chemoradiation, Adjuvant chemotherapy  Clinical Trial Eligibility:  Not discussed  Recommended Plan:  Continue with current treatment plan (Comment: stable on recent MRI, continue with adjuvant chemotherapy and reimage with follow up in 3 months)  Did the review exceed 30 minutes?:  did not           Documentation / Disclaimer Cancer Tumor Board Note  Cancer tumor board recommendations do not override what is determined to be reasonable care and treatment, which is dependent on the circumstances of a patient's case; the patient's medical, social, and personal concerns; and the clinical judgment of the oncologist [physician].

## 2019-11-19 ENCOUNTER — TELEPHONE (OUTPATIENT)
Dept: ONCOLOGY | Facility: CLINIC | Age: 64
End: 2019-11-19

## 2019-11-19 NOTE — ORAL ONC MGMT
Oral Chemotherapy Monitoring Program    Primary Oncologist: Dr. Lozano  Primary Oncology Clinic: [facility]  Cancer Diagnosis: Aplastic Oligodendroglioma    C1D1: 9/9/19, C3D1: 11/19/19  Therapy: Temozolomide 460 mg (2 x 180 mg + 1 x 100 mg) by mouth daily for 5 days of a 28 day cycle     Drug Interaction Assessment: No clinically relevant drug interactions with temozolomide found    Lab Monitoring Plan  Weekly CBC   Subjective/Objective:  Gopi Farfan is a 64 year old male contacted by phone for a follow-up visit for oral chemotherapy.  Melvin went in for labs yesterday and was aware his platelets were now 90. He plans to start cycle 3 today and continue until Saturday. He reports Grade 1 fatigue that is relieved with rest. He takes ondansetron 30 minutes prior to the temozolomide therapy. Melvin was in good spirits and plans to have weekly labs on 12/2/19 and 12/9/19.     Assessment:  Oligodendroglioma: Melvin is tolerating the temozolomide therapy well with minimal side effects. He has now met treatment parameters to start the 3rd cycle. Dr. Lozano advised the initiation and getting follow CBC on 12/2/19 and 12/9/19. Melvin was in agreement and plans to schedule those.     Plan:  -Start temozolomide 460 mg 11/19/19 and continue for 4 days  -Monitor fatigue  -Review weekly labs    Follow-Up:  Review labs on 12/2/19 to ensure labs meet treatment parameters    Refill Due:  12/17/19    Katy Abdi   PharmD Candidate & Oncology Intern   [facility]   [phone]

## 2019-11-20 ENCOUNTER — TELEPHONE (OUTPATIENT)
Dept: NEUROSURGERY | Facility: CLINIC | Age: 64
End: 2019-11-20

## 2019-11-20 NOTE — TELEPHONE ENCOUNTER
Spoke to patient about MRI review and overall picture with his neck. I think he has a mild myelopathy likely from some cord compression at C4-5 from the spondylolisthesis. There may also be some compression at C5-6.     Given that his symptoms are relatively mild, I think we have some flexibility about when we might do surgery. I have recommended a C4-6 ACDF, but we could do this in coordination with Dr. Lozano and his chemotherapy sooner, or wait until he is done with all of his cycles of chemo.    He will discuss with his wife. He has also requested a letter stating that he can't work right now, which I think is because both his tumor and his neck. We will provide that for him.

## 2019-12-03 DIAGNOSIS — C71.9 OLIGODENDROGLIOMA, ANAPLASTIC (H): ICD-10-CM

## 2019-12-04 DIAGNOSIS — C71.9 OLIGODENDROGLIOMA, ANAPLASTIC (H): Primary | ICD-10-CM

## 2019-12-04 RX ORDER — TEMOZOLOMIDE 180 MG/1
360 CAPSULE ORAL DAILY
Qty: 10 CAPSULE | Refills: 0 | Status: SHIPPED | OUTPATIENT
Start: 2019-12-04 | End: 2019-12-16

## 2019-12-04 RX ORDER — TEMOZOLOMIDE 100 MG/1
100 CAPSULE ORAL DAILY
Qty: 5 CAPSULE | Refills: 0 | Status: SHIPPED | OUTPATIENT
Start: 2019-12-04 | End: 2019-12-16

## 2019-12-09 ENCOUNTER — DOCUMENTATION ONLY (OUTPATIENT)
Dept: ONCOLOGY | Facility: CLINIC | Age: 64
End: 2019-12-09

## 2019-12-09 NOTE — PROGRESS NOTES
Form Request Documentation    Date Received in Clinic:  11/11/19  Name/Type of Form: CARLOS ENRIQUE Farfan  Questions that need to be addressed:   Amount of Leave Requested: Intermittent Leave  Date Completed: 12/9/2019  Copy Mailed to patient: Yes on 12/9/2019  Disposition of Form: Faxed to Diana Live at [facility] at [phone] on 12/9/2019

## 2019-12-10 ENCOUNTER — TELEPHONE (OUTPATIENT)
Dept: CARE COORDINATION | Facility: CLINIC | Age: 64
End: 2019-12-10

## 2019-12-10 NOTE — TELEPHONE ENCOUNTER
Per Patient's request,  completed and faxed [employer] [place] request for lodging dates 12/15-12/16. [place] will contact Patient for confirmation of reservation.  will continue to provide support as needed.    Soo Yeon Han, [facility] Medical CenterSW  Pager:  [phone]

## 2019-12-16 ENCOUNTER — APPOINTMENT (OUTPATIENT)
Dept: LAB | Facility: CLINIC | Age: 64
End: 2019-12-16
Attending: PSYCHIATRY & NEUROLOGY
Payer: COMMERCIAL

## 2019-12-16 ENCOUNTER — ONCOLOGY VISIT (OUTPATIENT)
Dept: ONCOLOGY | Facility: CLINIC | Age: 64
End: 2019-12-16
Attending: PHYSICIAN ASSISTANT
Payer: COMMERCIAL

## 2019-12-16 VITALS
DIASTOLIC BLOOD PRESSURE: 68 MMHG | RESPIRATION RATE: 16 BRPM | OXYGEN SATURATION: 97 % | WEIGHT: 218 LBS | SYSTOLIC BLOOD PRESSURE: 111 MMHG | TEMPERATURE: 98 F | BODY MASS INDEX: 27.99 KG/M2 | HEART RATE: 37 BPM

## 2019-12-16 DIAGNOSIS — C71.9 OLIGODENDROGLIOMA, ANAPLASTIC (H): Primary | ICD-10-CM

## 2019-12-16 PROBLEM — R55 CARDIAC RELATED SYNCOPE: Status: ACTIVE | Noted: 2019-10-30

## 2019-12-16 LAB
ALBUMIN SERPL-MCNC: 3.2 G/DL (ref 3.4–5)
ALP SERPL-CCNC: 87 U/L (ref 40–150)
ALT SERPL W P-5'-P-CCNC: 21 U/L (ref 0–70)
ANION GAP SERPL CALCULATED.3IONS-SCNC: 1 MMOL/L (ref 3–14)
AST SERPL W P-5'-P-CCNC: 8 U/L (ref 0–45)
BASOPHILS # BLD AUTO: 0 10E9/L (ref 0–0.2)
BASOPHILS NFR BLD AUTO: 1 %
BILIRUB SERPL-MCNC: 0.5 MG/DL (ref 0.2–1.3)
BUN SERPL-MCNC: 10 MG/DL (ref 7–30)
CALCIUM SERPL-MCNC: 8.6 MG/DL (ref 8.5–10.1)
CHLORIDE SERPL-SCNC: 109 MMOL/L (ref 94–109)
CO2 SERPL-SCNC: 25 MMOL/L (ref 20–32)
CREAT SERPL-MCNC: 1 MG/DL (ref 0.66–1.25)
DIFFERENTIAL METHOD BLD: ABNORMAL
EOSINOPHIL # BLD AUTO: 0.2 10E9/L (ref 0–0.7)
EOSINOPHIL NFR BLD AUTO: 4.1 %
ERYTHROCYTE [DISTWIDTH] IN BLOOD BY AUTOMATED COUNT: 17.8 % (ref 10–15)
GFR SERPL CREATININE-BSD FRML MDRD: 79 ML/MIN/{1.73_M2}
GLUCOSE SERPL-MCNC: 126 MG/DL (ref 70–99)
HCT VFR BLD AUTO: 35.3 % (ref 40–53)
HGB BLD-MCNC: 11.9 G/DL (ref 13.3–17.7)
IMM GRANULOCYTES # BLD: 0 10E9/L (ref 0–0.4)
IMM GRANULOCYTES NFR BLD: 0.3 %
LYMPHOCYTES # BLD AUTO: 0.4 10E9/L (ref 0.8–5.3)
LYMPHOCYTES NFR BLD AUTO: 9 %
MCH RBC QN AUTO: 34.7 PG (ref 26.5–33)
MCHC RBC AUTO-ENTMCNC: 33.7 G/DL (ref 31.5–36.5)
MCV RBC AUTO: 103 FL (ref 78–100)
MONOCYTES # BLD AUTO: 0.7 10E9/L (ref 0–1.3)
MONOCYTES NFR BLD AUTO: 18.8 %
NEUTROPHILS # BLD AUTO: 2.6 10E9/L (ref 1.6–8.3)
NEUTROPHILS NFR BLD AUTO: 66.8 %
NRBC # BLD AUTO: 0 10*3/UL
NRBC BLD AUTO-RTO: 0 /100
PLATELET # BLD AUTO: 134 10E9/L (ref 150–450)
POTASSIUM SERPL-SCNC: 3.6 MMOL/L (ref 3.4–5.3)
PROT SERPL-MCNC: 6.2 G/DL (ref 6.8–8.8)
RBC # BLD AUTO: 3.43 10E12/L (ref 4.4–5.9)
SODIUM SERPL-SCNC: 135 MMOL/L (ref 133–144)
WBC # BLD AUTO: 3.9 10E9/L (ref 4–11)

## 2019-12-16 PROCEDURE — 99214 OFFICE O/P EST MOD 30 MIN: CPT | Mod: ZP | Performed by: PHYSICIAN ASSISTANT

## 2019-12-16 PROCEDURE — 36415 COLL VENOUS BLD VENIPUNCTURE: CPT

## 2019-12-16 PROCEDURE — 85025 COMPLETE CBC W/AUTO DIFF WBC: CPT | Performed by: PHYSICIAN ASSISTANT

## 2019-12-16 PROCEDURE — G0463 HOSPITAL OUTPT CLINIC VISIT: HCPCS | Mod: ZF

## 2019-12-16 PROCEDURE — 80053 COMPREHEN METABOLIC PANEL: CPT | Performed by: PHYSICIAN ASSISTANT

## 2019-12-16 ASSESSMENT — PAIN SCALES - GENERAL: PAINLEVEL: NO PAIN (0)

## 2019-12-16 NOTE — LETTER
December 16, 2019    Gopi Farfan  [id number] [address] [place] [id number]    To Whom it May Concern:    Mr. Gopi Farfan was seen in our clinic on 12/16/19 at 10:40 am for a medically necessary follow-up visit required for continued evaluation and recommendations on treatment for his diagnosed brain cancer.      Please do not hesitate to contact our clinic with any additional questions.      Sincerely,            Danika Duron PA-C  [facility]  [address] [place] [address]  [phone]

## 2019-12-16 NOTE — NURSING NOTE
"Oncology Rooming Note    December 16, 2019 10:12 AM   Gopi Farfan is a 64 year old male who presents for:    Chief Complaint   Patient presents with     Blood Draw     Labs drawn via VPT by MA in LAB. VS taken. Pt. checked in for appt.      Oncology Clinic Visit     Return Oligodendroglioma, Anaplastic     Initial Vitals: /68 (BP Location: Right arm, Patient Position: Sitting, Cuff Size: Adult Large)   Pulse (!) 37   Temp 98  F (36.7  C) (Oral)   Resp 16   Wt 98.9 kg (218 lb)   SpO2 97%   BMI 27.99 kg/m   Estimated body mass index is 27.99 kg/m  as calculated from the following:    Height as of 11/1/19: 1.88 m (6' 2\").    Weight as of this encounter: 98.9 kg (218 lb). Body surface area is 2.27 meters squared.  No Pain (0) Comment: Data Unavailable   No LMP for male patient.  Allergies reviewed: Yes  Medications reviewed: Yes    Medications: Medication refills not needed today.  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       Joann Connors, TONYA              "

## 2019-12-16 NOTE — PROGRESS NOTES
"NEURO-ONCOLOGY VISIT  Dec 16, 2019    CHIEF COMPLAINT: Mr. 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 He completed chemoradiotherapy with concurrent temozolomide as of 8/21/2019, course complicated by thrombocytopenia. He is currently managed on adjuvant-dosed temozolomide.     He is presenting in follow-up for continued evaluation and recommendations on treatment accompanied by Dana (wife).       HISTORY OF PRESENT ILLNESS  -Feels more fatigued though improves the further out from chemo  -Sleep has been better   -Saw his PCP 2 weeks ago told him to continue metoprolol. Took him off the spirolactone cause he he was having nipple tenderness  -Still struggling with short term memory   -No N/V. Some constipation with the chemo. Will take laxatives and this helps  -Neck pain is at baseline. Working with Dr. Downing on this      REVIEW OF SYSTEMS  A comprehensive ROS negative except as in HPI.      MEDICATIONS   Current Outpatient Medications   Medication Sig Dispense Refill     atorvastatin (LIPITOR) 80 MG tablet Take 40 mg by mouth daily       dexamethasone (DECADRON) 2 MG tablet   0     latanoprost (XALATAN) 0.005 % ophthalmic solution Place 1 drop into both eyes every evening        losartan (COZAAR) 100 MG tablet Take 100 mg by mouth daily       metoprolol succinate ER (TOPROL-XL) 25 MG 24 hr tablet Take 25 mg by mouth       ondansetron (ZOFRAN) 4 MG tablet Take 1 tablet (4 mg) by mouth At Bedtime 30 minutes prior to chemotherapy dosing and repeat every 8 hours as needed for nausea. 30 tablet 3     DRUG ALLERGIES   Allergies   Allergen Reactions     Amlodipine Swelling       ONCOLOGIC HISTORY  -5/2019 PRESENTATION: Progressive confusion, memory loss plus difficulty with walking.   -5/28/2019 Admitted to [facility] in [place] for evaluation, then transferred to " [facility] for higher level of care.   -5/28/2019 MR brain imaging revealed a large left frontal mass   -5/30/2019 SURGERY: Craniotomy for  mass resection by Dr. Downing.  PATHOLOGY: Oligodendroglioma with borderline anaplastic features (WHO grade III); Co-deletion of chromosomal regions 1p and 19q, IDH-1 (R132H by immunohistochemistry mutated. ATRX wild type by immunohistochemistry. Mitoses were present, not sufficient in number to warrant a diagnosis of anaplasia by that criterion.  However, the presence of vascular proliferation, even though mild, is indicative of early anaplasia.  -6/13/2019 NEURO-ONC: Recommending chemoradiotherapy.   -7/8 - 8/21/2019 CHEMORADS: 59.4 Gy in 1.8 Gy daily fractions x 30 fractions per Dr. Kirby Dwyer  With [facility] plus concurrent temozolomide 75mg/m2 (180mg).   -9/16/2019 NEURO-ONC/ MRB/ CHEMO: Clinically well. Imaging with no concerns. Starting adjuvant-dosed temozolomide 150mg/m2 (360mg), cycle 1 (start date of 9/19/2019).   -10/14/2019 NEURO-ONC/CHEMO: Clinically stable. Continue adjuvant-dosed temozolomide, increasing to 200 mg/m2 (460 mg), cycle 2 (start date of 10/17/2019).   -11/11/2019 NEURO-ONC/ MRB/ CHEMO: Clinically stable. Imaging with no concerns. Adjuvant-dosed temozolomide 200 mg/m2 (460 mg), cycle 3 (start date of 11/19/2019, but delayed due to thrombocytopenia).   -12/16/2019 NEURO-ONC/CHEMO: Clinically stable. Adjuvant-dosed temozolomide 200 mg/m2 (460 mg), cycle 4 (start date of 12/17/2019).     SOCIAL HISTORY   Tobacco use: Former smoker.  Alcohol use: Social.   Drug use: Denies marijuana use.  Supplement, complimentary/ alternative medicine: None.    . 1 son + grandchildren.   Employment: .      PHYSICAL EXAMINATION  /68 (BP Location: Right arm, Patient Position: Sitting, Cuff Size: Adult Large)   Pulse (!) 37   Temp 98  F (36.7  C) (Oral)   Resp 16   Wt 98.9 kg (218 lb)   SpO2 97%   BMI 27.99 kg/m     Wt Readings from  "Last 2 Encounters:   12/16/19 98.9 kg (218 lb)   11/11/19 101.5 kg (223 lb 12.8 oz)      Ht Readings from Last 2 Encounters:   11/01/19 1.88 m (6' 2\")   10/14/19 1.88 m (6' 2.02\")     KPS: 100    -Generally well appearing.  -Throat: No oral thrush.  -Respiratory: Normal breath sounds, no audible wheezing.   -Skin: No rashes. Healed head incision.  -Hematologic/ lymphatic: Some bruising, healing. No leg swelling. Psoriasis on arms and legs.   -Psychiatric: Normal mood and affect. Pleasant, talkative.  -Neurologic:   MENTAL STATUS:     Alert, oriented to date.    Recall: Intact.    Speech fluent. Comprehension intact to multi-step commands.   Good right-left orientation.     CRANIAL NERVES:     L pupil 1mm > R pupil, round, reactive to light.     Extraocular movements full, patient denies diplopia.     Visual fields full.     Facial sensation intact to light touch.   Decreased activation of the face on the right.    Hearing intact.   Palate moves symmetrically.     Sternocleidomastoid and trapezius strength intact.   Tongue midline.  MOTOR:    Normal and symmetric tone.   No pronation or drift. No orbiting.   Able to rise from a chair without use of arms.   On toe/ heel walk, equal distance from floor to heels/ toes.   SENSATION:    Intact to light touch throughout.  COORDINATION:   Intact finger-nose with eyes open and closed.   GAIT:  Walks without assistance.   Good speed. Normal stride length and heel strike. Normal turns. Normal arm swing.   Able to toe, heel walk. Able to tandem walk.       MEDICAL RECORDS  Obtained and personally reviewed all available outside medical records in addition to reviewing any records available in our electronic system.     LABS  Personally reviewed all available lab results.     IMAGING  No new imaging    IMPRESSION  Continues to be clinically stable. Some slowly increasing fatigue with each cycle. Plt are 134 today. Will continue with increased dose of TMZ (200 mg/m2) for cycle 3 " tomorrow. Will likely continue at this dose through cycle 6 as long as hematologically tolerated.     [facility].     Will repeat imaging in 2 months and follow-up with Dr. Lozano after that.      PROBLEM LIST  Anaplastic oligodendroglioma (grade III)  Memory complaints  Low K+  Cervical radiculopathy  Chemotherapy-induced thrombocytopenia     PLAN  -CANCER DIRECTED THERAPY-  -Continue adjuvant temozolomide 200 mg/m2 (460 mg), cycle 4.   Start date as above (12/17).     -Supportive medications; Zofran and bowel regimen.   -Repeat 28 day cycle if WBC >= 3, ANC >= 1.5, HgB >= 10, and platelets >= 90.  -Will continue weekly CBC at [facility] in [place]  and repeat CMP every 4 weeks here. If labs stable, can change to every other week labs.    -Next generation sequencing can be ordered if further disease progression necessitates evaluating for targeted therapy.    -Repeat imaging in 2 months. All imaging needs to scheduled on a 1.5T scanner due to an implantable device that Melvin has at his hip.     -STEROIDS-  -Off dexamethasone.    -SEIZURE MANAGEMENT-  -While this patient is at increased risk of having seizures, given the lack of seizure history, there is no indication to prescribe an antiepileptic at this time.     -Quality of life/ MOOD/ FATIGUE-  -Denies any mood issues.  -Continue to monitor mood as untreated/ undertreated depression can worsen fatigue, dysorexia, and quality of life.    -CERVICAL SPINE CHANGES-  -Occasional numbness/tingling in arms.  -MRI showed left C6 radiculopathy and multilevel disc disease with nerve root impingement spondylolisthesis etc.  -Evaluated by Dr. Downing, imaging consistent with mild myelopathy from some cord compression at C4-5 from spondylolisthesis. Recommending a C4-5 anterior cervical discectomy and fusion, though likely after chemo  -will f/u with Dr. Downing [facility]. Should also ask him about  plowing    -HYPOKALEMIA-  -Managed by dietary modifications. Now resolved    -OTHER-  -Recommend seeing a dentist locally.   -Following with PCP regarding weak stream and bradycardia. Recently held sprinolactone for gynecomastia. Continued on metoprolol 25 mg daily. Unclear purpose, though it sounds like he may have a block. Should continue to be managed by PCP. HR stable today  -FMLA papers completed.   -Requ[facility] following every visit stating the date/ time/ reason of the clinic visit.     Return to clinic in 4 weeks with myself + labs.     In the meantime, Melvin and Dana know to call with questions or concerns or to report new complaints and can be seen sooner if needed. Urgent evaluation is needed in the setting of acute onset of severe headache, abrupt change in mental status, on-going seizures, new focal deficits, or new leg swelling/ pain.    Danika Duron PA-C  Neuro-oncology

## 2019-12-16 NOTE — LETTER
"    RE: Gopi Farfan  [address]  [place] [address]       NEURO-ONCOLOGY VISIT  Dec 16, 2019    CHIEF COMPLAINT: Mr. 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 He completed chemoradiotherapy with concurrent temozolomide as of 8/21/2019, course complicated by thrombocytopenia. He is currently managed on adjuvant-dosed temozolomide.     He is presenting in follow-up for continued evaluation and recommendations on treatment accompanied by Dana (wife).       HISTORY OF PRESENT ILLNESS  -Feels more fatigued though improves the further out from chemo  -Sleep has been better   -Saw his PCP 2 weeks ago told him to continue metoprolol. Took him off the spirolactone cause he he was having nipple tenderness  -Still struggling with short term memory   -No N/V. Some constipation with the chemo. Will take laxatives and this helps  -Neck pain is at baseline. Working with Dr. Downing on this      REVIEW OF SYSTEMS  A comprehensive ROS negative except as in HPI.      MEDICATIONS   Current Outpatient Medications   Medication Sig Dispense Refill     atorvastatin (LIPITOR) 80 MG tablet Take 40 mg by mouth daily       dexamethasone (DECADRON) 2 MG tablet   0     latanoprost (XALATAN) 0.005 % ophthalmic solution Place 1 drop into both eyes every evening        losartan (COZAAR) 100 MG tablet Take 100 mg by mouth daily       metoprolol succinate ER (TOPROL-XL) 25 MG 24 hr tablet Take 25 mg by mouth       ondansetron (ZOFRAN) 4 MG tablet Take 1 tablet (4 mg) by mouth At Bedtime 30 minutes prior to chemotherapy dosing and repeat every 8 hours as needed for nausea. 30 tablet 3     DRUG ALLERGIES   Allergies   Allergen Reactions     Amlodipine Swelling       ONCOLOGIC HISTORY  -5/2019 PRESENTATION: Progressive confusion, memory loss plus difficulty with walking.   -5/28/2019 Admitted to " [facility] in [place] for evaluation, then transferred to [facility] for higher level of care.   -5/28/2019 MR brain imaging revealed a large left frontal mass   -5/30/2019 SURGERY: Craniotomy for  mass resection by Dr. Downing.  PATHOLOGY: Oligodendroglioma with borderline anaplastic features (WHO grade III); Co-deletion of chromosomal regions 1p and 19q, IDH-1 (R132H by immunohistochemistry mutated. ATRX wild type by immunohistochemistry. Mitoses were present, not sufficient in number to warrant a diagnosis of anaplasia by that criterion.  However, the presence of vascular proliferation, even though mild, is indicative of early anaplasia.  -6/13/2019 NEURO-ONC: Recommending chemoradiotherapy.   -7/8 - 8/21/2019 CHEMORADS: 59.4 Gy in 1.8 Gy daily fractions x 30 fractions per Dr. Kirby Dwyer  With [facility] plus concurrent temozolomide 75mg/m2 (180mg).   -9/16/2019 NEURO-ONC/ MRB/ CHEMO: Clinically well. Imaging with no concerns. Starting adjuvant-dosed temozolomide 150mg/m2 (360mg), cycle 1 (start date of 9/19/2019).   -10/14/2019 NEURO-ONC/CHEMO: Clinically stable. Continue adjuvant-dosed temozolomide, increasing to 200 mg/m2 (460 mg), cycle 2 (start date of 10/17/2019).   -11/11/2019 NEURO-ONC/ MRB/ CHEMO: Clinically stable. Imaging with no concerns. Adjuvant-dosed temozolomide 200 mg/m2 (460 mg), cycle 3 (start date of 11/19/2019, but delayed due to thrombocytopenia).   -12/16/2019 NEURO-ONC/CHEMO: Clinically stable. Adjuvant-dosed temozolomide 200 mg/m2 (460 mg), cycle 4 (start date of 12/17/2019).     SOCIAL HISTORY   Tobacco use: Former smoker.  Alcohol use: Social.   Drug use: Denies marijuana use.  Supplement, complimentary/ alternative medicine: None.    . 1 son + grandchildren.   Employment: .      PHYSICAL EXAMINATION  /68 (BP Location: Right arm, Patient Position: Sitting, Cuff Size: Adult Large)   Pulse (!) 37   Temp 98  F (36.7  C) (Oral)   Resp 16   Wt 98.9  "kg (218 lb)   SpO2 97%   BMI 27.99 kg/m      Wt Readings from Last 2 Encounters:   12/16/19 98.9 kg (218 lb)   11/11/19 101.5 kg (223 lb 12.8 oz)      Ht Readings from Last 2 Encounters:   11/01/19 1.88 m (6' 2\")   10/14/19 1.88 m (6' 2.02\")     KPS: 100    -Generally well appearing.  -Throat: No oral thrush.  -Respiratory: Normal breath sounds, no audible wheezing.   -Skin: No rashes. Healed head incision.  -Hematologic/ lymphatic: Some bruising, healing. No leg swelling. Psoriasis on arms and legs.   -Psychiatric: Normal mood and affect. Pleasant, talkative.  -Neurologic:   MENTAL STATUS:     Alert, oriented to date.    Recall: Intact.    Speech fluent. Comprehension intact to multi-step commands.   Good right-left orientation.     CRANIAL NERVES:     L pupil 1mm > R pupil, round, reactive to light.     Extraocular movements full, patient denies diplopia.     Visual fields full.     Facial sensation intact to light touch.   Decreased activation of the face on the right.    Hearing intact.   Palate moves symmetrically.     Sternocleidomastoid and trapezius strength intact.   Tongue midline.  MOTOR:    Normal and symmetric tone.   No pronation or drift. No orbiting.   Able to rise from a chair without use of arms.   On toe/ heel walk, equal distance from floor to heels/ toes.   SENSATION:    Intact to light touch throughout.  COORDINATION:   Intact finger-nose with eyes open and closed.   GAIT:  Walks without assistance.   Good speed. Normal stride length and heel strike. Normal turns. Normal arm swing.   Able to toe, heel walk. Able to tandem walk.       MEDICAL RECORDS  Obtained and personally reviewed all available outside medical records in addition to reviewing any records available in our electronic system.     LABS  Personally reviewed all available lab results.     IMAGING  No new imaging    IMPRESSION  Continues to be clinically stable. Some slowly increasing fatigue with each cycle. Plt are 134 today. " Will continue with increased dose of TMZ (200 mg/m2) for cycle 3 tomorrow. Will likely continue at this dose through cycle 6 as long as hematologically tolerated.     [facility] today.     Will repeat imaging in 2 months and follow-up with Dr. Lozano after that.      PROBLEM LIST  Anaplastic oligodendroglioma (grade III)  Memory complaints  Low K+  Cervical radiculopathy  Chemotherapy-induced thrombocytopenia     PLAN  -CANCER DIRECTED THERAPY-  -Continue adjuvant temozolomide 200 mg/m2 (460 mg), cycle 4.   Start date as above (12/17).     -Supportive medications; Zofran and bowel regimen.   -Repeat 28 day cycle if WBC >= 3, ANC >= 1.5, HgB >= 10, and platelets >= 90.  -Will continue weekly CBC at [facility] in [place]  and repeat CMP every 4 weeks here. If labs stable, can change to every other week labs.    -Next generation sequencing can be ordered if further disease progression necessitates evaluating for targeted therapy.    -Repeat imaging in 2 months. All imaging needs to scheduled on a 1.5T scanner due to an implantable device that Melvin has at his hip.     -STEROIDS-  -Off dexamethasone.    -SEIZURE MANAGEMENT-  -While this patient is at increased risk of having seizures, given the lack of seizure history, there is no indication to prescribe an antiepileptic at this time.     -Quality of life/ MOOD/ FATIGUE-  -Denies any mood issues.  -Continue to monitor mood as untreated/ undertreated depression can worsen fatigue, dysorexia, and quality of life.    -CERVICAL SPINE CHANGES-  -Occasional numbness/tingling in arms.  -MRI showed left C6 radiculopathy and multilevel disc disease with nerve root impingement spondylolisthesis etc.  -Evaluated by Dr. Downing, imaging consistent with mild myelopathy from some cord compression at C4-5 from spondylolisthesis. Recommending a C4-5 anterior cervical discectomy and fusion, though likely after chemo  -will f/u with Dr. [facility]ing [facility]  concerning his neck. 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 Now resolved    -OTHER-  -Recommend seeing a dentist locally.   -Following with PCP regarding weak stream and bradycardia. Recently held sprinolactone for gynecomastia. Continued on metoprolol 25 mg daily. Unclear purpose, though it sounds like he may have a block. Should continue to be managed by PCP. HR stable today  -FMLA papers completed.   -Requ[facility] following every visit stating the date/ time/ reason of the clinic visit.     Return to clinic in 4 weeks with myself + labs.     In the meantime, Melvin and Dana know to call with questions or concerns or to report new complaints and can be seen sooner if needed. Urgent evaluation is needed in the setting of acute onset of severe headache, abrupt change in mental status, on-going seizures, new focal deficits, or new leg swelling/ pain.    Danika Duron PA-C  Neuro-oncology

## 2019-12-31 ENCOUNTER — TELEPHONE (OUTPATIENT)
Dept: ONCOLOGY | Facility: CLINIC | Age: 64
End: 2019-12-31

## 2019-12-31 DIAGNOSIS — C71.9 OLIGODENDROGLIOMA, ANAPLASTIC (H): ICD-10-CM

## 2019-12-31 DIAGNOSIS — C71.9 OLIGODENDROGLIOMA, ANAPLASTIC (H): Primary | ICD-10-CM

## 2019-12-31 RX ORDER — TEMOZOLOMIDE 100 MG/1
100 CAPSULE ORAL DAILY
Qty: 5 CAPSULE | Refills: 0 | Status: SHIPPED | OUTPATIENT
Start: 2019-12-31 | End: 2020-02-20

## 2019-12-31 RX ORDER — TEMOZOLOMIDE 180 MG/1
360 CAPSULE ORAL DAILY
Qty: 10 CAPSULE | Refills: 0 | Status: SHIPPED | OUTPATIENT
Start: 2019-12-31 | End: 2020-02-20

## 2019-12-31 NOTE — ORAL ONC MGMT
Oral Chemotherapy Monitoring Program     Primary Oncologist: Dr. Lozano  Primary Oncology Clinic: [facility]  Cancer Diagnosis: Aplastic Oligodendroglioma     C1D1: 9/9/19  Therapy: Temozolomide 460 mg (2 x 180 mg + 1 x 100 mg) by mouth daily for 5 days of a 28 day cycle      Drug Interaction Assessment: No drug interactions with temozolomide found at this time.    Drugs checked include: AtorvaSTATin -DexAMETHasone -Latanoprost -Losartan -Metoprolol -Ondansetron -Temodar     Lab Monitoring Plan  Weekly CBC  and monthly CMP    Subjective/Objective:  Gopi Farfan is a 64 year old male contacted by phone for a follow-up visit for oral chemotherapy.  Melvin said he is doing well with Temodar. He does report some fatigue but not excessive, and he is managing this with exercise to keep his energy level up. He denied any nausea. He said he has firm stools but he is managing this with Sennokot which has been effective for him. He reports drinking 1-3 bottles of water per day. He thanked me for the call.    ORAL CHEMOTHERAPY 7/16/2019 7/16/2019 9/16/2019 10/1/2019 10/22/2019 11/19/2019 12/31/2019   Drug Name Temodar (Temozolomide) Temodar (Temozolomide) Temodar (Temozolomide) Temodar (Temozolomide) Temodar (Temozolomide) Temodar (Temozolomide) Temodar (Temozolomide)   Current Dosage - 180mg 360mg 360mg Other Other (No Data)   Current Schedule - Daily Daily Daily Daily Daily Daily   Cycle Details - Other 5 days on, then 23 days off 5 days on, then 23 days off 5 days on, then 23 days off 5 days on, then 23 days off 5 days on, then 23 days off   Start Date of Last Cycle - - - 9/19/2019 10/17/2019 11/19/2019 12/17/2019   Planned next cycle start date - 7/7/2019 - 10/17/2019 11/14/2019 - 1/14/2020   Doses missed in last 2 weeks - 0 - 0 0 0 0   Adherence Assessment - Adherent - Adherent Adherent Adherent Adherent   Adverse Effects - No AE identified during assessment - Fatigue Fatigue Fatigue Fatigue   Fatigue - - - -  "Grade 1 Grade 1 Grade 1   Pharmacist Intervention(fatigue) - - - - No No Yes   Intervention(s) - - - - - - Patient education   Home BPs - - - - - Not applicable -   Any new drug interactions? - No - No No No No   Is the dose as ordered appropriate for the patient? - Yes - - Yes Yes Yes   Is the patient currently in pain? - - - - - No -   Has the patient missed any days of school, work, or other routine activity? - No - - - - -       Last PHQ-2 Score on record:   PHQ-2 ( 1999 [employer]) 6/14/2019   Q1: Little interest or pleasure in doing things 0   Q2: Feeling down, depressed or hopeless 0   PHQ-2 Score 0       Vitals:  BP:   BP Readings from Last 1 Encounters:   12/16/19 111/68     Wt Readings from Last 1 Encounters:   12/16/19 98.9 kg (218 lb)     Estimated body surface area is 2.27 meters squared as calculated from the following:    Height as of 11/1/19: 1.88 m (6' 2\").    Weight as of 12/16/19: 98.9 kg (218 lb).    Labs:  _  Result Component Current Result Ref Range   Sodium 135 (12/16/2019) 133 - 144 mmol/L     _  Result Component Current Result Ref Range   Potassium 3.6 (12/16/2019) 3.4 - 5.3 mmol/L     _  Result Component Current Result Ref Range   Calcium 8.6 (12/16/2019) 8.5 - 10.1 mg/dL     No results found for Mag within last 30 days.     No results found for Phos within last 30 days.     _  Result Component Current Result Ref Range   Albumin 3.2 (L) (12/16/2019) 3.4 - 5.0 g/dL     _  Result Component Current Result Ref Range   Urea Nitrogen 10 (12/16/2019) 7 - 30 mg/dL     _  Result Component Current Result Ref Range   Creatinine 1.00 (12/16/2019) 0.66 - 1.25 mg/dL       _  Result Component Current Result Ref Range   AST 8 (12/16/2019) 0 - 45 U/L     _  Result Component Current Result Ref Range   ALT 21 (12/16/2019) 0 - 70 U/L     _  Result Component Current Result Ref Range   Bilirubin Total 0.5 (12/16/2019) 0.2 - 1.3 mg/dL       _  Result Component Current Result Ref Range   WBC 3.9 (L) (12/16/2019) 4.0 - " 11.0 10e9/L     _  Result Component Current Result Ref Range   Hemoglobin 11.9 (L) (12/16/2019) 13.3 - 17.7 g/dL     _  Result Component Current Result Ref Range   Platelet Count 134 (L) (12/16/2019) 150 - 450 10e9/L     _  Result Component Current Result Ref Range   Absolute Neutrophil 2.6 (12/16/2019) 1.6 - 8.3 10e9/L       Assessment:  Melvin is managing his AE with appropriate interventions. He could use better hydration.    Plan:  Continue Temodar with next cycle scheduled to start around 1/14/2020. Increase oral hydration to 64 ounces of water per day.    Follow-Up:  Pending 1/13/2020 office visit.    Refill Due:  Plan to release early for 1/13/2020 delivery.    Kirby Waggoner PharmD  [facility]  [phone]  December 31, 2019

## 2020-01-07 ENCOUNTER — TELEPHONE (OUTPATIENT)
Dept: CARE COORDINATION | Facility: CLINIC | Age: 65
End: 2020-01-07

## 2020-01-07 NOTE — TELEPHONE ENCOUNTER
Per Patient's request,  completed and faxed [employer] [place] request for lodging dates 1/12-1/13. [place] will contact Patient for confirmation of reservation.  will continue to provide support as needed.    Soo Yeon Han, [facility] In[facility] HospitalSW  Pager:  [phone]

## 2020-01-13 ENCOUNTER — ONCOLOGY VISIT (OUTPATIENT)
Dept: ONCOLOGY | Facility: CLINIC | Age: 65
End: 2020-01-13
Attending: PHYSICIAN ASSISTANT
Payer: COMMERCIAL

## 2020-01-13 ENCOUNTER — APPOINTMENT (OUTPATIENT)
Dept: LAB | Facility: CLINIC | Age: 65
End: 2020-01-13
Attending: PHYSICIAN ASSISTANT
Payer: COMMERCIAL

## 2020-01-13 VITALS
WEIGHT: 225 LBS | BODY MASS INDEX: 28.89 KG/M2 | SYSTOLIC BLOOD PRESSURE: 126 MMHG | TEMPERATURE: 98 F | DIASTOLIC BLOOD PRESSURE: 59 MMHG | HEART RATE: 42 BPM | OXYGEN SATURATION: 97 % | RESPIRATION RATE: 16 BRPM

## 2020-01-13 DIAGNOSIS — C71.9 OLIGODENDROGLIOMA, ANAPLASTIC (H): Primary | ICD-10-CM

## 2020-01-13 LAB
ALBUMIN SERPL-MCNC: 3.4 G/DL (ref 3.4–5)
ALP SERPL-CCNC: 94 U/L (ref 40–150)
ALT SERPL W P-5'-P-CCNC: 22 U/L (ref 0–70)
ANION GAP SERPL CALCULATED.3IONS-SCNC: 5 MMOL/L (ref 3–14)
AST SERPL W P-5'-P-CCNC: 12 U/L (ref 0–45)
BASOPHILS # BLD AUTO: 0 10E9/L (ref 0–0.2)
BASOPHILS NFR BLD AUTO: 0.8 %
BILIRUB SERPL-MCNC: 0.6 MG/DL (ref 0.2–1.3)
BUN SERPL-MCNC: 10 MG/DL (ref 7–30)
CALCIUM SERPL-MCNC: 8.4 MG/DL (ref 8.5–10.1)
CHLORIDE SERPL-SCNC: 112 MMOL/L (ref 94–109)
CO2 SERPL-SCNC: 24 MMOL/L (ref 20–32)
CREAT SERPL-MCNC: 0.98 MG/DL (ref 0.66–1.25)
DIFFERENTIAL METHOD BLD: ABNORMAL
EOSINOPHIL # BLD AUTO: 0.1 10E9/L (ref 0–0.7)
EOSINOPHIL NFR BLD AUTO: 1.6 %
ERYTHROCYTE [DISTWIDTH] IN BLOOD BY AUTOMATED COUNT: 15.6 % (ref 10–15)
GFR SERPL CREATININE-BSD FRML MDRD: 81 ML/MIN/{1.73_M2}
GLUCOSE SERPL-MCNC: 118 MG/DL (ref 70–99)
HCT VFR BLD AUTO: 36.4 % (ref 40–53)
HGB BLD-MCNC: 12.6 G/DL (ref 13.3–17.7)
IMM GRANULOCYTES # BLD: 0 10E9/L (ref 0–0.4)
IMM GRANULOCYTES NFR BLD: 0.3 %
LYMPHOCYTES # BLD AUTO: 0.3 10E9/L (ref 0.8–5.3)
LYMPHOCYTES NFR BLD AUTO: 8.2 %
MCH RBC QN AUTO: 35.4 PG (ref 26.5–33)
MCHC RBC AUTO-ENTMCNC: 34.6 G/DL (ref 31.5–36.5)
MCV RBC AUTO: 102 FL (ref 78–100)
MONOCYTES # BLD AUTO: 0.8 10E9/L (ref 0–1.3)
MONOCYTES NFR BLD AUTO: 22.7 %
NEUTROPHILS # BLD AUTO: 2.4 10E9/L (ref 1.6–8.3)
NEUTROPHILS NFR BLD AUTO: 66.4 %
NRBC # BLD AUTO: 0 10*3/UL
NRBC BLD AUTO-RTO: 0 /100
PLATELET # BLD AUTO: 71 10E9/L (ref 150–450)
PLATELET # BLD EST: ABNORMAL 10*3/UL
POTASSIUM SERPL-SCNC: 4 MMOL/L (ref 3.4–5.3)
PROT SERPL-MCNC: 6.2 G/DL (ref 6.8–8.8)
RBC # BLD AUTO: 3.56 10E12/L (ref 4.4–5.9)
SODIUM SERPL-SCNC: 140 MMOL/L (ref 133–144)
WBC # BLD AUTO: 3.7 10E9/L (ref 4–11)

## 2020-01-13 PROCEDURE — 85025 COMPLETE CBC W/AUTO DIFF WBC: CPT | Performed by: PHYSICIAN ASSISTANT

## 2020-01-13 PROCEDURE — G0463 HOSPITAL OUTPT CLINIC VISIT: HCPCS | Mod: ZF

## 2020-01-13 PROCEDURE — 99214 OFFICE O/P EST MOD 30 MIN: CPT | Mod: ZP | Performed by: PHYSICIAN ASSISTANT

## 2020-01-13 PROCEDURE — 36415 COLL VENOUS BLD VENIPUNCTURE: CPT

## 2020-01-13 PROCEDURE — 80053 COMPREHEN METABOLIC PANEL: CPT | Performed by: PHYSICIAN ASSISTANT

## 2020-01-13 ASSESSMENT — PAIN SCALES - GENERAL: PAINLEVEL: NO PAIN (0)

## 2020-01-13 NOTE — LETTER
January 13, 2020    Gopi Farfan  [address] [address] [place] [id number]      To Whom it May Concern:    Mr. Gopi Farfan was seen in our clinic on 1/14/2020 at 11:30 am for a medically necessary follow-up visit required for continued evaluation and recommendations on treatment for his diagnosed brain cancer.      Please do not hesitate to contact our clinic with any additional questions.      Sincerely,            Danika Duron PA-C  [facility]  [address] [place] [address]  [phone]

## 2020-01-13 NOTE — NURSING NOTE
"Oncology Rooming Note    January 13, 2020 11:37 AM   Gopi Farfan is a 64 year old male who presents for:    Chief Complaint   Patient presents with     Blood Draw     vitals done by TONYA and venipuncture done by RN     Oncology Clinic Visit     Return; oligodendroglioma     Initial Vitals: /59   Pulse (!) 42   Temp 98  F (36.7  C) (Oral)   Resp 16   Wt 102.1 kg (225 lb)   SpO2 97%   BMI 28.89 kg/m   Estimated body mass index is 28.89 kg/m  as calculated from the following:    Height as of 11/1/19: 1.88 m (6' 2\").    Weight as of this encounter: 102.1 kg (225 lb). Body surface area is 2.31 meters squared.  No Pain (0) Comment: Data Unavailable   No LMP for male patient.  Allergies reviewed: Yes  Medications reviewed: Yes    Medications: Medication refills not needed today.  Pharmacy name entered into EPIC: [facility] - [place], [place] - [address]    Clinical concerns: Pt states he has been getting dizzy every morning.       Monique Duron CMA              "

## 2020-01-13 NOTE — LETTER
"    RE: Gopi Farfan  [id number]  [facility] [address]       NEURO-ONCOLOGY VISIT  Jan 13, 2020    CHIEF COMPLAINT: Mr. 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 He completed chemoradiotherapy with concurrent temozolomide as of 8/21/2019, course complicated by thrombocytopenia. He is currently managed on adjuvant-dosed temozolomide.     He is presenting in follow-up for continued evaluation and recommendations on treatment accompanied by Dana (wife).     HISTORY OF PRESENT ILLNESS  -He has been doing well. Tolerated this cycle of chemo last past ones. Some constipation managed by Senna, otherwise no GI side effects  -He has been having some dizziness after sitting up and then standing when he gets up in the AM. 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      MEDICATIONS   Current Outpatient Medications   Medication Sig Dispense Refill     atorvastatin (LIPITOR) 80 MG tablet Take 40 mg by mouth daily       dexamethasone (DECADRON) 2 MG tablet   0     latanoprost (XALATAN) 0.005 % ophthalmic solution Place 1 drop into both eyes every evening        losartan (COZAAR) 100 MG tablet Take 100 mg by mouth daily       metoprolol succinate ER (TOPROL-XL) 25 MG 24 hr tablet Take 25 mg by mouth       ondansetron (ZOFRAN) 4 MG tablet Take 1 tablet (4 mg) by mouth At Bedtime 30 minutes prior to chemotherapy dosing and repeat every 8 hours as needed for nausea. 30 " tablet 3     temozolomide (TEMODAR) 100 MG capsule Take 1 capsule (100 mg) by mouth daily for 5 days with 1 other temozolomide prescription for 460 mg total Take ondansetron 30-60 min before temozolomide. Take at bedtime on an empty stomach. 5 capsule 0     Temozolomide (TEMODAR) 180 MG capsule Take 2 capsules (360 mg) by mouth daily for 5 days with 1 other Temozolomide prescription for 460 mg total Take ondansetron 30-60 min before temozolomide. Take at bedtime on an empty stomach. 10 capsule 0     DRUG ALLERGIES   Allergies   Allergen Reactions     Amlodipine Swelling       ONCOLOGIC HISTORY  -5/2019 PRESENTATION: Progressive confusion, memory loss plus difficulty with walking.   -5/28/2019 Admitted to [facility] in [place] for evaluation, then transferred to [facility] for higher level of care.   -5/28/2019 MR brain imaging revealed a large left frontal mass   -5/30/2019 SURGERY: Craniotomy for  mass resection by Dr. Downing.  PATHOLOGY: Oligodendroglioma with borderline anaplastic features (WHO grade III); Co-deletion of chromosomal regions 1p and 19q, IDH-1 (R132H by immunohistochemistry mutated. ATRX wild type by immunohistochemistry. Mitoses were present, not sufficient in number to warrant a diagnosis of anaplasia by that criterion.  However, the presence of vascular proliferation, even though mild, is indicative of early anaplasia.  -6/13/2019 NEURO-ONC: Recommending chemoradiotherapy.   -7/8 - 8/21/2019 CHEMORADS: 59.4 Gy in 1.8 Gy daily fractions x 30 fractions per Dr. Kirby Dwyer  With [facility] plus concurrent temozolomide 75mg/m2 (180mg).   -9/16/2019 NEURO-ONC/ MRB/ CHEMO: Clinically well. Imaging with no concerns. Starting adjuvant-dosed temozolomide 150mg/m2 (360mg), cycle 1 (start date of 9/19/2019).   -10/14/2019 NEURO-ONC/CHEMO: Clinically stable. Continue adjuvant-dosed temozolomide, increasing to 200 mg/m2 (460 mg), cycle 2 (start date of 10/17/2019).   -11/11/2019 NEURO-ONC/  "MRB/ CHEMO: Clinically stable. Imaging with no concerns. Adjuvant-dosed temozolomide 200 mg/m2 (460 mg), cycle 3 (start date of 11/19/2019, but delayed due to thrombocytopenia).   -12/16/2019 NEURO-ONC/CHEMO: Clinically stable. Adjuvant-dosed temozolomide 200 mg/m2 (460 mg), cycle 4 (start date of 12/17/2019).   -1/13/2020 NEURO-ONC/CHEMO: Clinically stable. Adjuvant-dosed temozolomide 200 mg/m2 (460 mg), cycle 5 (start date originally supposed to be 1/14/2020, will check labs in 1 week)    SOCIAL HISTORY   Tobacco use: Former smoker.  Alcohol use: Social.   Drug use: Denies marijuana use.  Supplement, complimentary/ alternative medicine: None.    . 1 son + grandchildren.   Employment: .      PHYSICAL EXAMINATION  /59   Pulse (!) 42   Temp 98  F (36.7  C) (Oral)   Resp 16   Wt 102.1 kg (225 lb)   SpO2 97%   BMI 28.89 kg/m      Wt Readings from Last 2 Encounters:   01/13/20 102.1 kg (225 lb)   12/16/19 98.9 kg (218 lb)      Ht Readings from Last 2 Encounters:   11/01/19 1.88 m (6' 2\")   10/14/19 1.88 m (6' 2.02\")     KPS: 100    -Generally well appearing.  -Throat: No oral thrush.  -Respiratory: Normal breath sounds, no audible wheezing.   -Skin: No rashes. Healed head incision.  -Hematologic/ lymphatic: Some bruising, healing. No leg swelling. Psoriasis on arms and legs.   -Psychiatric: Normal mood and affect. Pleasant, talkative.  -Neurologic:   MENTAL STATUS:     Alert, oriented to date.    Recall: Intact.    Speech fluent. Comprehension intact to multi-step commands.   Good right-left orientation.     CRANIAL NERVES:     L pupil 1mm > R pupil, round, reactive to light.     Extraocular movements full, patient denies diplopia.     Visual fields full.     Facial sensation intact to light touch.   Decreased activation of the face on the right.    Hearing intact.   Palate moves symmetrically.     Sternocleidomastoid and trapezius strength intact.   Tongue midline.  MOTOR:    Normal and " symmetric tone.   No pronation or drift. No orbiting.   Able to rise from a chair without use of arms.   On toe/ heel walk, equal distance from floor to heels/ toes.   SENSATION:    Intact to light touch throughout.  COORDINATION:   Intact finger-nose with eyes open and closed.   GAIT:  Walks without assistance.   Good speed. Normal stride length and heel strike. Normal turns. Normal arm swing.   Able to toe, heel walk. Able to tandem walk.       MEDICAL RECORDS  Obtained and personally reviewed all available outside medical records in addition to reviewing any records available in our electronic system.     LABS  Personally reviewed all available lab results.     IMAGING  No new imaging    IMPRESSION  Clinically still doing well with some increased dizziness, though this sounds like orthostatic hypotension and likely related to his BP/HR. Should follow-up with his PCP about this. Continue to manage constipation 2/2 to TMZ with senna. He is due to start TMZ (200 mg/m2) for cycle 5 tomorrow, though his plt are 71 today. Will have him hold off on starting TMZ tomorrow. He would like to wait a week until his scheduled labs next week, which is reasonable. If plt >90, ok to start cycle 5 on 1/20. This is a Grade 2 toxicity and we can continue at 200 mg/m2 for this next cycle. Depending on his recovery time, consider decreasing dose for cycle 6.     [facility].     Will repeat imaging in 1 month and follow-up with Dr. Lozano after that.      PROBLEM LIST  Anaplastic oligodendroglioma (grade III)  Memory complaints  Low K+  Cervical radiculopathy  Chemotherapy-induced thrombocytopenia     PLAN  -CANCER DIRECTED THERAPY-  -Continue adjuvant temozolomide 200 mg/m2 (460 mg), cycle 5.   Start date supposed to be 1/14, though has TCP. Recheck labs in one week. Ok to restart when Plt >90     -Supportive medications; Zofran and bowel regimen.   -Repeat 28 day cycle if WBC >= 3, ANC >= 1.5, HgB >= 10, and platelets >=  90.  -Will continue weekly CBC at [facility] in [place]  and repeat CMP every 4 weeks here. If labs stable, can change to every other week labs.    -Next generation sequencing can be ordered if further disease progression necessitates evaluating for targeted therapy.    -Repeat imaging in 1 months. All imaging needs to scheduled on a 1.5T scanner due to an implantable device that Melvin has at his hip.     -STEROIDS-  -Off dexamethasone.    -SEIZURE MANAGEMENT-  -While this patient is at increased risk of having seizures, given the lack of seizure history, there is no indication to prescribe an antiepileptic at this time.     -Quality of life/ MOOD/ FATIGUE-  -Denies any mood issues.  -Continue to monitor mood as untreated/ undertreated depression can worsen fatigue, dysorexia, and quality of life.    -CERVICAL SPINE CHANGES-  -Occasional numbness/tingling in arms.  -MRI showed left C6 radiculopathy and multilevel disc disease with nerve root impingement spondylolisthesis etc.  -Evaluated by Dr. Downing, imaging consistent with mild myelopathy from some cord compression at C4-5 from spondylolisthesis. Recommending a C4-5 anterior cervical discectomy and fusion, though likely after chemo  -told him again today to follow-up with Dr. Downing [facility]. 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 Now resolved. No further interventions needed    -BRADYCARDIA-  -Following with PCP.  -Recently held sprinolactone for gynecomastia. Continued on metoprolol 25 mg daily. Unclear purpose, though it sounds like he may have a block.   -now having some orthostatic symptoms. Should follow-up with PCP about this as this is likely due to his heart/BP    -OTHER-  -Requ[facility] following every visit stating the date/ time/ reason of the clinic visit.     Return to clinic in 5 weeks (delayed 1 week due to chemo delay) with Dr. Lozano with  labs + MRI.     In the meantime, Melvin and Dana know to call with questions or concerns or to report new complaints and can be seen sooner if needed. Urgent evaluation is needed in the setting of acute onset of severe headache, abrupt change in mental status, on-going seizures, new focal deficits, or new leg swelling/ pain.    Danika Duron PA-C  Neuro-oncology

## 2020-01-13 NOTE — PROGRESS NOTES
"NEURO-ONCOLOGY VISIT  Jan 13, 2020    CHIEF COMPLAINT: Mr. 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 He completed chemoradiotherapy with concurrent temozolomide as of 8/21/2019, course complicated by thrombocytopenia. He is currently managed on adjuvant-dosed temozolomide.     He is presenting in follow-up for continued evaluation and recommendations on treatment accompanied by Dana (wife).       HISTORY OF PRESENT ILLNESS  -He has been doing well. Tolerated this cycle of chemo last past ones. Some constipation managed by Senna, otherwise no GI side effects  -He has been having some dizziness after sitting up and then standing when he gets up in the AM. 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      MEDICATIONS   Current Outpatient Medications   Medication Sig Dispense Refill     atorvastatin (LIPITOR) 80 MG tablet Take 40 mg by mouth daily       dexamethasone (DECADRON) 2 MG tablet   0     latanoprost (XALATAN) 0.005 % ophthalmic solution Place 1 drop into both eyes every evening        losartan (COZAAR) 100 MG tablet Take 100 mg by mouth daily       metoprolol succinate ER (TOPROL-XL) 25 MG 24 hr tablet Take 25 mg by mouth       ondansetron (ZOFRAN) 4 MG tablet Take 1 tablet (4 mg) by mouth At Bedtime 30 minutes prior to chemotherapy dosing and repeat every 8 hours as needed for nausea. 30 tablet 3     temozolomide (TEMODAR) 100 MG capsule Take 1 capsule " (100 mg) by mouth daily for 5 days with 1 other temozolomide prescription for 460 mg total Take ondansetron 30-60 min before temozolomide. Take at bedtime on an empty stomach. 5 capsule 0     Temozolomide (TEMODAR) 180 MG capsule Take 2 capsules (360 mg) by mouth daily for 5 days with 1 other Temozolomide prescription for 460 mg total Take ondansetron 30-60 min before temozolomide. Take at bedtime on an empty stomach. 10 capsule 0     DRUG ALLERGIES   Allergies   Allergen Reactions     Amlodipine Swelling       ONCOLOGIC HISTORY  -5/2019 PRESENTATION: Progressive confusion, memory loss plus difficulty with walking.   -5/28/2019 Admitted to [facility] for evaluation, then transferred to [facility] for higher level of care.   -5/28/2019 MR brain imaging revealed a large left frontal mass   -5/30/2019 SURGERY: Craniotomy for  mass resection by Dr. Downing.  PATHOLOGY: Oligodendroglioma with borderline anaplastic features (WHO grade III); Co-deletion of chromosomal regions 1p and 19q, IDH-1 (R132H by immunohistochemistry mutated. ATRX wild type by immunohistochemistry. Mitoses were present, not sufficient in number to warrant a diagnosis of anaplasia by that criterion.  However, the presence of vascular proliferation, even though mild, is indicative of early anaplasia.  -6/13/2019 NEURO-ONC: Recommending chemoradiotherapy.   -7/8 - 8/21/2019 CHEMORADS: 59.4 Gy in 1.8 Gy daily fractions x 30 fractions per Dr. Kirby Dwyer  With [facility] plus concurrent temozolomide 75mg/m2 (180mg).   -9/16/2019 NEURO-ONC/ MRB/ CHEMO: Clinically well. Imaging with no concerns. Starting adjuvant-dosed temozolomide 150mg/m2 (360mg), cycle 1 (start date of 9/19/2019).   -10/14/2019 NEURO-ONC/CHEMO: Clinically stable. Continue adjuvant-dosed temozolomide, increasing to 200 mg/m2 (460 mg), cycle 2 (start date of 10/17/2019).   -11/11/2019 NEURO-ONC/ MRB/ CHEMO: Clinically stable. Imaging with no concerns.  "Adjuvant-dosed temozolomide 200 mg/m2 (460 mg), cycle 3 (start date of 11/19/2019, but delayed due to thrombocytopenia).   -12/16/2019 NEURO-ONC/CHEMO: Clinically stable. Adjuvant-dosed temozolomide 200 mg/m2 (460 mg), cycle 4 (start date of 12/17/2019).   -1/13/2020 NEURO-ONC/CHEMO: Clinically stable. Adjuvant-dosed temozolomide 200 mg/m2 (460 mg), cycle 5 (start date originally supposed to be 1/14/2020, will check labs in 1 week)    SOCIAL HISTORY   Tobacco use: Former smoker.  Alcohol use: Social.   Drug use: Denies marijuana use.  Supplement, complimentary/ alternative medicine: None.    . 1 son + grandchildren.   Employment: .      PHYSICAL EXAMINATION  /59   Pulse (!) 42   Temp 98  F (36.7  C) (Oral)   Resp 16   Wt 102.1 kg (225 lb)   SpO2 97%   BMI 28.89 kg/m     Wt Readings from Last 2 Encounters:   01/13/20 102.1 kg (225 lb)   12/16/19 98.9 kg (218 lb)      Ht Readings from Last 2 Encounters:   11/01/19 1.88 m (6' 2\")   10/14/19 1.88 m (6' 2.02\")     KPS: 100    -Generally well appearing.  -Throat: No oral thrush.  -Respiratory: Normal breath sounds, no audible wheezing.   -Skin: No rashes. Healed head incision.  -Hematologic/ lymphatic: Some bruising, healing. No leg swelling. Psoriasis on arms and legs.   -Psychiatric: Normal mood and affect. Pleasant, talkative.  -Neurologic:   MENTAL STATUS:     Alert, oriented to date.    Recall: Intact.    Speech fluent. Comprehension intact to multi-step commands.   Good right-left orientation.     CRANIAL NERVES:     L pupil 1mm > R pupil, round, reactive to light.     Extraocular movements full, patient denies diplopia.     Visual fields full.     Facial sensation intact to light touch.   Decreased activation of the face on the right.    Hearing intact.   Palate moves symmetrically.     Sternocleidomastoid and trapezius strength intact.   Tongue midline.  MOTOR:    Normal and symmetric tone.   No pronation or drift. No orbiting.   Able " to rise from a chair without use of arms.   On toe/ heel walk, equal distance from floor to heels/ toes.   SENSATION:    Intact to light touch throughout.  COORDINATION:   Intact finger-nose with eyes open and closed.   GAIT:  Walks without assistance.   Good speed. Normal stride length and heel strike. Normal turns. Normal arm swing.   Able to toe, heel walk. Able to tandem walk.       MEDICAL RECORDS  Obtained and personally reviewed all available outside medical records in addition to reviewing any records available in our electronic system.     LABS  Personally reviewed all available lab results.     IMAGING  No new imaging    IMPRESSION  Clinically still doing well with some increased dizziness, though this sounds like orthostatic hypotension and likely related to his BP/HR. Should follow-up with his PCP about this. Continue to manage constipation 2/2 to TMZ with senna. He is due to start TMZ (200 mg/m2) for cycle 5 tomorrow, though his plt are 71 today. Will have him hold off on starting TMZ tomorrow. He would like to wait a week until his scheduled labs next week, which is reasonable. If plt >90, ok to start cycle 5 on 1/20. This is a Grade 2 toxicity and we can continue at 200 mg/m2 for this next cycle. Depending on his recovery time, consider decreasing dose for cycle 6.     [facility].     Will repeat imaging in 1 month and follow-up with Dr. Lozano after that.      PROBLEM LIST  Anaplastic oligodendroglioma (grade III)  Memory complaints  Low K+  Cervical radiculopathy  Chemotherapy-induced thrombocytopenia     PLAN  -CANCER DIRECTED THERAPY-  -Continue adjuvant temozolomide 200 mg/m2 (460 mg), cycle 5.   Start date supposed to be 1/14, though has TCP. Recheck labs in one week. Ok to restart when Plt >90     -Supportive medications; Zofran and bowel regimen.   -Repeat 28 day cycle if WBC >= 3, ANC >= 1.5, HgB >= 10, and platelets >= 90.  -Will continue weekly CBC at [facility]  in [place]  and repeat CMP every 4 weeks here. If labs stable, can change to every other week labs.    -Next generation sequencing can be ordered if further disease progression necessitates evaluating for targeted therapy.    -Repeat imaging in 1 months. All imaging needs to scheduled on a 1.5T scanner due to an implantable device that Melvin has at his hip.     -STEROIDS-  -Off dexamethasone.    -SEIZURE MANAGEMENT-  -While this patient is at increased risk of having seizures, given the lack of seizure history, there is no indication to prescribe an antiepileptic at this time.     -Quality of life/ MOOD/ FATIGUE-  -Denies any mood issues.  -Continue to monitor mood as untreated/ undertreated depression can worsen fatigue, dysorexia, and quality of life.    -CERVICAL SPINE CHANGES-  -Occasional numbness/tingling in arms.  -MRI showed left C6 radiculopathy and multilevel disc disease with nerve root impingement spondylolisthesis etc.  -Evaluated by Dr. Downing, imaging consistent with mild myelopathy from some cord compression at C4-5 from spondylolisthesis. Recommending a C4-5 anterior cervical discectomy and fusion, though likely after chemo  -told him again today to follow-up with Dr. Downing [facility]. 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 Now resolved. No further interventions needed    -BRADYCARDIA-  -Following with PCP.  -Recently held sprinolactone for gynecomastia. Continued on metoprolol 25 mg daily. Unclear purpose, though it sounds like he may have a block.   -now having some orthostatic symptoms. Should follow-up with PCP about this as this is likely due to his heart/BP    -OTHER-  -Requ[facility] a [facility] for [facility] following every visit stating the date/ time/ reason of the clinic visit.     Return to clinic in 5 weeks (delayed 1 week due to chemo delay) with Dr. Lozano with labs + MRI.     In the meantime, Melvin and Dana know to call  with questions or concerns or to report new complaints and can be seen sooner if needed. Urgent evaluation is needed in the setting of acute onset of severe headache, abrupt change in mental status, on-going seizures, new focal deficits, or new leg swelling/ pain.    Danika Duron PA-C  Neuro-oncology

## 2020-01-13 NOTE — NURSING NOTE
Chief Complaint   Patient presents with     Blood Draw     vitals done by TONYA and venipuncture done by ABISAI Mancilla CMA on 1/13/2020 at 9:38 AM

## 2020-01-20 ENCOUNTER — TELEPHONE (OUTPATIENT)
Dept: ONCOLOGY | Facility: CLINIC | Age: 65
End: 2020-01-20

## 2020-01-20 NOTE — TELEPHONE ENCOUNTER
Oral Chemotherapy Monitoring Program  Lab Follow-Up     Placed call to patient in follow up of lab work completed at [facility] on 1/20 for Temodar oral chemotherapy.     Labs:       Assessment/Plan:   Melvin's labs show that he meets treatment parameters with the exception of platelets at 89 (up from 71 on 1/13). Spoke with Danika Duron PA-C who approved the start of Cycle 5 Temodar to start this evening. Will repeat CBC labs in 1 week.     Follow-Up:  1/27 labs in [facility]      hCerelle Porter PharmD  Oral Chemotherapy Monitoring Program  [facility]  [phone]  January 20, 2020

## 2020-02-03 ENCOUNTER — TELEPHONE (OUTPATIENT)
Dept: ONCOLOGY | Facility: CLINIC | Age: 65
End: 2020-02-03

## 2020-02-03 ENCOUNTER — TELEPHONE (OUTPATIENT)
Dept: NEUROSURGERY | Facility: CLINIC | Age: 65
End: 2020-02-03

## 2020-02-03 DIAGNOSIS — C71.9 OLIGODENDROGLIOMA, ANAPLASTIC (H): Primary | ICD-10-CM

## 2020-02-03 RX ORDER — TEMOZOLOMIDE 180 MG/1
360 CAPSULE ORAL DAILY
Qty: 10 CAPSULE | Refills: 0 | Status: SHIPPED | OUTPATIENT
Start: 2020-02-03 | End: 2020-02-20

## 2020-02-03 RX ORDER — TEMOZOLOMIDE 100 MG/1
100 CAPSULE ORAL DAILY
Qty: 5 CAPSULE | Refills: 0 | Status: SHIPPED | OUTPATIENT
Start: 2020-02-03 | End: 2020-02-20

## 2020-02-03 NOTE — TELEPHONE ENCOUNTER
[facility] Downing for surgical consult: LVM for pt to return call.    Appt note info;    Ref by; [facility], Schoenecker, Miranda. SURGICAL CONSULT: Injury of Neck. EMG, PT @[facility] **waiting on notes** MRI @ [facility] 10/04/2019 **waiting on images**

## 2020-02-03 NOTE — TELEPHONE ENCOUNTER
Oral Chemotherapy Monitoring Program    Primary Oncologist: Dr. Lozano  Primary Oncology Clinic: [facility]   Cancer Diagnosis: Aplastic Oligodendroglioma    Therapy History:  Temodar 460mg (2x 180mg tablets + 1x 100mg tablet) by mouth daily for 5 days of each 28-day cycle  C1D1=9/9/2019    Drug Interaction Assessment: no new drug interactions identified.   Medication list checked (2/3): -Temodar -Metoprolol -Ondansetron -DexAMETHasone -AtorvaSTATin -Losartan -Xalatan    Lab Monitoring Plan  CBC weekly   CMP monthly    Subjective/Objective:  Gopi Farfan is a 64 year old male contacted by phone for a follow-up visit for oral chemotherapy. He confirms that he is taking Temodar 460mg daily for 5 of each 28-day cycle and is adherent to therapy. He reports that he was not as tired this last cycle and denies any additional side effects. We discussed his lab results completed today (2/3).     ORAL CHEMOTHERAPY 7/16/2019 9/16/2019 10/1/2019 10/22/2019 11/19/2019 12/31/2019 2/3/2020   Drug Name Temodar (Temozolomide) Temodar (Temozolomide) Temodar (Temozolomide) Temodar (Temozolomide) Temodar (Temozolomide) Temodar (Temozolomide) Temodar (Temozolomide)   Current Dosage 180mg 360mg 360mg Other Other (No Data) (No Data)   Current Schedule Daily Daily Daily Daily Daily Daily Daily   Cycle Details Other 5 days on, then 23 days off 5 days on, then 23 days off 5 days on, then 23 days off 5 days on, then 23 days off 5 days on, then 23 days off 5 days on, then 23 days off   Start Date of Last Cycle - - 9/19/2019 10/17/2019 11/19/2019 12/17/2019 1/20/2020   Planned next cycle start date 7/7/2019 - 10/17/2019 11/14/2019 - 1/14/2020 2/17/2020   Doses missed in last 2 weeks 0 - 0 0 0 0 0   Adherence Assessment Adherent - Adherent Adherent Adherent Adherent Adherent   Adverse Effects No AE identified during assessment - Fatigue Fatigue Fatigue Fatigue No AE identified during assessment   Fatigue - - - Grade 1 Grade 1 Grade 1  "-   Pharmacist Intervention(fatigue) - - - No No Yes -   Intervention(s) - - - - - Patient education -   Home BPs - - - - Not applicable - -   Any new drug interactions? No - No No No No No   Is the dose as ordered appropriate for the patient? Yes - - Yes Yes Yes Yes   Is the patient currently in pain? - - - - No - -   Has the patient missed any days of school, work, or other routine activity? No - - - - - -       Last PHQ-2 Score on record:   PHQ-2 ( 1999 [employer]) 6/14/2019   Q1: Little interest or pleasure in doing things 0   Q2: Feeling down, depressed or hopeless 0   PHQ-2 Score 0       Vitals:  BP:   BP Readings from Last 1 Encounters:   01/13/20 126/59     Wt Readings from Last 1 Encounters:   01/13/20 102.1 kg (225 lb)     Estimated body surface area is 2.31 meters squared as calculated from the following:    Height as of 11/1/19: 1.88 m (6' 2\").    Weight as of 1/13/20: 102.1 kg (225 lb).    Labs:      Assessment:  Melvin is tolerating therapy well. His lab results detailed a reduction in WBC and ANC.     Plan:  Continue Temodar therapy as planned. Repeat labs in one week. Emphasized importance of taking precautions to prevent getting sick and patient verbalized understanding.     Follow-Up:  2/10 for weekly labs      Cherelle Porter PharmD  Oral Chemotherapy Monitoring Program  [facility]  [phone]   "

## 2020-02-03 NOTE — TELEPHONE ENCOUNTER
DATE:  2/3/2020   TIME OF RECEIPT FROM LAB:  11:43 AM  LAB TEST:  wbc  LAB VALUE:  2.3  RESULTS GIVEN WITH READ-BACK TO (PROVIDER):  Danika Duron PAC  TIME LAB VALUE REPORTED TO PROVIDER:   Paged 11:43 AM, routed to RNCC and oral chemo team    WBC 2.3  Hgb 13.2  Plt 133  ANC 1.3

## 2020-02-04 NOTE — TELEPHONE ENCOUNTER
Patient returned call. He will give us a call back next month when he's done with chemo. Follow up with patient in 1 month as well for reminder.

## 2020-02-11 ENCOUNTER — TELEPHONE (OUTPATIENT)
Dept: PHARMACY | Facility: CLINIC | Age: 65
End: 2020-02-11

## 2020-02-11 NOTE — ORAL ONC MGMT
Oral Chemotherapy Monitoring Program     Spoke to patient in follow up of of labs from 02/10/20.         Assessment/Plan:  Labs values are fairly stable compared with last week, continue therapy as planned. Repeat labs in one week.     Follow-Up:  02/18/20 for weekly labs      César Walter Pharmacy Intern  [facility]   [phone]   2/11/2020

## 2020-02-14 NOTE — TELEPHONE ENCOUNTER
"Patient called stating that the [facility] told him that Dr. Downing will not be on their \"list\" of covered providers starting 2/19 but that there is a way for him to get put back on. Will call [facility] to verify and get figured out. Patient has last 5 day course of chemo on 2/24 and would like to see Dr. Downing for surgical consult after this is finished.     Addend: talked with [facility], they are switching to [employer], and Dr. Downing is not covered by this. Phone number given to call [employer] to try and get Dr. Downing approved to see patient [phone].    Spoke with [employer]. Will send credentialing form through. Should hear back within 5 days. Reference #[id number] if no call.   "

## 2020-02-18 ENCOUNTER — TELEPHONE (OUTPATIENT)
Dept: CARE COORDINATION | Facility: CLINIC | Age: 65
End: 2020-02-18

## 2020-02-18 NOTE — TELEPHONE ENCOUNTER
Per Patient's request,  completed and faxed [employer] request for lodging dates 2/23-2/24. [employer] will contact Patient for confirmation of reservation.  will continue to provide support as needed.    Soo Yeon Han, [facility]HealthSW  Pager:  [phone]

## 2020-02-20 NOTE — PROGRESS NOTES
"NEURO-ONCOLOGY VISIT  Feb 24, 2020    CHIEF COMPLAINT: Mr. 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 He completed chemoradiotherapy with concurrent temozolomide as of 8/21/2019, course complicated by thrombocytopenia. He is currently managed on adjuvant-dosed temozolomide.     He is presenting in follow-up for continued evaluation and recommendations on treatment accompanied by Dana (wife).       HISTORY OF PRESENT ILLNESS  -Melvin has been doing well.   -Tolerating chemotherapy well. Some constipation managed by Senna, otherwise no GI side effects, including nausea. Eating and drinking well.  -He has been having some dizziness after sitting up and then standing when he gets up in the AM. This occurs occasionally during the day as well when he stands up. Noted to have a low HR, this is being [facility] by the [facility].  -Sleeping well at night.  -Short term memory stable.  -No new sensory changes, weakness, headache, or changes in vision.  -No events concerning for seizures.  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     REVIEW OF SYSTEMS  A comprehensive ROS negative except as in HPI.      MEDICATIONS   Current Outpatient Medications   Medication Sig Dispense Refill     atorvastatin (LIPITOR) 80 MG tablet Take 40 mg by mouth daily       latanoprost (XALATAN) 0.005 % ophthalmic solution Place 1 drop into both eyes every evening        losartan (COZAAR) 100 MG tablet Take 100 mg by mouth daily       metoprolol succinate ER (TOPROL-XL) 25 MG 24 hr tablet Take 25 mg by mouth       ondansetron (ZOFRAN) 4 MG tablet Take 1 tablet (4 mg) by mouth At Bedtime 30 minutes prior to chemotherapy dosing and repeat every 8 hours as needed for nausea. 30 tablet 3     DRUG ALLERGIES   Allergies   Allergen Reactions     Amlodipine Swelling       ONCOLOGIC HISTORY  -5/2019 " PRESENTATION: Progressive confusion, memory loss plus difficulty with walking.   -5/28/2019 Admitted to [facility] in [place] for evaluation, then transferred to [facility] for higher level of care.   -5/28/2019 MR brain imaging revealed a large left frontal mass   -5/30/2019 SURGERY: Craniotomy for  mass resection by Dr. Downing.  PATHOLOGY: Oligodendroglioma with borderline anaplastic features (WHO grade III); Co-deletion of chromosomal regions 1p and 19q, IDH-1 (R132H by immunohistochemistry mutated. ATRX wild type by immunohistochemistry. Mitoses were present, not sufficient in number to warrant a diagnosis of anaplasia by that criterion.  However, the presence of vascular proliferation, even though mild, is indicative of early anaplasia.  -6/13/2019 NEURO-ONC: Recommending chemoradiotherapy.   -7/8 - 8/21/2019 CHEMORADS: 59.4 Gy in 1.8 Gy daily fractions x 30 fractions per Dr. Kirby Dwyer  With [facility] plus concurrent temozolomide 75mg/m2 (180mg).   -9/16/2019 NEURO-ONC/ MRB/ CHEMO: Clinically well. Imaging with no concerns. Starting adjuvant-dosed temozolomide 150mg/m2 (360mg), cycle 1 (start date of 9/19/2019).   -10/14/2019 NEURO-ONC/CHEMO: Clinically stable. Continue adjuvant-dosed temozolomide, increasing to 200 mg/m2 (460 mg), cycle 2 (start date of 10/17/2019).   -11/11/2019 NEURO-ONC/ MRB/ CHEMO: Clinically stable. Imaging with no concerns. Adjuvant-dosed temozolomide 200 mg/m2 (460 mg), cycle 3 (start date of 11/19/2019, but delayed due to thrombocytopenia).   -12/16/2019 NEURO-ONC/ CHEMO: Clinically stable. Adjuvant-dosed temozolomide 200 mg/m2 (460 mg), cycle 4 (start date of 12/17/2019).   -1/13/2020 NEURO-ONC/ CHEMO: Clinically stable. Adjuvant-dosed temozolomide 200 mg/m2 (460 mg), cycle 5 (start date originally supposed to be 1/14/2020, but was on 1/20/2020)  -2/24/2020 NEURO-ONC/ MRB/ CHEMO: Clinically stable. Imaging stable. Adjuvant-dosed temozolomide 200 mg/m2 (460 mg),  "cycle 6 (start date tonight).    SOCIAL HISTORY   Tobacco use: Former smoker.  Alcohol use: Social.   Drug use: Denies marijuana use.  Supplement, complimentary/ alternative medicine: None.    . 1 son + grandchildren.   Employment: .      PHYSICAL EXAMINATION  /73   Pulse 73   Temp 97.8  F (36.6  C) (Oral)   Resp 18   Wt 99.4 kg (219 lb 3.2 oz)   SpO2 99%   BMI 28.14 kg/m     Wt Readings from Last 2 Encounters:   02/24/20 99.4 kg (219 lb 3.2 oz)   01/13/20 102.1 kg (225 lb)      Ht Readings from Last 2 Encounters:   11/01/19 1.88 m (6' 2\")   10/14/19 1.88 m (6' 2.02\")     KPS: 100    -Generally well appearing.  -Respiratory: Normal breath sounds, no audible wheezing.   -Skin: No rashes. Healed head incision.  -Hematologic/ lymphatic: Some bruising, healing. No leg swelling. Psoriasis on arms and legs.   -Psychiatric: Normal mood and affect. Pleasant, talkative.  -Neurologic:   MENTAL STATUS:     Alert, oriented to date.    Recall: Intact.    Speech fluent.    Comprehension intact to multi-step commands.   Good right-left orientation.     CRANIAL NERVES:     L pupil 1mm > R pupil, round, reactive to light.     Extraocular movements full, patient denies diplopia.     Visual fields full.     Facial sensation intact to light touch.   Decreased activation of the face on the right, nasolabial fold flattening.    Hearing intact.   Palate moves symmetrically.     Sternocleidomastoid and trapezius strength intact.   Tongue midline.  MOTOR:    Normal and symmetric tone.   No pronation or drift. No orbiting.   Able to rise from a chair without use of arms.   On toe/ heel walk, equal distance from floor to heels/ toes.   SENSATION:    Intact to light touch throughout.  COORDINATION:   Intact finger-nose with eyes open and closed.   GAIT:  Walks without assistance.   Good speed. Normal stride length and heel strike. Normal turns. Normal arm swing.   Able to toe, heel walk. Able to tandem walk with " mild difficulty.       MEDICAL RECORDS  Obtained and personally reviewed all available outside medical records in addition to reviewing any records available in our electronic system.     LABS  Personally reviewed all available lab results.     IMAGING  Personally reviewed MR brain imaging from today and compared to prior imaging. To my eye, there is no concern for disease recurrence.     Imaging was shown to and results were reviewed with Melvin and Dana.     Imaging and case reviewed and discussed at Brain Tumor Conference.        IMPRESSION  Clinic time was spent discussing in detail the nature of this tumor in light of repeat imaging. This was in addition to providing emotional support, answering questions pertaining to my recommendations and devising the treatment plan as outlined below.      Clinically still doing well with some increased dizziness, though this sounds like orthostatic hypotension and likely related to his BP/HR. Melvin is following with his primary care physician and cardiologist for this and I again highly encouraged him to discuss his symptoms with them.     Imaging without concerns today. Will repeat in 3 months.     We discussed the recent literature regarding whether 6 versus 12 cycles of adjuvant temozolomide provide additional benefit. After a candid discussion on the risks and the benefits of continuing adjuvant therapy past 6 cycles, the decision was made to plan for a goal of 6 cycles. Melvin will start his final cycle tonight. Will repeat imaging in 2 months and if imaging in April is stable, can repeat every 3 months.     Suggested that the earliest potential date for cervical spine surgery could be early April 2020.     PROBLEM LIST  Anaplastic oligodendroglioma (grade III)  Memory complaints  Low K+  Cervical radiculopathy  Chemotherapy-induced thrombocytopenia     PLAN  -CANCER DIRECTED THERAPY-  -Continue adjuvant temozolomide 200 mg/m2 (460 mg), cycle 6 (final cycle).  -Supportive  medications; Zofran and bowel regimen.   -Repeat 28 day cycle if WBC >= 3, ANC >= 1.5, HgB >= 10, and platelets >= 90.  -Will continue every other week CBC at [facility] in [place] (due on 3/9 and 3/23) and repeat CMP every 4 weeks.    -Next generation sequencing can be ordered if further disease progression necessitates evaluating for targeted therapy.  -Repeat imaging in 3 months. All imaging needs to scheduled on a 1.5T scanner due to an implantable device that Melvin has at his hip.     -STEROIDS-  -Off dexamethasone.    -SEIZURE MANAGEMENT-  -While this patient is at increased risk of having seizures, given the lack of seizure history, there is no indication to prescribe an antiepileptic at this time.     -Quality of life/ MOOD/ FATIGUE-  -Denies any mood issues.  -Continue to monitor mood as untreated/ undertreated depression can worsen fatigue, dysorexia, and quality of life.    -CERVICAL SPINE CHANGES-  -Occasional numbness/tingling in arms.  -MRI showed left C6 radiculopathy and multilevel disc disease with nerve root impingement spondylolisthesis etc.  -Evaluated by Dr. Downing, imaging consistent with mild myelopathy from some cord compression at C4-5 from spondylolisthesis. Recommending a C4-5 anterior cervical discectomy and fusion, but would like patient to complete scheduled chemotherapy.   -Suggested that the earliest potential date for cervical spine surgery could be early April 2020.     -HYPOKALEMIA-  -Managed by dietary modifications. Now resolved. No further interventions needed.    -BRADYCARDIA/ ORTHOSTATSIS-  -Following with PCP and cardiology.    -OTHER-  -Requiring a letter for the [facility] following every visit stating the date/ time/ reason of the clinic visit.   -Social work to meet with him today to discuss social security/ disability.     Return to clinic in 5/2020 + labs + MRI.     In the meantime, Melvin and Dana know to call with questions or concerns or to report new complaints  and can be seen sooner if needed. Urgent evaluation is needed in the setting of acute onset of severe headache, abrupt change in mental status, on-going seizures, new focal deficits, or new leg swelling/ pain.    Leeanne Lozano MD  Neuro-oncology

## 2020-02-24 ENCOUNTER — ONCOLOGY VISIT (OUTPATIENT)
Dept: ONCOLOGY | Facility: CLINIC | Age: 65
End: 2020-02-24
Attending: PSYCHIATRY & NEUROLOGY
Payer: COMMERCIAL

## 2020-02-24 ENCOUNTER — TUMOR CONFERENCE (OUTPATIENT)
Dept: ONCOLOGY | Facility: CLINIC | Age: 65
End: 2020-02-24

## 2020-02-24 ENCOUNTER — HOSPITAL ENCOUNTER (OUTPATIENT)
Dept: MRI IMAGING | Facility: CLINIC | Age: 65
Discharge: HOME | End: 2020-02-24
Attending: PHYSICIAN ASSISTANT | Admitting: PHYSICIAN ASSISTANT
Payer: COMMERCIAL

## 2020-02-24 VITALS
SYSTOLIC BLOOD PRESSURE: 131 MMHG | RESPIRATION RATE: 18 BRPM | OXYGEN SATURATION: 99 % | HEART RATE: 73 BPM | TEMPERATURE: 97.8 F | DIASTOLIC BLOOD PRESSURE: 73 MMHG | WEIGHT: 219.2 LBS | BODY MASS INDEX: 28.14 KG/M2

## 2020-02-24 DIAGNOSIS — R11.2 CHEMOTHERAPY-INDUCED NAUSEA AND VOMITING: ICD-10-CM

## 2020-02-24 DIAGNOSIS — T45.1X5A CHEMOTHERAPY-INDUCED NAUSEA AND VOMITING: ICD-10-CM

## 2020-02-24 DIAGNOSIS — C71.9 OLIGODENDROGLIOMA, ANAPLASTIC (H): ICD-10-CM

## 2020-02-24 DIAGNOSIS — Z51.11 CHEMOTHERAPY MANAGEMENT, ENCOUNTER FOR: ICD-10-CM

## 2020-02-24 DIAGNOSIS — C71.9 OLIGODENDROGLIOMA, ANAPLASTIC (H): Primary | ICD-10-CM

## 2020-02-24 LAB
ALBUMIN SERPL-MCNC: 3.8 G/DL (ref 3.4–5)
ALP SERPL-CCNC: 93 U/L (ref 40–150)
ALT SERPL W P-5'-P-CCNC: 21 U/L (ref 0–70)
ANION GAP SERPL CALCULATED.3IONS-SCNC: 5 MMOL/L (ref 3–14)
AST SERPL W P-5'-P-CCNC: 14 U/L (ref 0–45)
BASOPHILS # BLD AUTO: 0 10E9/L (ref 0–0.2)
BASOPHILS NFR BLD AUTO: 0.9 %
BILIRUB SERPL-MCNC: 0.7 MG/DL (ref 0.2–1.3)
BUN SERPL-MCNC: 16 MG/DL (ref 7–30)
CALCIUM SERPL-MCNC: 9 MG/DL (ref 8.5–10.1)
CHLORIDE SERPL-SCNC: 106 MMOL/L (ref 94–109)
CO2 SERPL-SCNC: 27 MMOL/L (ref 20–32)
CREAT SERPL-MCNC: 1.03 MG/DL (ref 0.66–1.25)
DIFFERENTIAL METHOD BLD: ABNORMAL
EOSINOPHIL # BLD AUTO: 0.2 10E9/L (ref 0–0.7)
EOSINOPHIL NFR BLD AUTO: 4.6 %
ERYTHROCYTE [DISTWIDTH] IN BLOOD BY AUTOMATED COUNT: 14.7 % (ref 10–15)
GFR SERPL CREATININE-BSD FRML MDRD: 76 ML/MIN/{1.73_M2}
GLUCOSE SERPL-MCNC: 100 MG/DL (ref 70–99)
HCT VFR BLD AUTO: 40 % (ref 40–53)
HGB BLD-MCNC: 13.8 G/DL (ref 13.3–17.7)
IMM GRANULOCYTES # BLD: 0 10E9/L (ref 0–0.4)
IMM GRANULOCYTES NFR BLD: 0.3 %
LYMPHOCYTES # BLD AUTO: 0.5 10E9/L (ref 0.8–5.3)
LYMPHOCYTES NFR BLD AUTO: 12.9 %
MCH RBC QN AUTO: 35 PG (ref 26.5–33)
MCHC RBC AUTO-ENTMCNC: 34.5 G/DL (ref 31.5–36.5)
MCV RBC AUTO: 102 FL (ref 78–100)
MONOCYTES # BLD AUTO: 0.7 10E9/L (ref 0–1.3)
MONOCYTES NFR BLD AUTO: 19.5 %
NEUTROPHILS # BLD AUTO: 2.2 10E9/L (ref 1.6–8.3)
NEUTROPHILS NFR BLD AUTO: 61.8 %
NRBC # BLD AUTO: 0 10*3/UL
NRBC BLD AUTO-RTO: 0 /100
PLATELET # BLD AUTO: 157 10E9/L (ref 150–450)
POTASSIUM SERPL-SCNC: 3.9 MMOL/L (ref 3.4–5.3)
PROT SERPL-MCNC: 6.8 G/DL (ref 6.8–8.8)
RBC # BLD AUTO: 3.94 10E12/L (ref 4.4–5.9)
SODIUM SERPL-SCNC: 138 MMOL/L (ref 133–144)
WBC # BLD AUTO: 3.5 10E9/L (ref 4–11)

## 2020-02-24 PROCEDURE — 80053 COMPREHEN METABOLIC PANEL: CPT | Performed by: PSYCHIATRY & NEUROLOGY

## 2020-02-24 PROCEDURE — G0463 HOSPITAL OUTPT CLINIC VISIT: HCPCS | Mod: ZF,25

## 2020-02-24 PROCEDURE — 99215 OFFICE O/P EST HI 40 MIN: CPT | Mod: ZP | Performed by: PSYCHIATRY & NEUROLOGY

## 2020-02-24 PROCEDURE — A9585 GADOBUTROL INJECTION: HCPCS | Performed by: PHYSICIAN ASSISTANT

## 2020-02-24 PROCEDURE — 70553 MRI BRAIN STEM W/O & W/DYE: CPT

## 2020-02-24 PROCEDURE — 25500064 ZZH RX 255 OP 636: Performed by: PHYSICIAN ASSISTANT

## 2020-02-24 PROCEDURE — 85025 COMPLETE CBC W/AUTO DIFF WBC: CPT | Performed by: PSYCHIATRY & NEUROLOGY

## 2020-02-24 RX ORDER — GADOBUTROL 604.72 MG/ML
10 INJECTION INTRAVENOUS ONCE
Status: COMPLETED | OUTPATIENT
Start: 2020-02-24 | End: 2020-02-24

## 2020-02-24 RX ADMIN — GADOBUTROL 10 ML: 604.72 INJECTION INTRAVENOUS at 08:42

## 2020-02-24 ASSESSMENT — PAIN SCALES - GENERAL: PAINLEVEL: NO PAIN (0)

## 2020-02-24 NOTE — PATIENT INSTRUCTIONS
Imaging stable, no concerns.   Repeat in 3 months.   Cycle 6 of temozolomide (final cycle) to start tonight.   Repeat labs the week of 3/9 and 3/23.    Ask primary care doctor/ cardiologist about the need for Metoprolol.   -Dosing.  -Symptoms of dizziness in the morning.     I will message Dr. Downing about privileges with the [facility].   Ok to have spine surgery as early as early April.   Requesting surgical teaching.     Social work to meet with you.     Return to clinic in 3 months + labs + imaging.     Leeanne Lozano MD  Neuro-oncology  2/24/2020

## 2020-02-24 NOTE — TUMOR CONFERENCE
Tumor Conference Information  Tumor Conference:  Brain  Specialties Present:  Medical oncology, Pathology, Radiology, Radiation oncology, Surgery  Patient Status:  A current patient  Pathology:  Not Discussed  Treatment to Date:  Surgical intervention(s), Chemoradiation, Adjuvant chemotherapy  Clinical Trial Eligibility:  Not discussed  Recommended Plan:  Continue with current treatment plan (Comment: stable on recent MRI; plan to continue with cycle 6 of temodar and follow up with imaging in 3 months)  Did the review exceed 30 minutes?:  did not           Documentation / Disclaimer Cancer Tumor Board Note  Cancer tumor board recommendations do not override what is determined to be reasonable care and treatment, which is dependent on the circumstances of a patient's case; the patient's medical, social, and personal concerns; and the clinical judgment of the oncologist [physician].

## 2020-02-24 NOTE — NURSING NOTE
"Oncology Rooming Note    February 24, 2020 10:53 AM   Gopi Farfan is a 65 year old male who presents for:    Chief Complaint   Patient presents with     Blood Draw     Vitals and blood drawn by LPN. Pt checked into appt.      Oncology Clinic Visit     Return; Oligodendroglioma     Initial Vitals: /73   Pulse 73   Temp 97.8  F (36.6  C) (Oral)   Resp 18   Wt 99.4 kg (219 lb 3.2 oz)   SpO2 99%   BMI 28.14 kg/m   Estimated body mass index is 28.14 kg/m  as calculated from the following:    Height as of 11/1/19: 1.88 m (6' 2\").    Weight as of this encounter: 99.4 kg (219 lb 3.2 oz). Body surface area is 2.28 meters squared.  No Pain (0) Comment: Data Unavailable   No LMP for male patient.  Allergies reviewed: Yes  Medications reviewed: Yes    Medications: Medication refills not needed today.  Pharmacy name entered into EPIC: [facility] - [place], [place] - [address]    Clinical concerns: Patient states he would like to discuss dizziness. He would also like to discuss when he can have surgery on his neck.        Tatum Negrete CMA              "

## 2020-02-24 NOTE — PROGRESS NOTES
Chief Complaint   Patient presents with     Blood Draw     Vitals and blood drawn by LPN. Pt checked into [facility].      KATERINE Soriano LPN

## 2020-02-24 NOTE — LETTER
"     RE: Gopi Farfan  [address]  [facility] [address]     Dear Colleague,    Thank you for referring your patient, Gopi Farfan, to the [facility]. Please see a copy of my visit note below.    NEURO-ONCOLOGY VISIT  Feb 24, 2020    CHIEF COMPLAINT: Mr. 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 He completed chemoradiotherapy with concurrent temozolomide as of 8/21/2019, course complicated by thrombocytopenia. He is currently managed on adjuvant-dosed temozolomide.     He is presenting in follow-up for continued evaluation and recommendations on treatment accompanied by Dana (wife).     HISTORY OF PRESENT ILLNESS  -Melvin has been doing well.   -Tolerating chemotherapy well. Some constipation managed by Senna, otherwise no GI side effects, including nausea. Eating and drinking well.  -He has been having some dizziness after sitting up and then standing when he gets up in the AM. This occurs occasionally during the day as well when he stands up. Noted to have a low HR, [facility].  -Sleeping well at night.  -Short term memory stable.  -No new sensory changes, weakness, headache, or changes in vision.  -No events concerning for seizures.  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     REVIEW OF SYSTEMS  A comprehensive ROS negative except as in HPI.    MEDICATIONS   Current Outpatient Medications   Medication Sig Dispense Refill     atorvastatin (LIPITOR) 80 MG tablet Take 40 mg by mouth daily       latanoprost (XALATAN) 0.005 % ophthalmic solution Place 1 drop into both eyes every evening        losartan (COZAAR) 100 MG tablet Take 100 mg by mouth daily       metoprolol succinate ER (TOPROL-XL) 25 MG 24 hr tablet Take 25 mg by mouth       ondansetron (ZOFRAN) 4 MG tablet Take 1 tablet (4 mg) by " mouth At Bedtime 30 minutes prior to chemotherapy dosing and repeat every 8 hours as needed for nausea. 30 tablet 3     DRUG ALLERGIES   Allergies   Allergen Reactions     Amlodipine Swelling       ONCOLOGIC HISTORY  -5/2019 PRESENTATION: Progressive confusion, memory loss plus difficulty with walking.   -5/28/2019 Admitted to [facility] in [place] for evaluation, then transferred to [facility] for higher level of care.   -5/28/2019 MR brain imaging revealed a large left frontal mass   -5/30/2019 SURGERY: Craniotomy for  mass resection by Dr. Downing.  PATHOLOGY: Oligodendroglioma with borderline anaplastic features (WHO grade III); Co-deletion of chromosomal regions 1p and 19q, IDH-1 (R132H by immunohistochemistry mutated. ATRX wild type by immunohistochemistry. Mitoses were present, not sufficient in number to warrant a diagnosis of anaplasia by that criterion.  However, the presence of vascular proliferation, even though mild, is indicative of early anaplasia.  -6/13/2019 NEURO-ONC: Recommending chemoradiotherapy.   -7/8 - 8/21/2019 CHEMORADS: 59.4 Gy in 1.8 Gy daily fractions x 30 fractions per Dr. Kirby Dwyer  With [facility] plus concurrent temozolomide 75mg/m2 (180mg).   -9/16/2019 NEURO-ONC/ MRB/ CHEMO: Clinically well. Imaging with no concerns. Starting adjuvant-dosed temozolomide 150mg/m2 (360mg), cycle 1 (start date of 9/19/2019).   -10/14/2019 NEURO-ONC/CHEMO: Clinically stable. Continue adjuvant-dosed temozolomide, increasing to 200 mg/m2 (460 mg), cycle 2 (start date of 10/17/2019).   -11/11/2019 NEURO-ONC/ MRB/ CHEMO: Clinically stable. Imaging with no concerns. Adjuvant-dosed temozolomide 200 mg/m2 (460 mg), cycle 3 (start date of 11/19/2019, but delayed due to thrombocytopenia).   -12/16/2019 NEURO-ONC/ CHEMO: Clinically stable. Adjuvant-dosed temozolomide 200 mg/m2 (460 mg), cycle 4 (start date of 12/17/2019).   -1/13/2020 NEURO-ONC/ CHEMO: Clinically stable. Adjuvant-dosed  "temozolomide 200 mg/m2 (460 mg), cycle 5 (start date originally supposed to be 1/14/2020, but was on 1/20/2020)  -2/24/2020 NEURO-ONC/ MRB/ CHEMO: Clinically stable. Imaging stable. Adjuvant-dosed temozolomide 200 mg/m2 (460 mg), cycle 6 (start date tonight).    SOCIAL HISTORY   Tobacco use: Former smoker.  Alcohol use: Social.   Drug use: Denies marijuana use.  Supplement, complimentary/ alternative medicine: None.    . 1 son + grandchildren.   Employment: .      PHYSICAL EXAMINATION  /73   Pulse 73   Temp 97.8  F (36.6  C) (Oral)   Resp 18   Wt 99.4 kg (219 lb 3.2 oz)   SpO2 99%   BMI 28.14 kg/m      Wt Readings from Last 2 Encounters:   02/24/20 99.4 kg (219 lb 3.2 oz)   01/13/20 102.1 kg (225 lb)      Ht Readings from Last 2 Encounters:   11/01/19 1.88 m (6' 2\")   10/14/19 1.88 m (6' 2.02\")     KPS: 100    -Generally well appearing.  -Respiratory: Normal breath sounds, no audible wheezing.   -Skin: No rashes. Healed head incision.  -Hematologic/ lymphatic: Some bruising, healing. No leg swelling. Psoriasis on arms and legs.   -Psychiatric: Normal mood and affect. Pleasant, talkative.  -Neurologic:   MENTAL STATUS:     Alert, oriented to date.    Recall: Intact.    Speech fluent.    Comprehension intact to multi-step commands.   Good right-left orientation.     CRANIAL NERVES:     L pupil 1mm > R pupil, round, reactive to light.     Extraocular movements full, patient denies diplopia.     Visual fields full.     Facial sensation intact to light touch.   Decreased activation of the face on the right, nasolabial fold flattening.    Hearing intact.   Palate moves symmetrically.     Sternocleidomastoid and trapezius strength intact.   Tongue midline.  MOTOR:    Normal and symmetric tone.   No pronation or drift. No orbiting.   Able to rise from a chair without use of arms.   On toe/ heel walk, equal distance from floor to heels/ toes.   SENSATION:    Intact to light touch " throughout.  COORDINATION:   Intact finger-nose with eyes open and closed.   GAIT:  Walks without assistance.   Good speed. Normal stride length and heel strike. Normal turns. Normal arm swing.   Able to toe, heel walk. Able to tandem walk with mild difficulty.       MEDICAL RECORDS  Obtained and personally reviewed all available outside medical records in addition to reviewing any records available in our electronic system.     LABS  Personally reviewed all available lab results.     IMAGING  Personally reviewed MR brain imaging from today and compared to prior imaging. To my eye, there is no concern for disease recurrence.     Imaging was shown to and results were reviewed with Melvin and Dana.     Imaging and case reviewed and discussed at Brain Tumor Conference.        IMPRESSION  Clinic time was spent discussing in detail the nature of this tumor in light of repeat imaging. This was in addition to providing emotional support, answering questions pertaining to my recommendations and devising the treatment plan as outlined below.      Clinically still doing well with some increased dizziness, though this sounds like orthostatic hypotension and likely related to his BP/HR. Melvin is following with his primary care physician and cardiologist for this and I again highly encouraged him to discuss his symptoms with them.     Imaging without concerns today. Will repeat in 3 months.     We discussed the recent literature regarding whether 6 versus 12 cycles of adjuvant temozolomide provide additional benefit. After a candid discussion on the risks and the benefits of continuing adjuvant therapy past 6 cycles, the decision was made to plan for a goal of 6 cycles. Melvin will start his final cycle tonight. Will repeat imaging in 2 months and if imaging in April is stable, can repeat every 3 months.     Suggested that the earliest potential date for cervical spine surgery could be early April 2020.     PROBLEM LIST  Anaplastic  oligodendroglioma (grade III)  Memory complaints  Low K+  Cervical radiculopathy  Chemotherapy-induced thrombocytopenia     PLAN  -CANCER DIRECTED THERAPY-  -Continue adjuvant temozolomide 200 mg/m2 (460 mg), cycle 6 (final cycle).  -Supportive medications; Zofran and bowel regimen.   -Repeat 28 day cycle if WBC >= 3, ANC >= 1.5, HgB >= 10, and platelets >= 90.  -Will continue every other week CBC at [facility] in [place] (due on 3/9 and 3/23) and repeat CMP every 4 weeks.    -Next generation sequencing can be ordered if further disease progression necessitates evaluating for targeted therapy.  -Repeat imaging in 3 months. All imaging needs to scheduled on a 1.5T scanner due to an implantable device that Melvin has at his hip.     -STEROIDS-  -Off dexamethasone.    -SEIZURE MANAGEMENT-  -While this patient is at increased risk of having seizures, given the lack of seizure history, there is no indication to prescribe an antiepileptic at this time.     -Quality of life/ MOOD/ FATIGUE-  -Denies any mood issues.  -Continue to monitor mood as untreated/ undertreated depression can worsen fatigue, dysorexia, and quality of life.    -CERVICAL SPINE CHANGES-  -Occasional numbness/tingling in arms.  -MRI showed left C6 radiculopathy and multilevel disc disease with nerve root impingement spondylolisthesis etc.  -Evaluated by Dr. Downing, imaging consistent with mild myelopathy from some cord compression at C4-5 from spondylolisthesis. Recommending a C4-5 anterior cervical discectomy and fusion, but would like patient to complete scheduled chemotherapy.   -Suggested that the earliest potential date for cervical spine surgery could be early April 2020.     -HYPOKALEMIA-  -Managed by dietary modifications. Now resolved. No further interventions needed.    -BRADYCARDIA/ ORTHOSTATSIS-  -Following with PCP and cardiology.    -OTHER-  -Requiring [facility] letter for the [facility] following every visit stating the date/ time/ reason of  the clinic visit.   -Social work to meet with him today to discuss social security/ disability.     Return to clinic in 5/2020 + labs + MRI.     In the meantime, Melvin and Dana know to call with questions or concerns or to report new complaints and can be seen sooner if needed. Urgent evaluation is needed in the setting of acute onset of severe headache, abrupt change in mental status, on-going seizures, new focal deficits, or new leg swelling/ pain.    Leeanne Lozano MD  Neuro-oncology       Chief Complaint   Patient presents with     Blood Draw     Vitals and blood drawn by LPN. Pt checked into appt.      KATERINE Soriano LPN

## 2020-02-26 ENCOUNTER — PATIENT OUTREACH (OUTPATIENT)
Dept: CARE COORDINATION | Facility: CLINIC | Age: 65
End: 2020-02-26

## 2020-02-26 NOTE — PROGRESS NOTES
Social Work Follow-Up Encounter Visit  Oncology Clinic    Data/Intervention:  Patient Name:  Gopi Farfan  /Age:  1955 (65 year old)    Reason for Follow-Up:  Request for assistance with disability benefits.    Collaborated With:    -Patient  -  -        Resources Provided:   provided a brochure for senior linkage line, phone number to disability linkage line and contact information for primary  WILL Vargas.     Assessment:   met with patient on 2020 during their doctor's appointment. Patient had questions about disability benefits they had applied for recently. Patient reported they were approved for disability benefits, but were unsure about when they could expect to begin receiving benefits. They were also uncertain about how their benefits worked, how much they could expect to receive. encouraged patient to call senior linkage line for assistance with their questions and to help them better navigate and understand their benefits.  also provided number of disability linkage line and provided the number of their primary  WILL Vargas to follow up with if they had any further questions.     Plan:  Previously provided patient/family with writer's contact information and availability.   Patient will follow up with WILL Vargas as needed for any further assistance.     Dahiana TSAI, SARITA  - Oncology  Phone : [phone]  Pager: [phone]

## 2020-03-02 NOTE — TELEPHONE ENCOUNTER
Patient returned call, gave pt information below. He will check with insurance to too if  will be covered then he will give us a call back to get scheduled if so. Also patient stated he will be done with chemo this coming Friday and would be cleared to start treatment with Dr. Downing once coverage if confirmed.

## 2020-03-05 NOTE — TELEPHONE ENCOUNTER
Nursing calling to check on status of credentialing form and Dr. Downing's approval status to treat patient.     Angie, from [facility], stated she is unable to verify any of the information due to our address being [facility], The tax ID does not match, and NPI not pulling correct information. Angie stated an email was sent to both Alina BARONE and Dr. Downing on 02/25 for a demographic change. They have not heard back and are not able to move forward with anything else until that information is received.  Then it would take minimal 5 days to review everything per Angie.    Nursing will [place] back to Dr. Downing and Alina and see if they have an email.   Called patient to update him. Patient will check back in approximately a week and a half to see status of this approval.

## 2020-03-05 NOTE — TELEPHONE ENCOUNTER
Patient called and stated that in order for Dr. Downing to be covered under his insurance, he needs to [facility] with [facility]. He would like to for a nurse to call today if possible. Call Frances @ [phone].

## 2020-03-10 ENCOUNTER — TELEPHONE (OUTPATIENT)
Dept: NEUROSURGERY | Facility: CLINIC | Age: 65
End: 2020-03-10

## 2020-03-10 NOTE — TELEPHONE ENCOUNTER
[facility] referral from [facility], called pt to get scheduled, no answer. Left vm for pt to return call to get scheduled. Referral scanned into e-mail.

## 2020-03-11 ENCOUNTER — HEALTH MAINTENANCE LETTER (OUTPATIENT)
Age: 65
End: 2020-03-11

## 2020-03-12 ENCOUNTER — TELEPHONE (OUTPATIENT)
Dept: ONCOLOGY | Facility: CLINIC | Age: 65
End: 2020-03-12

## 2020-03-12 NOTE — ORAL ONC MGMT
Oral Chemotherapy Monitoring Program    Placed call to patient in follow up of Temodar therapy.    Left message to please call back in follow up of therapy. No patient or drug names were mentioned.    Vilma Wang  Student Pharmacist  Oral Chemotherapy Monitoring Program  [facility]  [phone]

## 2020-03-12 NOTE — ORAL ONC MGMT
Oral Chemotherapy Monitoring Program    Primary Oncologist: Dr. Lozano  Primary Oncology Clinic: [facility]  Cancer Diagnosis: Anaplastic oligodendroglioma    Therapy History:  Temodar 460 mg by mouth daily for 5 days of a 28 day cycle.   C1D1: 09/09/2019  Completed 6th cycle of chemotherapy on 02/28/2020    Drug Interaction Assessment: No new drug interactions at this time     Lab Monitoring Plan  Per Dr. Lozano, CBC every other week and CMP every 4 weeks at local clinic   Subjective/Objective:  Gopi Farfan is a 65 year old male contacted by phone for a follow-up visit for oral chemotherapy. His wife, Dana returned my call this afternoon. She states that Melvin finished his 6th cycle of Temodar on 02/28/2020. He has been doing very well since completing therapy. He does have some short term memory loss and has to write things down. We reviewed his labs from his local clinic on 03/10/2020. His white blood cell count was slightly low at 4.4, hemoglobin was 14.2, platelets were 204, and absolute neutrophil count was 2.7. She was surprised that his platelets were within the normal range because they have been low in the past. She thinks it may because he had a few extra days off before the last cycle. Overall, he is doing very well and they have an appointment coming up with Dr. Downing. They will call the clinic with any questions or concerns.     ORAL CHEMOTHERAPY 9/16/2019 10/1/2019 10/22/2019 11/19/2019 12/31/2019 2/3/2020 3/12/2020   Drug Name Temodar (Temozolomide) Temodar (Temozolomide) Temodar (Temozolomide) Temodar (Temozolomide) Temodar (Temozolomide) Temodar (Temozolomide) Temodar (Temozolomide)   Current Dosage 360mg 360mg Other Other (No Data) (No Data) (No Data)   Current Schedule Daily Daily Daily Daily Daily Daily Daily   Cycle Details 5 days on, then 23 days off 5 days on, then 23 days off 5 days on, then 23 days off 5 days on, then 23 days off 5 days on, then 23 days off 5 days on, then 23 days  off 5 days on, then 23 days off   Start Date of Last Cycle - 9/19/2019 10/17/2019 11/19/2019 12/17/2019 1/20/2020 2/24/2020   Planned next cycle start date - 10/17/2019 11/14/2019 - 1/14/2020 2/17/2020 -   Doses missed in last 2 weeks - 0 0 0 0 0 0   Adherence Assessment - Adherent Adherent Adherent Adherent Adherent Adherent   Adverse Effects - Fatigue Fatigue Fatigue Fatigue No AE identified during assessment No AE identified during assessment   Fatigue - - Grade 1 Grade 1 Grade 1 - -   Pharmacist Intervention(fatigue) - - No No Yes - -   Intervention(s) - - - - Patient education - -   Home BPs - - - Not applicable - - -   Any new drug interactions? - No No No No No No   Is the dose as ordered appropriate for the patient? - - Yes Yes Yes Yes Yes   Is the patient currently in pain? - - - No - - -   Has the patient missed any days of school, work, or other routine activity? - - - - - - -     Assessment:  Melvin completed 6 cycles of Temodar therapy successfully.     Plan:  Contact clinic with any questions or concerns. Follow up with Dr. Lozano in June for plan going forward.     Follow-Up:  3/20: Appointment with Dr. Downing   6/8: Follow up with Dr. Lozano in clinic with patricia Wang  Student Pharmacist  Oral Chemotherapy Monitoring Program  [facility]  [phone]

## 2020-03-16 ENCOUNTER — MYC MEDICAL ADVICE (OUTPATIENT)
Dept: NEUROSURGERY | Facility: CLINIC | Age: 65
End: 2020-03-16

## 2020-03-18 NOTE — TELEPHONE ENCOUNTER
Called and spoke to patient. Informed him that we will push appt out 4 weeks due to coronavirus. In agreement with plan and appt rescheduled. Pt will call if he has any concerns or questions.

## 2020-04-10 ENCOUNTER — OFFICE VISIT (OUTPATIENT)
Dept: NEUROSURGERY | Facility: CLINIC | Age: 65
End: 2020-04-10
Attending: NEUROLOGICAL SURGERY
Payer: COMMERCIAL

## 2020-04-10 VITALS
HEART RATE: 35 BPM | TEMPERATURE: 97.4 F | BODY MASS INDEX: 26.56 KG/M2 | HEIGHT: 74 IN | DIASTOLIC BLOOD PRESSURE: 62 MMHG | RESPIRATION RATE: 16 BRPM | WEIGHT: 207 LBS | SYSTOLIC BLOOD PRESSURE: 137 MMHG | OXYGEN SATURATION: 100 %

## 2020-04-10 DIAGNOSIS — M47.12 CERVICAL SPONDYLOSIS WITH MYELOPATHY: Primary | ICD-10-CM

## 2020-04-10 DIAGNOSIS — M47.22 CERVICAL SPONDYLOSIS WITH RADICULOPATHY: ICD-10-CM

## 2020-04-10 PROCEDURE — G0463 HOSPITAL OUTPT CLINIC VISIT: HCPCS

## 2020-04-10 PROCEDURE — 99213 OFFICE O/P EST LOW 20 MIN: CPT | Performed by: NEUROLOGICAL SURGERY

## 2020-04-10 ASSESSMENT — PAIN SCALES - GENERAL: PAINLEVEL: SEVERE PAIN (7)

## 2020-04-10 ASSESSMENT — MIFFLIN-ST. JEOR: SCORE: 1798.7

## 2020-04-10 NOTE — PATIENT INSTRUCTIONS
*Follow up with PCP regarding Metoprolol medication and low heart rate as discussed.    Surgery will be scheduled at [facility] for [facility] (anterior cervical discectomy and fusion)  With Dr. Downing.     Pre-Operative:  -Surgical risks: blood clots in the leg or lung, problems urinating, nerve damage, drainage from the incision, infection, stiffness.  - Pre-operative physical with primary care physician within 30 days of surgical date.   -Stop all solid foods and liquids 8 hours before surgery.    -Shower procedure: Please shower with antibacterial soap the night before surgery and the morning of surgery. Refer to information sheet in folder.   - Discontinue Aspirin, NSAIDs (Advil, Ibuprofen, Naproxen, Nuprin, Diclofenac, Meloxicam, Aleve, Celebrex) x 7 days prior to surgical date. After surgery, do not begin taking these medications until given clearance. May cause bleeding and interfere with bone healing.    - May take Tylenol for pain.      Post-Operative:  -1 night hospitalization.  - Post operative pain may require pain medications and muscle relaxants. You will receive medications upon discharge.  -Do NOT drive while taking narcotic pain medication.  -Post operative incision care- Watch for signs of infection: redness, swelling, warmth, drainage, and fever of 101 degrees or higher. Notify clinic [phone].  -Keep incision clean and dry. You may shower. No submerging incision in water such as pools, hot tubs, baths for at least 8 weeks or until incision is healed.   - Post operative activity limitations for 6 weeks after surgery: no lifting > 10 pounds, limited bending, twisting, or overhead reaching. You will be re-evaluated at your follow up appointments.   -If a brace is required per Dr. Downing, Orthotics will fit you for the brace in the hospital.  -If you are currently employed, you will need to be off work for recovery and healing. Please fax any FMLA/short term disability paperwork to  [phone]. You may call our clinic when you'd like to return to work and we can provide a work letter.   - Follow up appointments: 6 week post op, 3 months post op, 6 months post op, 1 year post op. You will need to an xray before each appointment. Please call to schedule these appointments at [phone].

## 2020-04-10 NOTE — NURSING NOTE
"Gopi Farfan is a 65 year old male who presents for:  Chief Complaint   Patient presents with     Neck Pain        Initial Vitals:  /62   Pulse (!) 35   Temp 97.4  F (36.3  C)   Resp 16   Ht 6' 2\" (1.88 m)   Wt 207 lb (93.9 kg)   SpO2 100%   BMI 26.58 kg/m   Estimated body mass index is 26.58 kg/m  as calculated from the following:    Height as of this encounter: 6' 2\" (1.88 m).    Weight as of this encounter: 207 lb (93.9 kg).. Body surface area is 2.21 meters squared. BP completed using cuff size: regular  Severe Pain (7)    Patricia Orellana RN  "

## 2020-04-10 NOTE — PROGRESS NOTES
It was a pleasure to see Gopi Farfan today in Neurosurgery Clinic. He is a 65 year old male who is here for follow-up of his cervical spine.  He is here today with his wife.    We discussed his symptoms which include neck pain, symptoms of hand tingling as well as some radicular type symptoms on the medial aspects of the forearms and hands.        Past Medical History:   Diagnosis Date     Brain tumor (H)      Cardiac arrhythmia      Glaucoma (increased eye pressure)      Hypercholesteremia      Past Medical History reviewed with patient during visit.  Past Surgical History:   Procedure Laterality Date     HRW ANES TOTAL KNEE REPLACEMENT, MDA 1 Left 2015     OPTICAL TRACKING SYSTEM CRANIOTOMY, EXCISE TUMOR, COMBINED Left 5/30/2019    Procedure: Stealth Guided Left Craniotomy And Tumor Resection;  Surgeon: Abdullahi Downing MD;  Location: [facility] OR     STOMACH SURGERY  02/12/2019    LINX, a magnetic implant in the area of the gastroesphogeal junction, needs special MRI     Past Surgical History reviewed with patient during visit.  Allergies   Allergen Reactions     Amlodipine Swelling       Current Outpatient Medications:      atorvastatin (LIPITOR) 80 MG tablet, Take 40 mg by mouth daily, Disp: , Rfl:      latanoprost (XALATAN) 0.005 % ophthalmic solution, Place 1 drop into both eyes every evening , Disp: , Rfl:      losartan (COZAAR) 100 MG tablet, Take 100 mg by mouth daily, Disp: , Rfl:      metoprolol succinate ER (TOPROL-XL) 25 MG 24 hr tablet, Take 25 mg by mouth, Disp: , Rfl:      ondansetron (ZOFRAN) 4 MG tablet, Take 1 tablet (4 mg) by mouth At Bedtime 30 minutes prior to chemotherapy dosing and repeat every 8 hours as needed for nausea. (Patient not taking: Reported on 4/10/2020), Disp: 30 tablet, Rfl: 3  Social History     Socioeconomic History     Marital status:      Spouse name: Dana     Number of children: 1     Years of education: None     Highest education level: None   Occupational  "History     None   Social Needs     Financial resource strain: None     Food insecurity     Worry: None     Inability: None     Transportation needs     Medical: None     Non-medical: None   Tobacco Use     Smoking status: Former Smoker     Smokeless tobacco: Former User   Substance and Sexual Activity     Alcohol use: Yes     Comment: a couple daily after work, on hold while under cancer treatment     Drug use: None     Sexual activity: None   Lifestyle     Physical activity     Days per week: None     Minutes per session: None     Stress: None   Relationships     Social connections     Talks on phone: None     Gets together: None     Attends Ep[employer] service: None     Active member of club or organization: None     Attends meetings of clubs or organizations: None     Relationship status: None     Intimate partner violence     Fear of current or ex partner: None     Emotionally abused: None     Physically abused: None     Forced sexual activity: None   Other Topics Concern     Parent/sibling w/ CABG, MI or angioplasty before 65F 55M? Not Asked   Social History Narrative     None     No family history on file.     ROS: 10 point ROS neg other than the symptoms noted above in the HPI.    Vitals:    04/10/20 1030   BP: 137/62   Pulse: (!) 35   Resp: 16   Temp: 97.4  F (36.3  C)   SpO2: 100%   Weight: 93.9 kg (207 lb)   Height: 1.88 m (6' 2\")     Body mass index is 26.58 kg/m .  Severe Pain (7)    Neck Disability Index  Neck Disability Index (  Jimmy H. and Mickiino C. 1991. All rights reserved.; used with permission) 11/1/2019   SECTION 1 - PAIN INTENSITY 2   SECTION 2 - PERSONAL CARE 0   SECTION 3 - LIFTING 1   SECTION 4 - READING 1   SECTION 5 - HEADACHES 1   SECTION 6 - CONCENTRATION 1   SECTION 7 - WORK 3   SECTION 8 - DRIVING 1   SECTION 9 - SLEEPING 1   SECTION 10 - RECREATION 2   Count 10   Sum 13   Raw Score: /50 13   Neck Disability Index Score: (%) 26       Visual Analog Scale (VAS) Questionnaire    No " flowsheet data found.       Awake alert.  Bilateral upper and lower extremity strength 5 out of 5.  Reflexes 1+ bilateral biceps 2+ bilateral tricep  1+ bilateral patella    Imaging: We reviewed his previous MRI as well as flexion-extension x-rays and CT of the cervical spine.  He has some instability at C4-5 with 5 mm of anterolisthesis on flexion.  He also has probably some stenosis at C5-6 as well.  Reviewing his CT and MRI also demonstrates some foraminal stenosis at C7-T1.  This seems to correlate with his symptoms down the medial aspect of the hands.  Imaging was reviewed with the patient shown the patient today.    Assessment: Cervical stenosis with myeloradiculopathy.    Plan: Given the locations of his problems I have recommended a single operation with 2 parts, a C4-6 anterior cervical discectomy and fusion with a bilateral minimally invasive C7-T1 posterior cervical foraminotomy.  We will work on scheduling this in the near future when virus restrictions allow us to restart elective surgeries. The risks benefits and alternatives to the procedure were discussed, questions solicited and answered, and the patient wishes to proceed with surgery.

## 2020-04-10 NOTE — LETTER
4/10/2020         RE: Gopi Farfan  [id number]  [place] [address]        Dear Colleague,    Thank you for referring your patient, Gopi Farfan, to the [facility]. Please see a copy of my visit note below.    It was a pleasure to see Gopi Farfan today in Neurosurgery Clinic. He is a 65 year old male who is here for follow-up of his cervical spine.  He is here today with his wife.    We discussed his symptoms which include neck pain, symptoms of hand tingling as well as some radicular type symptoms on the medial aspects of the forearms and hands.        Past Medical History:   Diagnosis Date     Brain tumor (H)      Cardiac arrhythmia      Glaucoma (increased eye pressure)      Hypercholesteremia      Past Medical History reviewed with patient during visit.  Past Surgical History:   Procedure Laterality Date     HRW ANES TOTAL KNEE REPLACEMENT, MDA 1 Left 2015     OPTICAL TRACKING SYSTEM CRANIOTOMY, EXCISE TUMOR, COMBINED Left 5/30/2019    Procedure: Stealth Guided Left Craniotomy And Tumor Resection;  Surgeon: Abdullahi Downing MD;  Location: [facility] OR     STOMACH SURGERY  02/12/2019    LINX, a magnetic implant in the area of the gastroesphogeal junction, needs special MRI     Past Surgical History reviewed with patient during visit.  Allergies   Allergen Reactions     Amlodipine Swelling       Current Outpatient Medications:      atorvastatin (LIPITOR) 80 MG tablet, Take 40 mg by mouth daily, Disp: , Rfl:      latanoprost (XALATAN) 0.005 % ophthalmic solution, Place 1 drop into both eyes every evening , Disp: , Rfl:      losartan (COZAAR) 100 MG tablet, Take 100 mg by mouth daily, Disp: , Rfl:      metoprolol succinate ER (TOPROL-XL) 25 MG 24 hr tablet, Take 25 mg by mouth, Disp: , Rfl:      ondansetron (ZOFRAN) 4 MG tablet, Take 1 tablet (4 mg) by mouth At Bedtime 30 minutes prior to chemotherapy dosing and repeat every 8 hours as needed for nausea. (Patient not  "taking: Reported on 4/10/2020), Disp: 30 tablet, Rfl: 3  Social History     Socioeconomic History     Marital status:      Spouse name: Dana     Number of children: 1     Years of education: None     Highest education level: None   Occupational History     None   Social Needs     Financial resource strain: None     Food insecurity     Worry: None     Inability: None     Transportation needs     Medical: None     Non-medical: None   Tobacco Use     Smoking status: Former Smoker     Smokeless tobacco: Former User   Substance and Sexual Activity     Alcohol use: Yes     Comment: a couple daily after work, on hold while under cancer treatment     Drug use: None     Sexual activity: None   Lifestyle     Physical activity     Days per week: None     Minutes per session: None     Stress: None   Relationships     Social connections     Talks on phone: None     Gets together: None     Attends Pentecostal service: None     Active member of club or organization: None     Attends meetings of clubs or organizations: None     Relationship status: None     Intimate partner violence     Fear of current or ex partner: None     Emotionally abused: None     Physically abused: None     Forced sexual activity: None   Other Topics Concern     Parent/sibling w/ CABG, MI or angioplasty before 65F 55M? Not Asked   Social History Narrative     None     No family history on file.     ROS: 10 point ROS neg other than the symptoms noted above in the HPI.    Vitals:    04/10/20 1030   BP: 137/62   Pulse: (!) 35   Resp: 16   Temp: 97.4  F (36.3  C)   SpO2: 100%   Weight: 93.9 kg (207 lb)   Height: 1.88 m (6' 2\")     Body mass index is 26.58 kg/m .  Severe Pain (7)    Neck Disability Index  Neck Disability Index (  Jimmy H. and Sharron C. 1991. All rights reserved.; used with permission) 11/1/2019   SECTION 1 - PAIN INTENSITY 2   SECTION 2 - PERSONAL CARE 0   SECTION 3 - LIFTING 1   SECTION 4 - READING 1   SECTION 5 - HEADACHES 1   SECTION " 6 - CONCENTRATION 1   SECTION 7 - WORK 3   SECTION 8 - DRIVING 1   SECTION 9 - SLEEPING 1   SECTION 10 - RECREATION 2   Count 10   Sum 13   Raw Score: /50 13   Neck Disability Index Score: (%) 26       Visual Analog Scale (VAS) Questionnaire    No flowsheet data found.       Awake alert.  Bilateral upper and lower extremity strength 5 out of 5.  Reflexes 1+ bilateral biceps 2+ bilateral tricep  1+ bilateral patella    Imaging: We reviewed his previous MRI as well as flexion-extension x-rays and CT of the cervical spine.  He has some instability at C4-5 with 5 mm of anterolisthesis on flexion.  He also has probably some stenosis at C5-6 as well.  Reviewing his CT and MRI also demonstrates some foraminal stenosis at C7-T1.  This seems to correlate with his symptoms down the medial aspect of the hands.  Imaging was reviewed with the patient shown the patient today.    Assessment: Cervical stenosis with myeloradiculopathy.    Plan: Given the locations of his problems I have recommended a single operation with 2 parts, a C4-6 anterior cervical discectomy and fusion with a bilateral minimally invasive C7-T1 posterior cervical foraminotomy.  We will work on scheduling this in the near future when virus restrictions allow us to restart elective surgeries. The risks benefits and alternatives to the procedure were discussed, questions solicited and answered, and the patient wishes to proceed with surgery.            Again, thank you for allowing me to participate in the care of your patient.        Sincerely,        Abdullahi Downing MD

## 2020-04-10 NOTE — NURSING NOTE
Patient Education    Education included but not limited to:  - Surgical risks: blood clots, urinating difficulties, nerve damage, infection.  - Pre-operative physical with primary care physician within 30 days of surgical date.   - Pre-operative clearance from other pertaining specialties.   - Discontinue NSAIDS x 7 days prior to surgical date.   - Do not begin taking NSAIDs (Advil, Motrin, Ibuprofen, Nuprin, Diclofenac, Meloxicam, Aleve, Celebrex, Aspirin, etc.) until 6 weeks after surgery if you had a fusion. May cause bleeding and interfere with bone healing.    -May try Tylenol for pain.  -Discussed being off work after surgery, short term disability, FMLA, etc.   -Forms to be completed    -Pre-op timeline: NPO, shower, medications    -Hospital stay: Checking in, surgery, recovery room, hospital room.    - Post operative pain management: narcotics, muscle relaxants, ice, etc.   -No driving while taking narcotics     -Post operative incision care:   Keep your incision clean and dry.   Okay to shower. No submerging in water until incision healed.   Watch for signs of infection and notify clinic if drainage or fever develops.   - Post operative activity limitations recommended until follow up appointment: no lifting > 10 pounds; limited bending, twisting, overhead reaching.  -If a brace is required per Dr. Downing, Orth[facility] will fit you for the brace in the hospital.  - Follow up appointments: Suture/staple removal at 2 weeks post op. 6 week post op, 3 months post op, 6 months post op, 1 year post op. You will need to an xray before each appointment. Please call to schedule follow up appointment at [phone].   - Education book was also given to the patient for further review.      Patient verbalized understanding of above instructions. All questions were answered to the best of my ability and the patient's satisfaction. Patient advised to call with any additional questions or concerns.

## 2020-04-10 NOTE — NURSING NOTE
Patient is taking Metoprolol for his HR. Nursing discussed with patient and Dr. Downing. Agreed to follow up with PCP due to low heart rate.

## 2020-05-06 DIAGNOSIS — Z11.59 ENCOUNTER FOR SCREENING FOR OTHER VIRAL DISEASES: Primary | ICD-10-CM

## 2020-05-06 PROBLEM — M47.12 CERVICAL SPONDYLOSIS WITH MYELOPATHY: Status: ACTIVE | Noted: 2020-05-06

## 2020-05-06 PROBLEM — M47.22 CERVICAL SPONDYLOSIS WITH RADICULOPATHY: Status: ACTIVE | Noted: 2020-05-06

## 2020-05-18 ENCOUNTER — TELEPHONE (OUTPATIENT)
Dept: NEUROSURGERY | Facility: CLINIC | Age: 65
End: 2020-05-18

## 2020-05-18 NOTE — TELEPHONE ENCOUNTER
Elizabeth from [facility] called regarding patients COVID testing.   Alexy answered on call back and stated he will call patient and get him scheduled for a test within appropriate timeframe for surgery.

## 2020-05-20 ENCOUNTER — DOCUMENTATION ONLY (OUTPATIENT)
Dept: NEUROSURGERY | Facility: CLINIC | Age: 65
End: 2020-05-20

## 2020-05-20 ENCOUNTER — DOCUMENTATION ONLY (OUTPATIENT)
Dept: ONCOLOGY | Facility: CLINIC | Age: 65
End: 2020-05-20

## 2020-05-20 RX ORDER — SILDENAFIL 50 MG/1
50 TABLET, FILM COATED ORAL DAILY PRN
COMMUNITY
End: 2022-04-22

## 2020-05-20 RX ORDER — ACETAMINOPHEN 325 MG/1
325-650 TABLET ORAL 4 TIMES DAILY PRN
COMMUNITY

## 2020-05-20 NOTE — PROGRESS NOTES
ORAL CHEMOTHERAPY DISCONTINUATION       Primary Oncologist:  Dr. Lozano  Primary Oncology Clinic: [facility]  Cancer Diagnosis:  Anaplastic Oligodendroglioma  Therapy History:  Adjuvant Temodar   C1D1: 9/19/19 150mg/m2 (360mg)  C2D1=10/17 increased to 200mg/m2 (460 mg)  V1K0=50/19 delayed due to thrombocytopenia   B1B3=2412/17/2019  C5D1=1/14/20 delayed to 1/20/20  C6D1=2/24/20 (final cycle)      Therapy Ended On:  2/28/2020  Reason For Discontinuation: therapy completed    Additional Notes:  Thank you for the opportunity to be a part in the care of this patient's oral chemotherapy. The oncology pharmacy will no longer be following this patient for oral chemotherapy. If anything changes, feel free to contact us.     Vilma Wang  Student Pharmacist  Oral Chemotherapy Monitoring Program  [facility]  [phone]

## 2020-05-20 NOTE — PROGRESS NOTES
May 22, 2020    [employer] Forms: yes     Type of form: [employer] for wife Dana Farfan     Faxed to: [phone]    Copy placed in bin and original sent to medical records.

## 2020-05-20 NOTE — PROGRESS NOTES
PTA medications updated by Medication Scribe day before surgery via phone call with patient      -LAST DOSES ENTERED BY NURSE-    Medication history sources: Patient, Surescripts and H&P  Medication history source reliability: Good  Adherence assessment: N/A Not Observed    Significant changes made to the medication list:  None      Additional medication history information:   Pt plans on bringing own Latanoprost eye drops        Prior to Admission medications    Medication Sig Last Dose Taking? Auth Provider   acetaminophen (TYLENOL) 325 MG tablet Take 325-650 mg by mouth 4 times daily as needed for mild pain  at prn Yes Reported, Patient   atorvastatin (LIPITOR) 80 MG tablet Take 40 mg by mouth every evening (takes 0.5 x 80mg)  at PM Yes Unknown, Entered By History   latanoprost (XALATAN) 0.005 % ophthalmic solution Place 1 drop into both eyes every evening   at PM Yes Unknown, Entered By History   losartan (COZAAR) 100 MG tablet Take 100 mg by mouth every evening   at PM Yes Unknown, Entered By History   metoprolol succinate ER (TOPROL-XL) 25 MG 24 hr tablet Take 25 mg by mouth daily   at AM Yes Reported, Patient   sildenafil (VIAGRA) 50 MG tablet Take 50 mg by mouth daily as needed  at PRN Yes Reported, Patient

## 2020-05-21 ENCOUNTER — APPOINTMENT (OUTPATIENT)
Dept: GENERAL RADIOLOGY | Facility: CLINIC | Age: 65
End: 2020-05-21
Attending: NEUROLOGICAL SURGERY
Payer: COMMERCIAL

## 2020-05-21 ENCOUNTER — ANESTHESIA EVENT (OUTPATIENT)
Dept: SURGERY | Facility: CLINIC | Age: 65
End: 2020-05-21
Payer: COMMERCIAL

## 2020-05-21 ENCOUNTER — HOSPITAL ENCOUNTER (OUTPATIENT)
Facility: CLINIC | Age: 65
Discharge: HOME | End: 2020-05-24
Attending: NEUROLOGICAL SURGERY | Admitting: NEUROLOGICAL SURGERY
Payer: COMMERCIAL

## 2020-05-21 ENCOUNTER — ANESTHESIA (OUTPATIENT)
Dept: SURGERY | Facility: CLINIC | Age: 65
End: 2020-05-21
Payer: COMMERCIAL

## 2020-05-21 DIAGNOSIS — M47.22 CERVICAL SPONDYLOSIS WITH RADICULOPATHY: ICD-10-CM

## 2020-05-21 DIAGNOSIS — M47.12 CERVICAL SPONDYLOSIS WITH MYELOPATHY: ICD-10-CM

## 2020-05-21 PROBLEM — Z98.1 S/P CERVICAL SPINAL FUSION: Status: ACTIVE | Noted: 2020-05-21

## 2020-05-21 LAB
ABO + RH BLD: NORMAL
ABO + RH BLD: NORMAL
ALBUMIN SERPL-MCNC: 3.2 G/DL (ref 3.4–5)
ALP SERPL-CCNC: 83 U/L (ref 40–150)
ALT SERPL W P-5'-P-CCNC: 20 U/L (ref 0–70)
ANION GAP SERPL CALCULATED.3IONS-SCNC: 8 MMOL/L (ref 3–14)
AST SERPL W P-5'-P-CCNC: 18 U/L (ref 0–45)
BILIRUB SERPL-MCNC: 0.6 MG/DL (ref 0.2–1.3)
BLD GP AB SCN SERPL QL: NORMAL
BLOOD BANK CMNT PATIENT-IMP: NORMAL
BUN SERPL-MCNC: 16 MG/DL (ref 7–30)
CALCIUM SERPL-MCNC: 8.6 MG/DL (ref 8.5–10.1)
CHLORIDE SERPL-SCNC: 106 MMOL/L (ref 94–109)
CO2 SERPL-SCNC: 25 MMOL/L (ref 20–32)
CREAT SERPL-MCNC: 0.94 MG/DL (ref 0.66–1.25)
GFR SERPL CREATININE-BSD FRML MDRD: 85 ML/MIN/{1.73_M2}
GLUCOSE SERPL-MCNC: 156 MG/DL (ref 70–99)
MAGNESIUM SERPL-MCNC: 1.9 MG/DL (ref 1.6–2.3)
POTASSIUM SERPL-SCNC: 4.2 MMOL/L (ref 3.4–5.3)
POTASSIUM SERPL-SCNC: 4.3 MMOL/L (ref 3.4–5.3)
PROT SERPL-MCNC: 6.2 G/DL (ref 6.8–8.8)
SODIUM SERPL-SCNC: 139 MMOL/L (ref 133–144)
SPECIMEN EXP DATE BLD: NORMAL
TROPONIN I SERPL-MCNC: <0.015 UG/L (ref 0–0.04)
TSH SERPL DL<=0.005 MIU/L-ACNC: 0.4 MU/L (ref 0.4–4)

## 2020-05-21 PROCEDURE — 20931 SP BONE ALGRFT STRUCT ADD-ON: CPT | Performed by: NEUROLOGICAL SURGERY

## 2020-05-21 PROCEDURE — 25000132 ZZH RX MED GY IP 250 OP 250 PS 637: Performed by: PHYSICIAN ASSISTANT

## 2020-05-21 PROCEDURE — 36000069 ZZH SURGERY LEVEL 5 EA 15 ADDTL MIN: Performed by: NEUROLOGICAL SURGERY

## 2020-05-21 PROCEDURE — 27210794 ZZH OR GENERAL SUPPLY STERILE: Performed by: NEUROLOGICAL SURGERY

## 2020-05-21 PROCEDURE — 84443 ASSAY THYROID STIM HORMONE: CPT | Performed by: PHYSICIAN ASSISTANT

## 2020-05-21 PROCEDURE — 99207 ZZC CONSULT E&M CHANGED TO INITIAL LEVEL: CPT | Performed by: HOSPITALIST

## 2020-05-21 PROCEDURE — 25000125 ZZHC RX 250: Performed by: NURSE ANESTHETIST, CERTIFIED REGISTERED

## 2020-05-21 PROCEDURE — 93005 ELECTROCARDIOGRAM TRACING: CPT

## 2020-05-21 PROCEDURE — 22551 ARTHRD ANT NTRBDY CERVICAL: CPT | Mod: AS | Performed by: PHYSICIAN ASSISTANT

## 2020-05-21 PROCEDURE — 22552 ARTHRD ANT NTRBD CERVICAL EA: CPT | Performed by: NEUROLOGICAL SURGERY

## 2020-05-21 PROCEDURE — 36415 COLL VENOUS BLD VENIPUNCTURE: CPT | Performed by: PHYSICIAN ASSISTANT

## 2020-05-21 PROCEDURE — C1762 CONN TISS, HUMAN(INC FASCIA): HCPCS | Performed by: NEUROLOGICAL SURGERY

## 2020-05-21 PROCEDURE — 25000125 ZZHC RX 250: Performed by: NEUROLOGICAL SURGERY

## 2020-05-21 PROCEDURE — 25000566 ZZH SEVOFLURANE, EA 15 MIN: Performed by: NEUROLOGICAL SURGERY

## 2020-05-21 PROCEDURE — 37000009 ZZH ANESTHESIA TECHNICAL FEE, EACH ADDTL 15 MIN: Performed by: NEUROLOGICAL SURGERY

## 2020-05-21 PROCEDURE — 25800030 ZZH RX IP 258 OP 636: Performed by: PHYSICIAN ASSISTANT

## 2020-05-21 PROCEDURE — 36415 COLL VENOUS BLD VENIPUNCTURE: CPT | Performed by: ANESTHESIOLOGY

## 2020-05-21 PROCEDURE — 63045 LAM FACETEC & FORAMOT CRV: CPT | Mod: AS | Performed by: PHYSICIAN ASSISTANT

## 2020-05-21 PROCEDURE — 25000132 ZZH RX MED GY IP 250 OP 250 PS 637: Performed by: NEUROLOGICAL SURGERY

## 2020-05-21 PROCEDURE — C1713 ANCHOR/SCREW BN/BN,TIS/BN: HCPCS | Performed by: NEUROLOGICAL SURGERY

## 2020-05-21 PROCEDURE — 25800030 ZZH RX IP 258 OP 636: Performed by: ANESTHESIOLOGY

## 2020-05-21 PROCEDURE — 84132 ASSAY OF SERUM POTASSIUM: CPT | Performed by: ANESTHESIOLOGY

## 2020-05-21 PROCEDURE — 37000008 ZZH ANESTHESIA TECHNICAL FEE, 1ST 30 MIN: Performed by: NEUROLOGICAL SURGERY

## 2020-05-21 PROCEDURE — 86850 RBC ANTIBODY SCREEN: CPT | Performed by: ANESTHESIOLOGY

## 2020-05-21 PROCEDURE — 86901 BLOOD TYPING SEROLOGIC RH(D): CPT | Performed by: ANESTHESIOLOGY

## 2020-05-21 PROCEDURE — 71000013 ZZH RECOVERY PHASE 1 LEVEL 1 EA ADDTL HR: Performed by: NEUROLOGICAL SURGERY

## 2020-05-21 PROCEDURE — 22551 ARTHRD ANT NTRBDY CERVICAL: CPT | Performed by: NEUROLOGICAL SURGERY

## 2020-05-21 PROCEDURE — 25000128 H RX IP 250 OP 636: Performed by: NEUROLOGICAL SURGERY

## 2020-05-21 PROCEDURE — 40000277 XR SURGERY CARM FLUORO LESS THAN 5 MIN W STILLS

## 2020-05-21 PROCEDURE — 99219 ZZC INITIAL OBSERVATION CARE,LEVL II: CPT | Performed by: HOSPITALIST

## 2020-05-21 PROCEDURE — 25000128 H RX IP 250 OP 636: Performed by: ANESTHESIOLOGY

## 2020-05-21 PROCEDURE — 25800030 ZZH RX IP 258 OP 636: Performed by: NURSE ANESTHETIST, CERTIFIED REGISTERED

## 2020-05-21 PROCEDURE — 83735 ASSAY OF MAGNESIUM: CPT | Performed by: PHYSICIAN ASSISTANT

## 2020-05-21 PROCEDURE — 22854 INSJ BIOMECHANICAL DEVICE: CPT | Mod: AS | Performed by: PHYSICIAN ASSISTANT

## 2020-05-21 PROCEDURE — 80053 COMPREHEN METABOLIC PANEL: CPT | Performed by: PHYSICIAN ASSISTANT

## 2020-05-21 PROCEDURE — 71000012 ZZH RECOVERY PHASE 1 LEVEL 1 FIRST HR: Performed by: NEUROLOGICAL SURGERY

## 2020-05-21 PROCEDURE — 25000128 H RX IP 250 OP 636: Performed by: NURSE ANESTHETIST, CERTIFIED REGISTERED

## 2020-05-21 PROCEDURE — 63045 LAM FACETEC & FORAMOT CRV: CPT | Performed by: NEUROLOGICAL SURGERY

## 2020-05-21 PROCEDURE — 40000170 ZZH STATISTIC PRE-PROCEDURE ASSESSMENT II: Performed by: NEUROLOGICAL SURGERY

## 2020-05-21 PROCEDURE — 86900 BLOOD TYPING SEROLOGIC ABO: CPT | Performed by: ANESTHESIOLOGY

## 2020-05-21 PROCEDURE — 36000071 ZZH SURGERY LEVEL 5 W FLUORO 1ST 30 MIN: Performed by: NEUROLOGICAL SURGERY

## 2020-05-21 PROCEDURE — 22845 INSERT SPINE FIXATION DEVICE: CPT | Performed by: NEUROLOGICAL SURGERY

## 2020-05-21 PROCEDURE — 84443 ASSAY THYROID STIM HORMONE: CPT | Performed by: HOSPITALIST

## 2020-05-21 PROCEDURE — 84484 ASSAY OF TROPONIN QUANT: CPT | Performed by: PHYSICIAN ASSISTANT

## 2020-05-21 PROCEDURE — 93010 ELECTROCARDIOGRAM REPORT: CPT | Performed by: INTERNAL MEDICINE

## 2020-05-21 PROCEDURE — 22552 ARTHRD ANT NTRBD CERVICAL EA: CPT | Mod: AS | Performed by: PHYSICIAN ASSISTANT

## 2020-05-21 PROCEDURE — 25000301 ZZH OR RX SURGIFLO W/THROMBIN KIT 2ML 1991 OPNP: Performed by: NEUROLOGICAL SURGERY

## 2020-05-21 DEVICE — IMPLANTABLE DEVICE: Type: IMPLANTABLE DEVICE | Site: SPINE CERVICAL | Status: FUNCTIONAL

## 2020-05-21 DEVICE — GRAFT BONE PUTTY DBX 01ML 038010: Type: IMPLANTABLE DEVICE | Site: SPINE CERVICAL | Status: FUNCTIONAL

## 2020-05-21 DEVICE — GRAFT BONE SPACER LORDOTIC 07MM 017707: Type: IMPLANTABLE DEVICE | Site: SPINE CERVICAL | Status: FUNCTIONAL

## 2020-05-21 RX ORDER — BUPIVACAINE HYDROCHLORIDE AND EPINEPHRINE 2.5; 5 MG/ML; UG/ML
INJECTION, SOLUTION INFILTRATION; PERINEURAL PRN
Status: DISCONTINUED | OUTPATIENT
Start: 2020-05-21 | End: 2020-05-21 | Stop reason: HOSPADM

## 2020-05-21 RX ORDER — KETAMINE HYDROCHLORIDE 10 MG/ML
INJECTION INTRAMUSCULAR; INTRAVENOUS PRN
Status: DISCONTINUED | OUTPATIENT
Start: 2020-05-21 | End: 2020-05-21

## 2020-05-21 RX ORDER — ACETAMINOPHEN 325 MG/1
975 TABLET ORAL ONCE
Status: COMPLETED | OUTPATIENT
Start: 2020-05-21 | End: 2020-05-21

## 2020-05-21 RX ORDER — FENTANYL CITRATE 50 UG/ML
INJECTION, SOLUTION INTRAMUSCULAR; INTRAVENOUS PRN
Status: DISCONTINUED | OUTPATIENT
Start: 2020-05-21 | End: 2020-05-21

## 2020-05-21 RX ORDER — NALOXONE HYDROCHLORIDE 0.4 MG/ML
.1-.4 INJECTION, SOLUTION INTRAMUSCULAR; INTRAVENOUS; SUBCUTANEOUS
Status: DISCONTINUED | OUTPATIENT
Start: 2020-05-21 | End: 2020-05-24 | Stop reason: HOSPADM

## 2020-05-21 RX ORDER — SODIUM CHLORIDE, SODIUM LACTATE, POTASSIUM CHLORIDE, CALCIUM CHLORIDE 600; 310; 30; 20 MG/100ML; MG/100ML; MG/100ML; MG/100ML
INJECTION, SOLUTION INTRAVENOUS CONTINUOUS PRN
Status: DISCONTINUED | OUTPATIENT
Start: 2020-05-21 | End: 2020-05-21

## 2020-05-21 RX ORDER — FENTANYL CITRATE 50 UG/ML
25-50 INJECTION, SOLUTION INTRAMUSCULAR; INTRAVENOUS
Status: DISCONTINUED | OUTPATIENT
Start: 2020-05-21 | End: 2020-05-21 | Stop reason: HOSPADM

## 2020-05-21 RX ORDER — SODIUM CHLORIDE, SODIUM LACTATE, POTASSIUM CHLORIDE, CALCIUM CHLORIDE 600; 310; 30; 20 MG/100ML; MG/100ML; MG/100ML; MG/100ML
INJECTION, SOLUTION INTRAVENOUS CONTINUOUS
Status: DISCONTINUED | OUTPATIENT
Start: 2020-05-21 | End: 2020-05-21 | Stop reason: HOSPADM

## 2020-05-21 RX ORDER — CEFAZOLIN SODIUM 1 G/3ML
1 INJECTION, POWDER, FOR SOLUTION INTRAMUSCULAR; INTRAVENOUS SEE ADMIN INSTRUCTIONS
Status: DISCONTINUED | OUTPATIENT
Start: 2020-05-21 | End: 2020-05-21 | Stop reason: HOSPADM

## 2020-05-21 RX ORDER — DEXAMETHASONE SODIUM PHOSPHATE 4 MG/ML
4 INJECTION, SOLUTION INTRA-ARTICULAR; INTRALESIONAL; INTRAMUSCULAR; INTRAVENOUS; SOFT TISSUE
Status: DISCONTINUED | OUTPATIENT
Start: 2020-05-21 | End: 2020-05-21 | Stop reason: HOSPADM

## 2020-05-21 RX ORDER — LOSARTAN POTASSIUM 100 MG/1
100 TABLET ORAL EVERY EVENING
Status: DISCONTINUED | OUTPATIENT
Start: 2020-05-21 | End: 2020-05-24 | Stop reason: HOSPADM

## 2020-05-21 RX ORDER — CEFAZOLIN SODIUM 1 G/3ML
INJECTION, POWDER, FOR SOLUTION INTRAMUSCULAR; INTRAVENOUS PRN
Status: DISCONTINUED | OUTPATIENT
Start: 2020-05-21 | End: 2020-05-21

## 2020-05-21 RX ORDER — GLYCOPYRROLATE 0.2 MG/ML
INJECTION, SOLUTION INTRAMUSCULAR; INTRAVENOUS PRN
Status: DISCONTINUED | OUTPATIENT
Start: 2020-05-21 | End: 2020-05-21

## 2020-05-21 RX ORDER — SODIUM CHLORIDE 9 MG/ML
INJECTION, SOLUTION INTRAVENOUS CONTINUOUS
Status: DISCONTINUED | OUTPATIENT
Start: 2020-05-21 | End: 2020-05-24 | Stop reason: HOSPADM

## 2020-05-21 RX ORDER — OXYCODONE AND ACETAMINOPHEN 5; 325 MG/1; MG/1
1-2 TABLET ORAL EVERY 4 HOURS PRN
Status: DISCONTINUED | OUTPATIENT
Start: 2020-05-21 | End: 2020-05-24 | Stop reason: HOSPADM

## 2020-05-21 RX ORDER — OXYCODONE AND ACETAMINOPHEN 5; 325 MG/1; MG/1
1-2 TABLET ORAL EVERY 4 HOURS PRN
Qty: 30 TABLET | Refills: 0 | Status: SHIPPED | OUTPATIENT
Start: 2020-05-21 | End: 2020-05-29

## 2020-05-21 RX ORDER — CEFAZOLIN SODIUM 2 G/100ML
2 INJECTION, SOLUTION INTRAVENOUS
Status: COMPLETED | OUTPATIENT
Start: 2020-05-21 | End: 2020-05-21

## 2020-05-21 RX ORDER — METOPROLOL SUCCINATE 25 MG/1
25 TABLET, EXTENDED RELEASE ORAL DAILY
Status: DISCONTINUED | OUTPATIENT
Start: 2020-05-22 | End: 2020-05-24 | Stop reason: HOSPADM

## 2020-05-21 RX ORDER — NALOXONE HYDROCHLORIDE 0.4 MG/ML
.1-.4 INJECTION, SOLUTION INTRAMUSCULAR; INTRAVENOUS; SUBCUTANEOUS
Status: DISCONTINUED | OUTPATIENT
Start: 2020-05-21 | End: 2020-05-21

## 2020-05-21 RX ORDER — ONDANSETRON 4 MG/1
4 TABLET, ORALLY DISINTEGRATING ORAL EVERY 30 MIN PRN
Status: DISCONTINUED | OUTPATIENT
Start: 2020-05-21 | End: 2020-05-21 | Stop reason: HOSPADM

## 2020-05-21 RX ORDER — AMOXICILLIN 250 MG
1-2 CAPSULE ORAL 2 TIMES DAILY
Qty: 30 TABLET | Refills: 0 | Status: SHIPPED | OUTPATIENT
Start: 2020-05-21 | End: 2020-11-21

## 2020-05-21 RX ORDER — DEXAMETHASONE SODIUM PHOSPHATE 4 MG/ML
INJECTION, SOLUTION INTRA-ARTICULAR; INTRALESIONAL; INTRAMUSCULAR; INTRAVENOUS; SOFT TISSUE PRN
Status: DISCONTINUED | OUTPATIENT
Start: 2020-05-21 | End: 2020-05-21

## 2020-05-21 RX ORDER — ONDANSETRON 2 MG/ML
4 INJECTION INTRAMUSCULAR; INTRAVENOUS EVERY 30 MIN PRN
Status: DISCONTINUED | OUTPATIENT
Start: 2020-05-21 | End: 2020-05-21 | Stop reason: HOSPADM

## 2020-05-21 RX ORDER — LIDOCAINE 40 MG/G
CREAM TOPICAL
Status: DISCONTINUED | OUTPATIENT
Start: 2020-05-21 | End: 2020-05-24 | Stop reason: HOSPADM

## 2020-05-21 RX ORDER — ONDANSETRON 2 MG/ML
INJECTION INTRAMUSCULAR; INTRAVENOUS PRN
Status: DISCONTINUED | OUTPATIENT
Start: 2020-05-21 | End: 2020-05-21

## 2020-05-21 RX ORDER — METOCLOPRAMIDE 5 MG/1
10 TABLET ORAL EVERY 6 HOURS PRN
Status: DISCONTINUED | OUTPATIENT
Start: 2020-05-21 | End: 2020-05-24 | Stop reason: HOSPADM

## 2020-05-21 RX ORDER — ATORVASTATIN CALCIUM 40 MG/1
40 TABLET, FILM COATED ORAL EVERY EVENING
Status: DISCONTINUED | OUTPATIENT
Start: 2020-05-21 | End: 2020-05-24 | Stop reason: HOSPADM

## 2020-05-21 RX ORDER — METOCLOPRAMIDE HYDROCHLORIDE 5 MG/ML
10 INJECTION INTRAMUSCULAR; INTRAVENOUS EVERY 6 HOURS PRN
Status: DISCONTINUED | OUTPATIENT
Start: 2020-05-21 | End: 2020-05-24 | Stop reason: HOSPADM

## 2020-05-21 RX ORDER — PROPOFOL 10 MG/ML
INJECTION, EMULSION INTRAVENOUS PRN
Status: DISCONTINUED | OUTPATIENT
Start: 2020-05-21 | End: 2020-05-21

## 2020-05-21 RX ORDER — ONDANSETRON 2 MG/ML
4 INJECTION INTRAMUSCULAR; INTRAVENOUS EVERY 6 HOURS PRN
Status: DISCONTINUED | OUTPATIENT
Start: 2020-05-21 | End: 2020-05-24 | Stop reason: HOSPADM

## 2020-05-21 RX ORDER — HYDROMORPHONE HYDROCHLORIDE 1 MG/ML
0.2 INJECTION, SOLUTION INTRAMUSCULAR; INTRAVENOUS; SUBCUTANEOUS
Status: DISCONTINUED | OUTPATIENT
Start: 2020-05-21 | End: 2020-05-24 | Stop reason: HOSPADM

## 2020-05-21 RX ORDER — ONDANSETRON 4 MG/1
4 TABLET, ORALLY DISINTEGRATING ORAL EVERY 6 HOURS PRN
Status: DISCONTINUED | OUTPATIENT
Start: 2020-05-21 | End: 2020-05-24 | Stop reason: HOSPADM

## 2020-05-21 RX ORDER — HYDROMORPHONE HYDROCHLORIDE 1 MG/ML
.3-.5 INJECTION, SOLUTION INTRAMUSCULAR; INTRAVENOUS; SUBCUTANEOUS EVERY 5 MIN PRN
Status: DISCONTINUED | OUTPATIENT
Start: 2020-05-21 | End: 2020-05-21 | Stop reason: HOSPADM

## 2020-05-21 RX ORDER — PROCHLORPERAZINE MALEATE 10 MG
10 TABLET ORAL EVERY 6 HOURS PRN
Status: DISCONTINUED | OUTPATIENT
Start: 2020-05-21 | End: 2020-05-24 | Stop reason: HOSPADM

## 2020-05-21 RX ORDER — LIDOCAINE HYDROCHLORIDE 20 MG/ML
INJECTION, SOLUTION INFILTRATION; PERINEURAL PRN
Status: DISCONTINUED | OUTPATIENT
Start: 2020-05-21 | End: 2020-05-21

## 2020-05-21 RX ADMIN — SODIUM CHLORIDE, POTASSIUM CHLORIDE, SODIUM LACTATE AND CALCIUM CHLORIDE: 600; 310; 30; 20 INJECTION, SOLUTION INTRAVENOUS at 07:55

## 2020-05-21 RX ADMIN — PHENYLEPHRINE HYDROCHLORIDE 0.25 MCG/KG/MIN: 10 INJECTION INTRAVENOUS at 08:01

## 2020-05-21 RX ADMIN — FENTANYL CITRATE 50 MCG: 50 INJECTION, SOLUTION INTRAMUSCULAR; INTRAVENOUS at 07:46

## 2020-05-21 RX ADMIN — DEXAMETHASONE SODIUM PHOSPHATE 4 MG: 4 INJECTION, SOLUTION INTRA-ARTICULAR; INTRALESIONAL; INTRAMUSCULAR; INTRAVENOUS; SOFT TISSUE at 07:46

## 2020-05-21 RX ADMIN — ROCURONIUM BROMIDE 10 MG: 10 INJECTION INTRAVENOUS at 10:03

## 2020-05-21 RX ADMIN — PROPOFOL 20 MG: 10 INJECTION, EMULSION INTRAVENOUS at 10:07

## 2020-05-21 RX ADMIN — FENTANYL CITRATE 50 MCG: 50 INJECTION, SOLUTION INTRAMUSCULAR; INTRAVENOUS at 13:34

## 2020-05-21 RX ADMIN — SODIUM CHLORIDE: 9 INJECTION, SOLUTION INTRAVENOUS at 16:30

## 2020-05-21 RX ADMIN — ROCURONIUM BROMIDE 10 MG: 10 INJECTION INTRAVENOUS at 11:13

## 2020-05-21 RX ADMIN — SODIUM CHLORIDE: 9 INJECTION, SOLUTION INTRAVENOUS at 15:29

## 2020-05-21 RX ADMIN — ROCURONIUM BROMIDE 10 MG: 10 INJECTION INTRAVENOUS at 08:50

## 2020-05-21 RX ADMIN — CEFAZOLIN 1 G: 1 INJECTION, POWDER, FOR SOLUTION INTRAMUSCULAR; INTRAVENOUS at 09:59

## 2020-05-21 RX ADMIN — HYDROMORPHONE HYDROCHLORIDE 0.5 MG: 1 INJECTION, SOLUTION INTRAMUSCULAR; INTRAVENOUS; SUBCUTANEOUS at 08:33

## 2020-05-21 RX ADMIN — FENTANYL CITRATE 50 MCG: 50 INJECTION, SOLUTION INTRAMUSCULAR; INTRAVENOUS at 08:53

## 2020-05-21 RX ADMIN — FENTANYL CITRATE 50 MCG: 50 INJECTION, SOLUTION INTRAMUSCULAR; INTRAVENOUS at 08:06

## 2020-05-21 RX ADMIN — ROCURONIUM BROMIDE 20 MG: 10 INJECTION INTRAVENOUS at 09:22

## 2020-05-21 RX ADMIN — ROCURONIUM BROMIDE 20 MG: 10 INJECTION INTRAVENOUS at 10:31

## 2020-05-21 RX ADMIN — ACETAMINOPHEN 1000 MG: 500 TABLET, FILM COATED ORAL at 06:01

## 2020-05-21 RX ADMIN — DEXMEDETOMIDINE HYDROCHLORIDE 0.3 MCG/KG/HR: 100 INJECTION, SOLUTION INTRAVENOUS at 09:41

## 2020-05-21 RX ADMIN — LOSARTAN POTASSIUM 100 MG: 100 TABLET, FILM COATED ORAL at 20:13

## 2020-05-21 RX ADMIN — ROCURONIUM BROMIDE 50 MG: 10 INJECTION INTRAVENOUS at 07:46

## 2020-05-21 RX ADMIN — LIDOCAINE HYDROCHLORIDE 80 MG: 20 INJECTION, SOLUTION INFILTRATION; PERINEURAL at 07:46

## 2020-05-21 RX ADMIN — ROCURONIUM BROMIDE 10 MG: 10 INJECTION INTRAVENOUS at 11:32

## 2020-05-21 RX ADMIN — OXYCODONE HYDROCHLORIDE AND ACETAMINOPHEN 1 TABLET: 5; 325 TABLET ORAL at 23:32

## 2020-05-21 RX ADMIN — CEFAZOLIN SODIUM 2 G: 2 INJECTION, SOLUTION INTRAVENOUS at 08:03

## 2020-05-21 RX ADMIN — SODIUM CHLORIDE, POTASSIUM CHLORIDE, SODIUM LACTATE AND CALCIUM CHLORIDE: 600; 310; 30; 20 INJECTION, SOLUTION INTRAVENOUS at 12:26

## 2020-05-21 RX ADMIN — Medication 10 MG: at 10:58

## 2020-05-21 RX ADMIN — PROPOFOL 50 MG: 10 INJECTION, EMULSION INTRAVENOUS at 10:28

## 2020-05-21 RX ADMIN — Medication 10 MG: at 08:58

## 2020-05-21 RX ADMIN — ROCURONIUM BROMIDE 20 MG: 10 INJECTION INTRAVENOUS at 08:32

## 2020-05-21 RX ADMIN — PROPOFOL 40 MG: 10 INJECTION, EMULSION INTRAVENOUS at 08:10

## 2020-05-21 RX ADMIN — FENTANYL CITRATE 50 MCG: 50 INJECTION, SOLUTION INTRAMUSCULAR; INTRAVENOUS at 08:58

## 2020-05-21 RX ADMIN — SODIUM CHLORIDE, POTASSIUM CHLORIDE, SODIUM LACTATE AND CALCIUM CHLORIDE: 600; 310; 30; 20 INJECTION, SOLUTION INTRAVENOUS at 07:06

## 2020-05-21 RX ADMIN — Medication 20 MG: at 08:00

## 2020-05-21 RX ADMIN — PROPOFOL 60 MG: 10 INJECTION, EMULSION INTRAVENOUS at 08:22

## 2020-05-21 RX ADMIN — HYDROMORPHONE HYDROCHLORIDE 0.5 MG: 1 INJECTION, SOLUTION INTRAMUSCULAR; INTRAVENOUS; SUBCUTANEOUS at 09:03

## 2020-05-21 RX ADMIN — SUGAMMADEX 200 MG: 100 INJECTION, SOLUTION INTRAVENOUS at 12:12

## 2020-05-21 RX ADMIN — ATORVASTATIN CALCIUM 40 MG: 40 TABLET, FILM COATED ORAL at 20:12

## 2020-05-21 RX ADMIN — PROPOFOL 30 MG: 10 INJECTION, EMULSION INTRAVENOUS at 09:36

## 2020-05-21 RX ADMIN — DEXMEDETOMIDINE HYDROCHLORIDE 8 MCG: 100 INJECTION, SOLUTION INTRAVENOUS at 09:01

## 2020-05-21 RX ADMIN — DEXMEDETOMIDINE HYDROCHLORIDE 12 MCG: 100 INJECTION, SOLUTION INTRAVENOUS at 09:03

## 2020-05-21 RX ADMIN — ROCURONIUM BROMIDE 10 MG: 10 INJECTION INTRAVENOUS at 10:24

## 2020-05-21 RX ADMIN — MIDAZOLAM 1 MG: 1 INJECTION INTRAMUSCULAR; INTRAVENOUS at 07:34

## 2020-05-21 RX ADMIN — GLYCOPYRROLATE 0.2 MG: 0.2 INJECTION, SOLUTION INTRAMUSCULAR; INTRAVENOUS at 12:30

## 2020-05-21 RX ADMIN — GLYCOPYRROLATE 0.2 MG: 0.2 INJECTION, SOLUTION INTRAMUSCULAR; INTRAVENOUS at 12:22

## 2020-05-21 RX ADMIN — Medication 10 MG: at 10:00

## 2020-05-21 RX ADMIN — OXYCODONE HYDROCHLORIDE AND ACETAMINOPHEN 2 TABLET: 5; 325 TABLET ORAL at 18:53

## 2020-05-21 RX ADMIN — ONDANSETRON 4 MG: 2 INJECTION INTRAMUSCULAR; INTRAVENOUS at 11:54

## 2020-05-21 RX ADMIN — PROPOFOL 200 MG: 10 INJECTION, EMULSION INTRAVENOUS at 07:46

## 2020-05-21 RX ADMIN — CEFAZOLIN 1 G: 1 INJECTION, POWDER, FOR SOLUTION INTRAMUSCULAR; INTRAVENOUS at 11:59

## 2020-05-21 ASSESSMENT — LIFESTYLE VARIABLES: TOBACCO_USE: 1

## 2020-05-21 ASSESSMENT — MIFFLIN-ST. JEOR: SCORE: 1875.81

## 2020-05-21 NOTE — ANESTHESIA CARE TRANSFER NOTE
Patient: Gopi Farfan    Procedure(s):  Right Anterior Cervical 4 to cervical 6 discectomy and fusion. Laurent wells tong placement  Bilateral posterior Cervical 7-Thoracic 1 posterior minimally invasive foraminotomy    Diagnosis: Cervical spondylosis with myelopathy [M47.12]  Cervical spondylosis with radiculopathy [M47.22]  Diagnosis Additional Information: No value filed.    Anesthesia Type:   General, ETT     Note:  Airway :Face Mask  Patient transferred to:PACU  Comments: Neuromuscular blockade reversed with sugammadex after TOF 2/4, spontaneous respirations, adequate tidal volumes, followed commands to voice, oropharynx suctioned with soft flexible catheter, extubated atraumatically, airway patent after extubation.  Oxygen via facemask at 6 liters per minute to PACU. After extubation, patient remained in OR for 14 minutes until transport to PACU due to standard hospital COVID-19 precautions, Oxygen tubing connected to wall O2 in PACU, SpO2, NiBP, and EKG monitors and alarms on and functioning, Benjamin Hugger warmer connected to patient gown, report on patient's clinical status given to PACU RN, RN questions answered.   Handoff Report: Identifed the Patient, Identified the Reponsible Provider, Reviewed the pertinent medical history, Discussed the surgical course, Reviewed Intra-OP anesthesia mangement and issues during anesthesia, Set expectations for post-procedure period and Allowed opportunity for questions and acknowledgement of understanding      Vitals: (Last set prior to Anesthesia Care Transfer)    CRNA VITALS  5/21/2020 1203 - 5/21/2020 1247      5/21/2020             Resp Rate (set):  10                Electronically Signed By: LEONID Tripathi CRNA  May 21, 2020  12:47 PM

## 2020-05-21 NOTE — ANESTHESIA PROCEDURE NOTES
ARTERIAL LINE PROCEDURE NOTE:  Staff -   Anesthesiologist:  David Cook MD      Performed By: anesthesiologist         Pre-Procedure  Performed by David Cook MD  Location: pre-op    Procedure Times:5/21/2020 7:13 AM and 5/21/2020 7:17 AM  Pre-Anesthestic Checklist: patient identified, IV checked, risks and benefits discussed, informed consent, monitors and equipment checked, pre-op evaluation and at physician/surgeon's request    Timeout  Correct Patient: Yes   Correct Procedure: Yes   Correct Site: Yes   Correct Laterality: Yes   Correct Position: Yes     .   Procedure Documentation  Procedure: arterial line    Supine  Insertion Site:left.Injection technique: Seldinger Technique  .  .  Patient Prep/Sterile Barriers; chlorhexidine gluconate and isopropyl alcohol    Assessment/Narrative    Catheter: 20 gauge, 1.75 in/4.5 cm quick cath (integral wire)     Secured by transparent dressing  Tegaderm dressing used.    Arterial waveform: Yes IBP within 10% of NIBP: Yes

## 2020-05-21 NOTE — ANESTHESIA PREPROCEDURE EVALUATION
Anesthesia Pre-Procedure Evaluation    Patient: Gopi Farfan   MRN: [id number] : 1955          Preoperative Diagnosis: Cervical spondylosis with myelopathy [M47.12]  Cervical spondylosis with radiculopathy [M47.22]    Procedure(s):  Right Anterior Cervical 4 to cervical 6 discectomy and fusion. Laurent wells tong placement  Bilateral posterior Cervical 7-Thoracic 1 posterior minimally invasive foraminotomy    Past Medical History:   Diagnosis Date     Brain tumor (H)      Cardiac arrhythmia      Cardiac related syncope      Frequent PVCs      GERD (gastroesophageal reflux disease)      Glaucoma (increased eye pressure)      Hypercholesteremia      Hypertension      Obese      Osteoarthritis      Past Surgical History:   Procedure Laterality Date     APPENDECTOMY       AS ESOPHAGOSCOPY, DIAGNOSTIC       esphageal sphincter       HRW ANES TOTAL KNEE REPLACEMENT, MDA 1 Left      OPTICAL TRACKING SYSTEM CRANIOTOMY, EXCISE TUMOR, COMBINED Left 2019    Procedure: Stealth Guided Left Craniotomy And Tumor Resection;  Surgeon: Abdullahi Downing MD;  Location: [facility] OR     STOMACH SURGERY  2019    LINX, a magnetic implant in the area of the gastroesphogeal junction, needs special MRI       Anesthesia Evaluation     . Pt has had prior anesthetic.     No history of anesthetic complications          ROS/MED HX    ENT/Pulmonary:     (+)tobacco use (Quit ), Past use , . .    Neurologic: Comment:   CT/MRI :large left frontal mass concerning for high grade glioma    Progressive short term memory loss     MRI brain w/wo contrast (19): left frontal heterogeneously enhancing mass that extends across the corpus callosum, and associated with vasogenic edema. Findings concerning for high grade glioma.      (+)migraines, other neuro some numbness in hands    Cardiovascular:     (+) Dyslipidemia, hypertension----. Taking blood thinners (ASA 81mg, last dose ) : . . . :. . Previous cardiac testing  Echodate:results:ECHOCARDIOGRAM      LROETO RAMIREZ  MRN:    [id number]         Accession#:   [id number]  :    1955 64 years Study Date:   10/31/2019 10:43:34 AM  Gender: M                  BP:           124/66 mmHg  Height: 185.00 cm          BSA:          2.20 mÂ   Weight: 96.00 kg           Tech:         BENSON                             Referring MD: KIM SEGUNDO  Site:             [facility]  Reading Location: [facility]      Procedure: 2D, Color Doppler and Spectral Doppler.    Indication for study: Syncope  Cardiac Rhythm: Normal sinus and with premature ventricular contractions.Study quality:    Final Impressions:   1. Normal left ventricular size, normal wall thickness, normal global systolic function, calculated EF of 60 %.   2. Grade 1 pattern of LV diastolic filling.   3. Right ventricular cavity size is normal, global systolic RV function is normal.   4. Normal estimated pulmonary pressures by tricuspid regurgitation velocity and right atrial pressure (15 mmHg plus RAP).   5. Sinus rhythm with frequent PVCs, including periods of bigeminy.Stress Testdate:2017 results:  STRESS ECHO:Final Impressions:   1. Negative stress echo for ischemia with a non specific blunted increase in EF post exercise.   2. Post stress, normal left ventricular size, normal global systolic function with an estimated EF of 60 to 65%.  ECG reviewed date:19 results:  EKG: Sinus rhythm in 70's, no ectopy, no ST changes. Flattened T waves in inferior leads (III, AVF), QTc 450ms.   date: results:          METS/Exercise Tolerance:     Hematologic:  - neg hematologic  ROS       Musculoskeletal: Comment: Cervicalgia     (+) arthritis,  other musculoskeletal-       GI/Hepatic:     (+) GERD Asymptomatic on medication,       Renal/Genitourinary: Comment: GFR 50-60's   - ROS Renal section negative       Endo:         Psychiatric:  - neg psychiatric ROS       Infectious Disease:  - neg infectious disease  "ROS       Malignancy:      - no malignancy   Other:                          Physical Exam  Normal systems: cardiovascular, pulmonary and dental    Airway   Mallampati: II  TM distance: >3 FB  Neck ROM: limited    Dental     Cardiovascular       Pulmonary             Lab Results   Component Value Date    WBC 3.5 (L) 02/24/2020    HGB 13.8 02/24/2020    HCT 40.0 02/24/2020     02/24/2020     02/24/2020    POTASSIUM 4.2 05/21/2020    CHLORIDE 106 02/24/2020    CO2 27 02/24/2020    BUN 16 02/24/2020    CR 1.03 02/24/2020     (H) 02/24/2020    LEFTY 9.0 02/24/2020    PHOS 2.8 06/01/2019    MAG 2.4 (H) 06/01/2019    ALBUMIN 3.8 02/24/2020    PROTTOTAL 6.8 02/24/2020    ALT 21 02/24/2020    AST 14 02/24/2020    ALKPHOS 93 02/24/2020    BILITOTAL 0.7 02/24/2020    PTT 25 05/28/2019    INR 1.04 05/28/2019       Preop Vitals  BP Readings from Last 3 Encounters:   05/21/20 129/87   04/10/20 137/62   02/24/20 131/73    Pulse Readings from Last 3 Encounters:   04/10/20 (!) 35   02/24/20 73   01/13/20 (!) 42      Resp Readings from Last 3 Encounters:   05/21/20 16   04/10/20 16   02/24/20 18    SpO2 Readings from Last 3 Encounters:   05/21/20 95%   04/10/20 100%   02/24/20 99%      Temp Readings from Last 1 Encounters:   05/21/20 36.7  C (98.1  F) (Temporal)    Ht Readings from Last 1 Encounters:   05/21/20 1.88 m (6' 2\")      Wt Readings from Last 1 Encounters:   05/21/20 101.6 kg (224 lb)    Estimated body mass index is 28.76 kg/m  as calculated from the following:    Height as of this encounter: 1.88 m (6' 2\").    Weight as of this encounter: 101.6 kg (224 lb).       Anesthesia Plan      History & Physical Review  History and physical reviewed and following examination; no interval change.    ASA Status:  3 .    NPO Status:  > 6 hours    Plan for General and ETT with Intravenous induction.   PONV prophylaxis:  Ondansetron (or other 5HT-3) and Dexamethasone or Solumedrol  Additional equipment: Arterial Line " and 2nd IV        Postoperative Care  Postoperative pain management:  Oral pain medications and IV analgesics.      Consents  Anesthetic plan, risks, benefits and alternatives discussed with:  Patient..                 David Cook MD

## 2020-05-21 NOTE — ANESTHESIA POSTPROCEDURE EVALUATION
Patient: Gopi Farfan    Procedure(s):  Right Anterior Cervical 4 to cervical 6 discectomy and fusion. Laurent wells tong placement  Bilateral posterior Cervical 7-Thoracic 1 posterior minimally invasive foraminotomy    Diagnosis:Cervical spondylosis with myelopathy [M47.12]  Cervical spondylosis with radiculopathy [M47.22]  Diagnosis Additional Information: No value filed.    Anesthesia Type:  General, ETT    Note:  Anesthesia Post Evaluation    Patient location during evaluation: PACU  Patient participation: Able to fully participate in evaluation  Level of consciousness: awake and alert  Pain management: adequate  Airway patency: patent  Cardiovascular status: acceptable  Respiratory status: acceptable  Hydration status: acceptable  PONV: none     Anesthetic complications: None          Last vitals:  Vitals:    05/21/20 1330 05/21/20 1340 05/21/20 1350   BP: 117/74 117/72 131/76   Pulse: 77 64 71   Resp: 11 12 11   Temp:   36.2  C (97.2  F)   SpO2: 96% 95% 97%         Electronically Signed By: David Cook MD  May 21, 2020  2:14 PM

## 2020-05-21 NOTE — BRIEF OP NOTE
[facility]    Brief Operative Note    Pre-operative diagnosis: Cervical spondylosis with myelopathy [M47.12]  Cervical spondylosis with radiculopathy [M47.22]  Post-operative diagnosis Same as pre-operative diagnosis    Procedure: Procedure(s):  Right Anterior Cervical 4 to cervical 6 discectomy and fusion. Laurent wells tong placement  Bilateral posterior Cervical 7-Thoracic 1 posterior minimally invasive foraminotomy  Surgeon: Surgeon(s) and Role:  Panel 1:     * Abdullahi Downing MD - Primary     * Mike Pastrana PA-C - Assisting  Panel 2:     * Abdullahi Downing MD - Primary     * Mike Pastrana PA-C - Assisting  Anesthesia: General   Estimated blood loss: 50 mL  Drains: None  Specimens: * No specimens in log *  Findings:   None.  Complications: None.  Implants:   Implant Name Type Inv. Item Serial No.  Lot No. LRB No. Used Action   GRAFT BONE PUTTY DBX 01ML [id number] Bone/Tissue/Biologic GRAFT BONE PUTTY DBX 01ML [id number] MUSCULOSKELETAL POP  N/A 1 Implanted   GRAFT BONE SPACER PARALLEL 08MM Bone/Tissue/Biologic GRAFT BONE SPACER PARALLEL 08MM [id number] MUSCULOSKELETAL POP  N/A 1 Implanted   GRAFT BONE SPACER LORDOTIC 07MM [id number] Bone/Tissue/Biologic GRAFT BONE SPACER LORDOTIC 07MM [id number] MUSCULOSKELETAL POP  N/A 1 Implanted   IMP SCR SYN CERVICAL 4.0X18MM VA TI SELF DRILL [id number] Metallic Hardware/Dixie IMP SCR SYN CERVICAL 4.0X18MM VA TI SELF DRILL [id number]  SYNTHES-STRATEC [id number] N/A 6 Implanted   IMP SYNTHES VECTRA TWO LEVEL PLATE 32MM    SYNTHES [id number] N/A 1 Implanted     [id number]

## 2020-05-21 NOTE — PROVIDER NOTIFICATION
Writer MIKE Adams, notified, Patient got up on unit from PACU at 1430, patient have intermittent low HR, as low at 35, asymptomatic. Please advise.     Writer spoke to Ishmael, he will order a Hospitalist consult.

## 2020-05-22 ENCOUNTER — TELEPHONE (OUTPATIENT)
Dept: NEUROSURGERY | Facility: CLINIC | Age: 65
End: 2020-05-22

## 2020-05-22 ENCOUNTER — APPOINTMENT (OUTPATIENT)
Dept: GENERAL RADIOLOGY | Facility: CLINIC | Age: 65
End: 2020-05-22
Attending: PHYSICIAN ASSISTANT
Payer: COMMERCIAL

## 2020-05-22 ENCOUNTER — APPOINTMENT (OUTPATIENT)
Dept: PHYSICAL THERAPY | Facility: CLINIC | Age: 65
End: 2020-05-22
Attending: PHYSICIAN ASSISTANT
Payer: COMMERCIAL

## 2020-05-22 LAB
BASOPHILS # BLD AUTO: 0 10E9/L (ref 0–0.2)
BASOPHILS NFR BLD AUTO: 0.2 %
DIFFERENTIAL METHOD BLD: ABNORMAL
EOSINOPHIL # BLD AUTO: 0.1 10E9/L (ref 0–0.7)
EOSINOPHIL NFR BLD AUTO: 1.2 %
ERYTHROCYTE [DISTWIDTH] IN BLOOD BY AUTOMATED COUNT: 14.3 % (ref 10–15)
GLUCOSE BLDC GLUCOMTR-MCNC: 105 MG/DL (ref 70–99)
HCT VFR BLD AUTO: 40.7 % (ref 40–53)
HGB BLD-MCNC: 13.8 G/DL (ref 13.3–17.7)
IMM GRANULOCYTES # BLD: 0 10E9/L (ref 0–0.4)
IMM GRANULOCYTES NFR BLD: 0.2 %
LYMPHOCYTES # BLD AUTO: 0.5 10E9/L (ref 0.8–5.3)
LYMPHOCYTES NFR BLD AUTO: 4.3 %
MCH RBC QN AUTO: 33.3 PG (ref 26.5–33)
MCHC RBC AUTO-ENTMCNC: 33.9 G/DL (ref 31.5–36.5)
MCV RBC AUTO: 98 FL (ref 78–100)
MONOCYTES # BLD AUTO: 1.7 10E9/L (ref 0–1.3)
MONOCYTES NFR BLD AUTO: 15.8 %
NEUTROPHILS # BLD AUTO: 8.6 10E9/L (ref 1.6–8.3)
NEUTROPHILS NFR BLD AUTO: 78.3 %
PLATELET # BLD AUTO: 176 10E9/L (ref 150–450)
RBC # BLD AUTO: 4.15 10E12/L (ref 4.4–5.9)
TROPONIN I SERPL-MCNC: <0.015 UG/L (ref 0–0.04)
TROPONIN I SERPL-MCNC: <0.015 UG/L (ref 0–0.04)
WBC # BLD AUTO: 11 10E9/L (ref 4–11)

## 2020-05-22 PROCEDURE — 25000132 ZZH RX MED GY IP 250 OP 250 PS 637: Performed by: PHYSICIAN ASSISTANT

## 2020-05-22 PROCEDURE — 99291 CRITICAL CARE FIRST HOUR: CPT | Performed by: NURSE PRACTITIONER

## 2020-05-22 PROCEDURE — 25000132 ZZH RX MED GY IP 250 OP 250 PS 637: Performed by: NEUROLOGICAL SURGERY

## 2020-05-22 PROCEDURE — 85025 COMPLETE CBC W/AUTO DIFF WBC: CPT | Performed by: HOSPITALIST

## 2020-05-22 PROCEDURE — 84484 ASSAY OF TROPONIN QUANT: CPT | Performed by: NURSE PRACTITIONER

## 2020-05-22 PROCEDURE — 36415 COLL VENOUS BLD VENIPUNCTURE: CPT | Performed by: NURSE PRACTITIONER

## 2020-05-22 PROCEDURE — 97116 GAIT TRAINING THERAPY: CPT | Mod: GP

## 2020-05-22 PROCEDURE — 25000125 ZZHC RX 250: Performed by: NEUROLOGICAL SURGERY

## 2020-05-22 PROCEDURE — 97161 PT EVAL LOW COMPLEX 20 MIN: CPT | Mod: GP

## 2020-05-22 PROCEDURE — 93010 ELECTROCARDIOGRAM REPORT: CPT | Mod: 76 | Performed by: INTERNAL MEDICINE

## 2020-05-22 PROCEDURE — 99207 ZZC CDG-CODE CATEGORY CHANGED: CPT | Performed by: INTERNAL MEDICINE

## 2020-05-22 PROCEDURE — 82962 GLUCOSE BLOOD TEST: CPT

## 2020-05-22 PROCEDURE — 25000128 H RX IP 250 OP 636: Performed by: NURSE PRACTITIONER

## 2020-05-22 PROCEDURE — 97530 THERAPEUTIC ACTIVITIES: CPT | Mod: GP

## 2020-05-22 PROCEDURE — 93005 ELECTROCARDIOGRAM TRACING: CPT | Mod: XU

## 2020-05-22 PROCEDURE — 36415 COLL VENOUS BLD VENIPUNCTURE: CPT | Performed by: HOSPITALIST

## 2020-05-22 PROCEDURE — 72040 X-RAY EXAM NECK SPINE 2-3 VW: CPT

## 2020-05-22 PROCEDURE — 25800030 ZZH RX IP 258 OP 636: Performed by: PHYSICIAN ASSISTANT

## 2020-05-22 PROCEDURE — 99225 ZZC SUBSEQUENT OBSERVATION CARE,LEVEL II: CPT | Performed by: INTERNAL MEDICINE

## 2020-05-22 RX ORDER — AMOXICILLIN 250 MG
1 CAPSULE ORAL 2 TIMES DAILY
Status: DISCONTINUED | OUTPATIENT
Start: 2020-05-22 | End: 2020-05-24 | Stop reason: HOSPADM

## 2020-05-22 RX ORDER — LIDOCAINE HYDROCHLORIDE 20 MG/ML
JELLY TOPICAL ONCE
Status: COMPLETED | OUTPATIENT
Start: 2020-05-22 | End: 2020-05-22

## 2020-05-22 RX ORDER — BISACODYL 5 MG
15 TABLET, DELAYED RELEASE (ENTERIC COATED) ORAL DAILY PRN
Status: DISCONTINUED | OUTPATIENT
Start: 2020-05-22 | End: 2020-05-24 | Stop reason: HOSPADM

## 2020-05-22 RX ORDER — MAGNESIUM CARB/ALUMINUM HYDROX 105-160MG
148 TABLET,CHEWABLE ORAL
Status: DISCONTINUED | OUTPATIENT
Start: 2020-05-22 | End: 2020-05-24 | Stop reason: HOSPADM

## 2020-05-22 RX ORDER — AMOXICILLIN 250 MG
2 CAPSULE ORAL 2 TIMES DAILY
Status: DISCONTINUED | OUTPATIENT
Start: 2020-05-22 | End: 2020-05-24 | Stop reason: HOSPADM

## 2020-05-22 RX ORDER — BISACODYL 5 MG
10 TABLET, DELAYED RELEASE (ENTERIC COATED) ORAL DAILY PRN
Status: DISCONTINUED | OUTPATIENT
Start: 2020-05-22 | End: 2020-05-24 | Stop reason: HOSPADM

## 2020-05-22 RX ORDER — MAGNESIUM SULFATE HEPTAHYDRATE 40 MG/ML
2 INJECTION, SOLUTION INTRAVENOUS ONCE
Status: COMPLETED | OUTPATIENT
Start: 2020-05-22 | End: 2020-05-22

## 2020-05-22 RX ORDER — BISACODYL 5 MG
5 TABLET, DELAYED RELEASE (ENTERIC COATED) ORAL DAILY PRN
Status: DISCONTINUED | OUTPATIENT
Start: 2020-05-22 | End: 2020-05-24 | Stop reason: HOSPADM

## 2020-05-22 RX ADMIN — LIDOCAINE HYDROCHLORIDE: 20 JELLY TOPICAL at 19:40

## 2020-05-22 RX ADMIN — OXYCODONE HYDROCHLORIDE AND ACETAMINOPHEN 2 TABLET: 5; 325 TABLET ORAL at 17:36

## 2020-05-22 RX ADMIN — METOPROLOL SUCCINATE 25 MG: 25 TABLET, EXTENDED RELEASE ORAL at 08:33

## 2020-05-22 RX ADMIN — Medication 1 LOZENGE: at 13:19

## 2020-05-22 RX ADMIN — OXYCODONE HYDROCHLORIDE AND ACETAMINOPHEN 1 TABLET: 5; 325 TABLET ORAL at 03:47

## 2020-05-22 RX ADMIN — OXYCODONE HYDROCHLORIDE AND ACETAMINOPHEN 2 TABLET: 5; 325 TABLET ORAL at 21:40

## 2020-05-22 RX ADMIN — OXYCODONE HYDROCHLORIDE AND ACETAMINOPHEN 1 TABLET: 5; 325 TABLET ORAL at 04:01

## 2020-05-22 RX ADMIN — MAGNESIUM SULFATE HEPTAHYDRATE 2 G: 40 INJECTION, SOLUTION INTRAVENOUS at 16:38

## 2020-05-22 RX ADMIN — SODIUM CHLORIDE: 9 INJECTION, SOLUTION INTRAVENOUS at 02:46

## 2020-05-22 RX ADMIN — OXYCODONE HYDROCHLORIDE AND ACETAMINOPHEN 2 TABLET: 5; 325 TABLET ORAL at 08:37

## 2020-05-22 RX ADMIN — SENNOSIDES AND DOCUSATE SODIUM 1 TABLET: 8.6; 5 TABLET ORAL at 20:50

## 2020-05-22 RX ADMIN — LIDOCAINE HYDROCHLORIDE: 20 JELLY TOPICAL at 22:56

## 2020-05-22 RX ADMIN — ATORVASTATIN CALCIUM 40 MG: 40 TABLET, FILM COATED ORAL at 19:38

## 2020-05-22 RX ADMIN — LOSARTAN POTASSIUM 100 MG: 100 TABLET, FILM COATED ORAL at 19:38

## 2020-05-22 RX ADMIN — OXYCODONE HYDROCHLORIDE AND ACETAMINOPHEN 2 TABLET: 5; 325 TABLET ORAL at 13:19

## 2020-05-22 ASSESSMENT — ACTIVITIES OF DAILY LIVING (ADL): PREVIOUS_RESPONSIBILITIES: MEAL PREP;HOUSEKEEPING;LAUNDRY;MEDICATION MANAGEMENT;DRIVING

## 2020-05-22 ASSESSMENT — MIFFLIN-ST. JEOR: SCORE: 1860.39

## 2020-05-22 NOTE — CONSULTS
[facility]    Hospitalist Consultation    Date of Admission:  5/21/2020  Date of Consult (When I saw the patient): 05/21/20    Assessment & Plan   Gopi Farfan is a 65 year old male who was admitted on 5/21/2020. I was asked to see the patient for bradycardia.    Bradycardia: Twelve-lead EKG shows sinus rhythm at a rate of 64 bpm.  Nursing staff reporting patient with heart rate as low as 35 bpm following surgical intervention and release from the postanesthesia care unit.  Patient was asymptomatic and hospitalist consult was requested by primary service to investigate further underlying etiologies as to the etiology of underlying reason for bradycardia.  Patient has sinus bradycardia on twelve-lead EKG.  Patient denies chest pain, vomiting, ear pain, eye pain, blood in the stool or urine, dysuria, lower extremity edema, rash, recent sick contacts.  -Telemetry.  -Lead EKG.  -TSH.  -Laboratory investigations electrolytes.    Cervical stenosis with myelopathy and radiculopathy, status post right anterior C4-C6 discectomy and fusion, bilateral posterior C7-T1 minimally invasive posterior cervical foraminotomy on May 21, 2020.  -Surgery following, defer cares to their service.    DVT Prophylaxis: Defer to primary service    Disposition: Defer to primary service.    Dr. Ethan [facility]fan D.O.  [facility]  Pager [phone]    Reason for Consult   Reason for consult: I was asked by neurosurgery to evaluate this patient for bradycardia.    Primary Care Physician   *Chip Tan    Chief Complaint   Bradycardia    History is obtained from the patient and medical records.    History of Present Illness   Gopi Farfan is a 65 year old male who was admitted on 5/21/2020. I was asked to see the patient for bradycardia. Twelve-lead EKG shows sinus rhythm at a rate of 64 bpm.  Nursing staff reporting patient with heart rate as low as 35 bpm following surgical intervention and  release from the postanesthesia care unit.  Patient was asymptomatic and hospitalist consult was requested by primary service to investigate further underlying etiologies as to the etiology of underlying reason for bradycardia.  Patient has sinus bradycardia on twelve-lead EKG.  Patient denies chest pain, vomiting, ear pain, eye pain, blood in the stool or urine, dysuria, lower extremity edema, rash, recent sick contacts.    Past Medical History    I have reviewed this patient's medical history and updated it with pertinent information if needed.   Past Medical History:   Diagnosis Date     Brain tumor (H)      Cardiac arrhythmia      Cardiac related syncope      Frequent PVCs      GERD (gastroesophageal reflux disease)      Glaucoma (increased eye pressure)      Hypercholesteremia      Hypertension      Obese      Osteoarthritis        Past Surgical History   I have reviewed this patient's surgical history and updated it with pertinent information if needed.  Past Surgical History:   Procedure Laterality Date     APPENDECTOMY       AS ESOPHAGOSCOPY, DIAGNOSTIC       esphageal sphincter       HRW ANES TOTAL KNEE REPLACEMENT, MDA 1 Left 2015     OPTICAL TRACKING SYSTEM CRANIOTOMY, EXCISE TUMOR, COMBINED Left 5/30/2019    Procedure: Stealth Guided Left Craniotomy And Tumor Resection;  Surgeon: Abdullahi Downing MD;  Location: [facility] OR     STOMACH SURGERY  02/12/2019    LINX, a magnetic implant in the area of the gastroesphogeal junction, needs special MRI       Prior to Admission Medications   Prior to Admission Medications   Prescriptions Last Dose Informant Patient Reported? Taking?   acetaminophen (TYLENOL) 325 MG tablet Past Week at prn Self Yes Yes   Sig: Take 325-650 mg by mouth 4 times daily as needed for mild pain   atorvastatin (LIPITOR) 80 MG tablet 5/20/2020 at PM Self Yes Yes   Sig: Take 40 mg by mouth every evening (takes 0.5 x 80mg)   latanoprost (XALATAN) 0.005 % ophthalmic solution 5/20/2020 at PM Self  Yes Yes   Sig: Place 1 drop into both eyes every evening    losartan (COZAAR) 100 MG tablet 2020 at PM Self Yes Yes   Sig: Take 100 mg by mouth every evening    metoprolol succinate ER (TOPROL-XL) 25 MG 24 hr tablet 2020 at AM Self Yes Yes   Sig: Take 25 mg by mouth daily    sildenafil (VIAGRA) 50 MG tablet More than a month at PRN Self Yes No   Sig: Take 50 mg by mouth daily as needed      Facility-Administered Medications: None     Allergies   Allergies   Allergen Reactions     Amlodipine Swelling       Social History   I have reviewed this patient's social history and updated it with pertinent information if needed. Gopi Farfan  reports that he quit smoking about 38 years ago. He smoked 1.00 pack per day. He quit smokeless tobacco use about 10 years ago.  His smokeless tobacco use included chew. He reports current alcohol use.    Family History   I have reviewed this patient's family history and updated it with pertinent information if needed.   Family History    Medical History Relation Name Comments   Heart Disease Father       Diabetes Mother       Heart Disease Mother       Good Health Sister         Relation Name Status Comments   Father    (Age 65)          Review of Systems   The 10 point Review of Systems is negative other than noted in the HPI or here.     Physical Exam   Temp: 98.1  F (36.7  C) Temp src: Oral BP: (!) 143/89 Pulse: 67 Heart Rate: 73 Resp: 18 SpO2: 96 % O2 Device: Nasal cannula Oxygen Delivery: 2 LPM  Vital Signs with Ranges  Temp:  [96.9  F (36.1  C)-98.1  F (36.7  C)] 98.1  F (36.7  C)  Pulse:  [39-79] 67  Heart Rate:  [35-78] 73  Resp:  [11-23] 18  BP: (109-153)/(56-92) 143/89  MAP:  [77 mmHg-113 mmHg] 104 mmHg  Arterial Line BP: (102-140)/(54-90) 130/83  SpO2:  [95 %-99 %] 96 %  224 lbs 0 oz    GENERAL: Alert and oriented. NAD. Conversational, appropriate.   HEENT: Normocephalic. EOMI. No icterus or injection. Nares normal.   LUNGS: Clear to auscultation. No  dyspnea at rest.   HEART: Regular rate intermittently bradycardic. Extremities perfused.   ABDOMEN: Soft, nontender, and nondistended. Positive bowel sounds.   EXTREMITIES: No LE edema noted.   NEUROLOGIC: Moves extremities x4, follows commands    Data   -Data reviewed today: All pertinent laboratory and imaging results from this encounter were reviewed.   Recent Labs   Lab 05/21/20  0553   POTASSIUM 4.2       No results found for this or any previous visit (from the past 24 hour(s)).

## 2020-05-22 NOTE — PLAN OF CARE
POD 1 A/P Cervical procedure, anterior C4-C6 diskectomy and fusion and posterior C7-T1 minimally invasive foraminotomy. Pt A&O x4, VSS, on RA, can desat at times with activity. LS clear. LUE 4/5 strength with intermittent numbness otherwise CMS intact. A/P Incisions ALFREDO, liquid bands, approximated. Neck and shoulder pain managed with Percocet, and ice packs. +BS, passing flatus, no BM.  bowel meds requested and ordered. Reg diet. Takes pills whole with water. Douglas d/gabriel at 1000, unable to void, bladder scanned 391cc at 1300, DTV. PT assessed this afternoon. OT still needs to see patient. Plans to discharge to home, possibly later today. Pt scoring green on Aggression Stop Light Tool.

## 2020-05-22 NOTE — PROGRESS NOTES
[facility]    Neurosurgery  Daily Post-Op Note    Assessment & Plan   Procedure(s):  Right Anterior Cervical 4 to cervical 6 discectomy and fusion. Laurent wells tong placement  Bilateral posterior Cervical 7-Thoracic 1 posterior minimally invasive foraminotomy   -1 Day Post-Op  Doing well.  Pain well-controlled.  hos not mobilized yet.     Plan:  -Advance activity as tolerated  -Continue supportive and symptomatic treatment  -Start or continue physical therapy  -PT orders placed.   -if he does well this am and afternoon with PT, could potentially go home this evening.     Florin Barone    Interval History   Stable.  Doing well.  Improving slowly.  Pain is reasonably controlled.  No fevers.     Physical Exam   Temp: 98.2  F (36.8  C) Temp src: Oral BP: 115/67 Pulse: 55 Heart Rate: 78 Resp: 19 SpO2: 99 % O2 Device: None (Room air) Oxygen Delivery: 2 LPM  Vitals:    05/21/20 0611   Weight: 101.6 kg (224 lb)     Vital Signs with Ranges  Temp:  [96.9  F (36.1  C)-98.4  F (36.9  C)] 98.2  F (36.8  C)  Pulse:  [39-79] 55  Heart Rate:  [35-81] 78  Resp:  [11-23] 19  BP: (109-160)/(56-92) 115/67  MAP:  [77 mmHg-113 mmHg] 104 mmHg  Arterial Line BP: (102-140)/(54-90) 130/83  SpO2:  [95 %-99 %] 99 %  I/O last 3 completed shifts:  In: 2946 [P.O.:240; I.V.:2706]  Out: 2250 [Urine:2200; Blood:50]    Alert and oriented.  Moves all extremities equally.    Incision: CDI      Medications     sodium chloride 75 mL/hr at 05/22/20 0830        atorvastatin  40 mg Oral QPM     losartan  100 mg Oral QPM     metoprolol succinate ER  25 mg Oral Daily     sodium chloride (PF)  3 mL Intracatheter Q8H       Data         Florin MEMBRENO [facility] Neurosurgery  [facility]  [address] [place] [address]  [address]  [place] [address]    Tel [phone]  Pager [phone]

## 2020-05-22 NOTE — PROGRESS NOTES
[facility]    Medicine Progress Note - Hospitalist Service       Date of Admission:  5/21/2020  Assessment & Plan   Gopi Farfan is a 65 year old male who was admitted on 5/21/2020. I was asked to see the patient for bradycardia.     Bradycardia:   -  Nursing staff reporting patient with heart rate as low as 35 bpm following surgical intervention and release from the postanesthesia care unit.  Patient was asymptomatic and hospitalist consult was requested by primary service to investigate further underlying etiologies as to the etiology of underlying reason for bradycardia.    - Twelve-lead EKG shows sinus rhythm at a rate of 64 bpm.  - he is on Toprol XL 25 mg po daily  - Patient denies chest pain, vomiting, ear pain, eye pain, blood in the stool or urine, dysuria, lower extremity edema, rash, recent sick contacts.  - TSH 0.4  - today Tele- with HR in mid 70's; he is doing fine today.  - he reports that his PMD was aware that his HR had been on lower side in the past and had Holter monitor  - would not stop beta blocker at this time, follow up with PMD     Cervical stenosis with myelopathy and radiculopathy, status post right anterior C4-C6 discectomy and fusion, bilateral posterior C7-T1 minimally invasive posterior cervical foraminotomy on May 21, 2020.  - POD#1  -Surgery following, defer cares to their service.  - pain controlled     H/o frontal anaplastic oligodendroglioma   - s/p resection on 5/30/2019 and chemoradiation therapy  - follows with Dr Lozano    HTN  - continue PTA Toprol Xl 25 mg po daily and Losartan 100 mg po daily    HLP  - continue PTA statin       Diet: Advance Diet as Tolerated: Regular Diet Adult    DVT Prophylaxis: Defer to primary service  Douglas Catheter: not present           Disposition Plan   Expected discharge: as per primary team, recommended to prior living arrangement     Entered: Saray Aguilera MD 05/22/2020, 10:20 AM       The patient's care was discussed  with the Bedside Nurse and Patient.    Saray Aguilera MD  Hospitalist Service  [facility]    ______________________________________________________________________    Interval History   Doing fine, pain reasonable controlled, no N/V, no abd pain, passes gas; no chest pain, no SOB; reports that he knows that he had low HR in the past.    Data reviewed today: I reviewed all medications, new labs and imaging results over the last 24 hours. I personally reviewed the EKG tracing showing NSR, no ischemic changes.    Physical Exam   Vital Signs: Temp: 98.3  F (36.8  C) Temp src: Oral BP: 125/70 Pulse: 55 Heart Rate: 65 Resp: 18 SpO2: 97 % O2 Device: None (Room air) Oxygen Delivery: 2 LPM  Weight: 224 lbs 0 oz     GENERAL: Alert and oriented. NAD. Conversational, appropriate.   HEENT: Normocephalic. EOMI. No icterus or injection. Nares normal.   LUNGS: Clear to auscultation. No dyspnea at rest.   HEART: S1S2, RRR, no murmurs, no rubs  ABDOMEN: Soft, nontender, and nondistended. Positive bowel sounds.   EXTREMITIES: No LE edema noted.   NEUROLOGIC: Moves extremities x4, follows commands    Data   Recent Labs   Lab 05/22/20  0719 05/21/20  2154 05/21/20  0553   WBC 11.0  --   --    HGB 13.8  --   --    MCV 98  --   --      --   --    NA  --  139  --    POTASSIUM  --  4.3 4.2   CHLORIDE  --  106  --    CO2  --  25  --    BUN  --  16  --    CR  --  0.94  --    ANIONGAP  --  8  --    LEFTY  --  8.6  --    GLC  --  156*  --    ALBUMIN  --  3.2*  --    PROTTOTAL  --  6.2*  --    BILITOTAL  --  0.6  --    ALKPHOS  --  83  --    ALT  --  20  --    AST  --  18  --    TROPI  --  <0.015  --      Recent Results (from the past 24 hour(s))   XR Cervical Spine 2/3 Views    Narrative    EXAM: XR CERVICAL SPINE 2/3 VWS  LOCATION: [facility]  DATE/TIME: 5/22/2020 6:15 AM    INDICATION: Status post cervical fusion.  COMPARISON: 11/1/2019.      Impression    IMPRESSION: Interval ACDF at C4-C6 with  interbody spacers and anterior fusion plate. No evidence for hardware complication. Improved alignment of previously noted cervical kyphosis and anterolisthesis at C4-C5. Severe disc space narrowing at C6-C7 with   marginal osteophyte, unchanged. Mild facet arthropathy. Thickening of the prevertebral soft tissues, likely relating to recent surgery. The lung apices are clear.                   Medications     sodium chloride 75 mL/hr at 05/22/20 0830       atorvastatin  40 mg Oral QPM     losartan  100 mg Oral QPM     metoprolol succinate ER  25 mg Oral Daily     sodium chloride (PF)  3 mL Intracatheter Q8H

## 2020-05-22 NOTE — PROGRESS NOTES
05/22/20 1410   Quick Adds   Type of Visit Initial PT Evaluation   Living Environment   Lives With spouse   Living Arrangements house   Transportation Anticipated family or friend will provide   Living Environment Comment Pt has 15 stairs to negotiate at home.   Self-Care   Usual Activity Tolerance good   Current Activity Tolerance moderate   Regular Exercise No   Equipment Currently Used at Home none   Activity/Exercise/Self-Care Comment Pt reports being independent and active prior to admission.   Functional Level Prior   Ambulation 0-->independent   Transferring 0-->independent   Fall history within last six months yes   Number of times patient has fallen within last six months 2   Prior Functional Level Comment Pt reports being independent prior to admission.   General Information   Onset of Illness/Injury or Date of Surgery - Date 05/21/20   Referring Physician Florin Barone, [facility]    Patient/Family Goals Statement To go home   Pertinent History of Current Problem (include personal factors and/or comorbidities that impact the POC) Pt is 65 year old male adm on 5/21/2020 for anterior C4-C6 discectomy and fusion and posterior C7-T1 foraminotomy.    Precautions/Limitations fall precautions;spinal precautions   Cognitive Status Examination   Orientation orientation to person, place and time   Level of Consciousness alert   Pain Assessment   Patient Currently in Pain Yes, see Vital Sign flowsheet  (c/o chest and shoulder pain, does not rate)   Posture    Posture Forward head position;Protracted shoulders   Range of Motion (ROM)   ROM Comment B LE ROM WFL   Strength   Strength Comments B LE strength WFL, no focal weakness noted, pt does demonstrate UE weakness and difficulty performing fine motor as well as lower body dressing tasks.   Bed Mobility   Bed Mobility Comments SBA- needs increased time   Transfer Skills   Transfer Comments SBA   Gait   Gait Comments CGA   Balance   Balance Comments Slightly unsteady  "with ambulation tasks but no overt LOB.   Sensory Examination   Sensory Perception Comments Intact to light touch B LE   General Therapy Interventions   Planned Therapy Interventions balance training;bed mobility training;gait training;home program guidelines;progressive activity/exercise   Clinical Impression   Criteria for Skilled Therapeutic Intervention yes, treatment indicated   PT Diagnosis Impaired gait   Influenced by the following impairments Increased pain, decreased activity tolerance, decreased balance, decreased strength   Functional limitations due to impairments Decreased ability to participate in daily tasks   Clinical Presentation Stable/Uncomplicated   Clinical Presentation Rationale Current presentation, [facility]   Clinical Decision Making (Complexity) Low complexity   Therapy Frequency Daily   Predicted Duration of Therapy Intervention (days/wks) 2 days   Anticipated Discharge Disposition Home   Risk & Benefits of therapy have been explained Yes   Patient, Family & other staff in agreement with plan of care Yes   Clinical Impression Comments Pt is limited primarily by chest and shoulder pain, is able to mobilize without much assistance but is slightly unsteady. Pt would benefit from continued PT during hospital stay for education, bed mobility, gait, and stair training. Pt would also benefit from OT assessment given decreased UE strength and difficulty with lower body dressing tasks.   [facility] [employer] TM \"6 Clicks\"   2016, Trustees of [facility], under license to [employer].  All rights reserved.   6 Clicks Short Forms Basic Mobility Inpatient Short Form   [facility] AM-PAC  \"6 Clicks\" V.2 Basic Mobility Inpatient Short Form   1. Turning from your back to your side while in a flat bed without using bedrails? 3 - A Little   2. Moving from lying on your back to sitting on the side of a flat bed without using bedrails? 3 - A Little   3. Moving to and from a bed to a chair " (including a wheelchair)? 3 - A Little   4. Standing up from a chair using your arms (e.g., wheelchair, or bedside chair)? 3 - A Little   5. To walk in hospital room? 3 - A Little   6. Climbing 3-5 steps with a railing? 3 - A Little   Basic Mobility Raw Score (Score out of 24.Lower scores equate to lower levels of function) 18   Total Evaluation Time   Total Evaluation Time (Minutes) 10

## 2020-05-22 NOTE — PROGRESS NOTES
Patient voided 100cc and PVR 460cc. Patient refused to be st. Cath and said he will do it after eating dinner.

## 2020-05-22 NOTE — PLAN OF CARE
Discharge Planner PT   Patient plan for discharge: Home  Current status: Pt is 65 year old male adm on 5/21/2020 for c-spine surgery. At baseline, pt lives with spouse in two story home, is independent without use of assistive device. PT orders received, chart reviewed, evaluation completed and treatment initiated. Pt is SBA for bed mobility, needs increased time to complete. Pt SBA for sit to stand transfers with and without assistive device. Pt able to ambulate 150' without assistive device with CGA, slightly unsteady but no overt LOB, stooped posture. Pt c/o increased chest and shoulder pain with activity, HR and O2 sats stable but pt appearing SOB and in discomfort therefore stairs not assessed at this time.  Barriers to return to prior living situation: Needs to trial stairs  Recommendations for discharge: Home with assist from spouse for household tasks  Rationale for recommendations: Pt is able to complete mobility necessary for discharge home, anticipate stairs will not be a problem for pt once pain controlled and he is able to tolerate increased activity. Pt is demonstrating UE weakness and has difficulty performing lower body dressing, could benefit from OT assessment.       Entered by: Ibeth Burch 05/22/2020 2:29 PM

## 2020-05-22 NOTE — TELEPHONE ENCOUNTER
Patient is s/p Right Anterior Cervical 4 to cervical 6 discectomy and fusion as well as  Bilateral posterior Cervical 7-Thoracic 1 posterior minimally invasive foraminotomy by Dr. Downing on 05/21/2020.  Reminder to call for post op however patient is currently inpatient. Reminder sent to check in next week.

## 2020-05-22 NOTE — PROGRESS NOTES
Transfer from PACU at 1430. POD 0 A/P Cervical procedure, anterior C4-C6 diskectomy and fusion and posterior C7-T1 minimally invasive foraminotomy. Pt A&O x4, shell GARBIAY MD notified, see writers previous note, asymptomatic, otherwise VSS, on 2L NC. LS clear. Capno WNL. LUE 4/5 strength, numbness from elbow down otherwise CMS intact. Incision ALFREDO, liquid bands, incision approximated. C/o of R shoulder pain, ice pack applied. Tolerating ice chips. Douglas patent. +BS, had BM yesterday. Report given to evening RN.

## 2020-05-22 NOTE — PROVIDER NOTIFICATION
Off going RN suggested patient have soft collar for comfort, text to Florin GONZALEZ.    Florin GONZALEZ/Radha PICKARD

## 2020-05-22 NOTE — PLAN OF CARE
OT- Attempted evaluation and gathered subjective information. Pt reporting chest discomfort. RN aware and began providing cares to pt. Will hold evaluation at this time, RN in agreement.

## 2020-05-22 NOTE — OP NOTE
Procedure Date: 05/21/2020      PREOPERATIVE DIAGNOSIS:  Cervical stenosis with myelopathy and radiculopathy.      POSTOPERATIVE DIAGNOSIS:  Cervical stenosis with myelopathy and radiculopathy.      PROCEDURES:   1.  Right anterior C4-C6 diskectomy and fusion using MTF allograft spacers and Synthes Vectra plate.   2.  Bilateral posterior C7-T1 minimally invasive posterior cervical foraminotomy.      SURGEON:  Abdullahi Downing MD      ASSISTANT:  Mike Pastrana PA-C      ANESTHESIA:  General endotracheal anesthesia plus local anesthetic.      ESTIMATED BLOOD LOSS:  50 mL.      INDICATIONS:  The patient is a 65-year-old male with numbness, tingling and pain into both arms and degenerative changes causing stenosis at multiple levels.  He was brought for anterior diskectomy and fusion and posterior cervical foraminotomies. Please note that Mike Pastrana PA-C's assistance was needed for positioning, retraction, suctioning, and closure.      DESCRIPTION OF PROCEDURE:  The patient was brought to the operating room, general endotracheal anesthesia was induced.  The patient was positioned supine and the Laurent-Wells tongs were placed and 10 pounds of traction were instituted.  A shoulder bump was placed.  The anterior neck was prepped and draped in sterile fashion.  Once this was done, C-arm fluoroscopy was used to localize the C4-C6 area and a right-sided incision was made after being infiltrated with local anesthetic.  The spine was exposed in standard fashion and retractors placed, starting first at the C4-C5 level.  Distraction pins were also placed in C4 and C5 disk space.  The microscope was sterilely draped and brought into the field.  The disk space was opened and the disk material debrided.  The posterior osteophyte and posterior longitudinal ligament was resected, decompressing the spinal canal and bilateral nerve roots.  Once this was done, an 8 mm parallel MTF allograft spacer filled with DBX putty was  placed and confirmed to be in good position.  Once this was done, the distraction pins were moved down to the C5-C6 level along with the retractors and again, in a similar fashion, the disk space was opened.  Disk material debrided.  The posterior osteophyte removed and the spinal canal and nerve roots decompressed bilaterally.  A 7 mm lordotic spacer was then trialed and placed and confirmed to be in good position with fluoroscopy.  A Synthes Vectra plate was then also placed with 18 mm screws and confirmed to be in good position from C4-C6.        Once this was done, the platysma was closed with 3-0 inverted interrupted Vicryl and the skin was closed with a 4-0 Monocryl subcuticular stitch.  Exofin was placed as a dressing.        The patient was then taken out of the [facility] and placed in a [place] headholder and rolled into the prone position on gel rolls.  The back of the neck and chest were prepped and draped in a sterile fashion.  C-arm fluoroscopy was used to localize the C7-T1 level and a midline incision was made, first starting on the left side.  The METRx tubular dilating system was placed.  The microscope was sterilely draped and brought into the field, and the hemilamina at C7-T1 was exposed.  The high-speed drill was used to remove some of the lamina and medial facet until the nerve root underneath was identified.  This was decompressed using the Kerrison punches.  Once hemostasis was achieved, the dilator was then moved over to the right side and, in a similar fashion, the foramen was opened, decompressing the nerve root.        Once hemostasis was achieved, 0 Vicryl were used to close the fascial layer, 2-0 inverted interrupted Vicryl was used for subcutaneous layer and a running subcuticular 3-0 Monocryl was used for skin.  Exofin was placed as a dressing.  The patient was rolled back in supine position, taken out of the [place] head arzate, awakened, extubated and taken to the  recovery room in good condition.         STACY CASTILLO MD             D: 2020   T: 2020   MT: WT      Name:     LORETO RAMIREZ   MRN:      [id number]        Account:        [id number]   :      1955           Procedure Date: 2020      Document: [id number]

## 2020-05-22 NOTE — CODE/RAPID RESPONSE
"[facility]    RRT Note  5/22/2020   Time Called: 3: 26 PM    RRT called for: chest pain    Assessment & Plan   Chest pain, intermittent and self-resolving with PACs/PVCs  RRT called for chest pain. Upon my arrival Mr. Farfan endorses no chest pain. He states he has known \"bigeminy.\" Per EHR he was evaluated by Dr. Terry Owen, [facility], in May 2019 after an extended Holter revealed high burden PVCs which are described as 27% burden, unifocal, and isolated in nature with occasional bigeminal cycles. Echocardiogram at that time did not reveal structural heart disease and PVCs improved with exercise. It was felt some of the high burden may have been related to stress. He was initiated on metoprolol, which has been maintained, with reduction in PVCs to 4%. He tells me he is able to walk around a store and complete ADLs without claudication, chest pain, or shortness of breath. His dad had a four- vessel CAB in his 60s.     Second EKG with PAC and a generous compensatory pause, which may be causing his symptoms.     INTERVENTIONS:  - EKG x 2 without obvious acute myocardial ischemia   - serial troponin   - continue telemetry  - will hold discharge  - echocardiogram per Dr. Aguilera   - magnesium 2- grams IV now     Discussed with and defer further cares to Dr. Aguilera, Hospitalist.     Code Status: No Order     LEONID Banks, CNP  Hospitalist Service, House Officer  [facility]     Text Page  Pager: [phone]    Allergies   Allergies   Allergen Reactions     Amlodipine Swelling       Physical Exam   Vital Signs with Ranges:  Temp:  [97.6  F (36.4  C)-98.6  F (37  C)] 98.3  F (36.8  C)  Pulse:  [39-67] 55  Heart Rate:  [65-83] 78  Resp:  [14-19] 16  BP: (115-174)/(67-98) 147/96  SpO2:  [95 %-99 %] 97 %  I/O last 3 completed shifts:  In: 1506 [P.O.:600; I.V.:906]  Out: 1675 [Urine:1675]    Constitutional: 65- year old male sitting in bed without obvious acute " distress.   Pulmonary: Lung fields clear, he is not hypoxic. No obvious acute respiratory distress.   Cardiovascular: S1, S2 without obvious murmur, rub, or gallop. He appears well perfused.   GI: Soft, non-tender, non-distended.   Skin/Integumen: Anterior neck incision without obvious acute concerns.   Neuro: Awake, alert, oriented x 4. Non-focal.   Psych:  Calm, cooperative.   Extremities: Moves all extremities.     Troponin:    Recent Labs   Lab Test 05/21/20  2154   TROPI <0.015       IMAGING: (X-ray/CT/MRI)   Recent Results (from the past 24 hour(s))   XR Cervical Spine 2/3 Views    Narrative    EXAM: XR CERVICAL SPINE 2/3 VWS  LOCATION: [facility]  DATE/TIME: 5/22/2020 6:15 AM    INDICATION: Status post cervical fusion.  COMPARISON: 11/1/2019.      Impression    IMPRESSION: Interval ACDF at C4-C6 with interbody spacers and anterior fusion plate. No evidence for hardware complication. Improved alignment of previously noted cervical kyphosis and anterolisthesis at C4-C5. Severe disc space narrowing at C6-C7 with   marginal osteophyte, unchanged. Mild facet arthropathy. Thickening of the prevertebral soft tissues, likely relating to recent surgery. The lung apices are clear.                     CBC with Diff:  Recent Labs   Lab Test 05/22/20  0719  05/28/19  1646   WBC 11.0   < > 10.2   HGB 13.8   < > 16.5   MCV 98   < > 89      < > 326   INR  --   --  1.04    < > = values in this interval not displayed.        Comprehensive Metabolic Panel:  Recent Labs   Lab 05/21/20  2154      POTASSIUM 4.3   CHLORIDE 106   CO2 25   ANIONGAP 8   *   BUN 16   CR 0.94   GFRESTIMATED 85   GFRESTBLACK >90   LEFTY 8.6   MAG 1.9   PROTTOTAL 6.2*   ALBUMIN 3.2*   BILITOTAL 0.6   ALKPHOS 83   AST 18   ALT 20     Time Spent on this Encounter   I spent 60 minutes of critical care time on the unit/floor managing the care of Gopi Farfan. Upon evaluation, this patient had a high probability of imminent  or life-threatening deterioration due to chest pain, which required my direct attention, intervention, and personal management. 100% of my time was spent at the bedside counseling the patient and/or coordinating care regarding services listed in this note.

## 2020-05-22 NOTE — PLAN OF CARE
Patient had surgery today, C4-C6 discectomy and fusion, C7-T1 posterior foraminatomy. A&Ox4. Neuros/CMS intact except numbness and tingling in R fingers and L arm , patient reported that was getting better during the shift. Bowel sounds present hypoactive, passing flatus, tolerating clear liquids diet. VSS except bradicardia  Down into  30s.TELE SBC with BBB. EKG done, no abnormalities. Surgical site open to air, clean no drainage. Bedrest, patient refused to sit at the edge of the bed . C/o back, shoulder pain, decreased with Oxycodone . Pt scoring green on the Aggression Stop Light Tool.

## 2020-05-22 NOTE — PLAN OF CARE
POD 1 from an anterior C4-6 discectomy and fusion, and posterior C7-T1 foraminotomy w/ Dr. Downing. A&Ox4. CMS intact ex numbness in LUE. On capno. VSS on 2L O2 via NC. Tele SR w/ BBB and occasional PVCs. Hypoactive bowel sounds, + flatus, tolerating clear liquid diet. Anterior and posterior incisions ALFREDO, glued, no drainage. Got up standing X1 - reported no dizziness, but O2 sats dropped down to upper 70s, improved when sat back down. Douglas in place, patent. C/o neck/shoulder pain, decreased with percocet. Pt scoring green on the Aggression Stop Light Tool. Plan for cervical X-ray at 0600. Discharge pending progress.

## 2020-05-22 NOTE — PROVIDER NOTIFICATION
Writer CARLOS Leal, Thought Dr. Downing mentioned OT orders too? Also could we get order for bowel meds. Please advise.

## 2020-05-23 ENCOUNTER — APPOINTMENT (OUTPATIENT)
Dept: PHYSICAL THERAPY | Facility: CLINIC | Age: 65
End: 2020-05-23
Attending: PHYSICIAN ASSISTANT
Payer: COMMERCIAL

## 2020-05-23 ENCOUNTER — APPOINTMENT (OUTPATIENT)
Dept: OCCUPATIONAL THERAPY | Facility: CLINIC | Age: 65
End: 2020-05-23
Attending: PHYSICIAN ASSISTANT
Payer: COMMERCIAL

## 2020-05-23 ENCOUNTER — APPOINTMENT (OUTPATIENT)
Dept: CARDIOLOGY | Facility: CLINIC | Age: 65
End: 2020-05-23
Attending: INTERNAL MEDICINE
Payer: COMMERCIAL

## 2020-05-23 LAB
GLUCOSE BLDC GLUCOMTR-MCNC: 119 MG/DL (ref 70–99)
INTERPRETATION ECG - MUSE: NORMAL
TROPONIN I SERPL-MCNC: <0.015 UG/L (ref 0–0.04)

## 2020-05-23 PROCEDURE — 25000132 ZZH RX MED GY IP 250 OP 250 PS 637: Performed by: PHYSICIAN ASSISTANT

## 2020-05-23 PROCEDURE — 40000894 ZZH STATISTIC OT IP EVAL DEFER

## 2020-05-23 PROCEDURE — 97530 THERAPEUTIC ACTIVITIES: CPT | Mod: GP

## 2020-05-23 PROCEDURE — 99226 ZZC SUBSEQUENT OBSERVATION CARE,LEVEL III: CPT | Performed by: INTERNAL MEDICINE

## 2020-05-23 PROCEDURE — 97116 GAIT TRAINING THERAPY: CPT | Mod: GP

## 2020-05-23 PROCEDURE — 36415 COLL VENOUS BLD VENIPUNCTURE: CPT | Performed by: NURSE PRACTITIONER

## 2020-05-23 PROCEDURE — 99207 ZZC MOONLIGHTING INDICATOR: CPT | Performed by: INTERNAL MEDICINE

## 2020-05-23 PROCEDURE — 93306 TTE W/DOPPLER COMPLETE: CPT | Mod: 26 | Performed by: INTERNAL MEDICINE

## 2020-05-23 PROCEDURE — 99207 ZZC CDG-CODE CATEGORY CHANGED: CPT | Performed by: INTERNAL MEDICINE

## 2020-05-23 PROCEDURE — 25000125 ZZHC RX 250

## 2020-05-23 PROCEDURE — 93306 TTE W/DOPPLER COMPLETE: CPT

## 2020-05-23 PROCEDURE — 82962 GLUCOSE BLOOD TEST: CPT

## 2020-05-23 PROCEDURE — 84484 ASSAY OF TROPONIN QUANT: CPT | Performed by: NURSE PRACTITIONER

## 2020-05-23 RX ORDER — LIDOCAINE HYDROCHLORIDE 20 MG/ML
JELLY TOPICAL
Status: COMPLETED
Start: 2020-05-23 | End: 2020-05-23

## 2020-05-23 RX ADMIN — OXYCODONE HYDROCHLORIDE AND ACETAMINOPHEN 2 TABLET: 5; 325 TABLET ORAL at 06:24

## 2020-05-23 RX ADMIN — LIDOCAINE HYDROCHLORIDE: 20 JELLY TOPICAL at 11:13

## 2020-05-23 RX ADMIN — OXYCODONE HYDROCHLORIDE AND ACETAMINOPHEN 2 TABLET: 5; 325 TABLET ORAL at 01:42

## 2020-05-23 RX ADMIN — LOSARTAN POTASSIUM 100 MG: 100 TABLET, FILM COATED ORAL at 20:02

## 2020-05-23 RX ADMIN — OXYCODONE HYDROCHLORIDE AND ACETAMINOPHEN 2 TABLET: 5; 325 TABLET ORAL at 11:57

## 2020-05-23 RX ADMIN — OXYCODONE HYDROCHLORIDE AND ACETAMINOPHEN 2 TABLET: 5; 325 TABLET ORAL at 16:12

## 2020-05-23 RX ADMIN — OXYCODONE HYDROCHLORIDE AND ACETAMINOPHEN 2 TABLET: 5; 325 TABLET ORAL at 20:02

## 2020-05-23 RX ADMIN — ATORVASTATIN CALCIUM 40 MG: 40 TABLET, FILM COATED ORAL at 20:02

## 2020-05-23 RX ADMIN — METOPROLOL SUCCINATE 25 MG: 25 TABLET, EXTENDED RELEASE ORAL at 08:03

## 2020-05-23 RX ADMIN — SENNOSIDES AND DOCUSATE SODIUM 2 TABLET: 8.6; 5 TABLET ORAL at 08:03

## 2020-05-23 RX ADMIN — SENNOSIDES AND DOCUSATE SODIUM 1 TABLET: 8.6; 5 TABLET ORAL at 20:03

## 2020-05-23 NOTE — PLAN OF CARE
7773-1509: Patient alert and oriented, VSS on room air. Complains of shoulder, neck, and chest muscle pain, percocet given x1, effective. Anterior and posterior incisions ALFREDO, WNL. Strength 4/5 in BUE, CMS intact. Up with ax1, GB, walker. Pt unable to void this shift, carney reinserted per MD order. Echo done today. Plan for Urology consult, discharge plan pending.  [id number]: Carney with adequate output, urology MD plan for pt to start tamsulosin and to keep Carney in at [facility] and follow up outpt for carney removal. Pt will most likely discharge tomorrow.

## 2020-05-23 NOTE — PLAN OF CARE
Discharge Planner OT   Patient plan for discharge: home  Current status: Orders received, chart reviewed. Pt is 65 year old male adm on 5/21/2020 for c-spine surgery. Prior to admit was ind at baseline, some L UE weakness and lack of mobility, occasional use of 4WW for community mobility. Pt stated some short term memory difficulty from brain cancer in medical history, able to use strategies.   OT eval to look at AE with LE dressing. Pt not interested in devices as has spouse at home and anticipates will improve and be able to complete independently, therapist in agreement. Suggest outpatient OT to follow up for LUE ROM and strength post surgery.Did not complete eval, not necessary at this time, PT following.  Barriers to return to prior living situation: none  Recommendations for discharge: home  Rationale for recommendations: Pt ambulating well with PT for ADL needs, has spouse at home for help with LE dressing as needed. Recommending outpatient OT to follow up on LUE weakness and mobility.        Entered by: Cindy Puente 05/23/2020 9:08 AM

## 2020-05-23 NOTE — PLAN OF CARE
POD 1 from a right anterior C4-U8frjoihdrp and fusion. Bilateral posterior C7- T1. A&O x 4. CMS intact except left arm baseline weakness with 4/5 strength. Bowel sounds active, passing flatus, tolerating regular diet. No bm. Gave hs scheduled senna. Tele SR. High SBP. Gave hs bp med. Incision on anterior and posterior neck with liquid bandage open to air CDI. Up with 2 with belt and walker. RRT called this evening due to intermittent chest pain. See Dr's note. C/o shoulder and neck pain, decreased with percocet. Patient refused ice pack offered. Patient having urine retention. 0 void at 1955 and bladder scanned 475cc. St. Cath 450cc. Patient voided 150cc at 2224. . St. Cath 400cc. Pt scoring green on the Aggression Stop Light Tool. Plan soft neck collar ordered. Continue to monitor.

## 2020-05-23 NOTE — PLAN OF CARE
DATE & TIME: 5/22/20 0839-0079               Cognitive Concerns/ Orientation: AOx4  BEHAVIOR & AGGRESSION TOOL COLOR: Green  ABNL VS/O2: VSS on RA + HTN  MOBILITY: A1 GBW  PAIN MANAGMENT: 5-9/10. Neck, shoulders, chest pain: Percocet PRN q4: 2x overnight  DIET: Regular  BOWEL/BLADDER: Continent. Some difficulty voiding. Douglas removed 1000 on 5.22.20. Staight cath 1x overnight.  DRAIN/DEVICES: PIV SL  Tele: SSR + PVCs  SKIN: Scabing/abrasion on right face/head  TESTS/PROCEDURES: NA  D/C DAY/GOALS/PLACE: Possibly 5.23.20.

## 2020-05-23 NOTE — PROGRESS NOTES
List all Outpatient in a Bed discharge goals to be met before discharge home:   ~ Patient able to ambulate as they were prior to admission or with assist devices provided by therapies during their stay. - met.    ** Patient is still unable to void w/urinary retention requiring intermittent straight cath since 10am yesterday.      ~ Nurse to Notify Provider when Outpatient in a Bed discharge goals have been met and patient is ready for discharge.

## 2020-05-23 NOTE — CONSULTS
UROLOGY CONSULTATION:    PATIENT: Gopi Farfan       (1955)  AGE: 65 year old year old male    Referring Physician: Alondra Linares  Primary Physician: Chip Tan    CHIEF COMPLAINT:  Post-operative urinary retention    HPI:   Mr. Farfan is a 65 yoM who is POD 2 from a right anterior C4-C6 dsicectomy and fusion, Laurent Wells Tong placement, bilateral posterior C7-T1 posterior minimally invasive foraminotomy. Patient has been recovering well and working with PT, however has struggled with urinary retention. Operative catheter was removed POD 1, however patient has not been able to void and has required straight caths for volumes of >600 ml. This morning an indwelling catheter was replaced and consultation placed. Currently not taking any medication to aid urination.    At baseline he reports his urination consists of good urinary stream. Denies any frequency, urgency, straining to void, sensation of incomplete emptying, hematuria or dysuria.    Past Medical History:   Diagnosis Date     Brain tumor (H)      Cardiac arrhythmia      Cardiac related syncope      Frequent PVCs      GERD (gastroesophageal reflux disease)      Glaucoma (increased eye pressure)      Hypercholesteremia      Hypertension      Obese      Osteoarthritis      Past Surgical History:   Procedure Laterality Date     APPENDECTOMY       AS ESOPHAGOSCOPY, DIAGNOSTIC       esphageal sphincter       HRW ANES TOTAL KNEE REPLACEMENT, MDA 1 Left      OPTICAL TRACKING SYSTEM CRANIOTOMY, EXCISE TUMOR, COMBINED Left 2019    Procedure: Stealth Guided Left Craniotomy And Tumor Resection;  Surgeon: Abdullahi Downing MD;  Location: [facility] OR     STOMACH SURGERY  2019    LINX, a magnetic implant in the area of the gastroesphogeal junction, needs special MRI     PAST SOCIAL HISTORY  Social History     Social History Narrative     Not on file     Social History     Tobacco Use     Smoking status: Former Smoker     Packs/day: 1.00     Last  "attempt to quit: 1982     Years since quittin.0     Smokeless tobacco: Former User     Types: Chew     Quit date: 2010     Tobacco comment: smoked 13 years, chewed for 40 years   Substance Use Topics     Alcohol use: Yes     Comment: a couple daily after work, on hold while under cancer treatment       FAMILY HISTORY  family history is not on file.    REVIEW OF SYSTEMS:  A comprehensive review of systems was reviewed with the patient with all findings across 10 systems negative except as per dictated in HPI.    There are no exam notes on file for this visit.             Current Outpatient Medications   Medication Sig Dispense Refill     oxyCODONE-acetaminophen (PERCOCET) 5-325 MG tablet Take 1-2 tablets by mouth every 4 hours as needed for pain (moderate to severe) 30 tablet 0     senna-docusate (SENOKOT-S/PERICOLACE) 8.6-50 MG tablet Take 1-2 tablets by mouth 2 times daily Take while on oral narcotics to prevent or treat constipation. 30 tablet 0                Allergies   Allergen Reactions     Amlodipine Swelling       PHYSICAL EXAM:          BP (!) 156/93 (BP Location: Right arm)   Pulse 74   Temp 97.9  F (36.6  C) (Oral)   Resp 16   Ht 1.88 m (6' 2\")   Wt 101.6 kg (224 lb)   SpO2 93%   BMI 28.76 kg/m           General appearance shows no deformaties and good grooming.  EYES: no icterus  HEAD, EARS, NOSE, MOUTH, AND THROAT: atraumatic, normocephalic  NECK: soft brace in place  CHEST WALL: symmetric  CARDIAC: skin well perfused, pulses regular  RESPIRATORY: breathing unlabored  ABDOMEN: soft, nontender, non distended, no rebound, guarding or peritoneal signs  BACK/FLANKS: no CVA tenderness bilaterally  RECTAL: patient refused exam  SKIN/HAIR/NAILS: no visible rashes  NEUROLOGIC: no focal deficits  PSYCHIATRIC: Alert and oriented x3. Speech: normal Mood: normal Affect: normal    LABS:            Color Urine (no units)   Date Value   2019 Yellow     Appearance Urine (no units)   Date " Value   2019 Clear     Glucose Urine (mg/dL)   Date Value   2019 70 (A)     Bilirubin Urine (no units)   Date Value   2019 Negative     Ketones Urine (mg/dL)   Date Value   2019 Negative     Specific Gravity Urine (no units)   Date Value   2019 1.024     pH Urine (pH)   Date Value   2019 5.5     Protein Albumin Urine (mg/dL)   Date Value   2019 Negative     Nitrite Urine (no units)   Date Value   2019 Negative     Leukocyte Esterase Urine (no units)   Date Value   2019 Negative              No results found for: PSA             Hemoglobin   Date Value Ref Range Status   2020 13.8 13.3 - 17.7 g/dL Final   ]    Lab Results   Component Value Date    CR 0.94 2020         IMAGING:   No pertinent imaging for me to review      ASSESSMENT:   65 yoM with post-operative urinary retention    PLAN:    1. Post-operative Urinary Retention  - Continue indwelling carney catheter for bladder rest at least 5-7 days.  - Start tamsulosin 0.4 mg PO daily  - Void trial after adequate time of bladder rest  - Encourage ambulation  - Avoid constipation  - Avoid narcotics  - Will have him follow up with us as an outpatient, will have him follow up with my partner, Dr. Xiang Montenegro in our [place] clinic as to avoid driving all the way down here for catheter removal.    Rik Rodríguez MD  [facility]  Pager: [phone]  Office: [phone]  Surgical [phone]

## 2020-05-23 NOTE — PLAN OF CARE
Discharge Planner PT   Patient plan for discharge: Home    Current status: Pt was received supine in bed. Adjusted cervical collar and is educated on proper wear. Edu on C-spine precautions and pt was unable to recall. Pt is at mod I with supine<>sit and sat at EOB independently. Stood with supervison and ambulated 200 ft using RW and SBA. Pt ambulated another 50 ft without assistive devices and required CGA for safety due to mildly unsteady gait. No gross LOB noted. Pt went up/down 9 steps using B rail support and SBA.      Barriers to return to prior living situation: None anticipated.     Recommendations for discharge: Home with assist from spouse for household tasks and supervision for ambulation.Recommends using a RW at home.     Rationale for recommendations: Pt is able to complete mobility necessary for discharge home. Pt may need spouse's supervision during ambulation.          Entered by: Quita Nguyễn 05/23/2020 12:23 PM

## 2020-05-23 NOTE — PROGRESS NOTES
[facility]    Medicine Progress Note - Hospitalist Service       Date of Admission:  5/21/2020  Assessment & Plan   Gopi Farfan is a 65 year old male who was admitted on 5/21/2020. I was asked to see the patient for bradycardia.     Bradycardia:   -  Nursing staff reporting patient with heart rate as low as 35 bpm following surgical intervention and release from the postanesthesia care unit.  Patient was asymptomatic and hospitalist consult was requested by primary service to investigate further underlying etiologies as to the etiology of underlying reason for bradycardia.    - Twelve-lead EKG shows sinus rhythm at a rate of 64 bpm.  - he is on Toprol XL 25 mg po daily  - Patient denies chest pain, vomiting, ear pain, eye pain, blood in the stool or urine, dysuria, lower extremity edema, rash, recent sick contacts.  - TSH 0.4  - today Tele- with HR in mid 70's; he is doing fine today.  - he reports that his PMD was aware that his HR had been on lower side in the past and had Holter monitor  - would not stop beta blocker at this time, follow up with PMD     Cervical stenosis with myelopathy and radiculopathy, status post right anterior C4-C6 discectomy and fusion, bilateral posterior C7-T1 minimally invasive posterior cervical foraminotomy on May 21, 2020.  - POD#1  -Surgery following, defer cares to their service.  - pain controlled     H/o frontal anaplastic oligodendroglioma   - s/p resection on 5/30/2019 and chemoradiation therapy  - follows with Dr Lozano    HTN  - continue PTA Toprol Xl 25 mg po daily and Losartan 100 mg po daily    HLP  - continue PTA statin       Diet: Advance Diet as Tolerated: Regular Diet Adult    DVT Prophylaxis: Defer to primary service  Douglas Catheter: not present           Disposition Plan   Expected discharge: as per primary team, recommended to prior living arrangement     Entered: Alondra Linares MD 05/23/2020, 11:47 AM       The patient's care was discussed with the  Bedside Nurse and Patient.    Alondra Linares MD  Hospitalist Service  [facility]    ______________________________________________________________________    Interval History   Urology consult for bladder retention.     Data reviewed today: I reviewed all medications, new labs and imaging results over the last 24 hours. I personally reviewed the EKG tracing showing NSR, no ischemic changes.    Physical Exam   Vital Signs: Temp: 97.9  F (36.6  C) Temp src: Oral BP: (!) 156/93 Pulse: 74 Heart Rate: 83 Resp: 16 SpO2: 93 % O2 Device: None (Room air)    Weight: 224 lbs 0 oz     GENERAL: Alert and oriented. NAD. Conversational, appropriate.   HEENT: Normocephalic. EOMI. No icterus or injection. Nares normal.   LUNGS: Clear to auscultation. No dyspnea at rest.   HEART: S1S2, RRR, no murmurs, no rubs  ABDOMEN: Soft, nontender, and nondistended. Positive bowel sounds.   EXTREMITIES: No LE edema noted.   NEUROLOGIC: Moves extremities x4, follows commands    Data   Recent Labs   Lab 05/23/20  0024 05/22/20 2029 05/22/20  1629 05/22/20  0719 05/21/20  2154  05/21/20  0553   WBC  --   --   --  11.0  --   --   --    HGB  --   --   --  13.8  --   --   --    MCV  --   --   --  98  --   --   --    PLT  --   --   --  176  --   --   --    NA  --   --   --   --  139  --   --    POTASSIUM  --   --   --   --  4.3  --  4.2   CHLORIDE  --   --   --   --  106  --   --    CO2  --   --   --   --  25  --   --    BUN  --   --   --   --  16  --   --    CR  --   --   --   --  0.94  --   --    ANIONGAP  --   --   --   --  8  --   --    LEFTY  --   --   --   --  8.6  --   --    GLC  --   --   --   --  156*  --   --    ALBUMIN  --   --   --   --  3.2*  --   --    PROTTOTAL  --   --   --   --  6.2*  --   --    BILITOTAL  --   --   --   --  0.6  --   --    ALKPHOS  --   --   --   --  83  --   --    ALT  --   --   --   --  20  --   --    AST  --   --   --   --  18  --   --    TROPI <0.015 <0.015 <0.015  --  <0.015   < >  --     < > =  values in this interval not displayed.     No results found for this or any previous visit (from the past 24 hour(s)).  Medications     sodium chloride Stopped (05/22/20 2300)       atorvastatin  40 mg Oral QPM     losartan  100 mg Oral QPM     metoprolol succinate ER  25 mg Oral Daily     senna-docusate  1 tablet Oral BID    Or     senna-docusate  2 tablet Oral BID     sodium chloride (PF)  3 mL Intracatheter Q8H     sodium phosphate  1 enema Rectal Once

## 2020-05-23 NOTE — PROGRESS NOTES
[facility]    Neurosurgery  Daily Post-Op Note    Assessment & Plan   Procedure(s):  Right Anterior Cervical 4 to cervical 6 discectomy and fusion. Laurent wells tong placement  Bilateral posterior Cervical 7-Thoracic 1 posterior minimally invasive foraminotomy   -2 Days Post-Op  Doing well.  Pain well-controlled.  Tolerating physical therapy and rehabilitation well.  Walking in halls with PT, no issues.   -still with some urinary retention, continue check PVRs. Consider Flomax.    Plan:  -Advance activity as tolerated  -Continue supportive and symptomatic treatment  -Start or continue physical therapy      Florin Barone    Interval History   Stable.  Doing well.  Improving slowly.  Pain is reasonably controlled.  No fevers.     Physical Exam   Temp: 97.9  F (36.6  C) Temp src: Oral BP: (!) 156/93 Pulse: 74 Heart Rate: 83 Resp: 16 SpO2: 93 % O2 Device: None (Room air)    Vitals:    05/21/20 0611   Weight: 101.6 kg (224 lb)     Vital Signs with Ranges  Temp:  [97.8  F (36.6  C)-98.6  F (37  C)] 97.9  F (36.6  C)  Pulse:  [74] 74  Heart Rate:  [78-89] 83  Resp:  [14-19] 16  BP: (115-174)/(67-98) 156/93  SpO2:  [91 %-99 %] 93 %  I/O last 3 completed shifts:  In: 600 [P.O.:600]  Out: 2100 [Urine:2100]    Alert and oriented.      Incision: CDI      Medications     sodium chloride Stopped (05/22/20 2300)        atorvastatin  40 mg Oral QPM     losartan  100 mg Oral QPM     metoprolol succinate ER  25 mg Oral Daily     senna-docusate  1 tablet Oral BID    Or     senna-docusate  2 tablet Oral BID     sodium chloride (PF)  3 mL Intracatheter Q8H     sodium phosphate  1 enema Rectal Once       Data         Florin Barone PA-C  [facility]  [address]  [address] [place] [id number]    Tel [phone]  Pager [phone]

## 2020-05-23 NOTE — PROGRESS NOTES
Dr. Calderon paged: pt having chest pain. Recent work up for chest pain on 5.22.20 @ 1036. Echo pending. Chest pain resolved.  oked no RRT call for this occurrence. Chest pain resolved in about 5 minutes. Pt said it felt like a muscle pull/cramp.

## 2020-05-23 NOTE — PROGRESS NOTES
Outpatient in a Bed discharge goals  PRIOR TO DISCHARGE     Comments: List all Outpatient in a Bed discharge goals to be met before discharge home:     ~ Patient able to ambulate as they were prior to admission or with assist devices provided by therapies during their stay: Met    ~ Nurse to Notify Provider when Outpatient in a Bed discharge goals have been met and patient is ready for discharge.

## 2020-05-23 NOTE — PROGRESS NOTES
MD Notification    Person notified:hospitalist    Person Name:Dr. Linares    Date/Time:5/23/20  3067    Interaction:page    Purpose of Notification:Patient still unable to empty bladder since carney removal yesterday morning, multiple straight caths since removal, bladder scan 661 ml now. Please advise.    Orders Received:Please reinsert carney catheter, urology consulted also.    Comments:

## 2020-05-23 NOTE — PROGRESS NOTES
List all Outpatient in a Bed discharge goals to be met before discharge home:   ~ Patient able to ambulate as they were prior to admission or with assist devices provided by therapies during their stay. - met.      ~ Nurse to Notify Provider when Outpatient in a Bed discharge goals have been met and patient is ready for discharge.

## 2020-05-24 ENCOUNTER — MYC MEDICAL ADVICE (OUTPATIENT)
Dept: NEUROSURGERY | Facility: CLINIC | Age: 65
End: 2020-05-24

## 2020-05-24 VITALS
HEART RATE: 74 BPM | BODY MASS INDEX: 28.3 KG/M2 | SYSTOLIC BLOOD PRESSURE: 154 MMHG | HEIGHT: 74 IN | WEIGHT: 220.5 LBS | TEMPERATURE: 97.5 F | RESPIRATION RATE: 18 BRPM | OXYGEN SATURATION: 93 % | DIASTOLIC BLOOD PRESSURE: 92 MMHG

## 2020-05-24 PROCEDURE — 99207 ZZC MOONLIGHTING INDICATOR: CPT | Performed by: INTERNAL MEDICINE

## 2020-05-24 PROCEDURE — 25000132 ZZH RX MED GY IP 250 OP 250 PS 637: Performed by: PHYSICIAN ASSISTANT

## 2020-05-24 PROCEDURE — 99226 ZZC SUBSEQUENT OBSERVATION CARE,LEVEL III: CPT | Performed by: INTERNAL MEDICINE

## 2020-05-24 RX ADMIN — OXYCODONE HYDROCHLORIDE AND ACETAMINOPHEN 2 TABLET: 5; 325 TABLET ORAL at 07:32

## 2020-05-24 RX ADMIN — METOPROLOL SUCCINATE 25 MG: 25 TABLET, EXTENDED RELEASE ORAL at 09:32

## 2020-05-24 RX ADMIN — OXYCODONE HYDROCHLORIDE AND ACETAMINOPHEN 2 TABLET: 5; 325 TABLET ORAL at 00:13

## 2020-05-24 RX ADMIN — SENNOSIDES AND DOCUSATE SODIUM 1 TABLET: 8.6; 5 TABLET ORAL at 09:32

## 2020-05-24 RX ADMIN — OXYCODONE HYDROCHLORIDE AND ACETAMINOPHEN 2 TABLET: 5; 325 TABLET ORAL at 03:54

## 2020-05-24 ASSESSMENT — MIFFLIN-ST. JEOR: SCORE: 1859.93

## 2020-05-24 NOTE — DISCHARGE INSTRUCTIONS
POST OPERATIVE INSTRUCTIONS  FOR PATIENT WHO HAVE HAD NECK SURGERY  (Cervical Discectomy, Laminectomy, Anterior and/or Posterior Fusion)    HOSPITAL STAY:  The hospital stay for cervical fusion is usually overnight only (one day). Occasionally the stay will be two days, depending upon the specific surgery done. We will let you know the anticipated length of hospitalization prior to the surgery. I, or one of my partners, will visit you each day in the hospital.    BRACING:  All cervical fusion surgeries requires that you be fitted in a cervical neck brace that is worn 24 hours a day, 7 days a week for approximately 6 weeks following surgery. Although the internal plate and screws used during the surgery are very secure, we have found that the success of surgery is enhanced when a brace is worn after surgery. Usually the brace fitting is arranged before the surgery, but sometimes you will not be fitted until you are in the hospital. The brace should be worn snugly, such that you can move your mouth to talk and eat, but such that you cannot move you neck more than a few degrees in any directions. If the brace does not seem to fit correctly, contact the orthotist who provided the brace for adjustments and/or replacement.     THINGS TO AVOID AFTER SURGERY:    In the postoperative period following cervical fusion, you will want to avoid the followin. Removing the cervical brace for any reason, except as outlined below.  2. Lifting or carrying anything more than five pounds.   3. Any activities that put undue stress on the neck and/or upper shoulders.   4. Any overhead activity.   5. Smoking or any other tobacco use.    Lifting and carrying anything more than five pounds, or working overhead, could put undue stress on the neck and/or upper shoulders, which could cause increased pain and slower recovery postoperatively.     Tobacco use will delay and even prevent fusion from healing, and in addition has been shown to  cause degenerative changes in the discs, and therefore is prohibited.     POSITIONS OF COMFORT:  Since you are required to wear the cervical brace 24 hours a day, 7 days a week for approximately 6 weeks, it is going to be somewhat difficult for you to be comfortable in the first few weeks with regard to sitting, sleeping and/or getting around at home. If you have obtained your brace before surgery, experiment with positions and activities to find what works for you.    The easiest thing to remember is that the most comfortable position while you re in your brace is most likely the safest position. There is no specific position that you need to stay in. Sitting, reclining, and/or lying flat, as long as there is not a great deal of pressure caused by that position, should not be a problem.     Again, the most important point that you should remember is that the brace needs to stay on at all times and there should be nothing that you do that causes stress to the neck and/or upper shoulders. Any of these situations could caused increased pain and make your postoperative course more uncomfortable than need be.     DRIVING:  You are not allowed to drive during the time you are wearing your rigid cervical collar. You will be able to ride in a car as long as you re in your collar, comfortable, and in the car for only a short periods of time. If the car ride lasts greater than one hour, you should pull over periodically so you can get out of the car and stretch.     SHOWERING:  Showering is acceptable starting five days after surgery. Prior to that time we suggest  sponge baths  or washcloth type baths, taking care not to get the brace, dressing, or wound wet. When you begin showering, the brace must be worn at all times during the shower.     When you do take a shower, please have somebody around to assist you. If you drop the soap or shampoo bottle, I would rather you did not bend over to pick it up, and instead have  someone retrieve this for you. Please do not take a bath before your follow up visit, as soaking the wound is not good. This also applies to the bone graft incision that is on the front portion of your hip.     The brace comes with pads that are changeable after showering, or sometimes another option is to have a second brace for showering. After your shower, the wet brace should be changed while in the sitting position. This is best done with the help of another person. If your dressing gets at all wet from showering, you will also need to have you dressing changed at this time. (See dressing and wound care below)    DRESSING AND WOUND CARE:  It is important to keep the wound and dressing dry underneath the collar. Most of the time I am able to use dissolving stitches so there are no stitches to remove. Occasionally the ends or  tails  of the stitches are outside of the skin and covered with a steri-strip (small Band-Aid like strip). At your follow up visit, we will check your wound, change the steri-strips and dressing if necessary, and clips the ends of the  tails  of the suture if necessary.    There is absolutely no need to change your dressing, but it is important to keep the wound covered until your follow up visit. If your dressing does get wet for some reason, have the supplies available so that someone can be prepared to change your dressing immediately. Please replace it with a sterile piece of dressing gauze, usually 2x2 inch or 4x4 inch type size, which can be obtained at the drug store. Use medical tape to hold it in place (also obtainable at a drug store).     If your wound begins to bother you, or you notice any fresh fluid or any sort on the dressing it is worth looking at the wound. To do this best, you should be sitting up in a very erect position, and have a friend or family member gently remove the front portion of your collar. Without turning your neck or bending have someone gently remove the  dressing, the wound can be inspected. If there is any concern regarding the wound, this should be reported to my office. Please call [phone] and ask to speak to my . In the meantime, please replace the dressing with a sterile piece of dressing gauze, using medical tape to hold it in place. Replace the collar after the new dressing has been applied.     STOCKING AND BLOOD CLOTS:  You will have DANIELITO hose type stocking on at the time when you leave the surgery center. These help prevent blood clots in your legs. Please wear then 23 hours a day, until you follow up visit. They may be removed for brief periods of time for personal hygiene purposes or to wash and dry them. Please remove them only at a time when you can be up and around, as walking helps prevent blood clots. I would prefer you not be lying down without the DANIELITO hose on. Occasionally I also prescribe an Aspirin every other day, as an additional measure to help prevent blood clots. The best way to prevent blood clots is to do the  ankle pumps  that the nurse will show you in the surgery center or hospital, and to walk. If you do get significant swelling of either leg, especially if accompanied by pain in the calf, it is important to call our office.     INCENTIVE SPIROMETER:  Be sure to take your incentive spirometer home. This is the breathing machine that you used at your bedside. Use this at home on a regular basis for your first three weeks after your surgery. This helps oxygen get to your lungs, and thus to your surgery site, and also helps to prevent pneumonia.     WALKING:  Although it is important to minimize your lifting and carrying, and overhead activity as outlined above, it is important to try to stand and walk in increased amounts every day. Please make a determined effort to walk at least three times per day. Your initial walking time may only be five to ten minutes at a time, but you should increase so that by three weeks after  surgery, you are walking up to one mile per day. Occasionally patients feel more comfortable using a walker the first few days after surgery, as the  hip  graft can make walking uncomfortable for a few days, and the cervical brace can make it difficult to see where you are going, until you get used to it. (We usually prescribe a walker, is necessary, during your hospital stay and prior discharge).       PRESCRIPTIONS:  You will be provided a prescription for a narcotic medication to take after surgery. This may be Percocet, Vicodin or Tylenol with Codeine. Vicodin and Tylenol with Codeine can be refilled over the phone if necessary by calling your pharmacy for a refill. The pharmacy will then call our office for verification. You should need diminishing amounts of these medications, such that by three weeks after surgery, most people are no longer using any narcotics. Over the counter Tylenol Extra Strength, or an alternative prescription medicine from our office, may be useful as an alternative to narcotics as your pain improves. In some cases, a muscle relaxant such as Soma is also provided. Try to stay away from large amounts of Aspirin or other anti-inflammatories (Ibuprofen, Advil, Nuprin, Aleve, etc.) as their use may inhibit healing in the initial week or two after surgery.     FOLLOW UP APPOINTMENT POST OPERATIVE PHYSICAL THERAPY, AND RETURN TO WORK:  You should make your first post operative appointment at the  or you may call ([phone]) to schedule this, usually approximately three weeks after surgery. The first visit will include post operative wound check, dressing changes, physical exam, and your first x-ray after surgery. At that time, if you are doing well, the DANIELITO hose stockings will be discontinued, your dressings will be discontinued, and your activities will be increased as tolerated. Those who have sedentary jobs may return to work at this point, although driving in your collar is  prohibited, so you may need to arrange rides to and from work. For those of you with heavier works, we may be able to place some restrictions on your work and return you to work in a limited capacity. For those who jobs require heavier lifting and carrying and overhead work, the earliest to return to work will be no sooner than the 6 weeks after surgery.     The next follow up appointment will usually be six to eight weeks after surgery. On the second office visit, you will have x-rays taken in the office out of their brace, usually with gentle neck flexion and extension x-rays to evaluate the fusion. If the fusion is healing on schedule, you will then be allowed to discontinue your hard cervical collar. You will be given a soft cervical collar for part-time use and will start physical therapy at this time for upper extremity and upper back strengthening exercises and gentle neck exercises. The therapy will usually occur three times a week for about four weeks.     The physical therapist should each your exercises you can do at home. Doing these exercises on at least an every other day basis, and also doing cardiovascular exercises, are also very helpful towards maintaining the health of your neck.         Assessment/Plan: Urology                 1.  continue flomax               2. F/u with Urology in one week for a voiding trial; happy to see the pt back if he decides         Please contact me with any questions or concerns.      Ashleigh Bryson MD  [facility]  Pager:  [phone]  Please call our office at [phone]

## 2020-05-24 NOTE — PROGRESS NOTES
[facility]    Neurosurgery  Daily Post-Op Note    Assessment & Plan   Procedure(s):  Right Anterior Cervical 4 to cervical 6 discectomy and fusion. Laurent wells tong placement  Bilateral posterior Cervical 7-Thoracic 1 posterior minimally invasive foraminotomy   -3 Days Post-Op  Pain well-controlled.  Failed voiding trial and carney is now in place. He will discharge with the carney and f/u with Urology as instructed.   Plan:  -Continue supportive and symptomatic treatment  -Pain control measures  -f/u with Dr. Downing's team [facility] in 4-6 weeks.   [phone] for appts.     Florin Barone    Interval History   Stable.  Failed voiding trial and carney was left in place.     Physical Exam   Temp: 97.5  F (36.4  C) Temp src: Oral BP: (!) 154/92   Heart Rate: 78 Resp: 18 SpO2: 93 % O2 Device: None (Room air)    Vitals:    05/21/20 0611 05/22/20 1936 05/24/20 0724   Weight: 101.6 kg (224 lb) 100.1 kg (220 lb 9.6 oz) 100 kg (220 lb 8 oz)     Vital Signs with Ranges  Temp:  [97.5  F (36.4  C)-98.4  F (36.9  C)] 97.5  F (36.4  C)  Heart Rate:  [73-90] 78  Resp:  [16-18] 18  BP: (123-176)/() 154/92  SpO2:  [93 %-94 %] 93 %  I/O last 3 completed shifts:  In: 240 [P.O.:240]  Out: 1350 [Urine:1350]    Alert and appropriate.     Incision: CDI      Medications     sodium chloride Stopped (05/22/20 2300)        atorvastatin  40 mg Oral QPM     losartan  100 mg Oral QPM     metoprolol succinate ER  25 mg Oral Daily     senna-docusate  1 tablet Oral BID    Or     senna-docusate  2 tablet Oral BID     sodium chloride (PF)  3 mL Intracatheter Q8H     sodium phosphate  1 enema Rectal Once       Data         Florin Barone PA-C  [facility]  [address]  [address]  [place] [address]    Tel [phone]  Pager [phone]

## 2020-05-24 NOTE — PROVIDER NOTIFICATION
Called by nursing regarding chest pain. Pain is similar to yesterday, although up to a 10/10 this evening. Rapid response note from 5/22 reviewed. Previous EKGs reviewed. Serial troponins were negative. TTE from 5/23 reviewed, no regional wall motion abnormalities. Pain is starting to improve. He was hypertensive, but this is starting to improved as his pain improves after getting PRN percocet. Heart rate has been OK this evening, in the 70s-90s.  Would continue symptomatic management.  Atul Harrison MD

## 2020-05-24 NOTE — PLAN OF CARE
POD 3 from an A/P C4-6 fusion and C7-T1 foraminotomy. A&Ox4. CMS intact with 4/5BUE strength. Bowel sounds active, passing flatus, no BM, tolerating Regular diet. 10/10 chest pain for approximately 5 minuets - MD aware, resolved on own- agrivated by movement. VSS on RA. Incsion glued and ALFREDO. Up with A1GBW. Soft collar for comfort. Douglas in place. C/o bilateral shoulder pain, decreased with percocet. Pt scoring green on the Aggression Stop Light Tool. Plan for discharge home today with follow up outpatient at urology clinic.

## 2020-05-24 NOTE — PROGRESS NOTES
[facility]    Medicine Progress Note - Hospitalist Service       Date of Admission:  5/21/2020  Assessment & Plan   Gopi Farfan is a 65 year old male who was admitted on 5/21/2020. I was asked to see the patient for bradycardia.     Bradycardia:   -  Nursing staff reporting patient with heart rate as low as 35 bpm following surgical intervention and release from the postanesthesia care unit.  Patient was asymptomatic and hospitalist consult was requested by primary service to investigate further underlying etiologies as to the etiology of underlying reason for bradycardia.    - Twelve-lead EKG shows sinus rhythm at a rate of 64 bpm.  - he is on Toprol XL 25 mg po daily  - Patient denies chest pain, vomiting, ear pain, eye pain, blood in the stool or urine, dysuria, lower extremity edema, rash, recent sick contacts.  - TSH 0.4  - improved now.      Cervical stenosis with myelopathy and radiculopathy, status post right anterior C4-C6 discectomy and fusion, bilateral posterior C7-T1 minimally invasive posterior cervical foraminotomy on May 21, 2020.  - POD#1  - Surgery following, defer cares to their service.  - pain controlled    Failed voiding trial  -urology consulted, carney left in, plan to follow up with urology as outpatient. Keep carney in.      H/o frontal anaplastic oligodendroglioma   - s/p resection on 5/30/2019 and chemoradiation therapy  - follows with Dr Lozano    HTN  - continue PTA Toprol Xl 25 mg po daily and Losartan 100 mg po daily    HLP  - continue PTA statin    Diet: Advance Diet as Tolerated: Regular Diet Adult  Diet    DVT Prophylaxis: Defer to primary service  Carney Catheter: in place, indication: Retention, Anesthesia           Disposition Plan   Expected discharge: as per primary team, recommended to prior living arrangement     Entered: Alondra Linares MD 05/24/2020, 11:11 AM    The patient's care was discussed with the Bedside Nurse and Patient.    Alondra Linares MD  Hospitalist  Service  [facility]    ______________________________________________________________________    Interval History   Urology consulted for bladder retention, carney will remain in and patient will follow with urology as outpatient. Chest pain overnight, but improved. Workup negative.     Data reviewed today: I reviewed all medications, new labs and imaging results over the last 24 hours. I personally reviewed the EKG tracing showing NSR, no ischemic changes.    Physical Exam   Vital Signs: Temp: 97.5  F (36.4  C) Temp src: Oral BP: (!) 154/92   Heart Rate: 78 Resp: 18 SpO2: 93 % O2 Device: None (Room air)    Weight: 220 lbs 8 oz     GENERAL: Alert and oriented. NAD. Conversational, appropriate.   HEENT: Normocephalic. EOMI. No icterus or injection. Nares normal.   LUNGS: Clear to auscultation. No dyspnea at rest.   HEART: S1S2, RRR, no murmurs, no rubs  ABDOMEN: Soft, nontender, and nondistended. Positive bowel sounds.   EXTREMITIES: No LE edema noted.   NEUROLOGIC: Moves extremities x4, follows commands    Data   Recent Labs   Lab 05/23/20  0024 05/22/20 2029 05/22/20  1629 05/22/20  0719 05/21/20  2154  05/21/20  0553   WBC  --   --   --  11.0  --   --   --    HGB  --   --   --  13.8  --   --   --    MCV  --   --   --  98  --   --   --    PLT  --   --   --  176  --   --   --    NA  --   --   --   --  139  --   --    POTASSIUM  --   --   --   --  4.3  --  4.2   CHLORIDE  --   --   --   --  106  --   --    CO2  --   --   --   --  25  --   --    BUN  --   --   --   --  16  --   --    CR  --   --   --   --  0.94  --   --    ANIONGAP  --   --   --   --  8  --   --    LEFTY  --   --   --   --  8.6  --   --    GLC  --   --   --   --  156*  --   --    ALBUMIN  --   --   --   --  3.2*  --   --    PROTTOTAL  --   --   --   --  6.2*  --   --    BILITOTAL  --   --   --   --  0.6  --   --    ALKPHOS  --   --   --   --  83  --   --    ALT  --   --   --   --  20  --   --    AST  --   --   --   --  18  --   --     TROPI <0.015 <0.015 <0.015  --  <0.015   < >  --     < > = values in this interval not displayed.     Recent Results (from the past 24 hour(s))   Echocardiogram Complete    Narrative    [id number]  [id number]  [id number]  [id number]^OLAFCOLTON^VELVET^FERMIN           [facility]  Echocardiography Laboratory  [address] [place] [address]        Name: LORETO RAMIREZ  MRN: [id number]  : 1955  Study Date: 2020 11:19 AM  Age: 65 yrs  Gender: Male  Patient [facility]  Reason For Study: PVC's  Ordering Physician: VELVET COLON     BSA: 2.3 m2  Height: 74 in  Weight: 224 lb  HR: 92  BP: 156/93 mmHg  _____________________________________________________________________________  __        Procedure  Complete Portable Echo Adult.  _____________________________________________________________________________  __        Interpretation Summary     The left ventricle is normal in size.  The visual ejection fraction is estimated at 55-60%.  Diastolic Doppler findings (E/E' ratio and/or other parameters) suggest left  ventricular filling pressures are increased.  No regional wall motion abnormalities noted.  No signifciant valvular heart disease.  _____________________________________________________________________________  __        Left Ventricle  The left ventricle is normal in size. There is normal left ventricular wall  thickness. The visual ejection fraction is estimated at 55-60%. Diastolic  Doppler findings (E/E' ratio and/or other parameters) suggest left ventricular  filling pressures are increased. No regional wall motion abnormalities noted.     Right Ventricle  The right ventricle is normal in size and function.     Atria  The left atrium is mildly dilated. Right atrial size is normal. There is no  color Doppler evidence of an atrial shunt.        Mitral Valve  The mitral valve leaflets are mildly thickened. There is mild (1+) mitral  regurgitation.     Tricuspid Valve  There is  mild (1+) tricuspid regurgitation.     Aortic Valve  There is trivial trileaflet aortic sclerosis. No aortic regurgitation is  present.     Pulmonic Valve  There is trace pulmonic valvular regurgitation.     Vessels  Normal size aorta. Borderline aortic root dilatation.     Pericardium  There is no pericardial effusion.        Rhythm  Sinus rhythm was noted. The patient exhibited frequent PVCs.  _____________________________________________________________________________  __     MMode/2D Measurements & Calculations  IVSd: 1.1 cm  LVIDd: 5.9 cm  LVIDs: 3.8 cm  LVPWd: 1.1 cm  FS: 34.9 %  LV mass(C)d: 268.5 grams  LV mass(C)dI: 117.7 grams/m2  Ao root diam: 3.7 cm  LA dimension: 4.9 cm  asc Aorta Diam: 3.5 cm  LA/Ao: 1.3  LVOT diam: 2.6 cm  LVOT area: 5.5 cm2  LA Volume (BP): 90.1 ml  LA Volume Index (BP): 39.5 ml/m2     RWT: 0.37        Doppler Measurements & Calculations  MV E max estrada: 98.1 cm/sec  MV A max estrada: 104.1 cm/sec  MV E/A: 0.94  MV dec time: 0.16 sec  PA V2 max: 57.0 cm/sec  PA max P.3 mmHg  PA acc time: 0.09 sec  E/E' avg: 15.2  Lateral E/e': 8.8  Medial E/e': 21.6           _____________________________________________________________________________  __           Report approved by: Claudia Torres 2020 12:41 PM        Medications     sodium chloride Stopped (20 2300)       atorvastatin  40 mg Oral QPM     losartan  100 mg Oral QPM     metoprolol succinate ER  25 mg Oral Daily     senna-docusate  1 tablet Oral BID    Or     senna-docusate  2 tablet Oral BID     sodium chloride (PF)  3 mL Intracatheter Q8H     sodium phosphate  1 enema Rectal Once      no

## 2020-05-24 NOTE — PROGRESS NOTES
Pt with questions regarding carney catheter and discharging home. RN reviewed urology note with patient and updated patient on discharge plan. Pt stated that he does not want to drive to [place] to follow up with urology as the clinic is 2 hours away from his home. Pt would prefer to follow up closer to his home and stated that his primary is Dr. Tan at [place] and would prefer to follow up there.

## 2020-05-24 NOTE — PLAN OF CARE
PT attempted. Pt already discharged and left the floor.    Physical Therapy Discharge Summary    Reason for therapy discharge:    Discharged to home with assist    Progress towards therapy goal(s). See goals on Care Plan in [facility] electronic health record for goal details.  Goals not met.  Barriers to achieving goals:   discharge from facility.    Therapy recommendation(s):    No further therapy is recommended.

## 2020-05-24 NOTE — PLAN OF CARE
POD 3 from a A/P C4-C6 fusion and c7-T1 foraminotomy w/ Dr. Downing. A&Ox4, slow speech at times. CMS intact, slight numbness in Bilat shoulders upon movement. Bowel sounds active, + flatus, tolerating Reg diet. VSS. Dressing ALFREDO, liquid bandage. Carney patent. Up with A1/GB/W. C/o Moderate pain, decreased with percocet q4h. Pt scoring green on the Aggression Stop Light Tool. Plan discharge home with wife today. Carney will stay in and pt. Will follow up with urology outpatient, number provided in AVS. Pt. Educated on carney care and performed a teachback on emptying carney. All questions answered and education given w/ wife on speakerphone.

## 2020-05-24 NOTE — PROVIDER NOTIFICATION
MD Notification    Notified Person: MD    Notified Person Name: Christy    Notification Date/Time: 5/24/20 0034    Notification Interaction: page    Purpose of Notification:Pt having recurring chest pain. previous work up negative. pain is suspected muscoskeletal. currently 10/10 chest pain with elevated BP and HR please advise.     Orders Received:     Comments:

## 2020-05-26 ENCOUNTER — TELEPHONE (OUTPATIENT)
Dept: NEUROSURGERY | Facility: CLINIC | Age: 65
End: 2020-05-26

## 2020-05-26 LAB
INTERPRETATION ECG - MUSE: NORMAL
INTERPRETATION ECG - MUSE: NORMAL

## 2020-05-26 NOTE — TELEPHONE ENCOUNTER
"Right C4-6 ACDF and Bilateral Posterior C7-T1 minimally invasive foraminotomy by Dr. Downing on 5/21/2020. Calling patient in regardings to his [place] message.   Patient reports he was released from the hospital on Sunday.     He was told to keep in catheter for 7 days. Friday will be the 7th day. Catheter is draining ok per patient. Patient states that the tube is secured \"at the very max\"   Patient confirmed that the catheter is ok placement wise however looking to get it removed sooner..    Nursing will check with Dr. Downing's team. Asked that he wait to get an appointment to remove it until cleared to. Advised if he thinks however the placement is wrong/uncomfortable to get it looked at prior. He agreed with plan.           "

## 2020-05-26 NOTE — TELEPHONE ENCOUNTER
"Spoke with Bharti, from [facility]. According to Dr. Rodríguez's note on 5/23 patient to follow up with Dr. Xiang Montenegro in their [place] clinic. Bharti stated a nurse will call him to go over proper care and discuss to make sure no concerns with his catheter. Also will arrange follow up appointment at the time recommended by Urology.    Nursing called patient to let him know to not get his catheter taken out by PCP and to follow Urology guidelines. Provided Urology clinic number.    Patient states that since he talked to me this morning it is \"different' He said the tube looks as if it is coming out at top. No pain. Draining fine. Him and his wife stated trying to clean it. Nursing discussed not to attempt to take it out himself as he mentioned. Nursing encouraged him to call Urology to discuss. He stated he will now.  "

## 2020-05-29 DIAGNOSIS — M47.12 CERVICAL SPONDYLOSIS WITH MYELOPATHY: ICD-10-CM

## 2020-05-29 RX ORDER — OXYCODONE AND ACETAMINOPHEN 5; 325 MG/1; MG/1
1 TABLET ORAL EVERY 4 HOURS PRN
Qty: 30 TABLET | Refills: 0 | Status: SHIPPED | OUTPATIENT
Start: 2020-05-29 | End: 2021-05-24

## 2020-05-29 NOTE — TELEPHONE ENCOUNTER
Prescription Refill Request    Medication: Percocet     Surgical procedure/date: Anterior C4 to C6 discectomy and fusion, bilateral posterior C7-T1 posterior foraminotomy by Dr. Downing on 05/21/2020.    Current regimen/number of tabs per day: 1 tab q 4 hours, using Tylenol in between     Last refill:  5/21/20    Pain Assessment:  Neck and shoulders      location: [facility] in [place]    Any patient questions or concerns: None    Will forward request to care team for approval.

## 2020-06-04 ENCOUNTER — OFFICE VISIT (OUTPATIENT)
Dept: NEUROSURGERY | Facility: CLINIC | Age: 65
End: 2020-06-04

## 2020-06-04 VITALS
OXYGEN SATURATION: 99 % | TEMPERATURE: 97.8 F | HEART RATE: 73 BPM | DIASTOLIC BLOOD PRESSURE: 75 MMHG | SYSTOLIC BLOOD PRESSURE: 138 MMHG | HEIGHT: 74 IN | BODY MASS INDEX: 28.23 KG/M2 | WEIGHT: 220 LBS | RESPIRATION RATE: 16 BRPM

## 2020-06-04 DIAGNOSIS — Z98.1 S/P CERVICAL SPINAL FUSION: Primary | ICD-10-CM

## 2020-06-04 PROCEDURE — 99207 ZZC NO CHARGE NURSE ONLY: CPT

## 2020-06-04 PROCEDURE — 40000269 ZZH STATISTIC NO CHARGE FACILITY FEE

## 2020-06-04 ASSESSMENT — MIFFLIN-ST. JEOR: SCORE: 1857.66

## 2020-06-04 ASSESSMENT — PAIN SCALES - GENERAL: PAINLEVEL: NO PAIN (0)

## 2020-06-04 NOTE — NURSING NOTE
"Gopi Farfan is a 65 year old male who presents for:  Chief Complaint   Patient presents with     Surgical Followup        Initial Vitals:  /75   Pulse 73   Temp 97.8  F (36.6  C) (Oral)   Resp 16   Ht 6' 2\" (1.88 m)   Wt 220 lb (99.8 kg)   SpO2 99%   BMI 28.25 kg/m   Estimated body mass index is 28.25 kg/m  as calculated from the following:    Height as of this encounter: 6' 2\" (1.88 m).    Weight as of this encounter: 220 lb (99.8 kg).. Body surface area is 2.28 meters squared. BP completed using cuff size: regular  No Pain (0)    Nursing Comments: Pt present today for 2 weeks post op f/u.    Jasmeet Parmar CMA    "

## 2020-06-04 NOTE — PROGRESS NOTES
Nurse Visit: Incision Check    S:  Patient is s/p C4-6 ACDF and C7-T1 posterior foraminotomy on 5/21/20 with Dr. Downing. Patient denies any incisional concerns today. He reports some moderate soreness in neck and shoulders. Denies arm pain. He's happy the Douglas catheter was able to be removed. He has weaned off narcotics. Currently taking Tylenol as needed. He would like to work on arm strengthening exercises if possible. He has gone to PT in [place] in the past since this is close to home.   O:  Incision clean, dry, intact. No sutures or staples to be removed. Incision is healing well without erythema, fluctuation, induration, drainage, or fever. 5/5 strength BUE.   A: Patient returned to clinic for post op incision check. Recovering well at this time.  P:    - Keep your incision clean and dry. No bathing, swimming, or submerging in water until incision is well healed.  Call clinic or go to Emergency Room if you develop any new pain, drainage, swelling, or fever.    - No lifting greater than 10 pounds. Limited bending, twisting, overhead reaching.    -Will discuss with Dr. Downing: physical therapy order for arm strengthening. We can fax this to [facility].     - Return to clinic as scheduled.     -Please call clinic with any further questions or concerns: [phone]    Magalie Lind RN  [facility] Neurosurgery  [address] [address]      ADDENDUM: Dr. Downing approves PT order. Faxed to [facility].     Tel [phone]

## 2020-06-08 ENCOUNTER — APPOINTMENT (OUTPATIENT)
Dept: LAB | Facility: CLINIC | Age: 65
End: 2020-06-08
Attending: PSYCHIATRY & NEUROLOGY
Payer: COMMERCIAL

## 2020-06-08 ENCOUNTER — HOSPITAL ENCOUNTER (OUTPATIENT)
Dept: MRI IMAGING | Facility: CLINIC | Age: 65
End: 2020-06-08
Attending: PSYCHIATRY & NEUROLOGY
Payer: COMMERCIAL

## 2020-06-08 ENCOUNTER — ONCOLOGY VISIT (OUTPATIENT)
Dept: ONCOLOGY | Facility: CLINIC | Age: 65
End: 2020-06-08
Attending: PSYCHIATRY & NEUROLOGY
Payer: COMMERCIAL

## 2020-06-08 VITALS
RESPIRATION RATE: 16 BRPM | SYSTOLIC BLOOD PRESSURE: 122 MMHG | TEMPERATURE: 98.2 F | OXYGEN SATURATION: 98 % | WEIGHT: 217 LBS | BODY MASS INDEX: 27.86 KG/M2 | HEART RATE: 72 BPM | DIASTOLIC BLOOD PRESSURE: 76 MMHG

## 2020-06-08 DIAGNOSIS — R11.2 CHEMOTHERAPY-INDUCED NAUSEA AND VOMITING: ICD-10-CM

## 2020-06-08 DIAGNOSIS — C71.9 OLIGODENDROGLIOMA, ANAPLASTIC (H): ICD-10-CM

## 2020-06-08 DIAGNOSIS — T45.1X5A CHEMOTHERAPY-INDUCED NAUSEA AND VOMITING: ICD-10-CM

## 2020-06-08 DIAGNOSIS — C71.9 CANCER OF BRAIN TREATED WITH RADIATION THERAPY (H): ICD-10-CM

## 2020-06-08 DIAGNOSIS — Z51.11 CHEMOTHERAPY MANAGEMENT, ENCOUNTER FOR: ICD-10-CM

## 2020-06-08 DIAGNOSIS — C71.9 OLIGODENDROGLIOMA, ANAPLASTIC (H): Primary | ICD-10-CM

## 2020-06-08 LAB
ALBUMIN SERPL-MCNC: 3.5 G/DL (ref 3.4–5)
ALP SERPL-CCNC: 110 U/L (ref 40–150)
ALT SERPL W P-5'-P-CCNC: 30 U/L (ref 0–70)
ANION GAP SERPL CALCULATED.3IONS-SCNC: 7 MMOL/L (ref 3–14)
AST SERPL W P-5'-P-CCNC: 10 U/L (ref 0–45)
BASOPHILS # BLD AUTO: 0.1 10E9/L (ref 0–0.2)
BASOPHILS NFR BLD AUTO: 0.9 %
BILIRUB SERPL-MCNC: 0.3 MG/DL (ref 0.2–1.3)
BUN SERPL-MCNC: 14 MG/DL (ref 7–30)
CALCIUM SERPL-MCNC: 9.2 MG/DL (ref 8.5–10.1)
CHLORIDE SERPL-SCNC: 105 MMOL/L (ref 94–109)
CO2 SERPL-SCNC: 24 MMOL/L (ref 20–32)
CREAT SERPL-MCNC: 0.95 MG/DL (ref 0.66–1.25)
DIFFERENTIAL METHOD BLD: NORMAL
EOSINOPHIL # BLD AUTO: 0.3 10E9/L (ref 0–0.7)
EOSINOPHIL NFR BLD AUTO: 4.1 %
ERYTHROCYTE [DISTWIDTH] IN BLOOD BY AUTOMATED COUNT: 13 % (ref 10–15)
GFR SERPL CREATININE-BSD FRML MDRD: 84 ML/MIN/{1.73_M2}
GLUCOSE SERPL-MCNC: 92 MG/DL (ref 70–99)
HCT VFR BLD AUTO: 42.6 % (ref 40–53)
HGB BLD-MCNC: 14.3 G/DL (ref 13.3–17.7)
IMM GRANULOCYTES # BLD: 0 10E9/L (ref 0–0.4)
IMM GRANULOCYTES NFR BLD: 0.3 %
LYMPHOCYTES # BLD AUTO: 0.8 10E9/L (ref 0.8–5.3)
LYMPHOCYTES NFR BLD AUTO: 11.1 %
MCH RBC QN AUTO: 32.2 PG (ref 26.5–33)
MCHC RBC AUTO-ENTMCNC: 33.6 G/DL (ref 31.5–36.5)
MCV RBC AUTO: 96 FL (ref 78–100)
MONOCYTES # BLD AUTO: 0.9 10E9/L (ref 0–1.3)
MONOCYTES NFR BLD AUTO: 13.7 %
NEUTROPHILS # BLD AUTO: 4.8 10E9/L (ref 1.6–8.3)
NEUTROPHILS NFR BLD AUTO: 69.9 %
NRBC # BLD AUTO: 0 10*3/UL
NRBC BLD AUTO-RTO: 0 /100
PLATELET # BLD AUTO: 255 10E9/L (ref 150–450)
POTASSIUM SERPL-SCNC: 4.2 MMOL/L (ref 3.4–5.3)
PROT SERPL-MCNC: 7.5 G/DL (ref 6.8–8.8)
RBC # BLD AUTO: 4.44 10E12/L (ref 4.4–5.9)
SODIUM SERPL-SCNC: 137 MMOL/L (ref 133–144)
WBC # BLD AUTO: 6.9 10E9/L (ref 4–11)

## 2020-06-08 PROCEDURE — A9585 GADOBUTROL INJECTION: HCPCS | Performed by: PSYCHIATRY & NEUROLOGY

## 2020-06-08 PROCEDURE — 80053 COMPREHEN METABOLIC PANEL: CPT | Performed by: PSYCHIATRY & NEUROLOGY

## 2020-06-08 PROCEDURE — 70553 MRI BRAIN STEM W/O & W/DYE: CPT

## 2020-06-08 PROCEDURE — 99214 OFFICE O/P EST MOD 30 MIN: CPT | Mod: ZP | Performed by: PSYCHIATRY & NEUROLOGY

## 2020-06-08 PROCEDURE — 36415 COLL VENOUS BLD VENIPUNCTURE: CPT

## 2020-06-08 PROCEDURE — G0463 HOSPITAL OUTPT CLINIC VISIT: HCPCS | Mod: ZF

## 2020-06-08 PROCEDURE — 25500064 ZZH RX 255 OP 636: Performed by: PSYCHIATRY & NEUROLOGY

## 2020-06-08 PROCEDURE — 85025 COMPLETE CBC W/AUTO DIFF WBC: CPT | Performed by: PSYCHIATRY & NEUROLOGY

## 2020-06-08 RX ORDER — GADOBUTROL 604.72 MG/ML
10 INJECTION INTRAVENOUS ONCE
Status: COMPLETED | OUTPATIENT
Start: 2020-06-08 | End: 2020-06-08

## 2020-06-08 RX ADMIN — GADOBUTROL 10 ML: 604.72 INJECTION INTRAVENOUS at 09:44

## 2020-06-08 ASSESSMENT — PAIN SCALES - GENERAL: PAINLEVEL: NO PAIN (0)

## 2020-06-08 NOTE — NURSING NOTE
Chief Complaint   Patient presents with     Blood Draw     Labs drawn via  by RN in lab. VS Taken.     Asia Bonds RN

## 2020-06-08 NOTE — PROGRESS NOTES
"NEURO-ONCOLOGY VISIT  Jun 8, 2020    CHIEF COMPLAINT: Mr. 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 He completed chemoradiotherapy with concurrent temozolomide as of 8/21/2019, course complicated by thrombocytopenia. He completed 6 cycles of adjuvant-dosed temozolomide in 2/2020. He is currently managed on imaging surveillance.     I met with Melvin in person and Dana (wife) on the phone today for this follow-up visit.       HISTORY OF PRESENT ILLNESS  -Melvin has been doing well. Denies any new neurological complaints.   -Recovered well from neck surgery and experiencing less pain in the neck/ shoulders/ arms. Posture is poor, hunching forward, going to start working with PT/ OT at the end of the month.   -Burning on urination after catheter was removed following surgery. Following with urology.   -Happy to be done with chemotherapy.  -Continued dizziness after sitting up and then standing when he first gets up in the AM. This occurs occasionally during the day as well when he stands up. HR is in the 70 today. Not nauseated. Some sense of motion, no spinning.   -Sleeping well at night.  -Short term memory issues overall stable, slight worsening in comprehension. 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 He is feeling very bored and is watching much TV during the day. Lacks a routine.  -No new sensory changes, weakness, headache, or changes in vision.  -No events concerning for seizures.     REVIEW OF SYSTEMS  A comprehensive ROS negative except as in HPI.      MEDICATIONS   Current Outpatient Medications   Medication Sig Dispense Refill     acetaminophen (TYLENOL) 325 MG tablet Take 325-650 mg by mouth 4 times daily as needed for mild pain       atorvastatin (LIPITOR) 80 MG tablet Take 40 mg by mouth every evening (takes 0.5 x 80mg)       latanoprost (XALATAN) 0.005 % " ophthalmic solution Place 1 drop into both eyes every evening        losartan (COZAAR) 100 MG tablet Take 100 mg by mouth every evening        metoprolol succinate ER (TOPROL-XL) 25 MG 24 hr tablet Take 25 mg by mouth daily        oxyCODONE-acetaminophen (PERCOCET) 5-325 MG tablet Take 1 tablet by mouth every 4 hours as needed for pain (moderate to severe) 30 tablet 0     senna-docusate (SENOKOT-S/PERICOLACE) 8.6-50 MG tablet Take 1-2 tablets by mouth 2 times daily Take while on oral narcotics to prevent or treat constipation. 30 tablet 0     sildenafil (VIAGRA) 50 MG tablet Take 50 mg by mouth daily as needed       DRUG ALLERGIES   Allergies   Allergen Reactions     Amlodipine Swelling       ONCOLOGIC HISTORY  -5/2019 PRESENTATION: Progressive confusion, memory loss plus difficulty with walking.   -5/28/2019 Admitted to [facility] for evaluation, then transferred to [facility] for higher level of care.   -5/28/2019 MR brain imaging revealed a large left frontal mass   -5/30/2019 SURGERY: Craniotomy for  mass resection by Dr. Downing.  PATHOLOGY: Oligodendroglioma with borderline anaplastic features (WHO grade III); Co-deletion of chromosomal regions 1p and 19q, IDH-1 (R132H by immunohistochemistry mutated. ATRX wild type by immunohistochemistry. Mitoses were present, not sufficient in number to warrant a diagnosis of anaplasia by that criterion.  However, the presence of vascular proliferation, even though mild, is indicative of early anaplasia.  -6/13/2019 NEURO-ONC: Recommending chemoradiotherapy.   -7/8 - 8/21/2019 CHEMORADS: 59.4 Gy in 1.8 Gy daily fractions x 30 fractions per Dr. Kirby Dwyer  With [facility] plus concurrent temozolomide 75mg/m2 (180mg).   -9/16/2019 NEURO-ONC/ MRB/ CHEMO: Clinically well. Imaging with no concerns. Starting adjuvant-dosed temozolomide 150mg/m2 (360mg), cycle 1 (start date of 9/19/2019).   -10/14/2019 NEURO-ONC/CHEMO: Clinically stable. Continue  "adjuvant-dosed temozolomide, increasing to 200 mg/m2 (460 mg), cycle 2 (start date of 10/17/2019).   -11/11/2019 NEURO-ONC/ MRB/ CHEMO: Clinically stable. Imaging with no concerns. Adjuvant-dosed temozolomide 200 mg/m2 (460 mg), cycle 3 (start date of 11/19/2019, but delayed due to thrombocytopenia).   -12/16/2019 NEURO-ONC/ CHEMO: Clinically stable. Adjuvant-dosed temozolomide 200 mg/m2 (460 mg), cycle 4 (start date of 12/17/2019).   -1/13/2020 NEURO-ONC/ CHEMO: Clinically stable. Adjuvant-dosed temozolomide 200 mg/m2 (460 mg), cycle 5 (start date originally supposed to be 1/14/2020, but was on 1/20/2020)  -2/24/2020 NEURO-ONC/ MRB/ CHEMO: Clinically stable. Imaging stable. Adjuvant-dosed temozolomide 200 mg/m2 (460 mg), cycle 6 (start date tonight).  -5/21/2020 SURGERY: Right anterior cervical 4 - 6 discectomy and fusion and mercedes wells tong placement. Bilateral posterior Cervical 7-Thoracic 1 posterior minimally invasive foraminotomy. Preformed by Dr. Downing.   -6/8/2020 NEURO-ONC/ MRB: Clinically stable; continued cognitive complaints. Imaging with symmetric cristiana-ventricular increased T2 FLAIR. Most consistent with radiation-induced changes. Repeat imaging with spectroscopy in 2 months.    SOCIAL HISTORY   Tobacco use: Former smoker.  Alcohol use: Social.   Drug use: Denies marijuana use.  Supplement, complimentary/ alternative medicine: None.    . 1 son + grandchildren.   Employment: Retired, worked as an .      PHYSICAL EXAMINATION  /76 (BP Location: Right arm, Patient Position: Sitting, Cuff Size: Adult Regular)   Pulse 72   Temp 98.2  F (36.8  C) (Oral)   Resp 16   Wt 98.4 kg (217 lb)   SpO2 98%   BMI 27.86 kg/m     Wt Readings from Last 2 Encounters:   06/08/20 98.4 kg (217 lb)   06/04/20 99.8 kg (220 lb)      Ht Readings from Last 2 Encounters:   06/04/20 1.88 m (6' 2\")   05/21/20 1.88 m (6' 2\")     KPS: 90    -Generally well appearing. Neck flexed, hunched posture. "   -Respiratory: Normal breath sounds, no audible wheezing.   -Skin: Psoriasis on arms and legs.   -Hematologic/ lymphatic: No leg swelling.   -Psychiatric: Normal mood and affect. Pleasant, talkative.  -Neurologic:   MENTAL STATUS:     Alert, oriented to date.    Recall: Intact, delayed in answering questions.    Speech fluent.    Comprehension intact to multi-step commands. Mild confusion.    Good right-left orientation.     CRANIAL NERVES:     L pupil 1mm > R pupil, round, reactive to light.     Extraocular movements full, patient denies diplopia.     Visual fields full.     Facial sensation intact to light touch.   Decreased activation of the face on the right, nasolabial fold flattening.    Hearing intact.   With normal phonation, no dysfunction anticipated of the palate or tongue. Wearing a mask today.  MOTOR:    No pronation or drift. No orbiting.   Difficult in rising from a chair without use of arms.   On toe/ heel walk, equal distance from floor to heels/ toes.   SENSATION:    Intact to light touch throughout.  COORDINATION:   Intact finger-nose with eyes open and closed.   GAIT:  Walks without assistance.   Hunched posture; neck flexed, shoulders forward.    Good speed. Normal stride length and heel strike. Normal turns. Normal arm swing.   Able to toe, heel walk. Able to tandem walk with mild difficulty.       MEDICAL RECORDS  Obtained and personally reviewed all available outside medical records in addition to reviewing any records available in our electronic system.     LABS  Personally reviewed all available lab results.     IMAGING  Personally reviewed MR brain imaging from today and compared to prior imaging. To my eye, there is no new contrast enhancement. There is a symmetric increase in T2 FLAIR signal abnormality about the cristiana-ventricular region.     Imaging results were reviewed with Iveth.     Imaging and case reviewed and discussed at Brain Tumor Conference.        IMPRESSION  Clinic  time was spent discussing in detail the nature of this tumor in light of repeat imaging. This was in addition to providing emotional support, answering questions pertaining to my recommendations and devising the treatment plan as outlined below.      Clinically doing well. He recovered well from neck surgery and is experiencing less pain. Agree with the need to start PT/ OT to help with strengthening and posture. Melvin notes continued issues with memory and cognition. He is recently retired and lacks an active daily routine. I think that he will benefit from a referral to Dr. Shadi Grady for neuro-psych testing and will discuss this again at his next follow-up visit.     Imaging with symmetric cristiana-ventricular increased T2 FLAIR. Given the symmetric nature plus the tumor pathology coupled with past use of radiation, this finding to me seems most consistent with radiation-induced changes. Repeat imaging with spectroscopy in 2 months.     PROBLEM LIST  Anaplastic oligodendroglioma (grade III)  Memory complaints  Low K+  Cervical radiculopathy  Chemotherapy-induced thrombocytopenia     PLAN  -CANCER DIRECTED THERAPY-  -As above; Repeat imaging + spectroscopy in 2 months.    All imaging needs to scheduled on a 1.5T scanner due to an implantable device that Melvin has in his hip.     -STEROIDS-  -Off dexamethasone.    -ISSUES WITH MEMORY AND COGNITION-  -Consideration for referral to Dr. Shadi Grady for neuro-psych testing.     -SEIZURE MANAGEMENT-  -While this patient is at increased risk of having seizures, given the lack of seizure history, there is no indication to prescribe an antiepileptic at this time.     -Quality of life/ MOOD/ FATIGUE-  -Denies any mood issues.  -Continue to monitor mood as untreated/ undertreated depression can worsen fatigue, dysorexia, and quality of life.     -BRADYCARDIA/ ORTHOSTATSIS-  -Following with PCP and cardiology.    -OTHER-  -Requiring [facility] for [facility] following every visit  stating the date/ time/ reason of the clinic visit.   -Dana is requesting that [employer] paperwork be completed.     Return to clinic in 8/2020 + MRI.     In the meantime, Melvin and Dana know to call with questions or concerns or to report new complaints and can be seen sooner if needed.     Leeanne Lozano MD  Neuro-oncology

## 2020-06-08 NOTE — NURSING NOTE
"Oncology Rooming Note    June 8, 2020 11:27 AM   Gopi Farfan is a 65 year old male who presents for:    Chief Complaint   Patient presents with     Blood Draw     Labs drawn via  by RN in lab. VS Taken.     Oncology Clinic Visit     Oligodendroglioma      Initial Vitals: /76 (BP Location: Right arm, Patient Position: Sitting, Cuff Size: Adult Regular)   Pulse 72   Temp 98.2  F (36.8  C) (Oral)   Resp 16   Wt 98.4 kg (217 lb)   SpO2 98%   BMI 27.86 kg/m   Estimated body mass index is 27.86 kg/m  as calculated from the following:    Height as of 6/4/20: 1.88 m (6' 2\").    Weight as of this encounter: 98.4 kg (217 lb). Body surface area is 2.27 meters squared.  No Pain (0) Comment: Data Unavailable   No LMP for male patient.  Allergies reviewed: Yes  Medications reviewed: Yes    Medications: Medication refills not needed today.  Pharmacy name entered into EPIC:    [facility] - [place], [place] - [address]  [facility] - [place], [place] - [address] [place] [address] 210    Clinical concerns: None       Linda Argueta CMA              "

## 2020-06-08 NOTE — LETTER
"    6/8/2020         RE: Gopi Farfan  [address]  [facility] [address]        Dear Colleague,    Thank you for referring your patient, Gopi Farfan, to the [facility]. Please see a copy of my visit note below.    NEURO-ONCOLOGY VISIT  Jun 8, 2020    CHIEF COMPLAINT: Mr. 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 He completed chemoradiotherapy with concurrent temozolomide as of 8/21/2019, course complicated by thrombocytopenia. He completed 6 cycles of adjuvant-dosed temozolomide in 2/2020. He is currently managed on imaging surveillance.     I met with Melvin in person and Dana (wife) on the phone today for this follow-up visit.       HISTORY OF PRESENT ILLNESS  -Melvin has been doing well. Denies any new neurological complaints.   -Recovered well from neck surgery and experiencing less pain in the neck/ shoulders/ arms. Posture is poor, hunching forward, going to start working with PT/ OT at the end of the month.   -Burning on urination after catheter was removed following surgery. Following with urology.   -Happy to be done with chemotherapy.  -Continued dizziness after sitting up and then standing when he first gets up in the AM. This occurs occasionally during the day as well when he stands up. HR is in the 70 today. Not nauseated. Some sense of motion, no spinning.   -Sleeping well at night.  -Short term memory issues overall stable, slight worsening in comprehension. 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 He is feeling very bored and is watching much TV during the day. Lacks a routine.  -No new sensory changes, weakness, headache, or changes in vision.  -No events concerning for seizures.     REVIEW OF SYSTEMS  A comprehensive ROS negative except as in HPI.      MEDICATIONS   Current Outpatient Medications   Medication Sig Dispense " Refill     acetaminophen (TYLENOL) 325 MG tablet Take 325-650 mg by mouth 4 times daily as needed for mild pain       atorvastatin (LIPITOR) 80 MG tablet Take 40 mg by mouth every evening (takes 0.5 x 80mg)       latanoprost (XALATAN) 0.005 % ophthalmic solution Place 1 drop into both eyes every evening        losartan (COZAAR) 100 MG tablet Take 100 mg by mouth every evening        metoprolol succinate ER (TOPROL-XL) 25 MG 24 hr tablet Take 25 mg by mouth daily        oxyCODONE-acetaminophen (PERCOCET) 5-325 MG tablet Take 1 tablet by mouth every 4 hours as needed for pain (moderate to severe) 30 tablet 0     senna-docusate (SENOKOT-S/PERICOLACE) 8.6-50 MG tablet Take 1-2 tablets by mouth 2 times daily Take while on oral narcotics to prevent or treat constipation. 30 tablet 0     sildenafil (VIAGRA) 50 MG tablet Take 50 mg by mouth daily as needed       DRUG ALLERGIES   Allergies   Allergen Reactions     Amlodipine Swelling       ONCOLOGIC HISTORY  -5/2019 PRESENTATION: Progressive confusion, memory loss plus difficulty with walking.   -5/28/2019 Admitted to [facility] for evaluation, then transferred to [facility] for higher level of care.   -5/28/2019 MR brain imaging revealed a large left frontal mass   -5/30/2019 SURGERY: Craniotomy for  mass resection by Dr. Downing.  PATHOLOGY: Oligodendroglioma with borderline anaplastic features (WHO grade III); Co-deletion of chromosomal regions 1p and 19q, IDH-1 (R132H by immunohistochemistry mutated. ATRX wild type by immunohistochemistry. Mitoses were present, not sufficient in number to warrant a diagnosis of anaplasia by that criterion.  However, the presence of vascular proliferation, even though mild, is indicative of early anaplasia.  -6/13/2019 NEURO-ONC: Recommending chemoradiotherapy.   -7/8 - 8/21/2019 CHEMORADS: 59.4 Gy in 1.8 Gy daily fractions x 30 fractions per Dr. Kirby Dwyer  With [facility] plus concurrent temozolomide 75mg/m2  (180mg).   -9/16/2019 NEURO-ONC/ MRB/ CHEMO: Clinically well. Imaging with no concerns. Starting adjuvant-dosed temozolomide 150mg/m2 (360mg), cycle 1 (start date of 9/19/2019).   -10/14/2019 NEURO-ONC/CHEMO: Clinically stable. Continue adjuvant-dosed temozolomide, increasing to 200 mg/m2 (460 mg), cycle 2 (start date of 10/17/2019).   -11/11/2019 NEURO-ONC/ MRB/ CHEMO: Clinically stable. Imaging with no concerns. Adjuvant-dosed temozolomide 200 mg/m2 (460 mg), cycle 3 (start date of 11/19/2019, but delayed due to thrombocytopenia).   -12/16/2019 NEURO-ONC/ CHEMO: Clinically stable. Adjuvant-dosed temozolomide 200 mg/m2 (460 mg), cycle 4 (start date of 12/17/2019).   -1/13/2020 NEURO-ONC/ CHEMO: Clinically stable. Adjuvant-dosed temozolomide 200 mg/m2 (460 mg), cycle 5 (start date originally supposed to be 1/14/2020, but was on 1/20/2020)  -2/24/2020 NEURO-ONC/ MRB/ CHEMO: Clinically stable. Imaging stable. Adjuvant-dosed temozolomide 200 mg/m2 (460 mg), cycle 6 (start date tonight).  -5/21/2020 SURGERY: Right anterior cervical 4 - 6 discectomy and fusion and mercedes wells tong placement. Bilateral posterior Cervical 7-Thoracic 1 posterior minimally invasive foraminotomy. Preformed by Dr. Downing.   -6/8/2020 NEURO-ONC/ MRB: Clinically stable; continued cognitive complaints. Imaging with symmetric cristiana-ventricular increased T2 FLAIR. Most consistent with radiation-induced changes. Repeat imaging with spectroscopy in 2 months.    SOCIAL HISTORY   Tobacco use: Former smoker.  Alcohol use: Social.   Drug use: Denies marijuana use.  Supplement, complimentary/ alternative medicine: None.    . 1 son + grandchildren.   Employment: Retired, worked as an .      PHYSICAL EXAMINATION  /76 (BP Location: Right arm, Patient Position: Sitting, Cuff Size: Adult Regular)   Pulse 72   Temp 98.2  F (36.8  C) (Oral)   Resp 16   Wt 98.4 kg (217 lb)   SpO2 98%   BMI 27.86 kg/m     Wt Readings from Last 2  "Encounters:   06/08/20 98.4 kg (217 lb)   06/04/20 99.8 kg (220 lb)      Ht Readings from Last 2 Encounters:   06/04/20 1.88 m (6' 2\")   05/21/20 1.88 m (6' 2\")     KPS: 90    -Generally well appearing. Neck flexed, hunched posture.   -Respiratory: Normal breath sounds, no audible wheezing.   -Skin: Psoriasis on arms and legs.   -Hematologic/ lymphatic: No leg swelling.   -Psychiatric: Normal mood and affect. Pleasant, talkative.  -Neurologic:   MENTAL STATUS:     Alert, oriented to date.    Recall: Intact, delayed in answering questions.    Speech fluent.    Comprehension intact to multi-step commands. Mild confusion.    Good right-left orientation.     CRANIAL NERVES:     L pupil 1mm > R pupil, round, reactive to light.     Extraocular movements full, patient denies diplopia.     Visual fields full.     Facial sensation intact to light touch.   Decreased activation of the face on the right, nasolabial fold flattening.    Hearing intact.   With normal phonation, no dysfunction anticipated of the palate or tongue. Wearing a mask today.  MOTOR:    No pronation or drift. No orbiting.   Difficult in rising from a chair without use of arms.   On toe/ heel walk, equal distance from floor to heels/ toes.   SENSATION:    Intact to light touch throughout.  COORDINATION:   Intact finger-nose with eyes open and closed.   GAIT:  Walks without assistance.   Hunched posture; neck flexed, shoulders forward.    Good speed. Normal stride length and heel strike. Normal turns. Normal arm swing.   Able to toe, heel walk. Able to tandem walk with mild difficulty.       MEDICAL RECORDS  Obtained and personally reviewed all available outside medical records in addition to reviewing any records available in our electronic system.     LABS  Personally reviewed all available lab results.     IMAGING  Personally reviewed MR brain imaging from today and compared to prior imaging. To my eye, there is no new contrast enhancement. There is a " symmetric increase in T2 FLAIR signal abnormality about the cristiana-ventricular region.     Imaging results were reviewed with Melvin and Dana.     Imaging and case reviewed and discussed at Brain Tumor Conference.        IMPRESSION  Clinic time was spent discussing in detail the nature of this tumor in light of repeat imaging. This was in addition to providing emotional support, answering questions pertaining to my recommendations and devising the treatment plan as outlined below.      Clinically doing well. He recovered well from neck surgery and is experiencing less pain. Agree with the need to start PT/ OT to help with strengthening and posture. Melvin notes continued issues with memory and cognition. He is recently retired and lacks an active daily routine. I think that he will benefit from a referral to Dr. Shadi Grady for neuro-psych testing and will discuss this again at his next follow-up visit.     Imaging with symmetric cristiana-ventricular increased T2 FLAIR. Given the symmetric nature plus the tumor pathology coupled with past use of radiation, this finding to me seems most consistent with radiation-induced changes. Repeat imaging with spectroscopy in 2 months.     PROBLEM LIST  Anaplastic oligodendroglioma (grade III)  Memory complaints  Low K+  Cervical radiculopathy  Chemotherapy-induced thrombocytopenia     PLAN  -CANCER DIRECTED THERAPY-  -As above; Repeat imaging + spectroscopy in 2 months.    All imaging needs to scheduled on a 1.5T scanner due to an implantable device that Melvin has in his hip.     -STEROIDS-  -Off dexamethasone.    -ISSUES WITH MEMORY AND COGNITION-  -Consideration for referral to Dr. Shadi Grady for neuro-psych testing.     -SEIZURE MANAGEMENT-  -While this patient is at increased risk of having seizures, given the lack of seizure history, there is no indication to prescribe an antiepileptic at this time.     -Quality of life/ MOOD/ FATIGUE-  -Denies any mood issues.  -Continue to  monitor mood as untreated/ undertreated depression can worsen fatigue, dysorexia, and quality of life.     -BRADYCARDIA/ ORTHOSTATSIS-  -Following with PCP and cardiology.    -OTHER-  -Requ[facility] every visit stating the date/ time/ reason of the clinic visit.   -Dana is requesting that [employer] paperwork be completed.     Return to clinic in 8/2020 + MRI.     In the meantime, Melvin and Dana know to call with questions or concerns or to report new complaints and can be seen sooner if needed.     Leeanne Lozano MD  Neuro-oncology

## 2020-06-08 NOTE — PATIENT INSTRUCTIONS
Imaging with subtle changes, likely related to prior radiation.   Adding spectroscopy.   Repeat in 2 months.     Consideration for referral to Dr. Shadi Grady for neuro-psych testing to evaluate memory and thinking.     Return to clinic in August.     Leeanne Lozano MD  Neuro-oncology  06/08/20

## 2020-06-09 ENCOUNTER — DOCUMENTATION ONLY (OUTPATIENT)
Dept: ONCOLOGY | Facility: CLINIC | Age: 65
End: 2020-06-09

## 2020-06-15 ENCOUNTER — TELEPHONE (OUTPATIENT)
Dept: NEUROSURGERY | Facility: CLINIC | Age: 65
End: 2020-06-15

## 2020-06-15 NOTE — TELEPHONE ENCOUNTER
Patient is calling to report some on-going urinary symptoms S/p right C4-6 ACDF on 5/21/20. Patient developed acute urinary retention follow his surgery and was discharged with a Douglas. Patient states that his Douglas was removed and was cleared by urology approximately 1 week ago. Since then patient reports urgency, frequency, and pressure with urination. He denies burning with urination, fevers, or chills. Patient is asking if their is a medication that can help with this. I encouraged patient to reach out to urology or PCP to discuss. Patient would like to continue to monitor symptoms for now, if not improving will reach out to PCP or urology.,

## 2020-06-15 NOTE — PROGRESS NOTES
[facility] paperwork for spouse completed, signed and faxed to [place] [employer] @ [phone]. A copy was made, placed in family paperwork file and original mailed to patient at home address.    Successful transmission verified in Right Fax.      Tatum Alarcon, [facility]

## 2020-07-31 NOTE — PROGRESS NOTES
"NEURO-ONCOLOGY VISIT  Aug 3, 2020    CHIEF COMPLAINT: Mr. 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 He completed chemoradiotherapy with concurrent temozolomide as of 8/21/2019, course complicated by thrombocytopenia. He completed 6 cycles of adjuvant-dosed temozolomide in 2/2020. He is currently managed on imaging surveillance.     I met with Melvin in person and Dana (wife) on the phone today for this follow-up visit.       HISTORY OF PRESENT ILLNESS  -Melvin has been doing well. Denies any new neurological complaints.   -Continues to experience less pain in the neck/ shoulders/ arms following surgery. Working with PT/ OT on posture/ strengthening exercises. ROM improved.   -Sleeping well at night.  -Short term memory issues overall stable. Previously declined neuro-psych testing, today declined again.   -No new sensory changes, weakness, headache, or changes in vision.  -No events concerning for seizures.     REVIEW OF SYSTEMS  A comprehensive ROS negative except as in HPI.      MEDICATIONS   Current Outpatient Medications   Medication Sig Dispense Refill     acetaminophen (TYLENOL) 325 MG tablet Take 325-650 mg by mouth 4 times daily as needed for mild pain       atorvastatin (LIPITOR) 80 MG tablet Take 40 mg by mouth every evening (takes 0.5 x 80mg)       latanoprost (XALATAN) 0.005 % ophthalmic solution Place 1 drop into both eyes every evening        losartan (COZAAR) 100 MG tablet Take 100 mg by mouth every evening        metoprolol succinate ER (TOPROL-XL) 25 MG 24 hr tablet Take 25 mg by mouth daily        oxyCODONE-acetaminophen (PERCOCET) 5-325 MG tablet Take 1 tablet by mouth every 4 hours as needed for pain (moderate to severe) 30 tablet 0     senna-docusate (SENOKOT-S/PERICOLACE) 8.6-50 MG tablet Take 1-2 tablets by mouth 2 times daily Take while on oral " narcotics to prevent or treat constipation. 30 tablet 0     sildenafil (VIAGRA) 50 MG tablet Take 50 mg by mouth daily as needed       DRUG ALLERGIES   Allergies   Allergen Reactions     Amlodipine Swelling       ONCOLOGIC HISTORY  -5/2019 PRESENTATION: Progressive confusion, memory loss plus difficulty with walking.   -5/28/2019 Admitted to [facility] in [place] for evaluation, then transferred to [facility] for higher level of care.   -5/28/2019 MR brain imaging revealed a large left frontal mass   -5/30/2019 SURGERY: Craniotomy for  mass resection by Dr. Downing.  PATHOLOGY: Oligodendroglioma with borderline anaplastic features (WHO grade III); Co-deletion of chromosomal regions 1p and 19q, IDH-1 (R132H by immunohistochemistry mutated. ATRX wild type by immunohistochemistry. Mitoses were present, not sufficient in number to warrant a diagnosis of anaplasia by that criterion.  However, the presence of vascular proliferation, even though mild, is indicative of early anaplasia.  -6/13/2019 NEURO-ONC: Recommending chemoradiotherapy.   -7/8 - 8/21/2019 CHEMORADS: 59.4 Gy in 1.8 Gy daily fractions x 30 fractions per Dr. Kirby Dwyer  With [facility] plus concurrent temozolomide 75mg/m2 (180mg).   -9/16/2019 NEURO-ONC/ MRB/ CHEMO: Clinically well. Imaging with no concerns. Starting adjuvant-dosed temozolomide 150mg/m2 (360mg), cycle 1 (start date of 9/19/2019).   -10/14/2019 NEURO-ONC/CHEMO: Clinically stable. Continue adjuvant-dosed temozolomide, increasing to 200 mg/m2 (460 mg), cycle 2 (start date of 10/17/2019).   -11/11/2019 NEURO-ONC/ MRB/ CHEMO: Clinically stable. Imaging with no concerns. Adjuvant-dosed temozolomide 200 mg/m2 (460 mg), cycle 3 (start date of 11/19/2019, but delayed due to thrombocytopenia).   -12/16/2019 NEURO-ONC/ CHEMO: Clinically stable. Adjuvant-dosed temozolomide 200 mg/m2 (460 mg), cycle 4 (start date of 12/17/2019).   -1/13/2020 NEURO-ONC/ CHEMO: Clinically stable.  "Adjuvant-dosed temozolomide 200 mg/m2 (460 mg), cycle 5 (start date originally supposed to be 1/14/2020, but was on 1/20/2020)  -2/24/2020 NEURO-ONC/ MRB/ CHEMO: Clinically stable. Imaging stable. Adjuvant-dosed temozolomide 200 mg/m2 (460 mg), cycle 6 (start date tonight).  -5/21/2020 SURGERY: Right anterior cervical 4 - 6 discectomy and fusion and mercedes wells tong placement. Bilateral posterior Cervical 7-Thoracic 1 posterior minimally invasive foraminotomy. Preformed by Dr. Downing.   -6/8/2020 NEURO-ONC/ MRB: Clinically stable; continued cognitive complaints. Imaging with symmetric cristiana-ventricular increased T2 FLAIR. Most consistent with radiation-induced changes. Repeat imaging with spectroscopy in 2 months.  -8/3/2020 NEURO-ONC/ MRB: Clinically stable. Imaging with symmetric cristiana-ventricular T2 FLAIR, stable since the last scan. Spectroscopy consistent with radiation injury.    SOCIAL HISTORY   Tobacco use: Former smoker.  Alcohol use: Social.   Drug use: Denies marijuana use.  Supplement, complimentary/ alternative medicine: None.    . 1 son + grandchildren.   Employment: Retired, worked as an .      PHYSICAL EXAMINATION  /77   Pulse 84   Temp 98.1  F (36.7  C) (Oral)   Resp 15   Ht 1.88 m (6' 2\")   Wt 101.9 kg (224 lb 11.2 oz)   SpO2 97%   BMI 28.85 kg/m     Wt Readings from Last 2 Encounters:   08/03/20 101.9 kg (224 lb 11.2 oz)   06/08/20 98.4 kg (217 lb)      Ht Readings from Last 2 Encounters:   08/03/20 1.88 m (6' 2\")   06/04/20 1.88 m (6' 2\")     KPS: 90    -Generally well appearing. Neck flexed, better posture.   -Respiratory: Normal breath sounds, no audible wheezing.   -Skin: Psoriasis on arms and legs.   -Hematologic/ lymphatic: No leg swelling.   -Psychiatric: Normal mood and affect. Pleasant, talkative.  -Neurologic:   MENTAL STATUS:     Alert, oriented to date.    Recall: Intact, less delayed in answering questions.    Speech fluent.    Comprehension intact to " multi-step commands. No confusion.    Good right-left orientation.     CRANIAL NERVES:     L pupil 1mm > R pupil, round, reactive to light.     Extraocular movements full, patient denies diplopia.     Visual fields full.     Facial sensation intact to light touch.   Decreased activation of the face on the right, nasolabial fold flattening.    Hearing intact.   With normal phonation, no dysfunction anticipated of the palate or tongue. Wearing a mask today.  MOTOR:    No pronation or drift. No orbiting.   No difficulty in rising from a chair without use of arms.   On toe/ heel walk, equal distance from floor to heels/ toes.   SENSATION:    Intact to light touch throughout.  COORDINATION:   Intact finger-nose with eyes open and closed.   GAIT:  Walks without assistance.   Less hunched posture.    Good speed. Normal stride length and heel strike. Normal turns. Normal arm swing.   Able to toe, heel walk. Able to tandem walk with mild difficulty.       MEDICAL RECORDS  Obtained and personally reviewed all available outside medical records in addition to reviewing any records available in our electronic system.     LABS  Personally reviewed all available lab results.     IMAGING  Personally reviewed MR brain imaging from today and compared to prior imaging. To my eye, there is no new contrast enhancement. There is a symmetric increase in T2 FLAIR signal abnormality about the cristiana-ventricular region that is stable as compared to 2 months ago. Spectroscopy is most consistent with radiation induced injury.     Imaging results were reviewed with Iveth.     Imaging and case reviewed and discussed at Brain Tumor Conference earlier today.        IMPRESSION  Clinic time was spent discussing in detail the nature of this tumor in light of repeat imaging. This was in addition to providing emotional support, answering questions pertaining to my recommendations and devising the plan as outlined below.      Clinically doing  well. Time spent with PT/ OT is helping with strengthening and posture following neck surgery. ROM has greatly improved. Melvin notes continued issues with memory and cognition. While I think he will benefit from a referral to Dr. Shadi Grady for neuro-psych testing,Melvin has again declined this referral. Otherwise, no new or worsening neurological complaints or findings on examination.     Imaging with symmetric cristiana-ventricular increased T2 FLAIR that is stable compared to the [place] scan. Given the symmetric nature plus the tumor pathology coupled with past use of radiation in the setting of multiple factors for cardiovascular disease, this finding to me is most consistent with radiation-induced changes and spectroscopy done today confirms this. Continue imaging surveillance.     PROBLEM LIST  Anaplastic oligodendroglioma (grade III)  Memory complaints  Low K+  Cervical radiculopathy  Chemotherapy-induced thrombocytopenia     PLAN  -CANCER DIRECTED THERAPY-  -As above; Continue imaging surveillance. Repeat imaging in 3 months.    All imaging needs to scheduled on a 1.5T scanner due to an implantable device that Melvin has in his hip.     -STEROIDS-  -Off dexamethasone.    -ISSUES WITH MEMORY AND COGNITION-  -Consideration for referral to Dr. Shadi Grady for neuro-psych testing.     -SEIZURE MANAGEMENT-  -While this patient is at increased risk of having seizures, given the lack of seizure history, there is no indication to prescribe an antiepileptic at this time.     -Quality of life/ MOOD/ FATIGUE-  -Denies any mood issues.  -Continue to monitor mood as untreated/ undertreated depression can worsen fatigue, dysorexia, and quality of life.     -BRADYCARDIA/ ORTHOSTATSIS-  -Following with PCP and cardiology.  -HR today in the 80's.     -OTHER-  -Requ[facility] following every visit stating the date/ time/ reason of the clinic visit.     Return to clinic in 11/2020 + MRI.     In the meantime, Melvin and Dana  know to call with questions or concerns or to report new complaints and can be seen sooner if needed.     Leeanne Lozano MD  Neuro-oncology

## 2020-07-31 NOTE — PATIENT INSTRUCTIONS
Imaging with stable changes as compared to June. Spectroscopy confirms radiation induced injury.   Repeat in 3 months.             Consideration for referral to Dr. Shadi Grady for neuro-psych testing to evaluate memory and thinking.     Return to clinic in November + imaging. Have a nice rest of the summer!    Leeanne Lozano MD  Neuro-oncology  08/03/20

## 2020-08-03 ENCOUNTER — TUMOR CONFERENCE (OUTPATIENT)
Dept: ONCOLOGY | Facility: CLINIC | Age: 65
End: 2020-08-03

## 2020-08-03 ENCOUNTER — HOSPITAL ENCOUNTER (OUTPATIENT)
Dept: MRI IMAGING | Facility: CLINIC | Age: 65
End: 2020-08-03
Attending: PSYCHIATRY & NEUROLOGY
Payer: COMMERCIAL

## 2020-08-03 ENCOUNTER — ONCOLOGY VISIT (OUTPATIENT)
Dept: ONCOLOGY | Facility: CLINIC | Age: 65
End: 2020-08-03
Attending: PSYCHIATRY & NEUROLOGY
Payer: COMMERCIAL

## 2020-08-03 VITALS
WEIGHT: 224.7 LBS | RESPIRATION RATE: 15 BRPM | DIASTOLIC BLOOD PRESSURE: 77 MMHG | BODY MASS INDEX: 28.84 KG/M2 | TEMPERATURE: 98.1 F | HEIGHT: 74 IN | OXYGEN SATURATION: 97 % | SYSTOLIC BLOOD PRESSURE: 126 MMHG | HEART RATE: 84 BPM

## 2020-08-03 DIAGNOSIS — C71.9 CANCER OF BRAIN TREATED WITH RADIATION THERAPY (H): ICD-10-CM

## 2020-08-03 DIAGNOSIS — C71.9 OLIGODENDROGLIOMA, ANAPLASTIC (H): ICD-10-CM

## 2020-08-03 DIAGNOSIS — C71.9 OLIGODENDROGLIOMA, ANAPLASTIC (H): Primary | ICD-10-CM

## 2020-08-03 PROCEDURE — 40001009 ZZH VIDEO/TELEPHONE VISIT; NO CHARGE

## 2020-08-03 PROCEDURE — 70553 MRI BRAIN STEM W/O & W/DYE: CPT | Mod: XS

## 2020-08-03 PROCEDURE — G0463 HOSPITAL OUTPT CLINIC VISIT: HCPCS | Mod: 25

## 2020-08-03 PROCEDURE — 70553 MRI BRAIN STEM W/O & W/DYE: CPT

## 2020-08-03 PROCEDURE — A9585 GADOBUTROL INJECTION: HCPCS | Performed by: PSYCHIATRY & NEUROLOGY

## 2020-08-03 PROCEDURE — 99214 OFFICE O/P EST MOD 30 MIN: CPT | Mod: ZP | Performed by: PSYCHIATRY & NEUROLOGY

## 2020-08-03 PROCEDURE — 25500064 ZZH RX 255 OP 636: Performed by: PSYCHIATRY & NEUROLOGY

## 2020-08-03 RX ORDER — GADOBUTROL 604.72 MG/ML
10 INJECTION INTRAVENOUS ONCE
Status: COMPLETED | OUTPATIENT
Start: 2020-08-03 | End: 2020-08-03

## 2020-08-03 RX ADMIN — GADOBUTROL 10 ML: 604.72 INJECTION INTRAVENOUS at 14:45

## 2020-08-03 ASSESSMENT — PAIN SCALES - GENERAL: PAINLEVEL: NO PAIN (0)

## 2020-08-03 ASSESSMENT — MIFFLIN-ST. JEOR: SCORE: 1878.98

## 2020-08-03 NOTE — NURSING NOTE
"Oncology Rooming Note    August 3, 2020 3:59 PM   Gopi Farfan is a 65 year old male who presents for:    Chief Complaint   Patient presents with     Oncology Clinic Visit     Return; Oligodendroglioma     Initial Vitals: /77   Pulse 84   Temp 98.1  F (36.7  C) (Oral)   Resp 15   Ht 1.88 m (6' 2\")   Wt 101.9 kg (224 lb 11.2 oz)   SpO2 97%   BMI 28.85 kg/m   Estimated body mass index is 28.85 kg/m  as calculated from the following:    Height as of this encounter: 1.88 m (6' 2\").    Weight as of this encounter: 101.9 kg (224 lb 11.2 oz). Body surface area is 2.31 meters squared.  No Pain (0) Comment: Data Unavailable   No LMP for male patient.  Allergies reviewed: Yes  Medications reviewed: Yes    Medications: Medication refills not needed today.  Pharmacy name entered into EPIC:    [facility] - [place], [place] - [facility] - [place], [place] - [address]    Clinical concerns: No concerns       Tanesha Mata CMA              "

## 2020-08-03 NOTE — LETTER
"    8/3/2020         RE: Gopi Farfan  [address]  [facility] [address]        Dear Colleague,    Thank you for referring your patient, Goip Farfan, to the [facility]. Please see a copy of my visit note below.    NEURO-ONCOLOGY VISIT  Aug 3, 2020    CHIEF COMPLAINT: Mr. 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 He completed chemoradiotherapy with concurrent temozolomide as of 8/21/2019, course complicated by thrombocytopenia. He completed 6 cycles of adjuvant-dosed temozolomide in 2/2020. He is currently managed on imaging surveillance.     I met with Melvin in person and Dana (wife) on the phone today for this follow-up visit.       HISTORY OF PRESENT ILLNESS  -Melvin has been doing well. Denies any new neurological complaints.   -Continues to experience less pain in the neck/ shoulders/ arms following surgery. Working with PT/ OT on posture/ strengthening exercises. ROM improved.   -Sleeping well at night.  -Short term memory issues overall stable. Previously declined neuro-psych testing, today declined again.   -No new sensory changes, weakness, headache, or changes in vision.  -No events concerning for seizures.     REVIEW OF SYSTEMS  A comprehensive ROS negative except as in HPI.      MEDICATIONS   Current Outpatient Medications   Medication Sig Dispense Refill     acetaminophen (TYLENOL) 325 MG tablet Take 325-650 mg by mouth 4 times daily as needed for mild pain       atorvastatin (LIPITOR) 80 MG tablet Take 40 mg by mouth every evening (takes 0.5 x 80mg)       latanoprost (XALATAN) 0.005 % ophthalmic solution Place 1 drop into both eyes every evening        losartan (COZAAR) 100 MG tablet Take 100 mg by mouth every evening        metoprolol succinate ER (TOPROL-XL) 25 MG 24 hr tablet Take 25 mg by mouth daily        " oxyCODONE-acetaminophen (PERCOCET) 5-325 MG tablet Take 1 tablet by mouth every 4 hours as needed for pain (moderate to severe) 30 tablet 0     senna-docusate (SENOKOT-S/PERICOLACE) 8.6-50 MG tablet Take 1-2 tablets by mouth 2 times daily Take while on oral narcotics to prevent or treat constipation. 30 tablet 0     sildenafil (VIAGRA) 50 MG tablet Take 50 mg by mouth daily as needed       DRUG ALLERGIES   Allergies   Allergen Reactions     Amlodipine Swelling       ONCOLOGIC HISTORY  -5/2019 PRESENTATION: Progressive confusion, memory loss plus difficulty with walking.   -5/28/2019 Admitted to [facility] in [place] for evaluation, then transferred to [facility] for higher level of care.   -5/28/2019 MR brain imaging revealed a large left frontal mass   -5/30/2019 SURGERY: Craniotomy for  mass resection by Dr. Downing.  PATHOLOGY: Oligodendroglioma with borderline anaplastic features (WHO grade III); Co-deletion of chromosomal regions 1p and 19q, IDH-1 (R132H by immunohistochemistry mutated. ATRX wild type by immunohistochemistry. Mitoses were present, not sufficient in number to warrant a diagnosis of anaplasia by that criterion.  However, the presence of vascular proliferation, even though mild, is indicative of early anaplasia.  -6/13/2019 NEURO-ONC: Recommending chemoradiotherapy.   -7/8 - 8/21/2019 CHEMORADS: 59.4 Gy in 1.8 Gy daily fractions x 30 fractions per Dr. Kirby Dwyer  With [facility] plus concurrent temozolomide 75mg/m2 (180mg).   -9/16/2019 NEURO-ONC/ MRB/ CHEMO: Clinically well. Imaging with no concerns. Starting adjuvant-dosed temozolomide 150mg/m2 (360mg), cycle 1 (start date of 9/19/2019).   -10/14/2019 NEURO-ONC/CHEMO: Clinically stable. Continue adjuvant-dosed temozolomide, increasing to 200 mg/m2 (460 mg), cycle 2 (start date of 10/17/2019).   -11/11/2019 NEURO-ONC/ MRB/ CHEMO: Clinically stable. Imaging with no concerns. Adjuvant-dosed temozolomide 200 mg/m2 (460 mg), cycle 3  "(start date of 11/19/2019, but delayed due to thrombocytopenia).   -12/16/2019 NEURO-ONC/ CHEMO: Clinically stable. Adjuvant-dosed temozolomide 200 mg/m2 (460 mg), cycle 4 (start date of 12/17/2019).   -1/13/2020 NEURO-ONC/ CHEMO: Clinically stable. Adjuvant-dosed temozolomide 200 mg/m2 (460 mg), cycle 5 (start date originally supposed to be 1/14/2020, but was on 1/20/2020)  -2/24/2020 NEURO-ONC/ MRB/ CHEMO: Clinically stable. Imaging stable. Adjuvant-dosed temozolomide 200 mg/m2 (460 mg), cycle 6 (start date tonight).  -5/21/2020 SURGERY: Right anterior cervical 4 - 6 discectomy and fusion and mercedes wells tong placement. Bilateral posterior Cervical 7-Thoracic 1 posterior minimally invasive foraminotomy. Preformed by Dr. Downing.   -6/8/2020 NEURO-ONC/ MRB: Clinically stable; continued cognitive complaints. Imaging with symmetric cristiana-ventricular increased T2 FLAIR. Most consistent with radiation-induced changes. Repeat imaging with spectroscopy in 2 months.  -8/3/2020 NEURO-ONC/ MRB: Clinically stable. Imaging with symmetric cristiana-ventricular T2 FLAIR, stable since the last scan. Spectroscopy consistent with radiation injury.    SOCIAL HISTORY   Tobacco use: Former smoker.  Alcohol use: Social.   Drug use: Denies marijuana use.  Supplement, complimentary/ alternative medicine: None.    . 1 son + grandchildren.   Employment: Retired, worked as an .      PHYSICAL EXAMINATION  /77   Pulse 84   Temp 98.1  F (36.7  C) (Oral)   Resp 15   Ht 1.88 m (6' 2\")   Wt 101.9 kg (224 lb 11.2 oz)   SpO2 97%   BMI 28.85 kg/m     Wt Readings from Last 2 Encounters:   08/03/20 101.9 kg (224 lb 11.2 oz)   06/08/20 98.4 kg (217 lb)      Ht Readings from Last 2 Encounters:   08/03/20 1.88 m (6' 2\")   06/04/20 1.88 m (6' 2\")     KPS: 90    -Generally well appearing. Neck flexed, better posture.   -Respiratory: Normal breath sounds, no audible wheezing.   -Skin: Psoriasis on arms and legs.   -Hematologic/ " lymphatic: No leg swelling.   -Psychiatric: Normal mood and affect. Pleasant, talkative.  -Neurologic:   MENTAL STATUS:     Alert, oriented to date.    Recall: Intact, less delayed in answering questions.    Speech fluent.    Comprehension intact to multi-step commands. No confusion.    Good right-left orientation.     CRANIAL NERVES:     L pupil 1mm > R pupil, round, reactive to light.     Extraocular movements full, patient denies diplopia.     Visual fields full.     Facial sensation intact to light touch.   Decreased activation of the face on the right, nasolabial fold flattening.    Hearing intact.   With normal phonation, no dysfunction anticipated of the palate or tongue. Wearing a mask today.  MOTOR:    No pronation or drift. No orbiting.   No difficulty in rising from a chair without use of arms.   On toe/ heel walk, equal distance from floor to heels/ toes.   SENSATION:    Intact to light touch throughout.  COORDINATION:   Intact finger-nose with eyes open and closed.   GAIT:  Walks without assistance.   Less hunched posture.    Good speed. Normal stride length and heel strike. Normal turns. Normal arm swing.   Able to toe, heel walk. Able to tandem walk with mild difficulty.       MEDICAL RECORDS  Obtained and personally reviewed all available outside medical records in addition to reviewing any records available in our electronic system.     LABS  Personally reviewed all available lab results.     IMAGING  Personally reviewed MR brain imaging from today and compared to prior imaging. To my eye, there is no new contrast enhancement. There is a symmetric increase in T2 FLAIR signal abnormality about the cristiana-ventricular region that is stable as compared to 2 months ago. Spectroscopy is most consistent with radiation induced injury.     Imaging results were reviewed with Iveth.     Imaging and case reviewed and discussed at Brain Tumor Conference earlier today.        IMPRESSION  Clinic time was  spent discussing in detail the nature of this tumor in light of repeat imaging. This was in addition to providing emotional support, answering questions pertaining to my recommendations and devising the plan as outlined below.      Clinically doing well. Time spent with PT/ OT is helping with strengthening and posture following neck surgery. ROM has greatly improved. Melvin notes continued issues with memory and cognition. While I think he will benefit from a referral to Dr. Shadi Grady for neuro-psych testing,Melvin has again declined this referral. Otherwise, no new or worsening neurological complaints or findings on examination.     Imaging with symmetric cristiana-ventricular increased T2 FLAIR that is stable compared to the June scan. Given the symmetric nature plus the tumor pathology coupled with past use of radiation in the setting of multiple factors for cardiovascular disease, this finding to me is most consistent with radiation-induced changes and spectroscopy done today confirms this. Continue imaging surveillance.     PROBLEM LIST  Anaplastic oligodendroglioma (grade III)  Memory complaints  Low K+  Cervical radiculopathy  Chemotherapy-induced thrombocytopenia     PLAN  -CANCER DIRECTED THERAPY-  -As above; Continue imaging surveillance. Repeat imaging in 3 months.    All imaging needs to scheduled on a 1.5T scanner due to an implantable device that Melvin has in his hip.     -STEROIDS-  -Off dexamethasone.    -ISSUES WITH MEMORY AND COGNITION-  -Consideration for referral to Dr. Shadi Grady for neuro-psych testing.     -SEIZURE MANAGEMENT-  -While this patient is at increased risk of having seizures, given the lack of seizure history, there is no indication to prescribe an antiepileptic at this time.     -Quality of life/ MOOD/ FATIGUE-  -Denies any mood issues.  -Continue to monitor mood as untreated/ undertreated depression can worsen fatigue, dysorexia, and quality of life.     -BRADYCARDIA/  ORTHOSTATSIS-  -Following with PCP and cardiology.  -HR today in the 80's.     -OTHER-  -Requiring [facility] following every visit stating the date/ time/ reason of the clinic visit.     Return to clinic in 11/2020 + MRI.     In the meantime, Melvin and Dana know to call with questions or concerns or to report new complaints and can be seen sooner if needed.     Leeanne Lozano MD  Neuro-oncology

## 2020-08-04 NOTE — TUMOR CONFERENCE
Tumor Conference Information  Tumor Conference:  Brain  Specialties Present:  Medical oncology, Pathology, Radiology, Radiation oncology, Surgery  Patient Status:  A current patient  Pathology:  Not Discussed  Treatment to Date:  Surgical intervention(s), Chemoradiation, Adjuvant chemotherapy  Clinical Trial Eligibility:  Not discussed  Recommended Plan:  Observation (see comment) (Comment: stable on recent MRI; continue with regular follow up and imaging)  Did the review exceed 30 minutes?:  did not           Documentation / Disclaimer Cancer Tumor Board Note  Cancer tumor board recommendations do not override what is determined to be reasonable care and treatment, which is dependent on the circumstances of a patient's case; the patient's medical, social, and personal concerns; and the clinical judgment of the oncologist [physician].

## 2020-08-14 ENCOUNTER — TRANSFERRED RECORDS (OUTPATIENT)
Dept: HEALTH INFORMATION MANAGEMENT | Facility: CLINIC | Age: 65
End: 2020-08-14

## 2020-08-14 ENCOUNTER — OFFICE VISIT (OUTPATIENT)
Dept: NEUROSURGERY | Facility: CLINIC | Age: 65
End: 2020-08-14
Attending: NEUROLOGICAL SURGERY
Payer: COMMERCIAL

## 2020-08-14 VITALS
DIASTOLIC BLOOD PRESSURE: 78 MMHG | SYSTOLIC BLOOD PRESSURE: 164 MMHG | HEIGHT: 74 IN | OXYGEN SATURATION: 99 % | HEART RATE: 53 BPM | BODY MASS INDEX: 28.62 KG/M2 | RESPIRATION RATE: 16 BRPM | WEIGHT: 223 LBS

## 2020-08-14 DIAGNOSIS — M47.22 CERVICAL SPONDYLOSIS WITH RADICULOPATHY: ICD-10-CM

## 2020-08-14 DIAGNOSIS — Z98.1 S/P CERVICAL SPINAL FUSION: Primary | ICD-10-CM

## 2020-08-14 DIAGNOSIS — M47.12 CERVICAL SPONDYLOSIS WITH MYELOPATHY: ICD-10-CM

## 2020-08-14 PROCEDURE — G0463 HOSPITAL OUTPT CLINIC VISIT: HCPCS

## 2020-08-14 PROCEDURE — 99024 POSTOP FOLLOW-UP VISIT: CPT | Performed by: NEUROLOGICAL SURGERY

## 2020-08-14 ASSESSMENT — MIFFLIN-ST. JEOR: SCORE: 1871.27

## 2020-08-14 ASSESSMENT — PAIN SCALES - GENERAL: PAINLEVEL: SEVERE PAIN (7)

## 2020-08-14 NOTE — PATIENT INSTRUCTIONS
Patient Next Steps:      Order placed for cervical xray imaging. You can schedule at our  today, or you can call [place] at [phone] (south) We will call you with the results and next steps once imaging is completed.    Please call us if you have any further questions or concerns.    ABISAI Fuller  [facility]   Phone: [phone]  Fax: [phone]

## 2020-08-14 NOTE — LETTER
"    8/14/2020         RE: Gopi Farfan  [address]  [facility] [address]        Dear Colleague,    Thank you for referring your patient, Gopi Farfan, to the [facility]. Please see a copy of my visit note below.    It was a pleasure to see Gopi Farfan today in Neurosurgery Clinic. He is a 65 year old male who underwent:    Procedure Date: 05/21/2020      PREOPERATIVE DIAGNOSIS:  Cervical stenosis with myelopathy and radiculopathy.      POSTOPERATIVE DIAGNOSIS:  Cervical stenosis with myelopathy and radiculopathy.      PROCEDURES:   1.  Right anterior C4-C6 diskectomy and fusion using MTF allograft spacers and Synthes Vectra plate.   2.  Bilateral posterior C7-T1 minimally invasive posterior cervical foraminotomy.      SURGEON:  Abdullahi Downing MD      ASSISTANT:  Mike Pastrana PA-C      ANESTHESIA:  General endotracheal anesthesia plus local anesthetic.      ESTIMATED BLOOD LOSS:  50 mL.     He is doing well.  It sounds as if his radicular/myelopathic symptoms have improved.  He does have some neck stiffness but seems to be improving overall.    Vitals:    08/14/20 1039   BP: (!) 164/78   Pulse: 53   Resp: 16   SpO2: 99%   Weight: 101.2 kg (223 lb)   Height: 1.88 m (6' 2\")     Body mass index is 28.63 kg/m .  Severe Pain (7)    Well-healed anterior and posterior cervical incisions.    Bilateral upper and lower extremity strength 5 out of 5 in all muscle groups.    Imaging: No new imaging today.    Assessment: Status post cervical fusion and foraminotomies.  Doing well.    Plan: I would like to see him back in 9 months with a CT scan of the cervical spine in order to evaluate his cervical fusion.  We will obtain AP and lateral cervical x-rays today to evaluate his alignment and hardware to make sure there have been no significant changes.  We discussed gradually increasing his activity over time which I think is reasonable and he could certainly start to boat, ride in a " offroad vehicle, or even hunt if he wanted to this fall from my perspective.      Again, thank you for allowing me to participate in the care of your patient.        Sincerely,        Abdullahi Downing MD

## 2020-08-14 NOTE — PROGRESS NOTES
"It was a pleasure to see Gopi Farfan today in Neurosurgery Clinic. He is a 65 year old male who underwent:    Procedure Date: 05/21/2020      PREOPERATIVE DIAGNOSIS:  Cervical stenosis with myelopathy and radiculopathy.      POSTOPERATIVE DIAGNOSIS:  Cervical stenosis with myelopathy and radiculopathy.      PROCEDURES:   1.  Right anterior C4-C6 diskectomy and fusion using MTF allograft spacers and Synthes Vectra plate.   2.  Bilateral posterior C7-T1 minimally invasive posterior cervical foraminotomy.      SURGEON:  Abdullahi Downing MD      ASSISTANT:  Mike Pastrana PA-C      ANESTHESIA:  General endotracheal anesthesia plus local anesthetic.      ESTIMATED BLOOD LOSS:  50 mL.     He is doing well.  It sounds as if his radicular/myelopathic symptoms have improved.  He does have some neck stiffness but seems to be improving overall.    Vitals:    08/14/20 1039   BP: (!) 164/78   Pulse: 53   Resp: 16   SpO2: 99%   Weight: 101.2 kg (223 lb)   Height: 1.88 m (6' 2\")     Body mass index is 28.63 kg/m .  Severe Pain (7)    Well-healed anterior and posterior cervical incisions.    Bilateral upper and lower extremity strength 5 out of 5 in all muscle groups.    Imaging: No new imaging today.    Assessment: Status post cervical fusion and foraminotomies.  Doing well.    Plan: I would like to see him back in 9 months with a CT scan of the cervical spine in order to evaluate his cervical fusion.  We will obtain AP and lateral cervical x-rays today to evaluate his alignment and hardware to make sure there have been no significant changes.  We discussed gradually increasing his activity over time which I think is reasonable and he could certainly start to boat, ride in a offroad vehicle, or even hunt if he wanted to this fall from my perspective.    "

## 2020-08-14 NOTE — NURSING NOTE
"Gopi Farfan is a 65 year old male who presents for:  Chief Complaint   Patient presents with     Follow Up        Initial Vitals:  BP (!) 164/78   Pulse 53   Resp 16   Ht 6' 2\" (1.88 m)   Wt 223 lb (101.2 kg)   SpO2 99%   BMI 28.63 kg/m   Estimated body mass index is 28.63 kg/m  as calculated from the following:    Height as of this encounter: 6' 2\" (1.88 m).    Weight as of this encounter: 223 lb (101.2 kg).. Body surface area is 2.3 meters squared. BP completed using cuff size: large  Severe Pain (7)    Nursing Comments: Pt present today for 12 weeks post op follow up.    Jasmeet Parmar, [facility]    "

## 2020-08-17 ENCOUNTER — DOCUMENTATION ONLY (OUTPATIENT)
Dept: NEUROSURGERY | Facility: CLINIC | Age: 65
End: 2020-08-17

## 2020-08-17 NOTE — PROGRESS NOTES
Patient had cervical x-ray done on 08/14/20. Calling [facility]. They relayed they will push the imaging and fax the report.   Once available for review will update Dr. Downing.

## 2020-11-20 ENCOUNTER — HOSPITAL ENCOUNTER (OUTPATIENT)
Dept: MRI IMAGING | Facility: CLINIC | Age: 65
Discharge: HOME | End: 2020-11-20
Attending: PSYCHIATRY & NEUROLOGY | Admitting: PSYCHIATRY & NEUROLOGY
Payer: COMMERCIAL

## 2020-11-20 DIAGNOSIS — C71.9 OLIGODENDROGLIOMA, ANAPLASTIC (H): ICD-10-CM

## 2020-11-20 PROCEDURE — 70553 MRI BRAIN STEM W/O & W/DYE: CPT | Mod: 26 | Performed by: RADIOLOGY

## 2020-11-20 PROCEDURE — 255N000002 HC RX 255 OP 636: Performed by: PSYCHIATRY & NEUROLOGY

## 2020-11-20 PROCEDURE — A9585 GADOBUTROL INJECTION: HCPCS | Performed by: PSYCHIATRY & NEUROLOGY

## 2020-11-20 PROCEDURE — 70553 MRI BRAIN STEM W/O & W/DYE: CPT

## 2020-11-20 RX ORDER — GADOBUTROL 604.72 MG/ML
10 INJECTION INTRAVENOUS ONCE
Status: COMPLETED | OUTPATIENT
Start: 2020-11-20 | End: 2020-11-20

## 2020-11-20 RX ADMIN — GADOBUTROL 10 ML: 604.72 INJECTION INTRAVENOUS at 12:34

## 2020-11-21 NOTE — PROGRESS NOTES
"Gopi Farfan is a 65 year old male who is being evaluated via a billable video visit.      The patient has been notified of following:     \"This video visit will be conducted via a call between you and your physician/provider. We have found that certain health care needs can be provided without the need for an in-person physical exam.  This service lets us provide the care you need with a video conversation.  If a prescription is necessary we can send it directly to your pharmacy.  If lab work is needed we can place an order for that and you can then stop by our lab to have the test done at a later time.    Video visits are billed at different rates depending on your insurance coverage.  Please reach out to your insurance provider with any questions.    If during the course of the call the physician/provider feels a video visit is not appropriate, you will not be charged for this service.\"    Patient has given verbal consent for Video visit? Yes  How would you like to obtain your AVS? MyChart  If you are dropped from the video visit, the video invite should be resent to: Send to e-mail at: [email or web address]  Will anyone else be joining your video visit? No      Video-Visit Details  Type of service:  Video Visit    Video Start Time: 7:10 AM  Video End Time: 7:32 AM    Originating Location (pt. Location): Home    Distant Location (provider location):  [facility]     Platform used for Video Visit: Ely Lozano MD    _____________________________________________________________    NEURO-ONCOLOGY VISIT  Nov 23, 2020    CHIEF COMPLAINT: Mr. 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 He completed chemoradiotherapy with concurrent temozolomide as of 8/21/2019, treatment course was complicated by thrombocytopenia. He then " completed 6 cycles of adjuvant-dosed temozolomide in 2/2020. He is currently managed on imaging surveillance.     I met with Melvin and Dana (wife) today for this follow-up visit.     HISTORY OF PRESENT ILLNESS  -Melvin has been doing well. Denies any new neurological complaints.   -Mild pain in the neck/ shoulders/ arms when moving particular ways, conscientious of maintaining good posture. Decreased ROM of the neck.   -Sleeping well at night.  -Short term memory issues overall stable per Dana.  -No new sensory changes or weakness.  -Infrequent headaches, improved from prior. Takes an aspirin and the pain improves.  -Decreased vision in the right eye with blind spots; per ophtho, there is decreased blood flow to that eye.  -No events concerning for seizures.   -Iveth decided to not attend a family wedding recently to avoid daryl COVID.   -Melvin's mom has worsening medical conditions. She currently lives in [place] and Melvin is weighing the risks of going to visit her during the pandemic.     REVIEW OF SYSTEMS  A comprehensive ROS negative except as in HPI.      MEDICATIONS   Current Outpatient Medications   Medication Sig Dispense Refill     acetaminophen (TYLENOL) 325 MG tablet Take 325-650 mg by mouth 4 times daily as needed for mild pain       atorvastatin (LIPITOR) 80 MG tablet Take 40 mg by mouth every evening (takes 0.5 x 80mg)       latanoprost (XALATAN) 0.005 % ophthalmic solution Place 1 drop into both eyes every evening        losartan (COZAAR) 100 MG tablet Take 100 mg by mouth every evening        metoprolol succinate ER (TOPROL-XL) 25 MG 24 hr tablet Take 25 mg by mouth daily        oxyCODONE-acetaminophen (PERCOCET) 5-325 MG tablet Take 1 tablet by mouth every 4 hours as needed for pain (moderate to severe) 30 tablet 0     sildenafil (VIAGRA) 50 MG tablet Take 50 mg by mouth daily as needed       DRUG ALLERGIES   Allergies   Allergen Reactions     Amlodipine Swelling       ONCOLOGIC  HISTORY  -5/2019 PRESENTATION: Progressive confusion, memory loss plus difficulty with walking.   -5/28/2019 Admitted to [facility] in [place] for evaluation, then transferred to [facility] for higher level of care.   -5/28/2019 MR brain imaging revealed a large left frontal mass   -5/30/2019 SURGERY: Craniotomy for  mass resection by Dr. Downing.  PATHOLOGY: Oligodendroglioma with borderline anaplastic features (WHO grade III); Co-deletion of chromosomal regions 1p and 19q, IDH-1 (R132H by immunohistochemistry mutated. ATRX wild type by immunohistochemistry. Mitoses were present, not sufficient in number to warrant a diagnosis of anaplasia by that criterion.  However, the presence of vascular proliferation, even though mild, is indicative of early anaplasia.  -6/13/2019 NEURO-ONC: Recommending chemoradiotherapy.   -7/8 - 8/21/2019 CHEMORADS: 59.4 Gy in 1.8 Gy daily fractions x 30 fractions per Dr. Kirby Dwyer  With [facility] plus concurrent temozolomide 75mg/m2 (180mg).   -9/16/2019 NEURO-ONC/ MRB/ CHEMO: Clinically well. Imaging with no concerns. Starting adjuvant-dosed temozolomide 150mg/m2 (360mg), cycle 1 (start date of 9/19/2019).   -10/14/2019 NEURO-ONC/CHEMO: Clinically stable. Continue adjuvant-dosed temozolomide, increasing to 200 mg/m2 (460 mg), cycle 2 (start date of 10/17/2019).   -11/11/2019 NEURO-ONC/ MRB/ CHEMO: Clinically stable. Imaging with no concerns. Adjuvant-dosed temozolomide 200 mg/m2 (460 mg), cycle 3 (start date of 11/19/2019, but delayed due to thrombocytopenia).   -12/16/2019 NEURO-ONC/ CHEMO: Clinically stable. Adjuvant-dosed temozolomide 200 mg/m2 (460 mg), cycle 4 (start date of 12/17/2019).   -1/13/2020 NEURO-ONC/ CHEMO: Clinically stable. Adjuvant-dosed temozolomide 200 mg/m2 (460 mg), cycle 5 (start date originally supposed to be 1/14/2020, but was on 1/20/2020)  -2/24/2020 NEURO-ONC/ MRB/ CHEMO: Clinically stable. Imaging stable. Adjuvant-dosed temozolomide 200  mg/m2 (460 mg), cycle 6 (start date tonight).  -5/21/2020 SURGERY: Right anterior cervical 4 - 6 discectomy and fusion and mercedes wells tong placement. Bilateral posterior Cervical 7-Thoracic 1 posterior minimally invasive foraminotomy. Preformed by Dr. Downing.   -6/8/2020 NEURO-ONC/ MRB: Clinically stable; continued cognitive complaints. Imaging with symmetric cristiana-ventricular increased T2 FLAIR. Most consistent with radiation-induced changes. Repeat imaging with spectroscopy in 2 months.  -8/3/2020 NEURO-ONC/ MRB: Clinically stable. Imaging with symmetric cristiana-ventricular T2 FLAIR, stable since the last scan. Spectroscopy consistent with radiation injury.  -11/23/2020 NEURO-ONC/ MRB: Clinically well. Imaging stable.    SOCIAL HISTORY   Tobacco use: Former smoker.  Alcohol use: Social.   Drug use: Denies.  . 1 son + grandchildren.   Employment: Retired, worked as an .      PHYSICAL EXAMINATION  KPS: 90    -Generally well appearing.    -Respiratory: Normal breath sounds, no audible wheezing.   -Skin: No facial rashes. Psoriasis on arms and legs.   -Psychiatric: Normal mood and affect. Pleasant, talkative.  -Neurologic:   MENTAL STATUS:     Alert, oriented to date.    Recall: Intact, good job in answering questions.    Speech fluent.    Comprehension intact to multi-step commands. No confusion noted.    Good right-left orientation.     CRANIAL NERVES:     L pupil 1mm > R pupil, round, reactive to light.     Decreased acuity on the R. Blind spots on the R.    Extraocular movements full, patient denies diplopia.      Facial sensation intact to light touch.   Decreased activation of the face on the right, nasolabial fold flattening.    Hearing intact.   With normal phonation, no dysfunction anticipated of the palate or tongue.  MOTOR:    No pronation or drift. No orbiting.  SENSATION:    Intact to light touch throughout.  COORDINATION:   Intact finger-nose with eyes open and closed.     The rest of a  comprehensive physical examination is deferred due to [facility] (public health emergency) video visit restrictions.        MEDICAL RECORDS  Obtained and personally reviewed all available outside medical records in addition to reviewing any records available in our electronic system.     LABS  Personally reviewed all available lab results.     IMAGING  Personally reviewed MR brain imaging from last week and compared to prior imaging. To my eye, there is no new contrast enhancement. Previously seen enhancement has been stable since 2/2020. There is a symmetric increase in T2 FLAIR signal abnormality about the cristiana-ventricular region that is stable as compared to June.    Imaging results were reviewed with Melvin and Dana.      IMPRESSION  Clinic time was spent discussing in detail the nature of his cancer in light of repeat imaging. This was in addition to providing emotional support, answering questions pertaining to my recommendations, and devising the plan as outlined below.      Clinically, Melvin is doing well. He denies any new neurological complaints and has minimal pain in the neck/ shoulders/ arms when moving a particular way. He remains conscientious of maintaining good posture plus doing PT exercises, but his ROM is decreased. Melvin and Dana note continued issues with memory, but these are stable. Otherwise, no new findings on examination.     Imaging with symmetric cristiana-ventricular increased T2 FLAIR that is stable compared to prior scans. Given the symmetric nature, past use of radiation in the setting of multiple factors for cardiovascular disease, and prior spectroscopy being consistent with radiation-induced changes, I have no concerns. No other findings on imaging of concern. If imaging in February is again stable, given that he will be 1 year off chemotherapy, we will discuss the option of increasing the imaging interval to every 4-6 months. Continue imaging surveillance.     PROBLEM LIST  Anaplastic  oligodendroglioma (grade III)  Memory complaints  Low K+  Cervical radiculopathy  Chemotherapy-induced thrombocytopenia   Bradycardia/ orthostatsis     PLAN  -CANCER DIRECTED THERAPY-  -As above; Continue imaging surveillance. Repeat imaging in 3 months.    All imaging needs to scheduled on a 1.5T scanner due to an implantable device that Melvin has in his hip.     -SEIZURE MANAGEMENT-  -While this patient is at increased risk of having seizures, given the lack of seizure history, there is no indication to prescribe an antiepileptic at this time.     -Quality of life/ MOOD/ FATIGUE-  -Denies any mood issues.  -Continue to monitor mood as untreated/ undertreated depression can worsen fatigue, dysorexia, and quality of life.     -OTHER-  -[facility]qu[facility] visit stating the date/ time/ reason of the clinic visit.     Return to clinic in 2/2021 + imaging.     In the meantime, Melvin and Dana know to call with questions or concerns or to report new complaints and can be seen sooner if needed.     Leeanne Lozano MD  Neuro-oncology

## 2020-11-21 NOTE — PATIENT INSTRUCTIONS
Imaging stable.   Repeat in 3 months.     Return to clinic in 2/2021 + imaging.    Leeanne Lozano MD  Neuro-oncology  11/23/20

## 2020-11-23 ENCOUNTER — VIRTUAL VISIT (OUTPATIENT)
Dept: ONCOLOGY | Facility: CLINIC | Age: 65
End: 2020-11-23
Attending: PSYCHIATRY & NEUROLOGY
Payer: COMMERCIAL

## 2020-11-23 DIAGNOSIS — C71.9 OLIGODENDROGLIOMA, ANAPLASTIC (H): Primary | ICD-10-CM

## 2020-11-23 PROCEDURE — 99214 OFFICE O/P EST MOD 30 MIN: CPT | Mod: 95 | Performed by: PSYCHIATRY & NEUROLOGY

## 2020-11-23 PROCEDURE — 999N001193 HC VIDEO/TELEPHONE VISIT; NO CHARGE

## 2020-11-23 NOTE — LETTER
"    11/23/2020         RE: Gopi Farfan  [id number]  [facility] [address]        Dear Colleague,    Thank you for referring your patient, Gopi Farfan, to the [facility]. Please see a copy of my visit note below.    Gopi Farfan is a 65 year old male who is being evaluated via a billable video visit.      The patient has been notified of following:     \"This video visit will be conducted via a call between you and your physician/provider. We have found that certain health care needs can be provided without the need for an in-person physical exam.  This service lets us provide the care you need with a video conversation.  If a prescription is necessary we can send it directly to your pharmacy.  If lab work is needed we can place an order for that and you can then stop by our lab to have the test done at a later time.    Video visits are billed at different rates depending on your insurance coverage.  Please reach out to your insurance provider with any questions.    If during the course of the call the physician/provider feels a video visit is not appropriate, you will not be charged for this service.\"    Patient has given verbal consent for Video visit? Yes  How would you like to obtain your AVS? MyChart  If you are dropped from the video visit, the video invite should be resent to: Send to e-mail at: [email or web address]  Will anyone else be joining your video visit? No      Video-Visit Details  Type of service:  Video Visit    Video Start Time: 7:10 AM  Video End Time: 7:32 AM    Originating Location (pt. Location): Home    Distant Location (provider location):  [facility]     Platform used for Video Visit: Ely Lozano MD    _____________________________________________________________    NEURO-ONCOLOGY VISIT  Nov 23, 2020    CHIEF COMPLAINT: Mr. Nguyễn \"Melvin\" Adolph is a 65 year old right-handed man with a left " frontal anaplastic oligodendroglioma (1p/ 19q co-deleted, IDH-1 R132H mutated), diagnosed following resection on 5/30/2019. He completed chemoradiotherapy with concurrent temozolomide as of 8/21/2019, treatment course was complicated by thrombocytopenia. He then completed 6 cycles of adjuvant-dosed temozolomide in 2/2020. He is currently managed on imaging surveillance.     I met with Melvin and Dana (wife) today for this follow-up visit.     HISTORY OF PRESENT ILLNESS  -Melvin has been doing well. Denies any new neurological complaints.   -Mild pain in the neck/ shoulders/ arms when moving particular ways, conscientious of maintaining good posture. Decreased ROM of the neck.   -Sleeping well at night.  -Short term memory issues overall stable per Dnaa.  -No new sensory changes or weakness.  -Infrequent headaches, improved from prior. Takes an aspirin and the pain improves.  -Decreased vision in the right eye with blind spots; per ophtho, there is decreased blood flow to that eye.  -No events concerning for seizures.   -Melvin and Dana decided to not attend a family wedding recently to avoid daryl COVID.   -Melvin's mom has worsening medical conditions. She currently lives in [place] and Melvin is weighing the risks of going to visit her during the pandemic.     REVIEW OF SYSTEMS  A comprehensive ROS negative except as in HPI.      MEDICATIONS   Current Outpatient Medications   Medication Sig Dispense Refill     acetaminophen (TYLENOL) 325 MG tablet Take 325-650 mg by mouth 4 times daily as needed for mild pain       atorvastatin (LIPITOR) 80 MG tablet Take 40 mg by mouth every evening (takes 0.5 x 80mg)       latanoprost (XALATAN) 0.005 % ophthalmic solution Place 1 drop into both eyes every evening        losartan (COZAAR) 100 MG tablet Take 100 mg by mouth every evening        metoprolol succinate ER (TOPROL-XL) 25 MG 24 hr tablet Take 25 mg by mouth daily        oxyCODONE-acetaminophen (PERCOCET) 5-325 MG tablet  Take 1 tablet by mouth every 4 hours as needed for pain (moderate to severe) 30 tablet 0     sildenafil (VIAGRA) 50 MG tablet Take 50 mg by mouth daily as needed       DRUG ALLERGIES   Allergies   Allergen Reactions     Amlodipine Swelling       ONCOLOGIC HISTORY  -5/2019 PRESENTATION: Progressive confusion, memory loss plus difficulty with walking.   -5/28/2019 Admitted to [facility] in [place] for evaluation, then transferred to [facility] for higher level of care.   -5/28/2019 MR brain imaging revealed a large left frontal mass   -5/30/2019 SURGERY: Craniotomy for  mass resection by Dr. Downing.  PATHOLOGY: Oligodendroglioma with borderline anaplastic features (WHO grade III); Co-deletion of chromosomal regions 1p and 19q, IDH-1 (R132H by immunohistochemistry mutated. ATRX wild type by immunohistochemistry. Mitoses were present, not sufficient in number to warrant a diagnosis of anaplasia by that criterion.  However, the presence of vascular proliferation, even though mild, is indicative of early anaplasia.  -6/13/2019 NEURO-ONC: Recommending chemoradiotherapy.   -7/8 - 8/21/2019 CHEMORADS: 59.4 Gy in 1.8 Gy daily fractions x 30 fractions per Dr. Kirby Dwyer  With [facility] plus concurrent temozolomide 75mg/m2 (180mg).   -9/16/2019 NEURO-ONC/ MRB/ CHEMO: Clinically well. Imaging with no concerns. Starting adjuvant-dosed temozolomide 150mg/m2 (360mg), cycle 1 (start date of 9/19/2019).   -10/14/2019 NEURO-ONC/CHEMO: Clinically stable. Continue adjuvant-dosed temozolomide, increasing to 200 mg/m2 (460 mg), cycle 2 (start date of 10/17/2019).   -11/11/2019 NEURO-ONC/ MRB/ CHEMO: Clinically stable. Imaging with no concerns. Adjuvant-dosed temozolomide 200 mg/m2 (460 mg), cycle 3 (start date of 11/19/2019, but delayed due to thrombocytopenia).   -12/16/2019 NEURO-ONC/ CHEMO: Clinically stable. Adjuvant-dosed temozolomide 200 mg/m2 (460 mg), cycle 4 (start date of 12/17/2019).   -1/13/2020 NEURO-ONC/  CHEMO: Clinically stable. Adjuvant-dosed temozolomide 200 mg/m2 (460 mg), cycle 5 (start date originally supposed to be 1/14/2020, but was on 1/20/2020)  -2/24/2020 NEURO-ONC/ MRB/ CHEMO: Clinically stable. Imaging stable. Adjuvant-dosed temozolomide 200 mg/m2 (460 mg), cycle 6 (start date tonight).  -5/21/2020 SURGERY: Right anterior cervical 4 - 6 discectomy and fusion and mercedes wells tong placement. Bilateral posterior Cervical 7-Thoracic 1 posterior minimally invasive foraminotomy. Preformed by Dr. Downing.   -6/8/2020 NEURO-ONC/ MRB: Clinically stable; continued cognitive complaints. Imaging with symmetric cristiana-ventricular increased T2 FLAIR. Most consistent with radiation-induced changes. Repeat imaging with spectroscopy in 2 months.  -8/3/2020 NEURO-ONC/ MRB: Clinically stable. Imaging with symmetric cristiana-ventricular T2 FLAIR, stable since the last scan. Spectroscopy consistent with radiation injury.  -11/23/2020 NEURO-ONC/ MRB: Clinically well. Imaging stable.    SOCIAL HISTORY   Tobacco use: Former smoker.  Alcohol use: Social.   Drug use: Denies.  . 1 son + grandchildren.   Employment: Retired, worked as an .      PHYSICAL EXAMINATION  KPS: 90    -Generally well appearing.    -Respiratory: Normal breath sounds, no audible wheezing.   -Skin: No facial rashes. Psoriasis on arms and legs.   -Psychiatric: Normal mood and affect. Pleasant, talkative.  -Neurologic:   MENTAL STATUS:     Alert, oriented to date.    Recall: Intact, good job in answering questions.    Speech fluent.    Comprehension intact to multi-step commands. No confusion noted.    Good right-left orientation.     CRANIAL NERVES:     L pupil 1mm > R pupil, round, reactive to light.     Decreased acuity on the R. Blind spots on the R.    Extraocular movements full, patient denies diplopia.      Facial sensation intact to light touch.   Decreased activation of the face on the right, nasolabial fold flattening.    Hearing  intact.   With normal phonation, no dysfunction anticipated of the palate or tongue.  MOTOR:    No pronation or drift. No orbiting.  SENSATION:    Intact to light touch throughout.  COORDINATION:   Intact finger-nose with eyes open and closed.     The rest of a comprehensive physical examination is deferred due to [facility] (public health emergency) video visit restrictions.        MEDICAL RECORDS  Obtained and personally reviewed all available outside medical records in addition to reviewing any records available in our electronic system.     LABS  Personally reviewed all available lab results.     IMAGING  Personally reviewed MR brain imaging from last week and compared to prior imaging. To my eye, there is no new contrast enhancement. Previously seen enhancement has been stable since 2/2020. There is a symmetric increase in T2 FLAIR signal abnormality about the cristiana-ventricular region that is stable as compared to June.    Imaging results were reviewed with Melvin and Dana.      IMPRESSION  Clinic time was spent discussing in detail the nature of his cancer in light of repeat imaging. This was in addition to providing emotional support, answering questions pertaining to my recommendations, and devising the plan as outlined below.      Clinically, Melvin is doing well. He denies any new neurological complaints and has minimal pain in the neck/ shoulders/ arms when moving a particular way. He remains conscientious of maintaining good posture plus doing PT exercises, but his ROM is decreased. Melvin and Dana note continued issues with memory, but these are stable. Otherwise, no new findings on examination.     Imaging with symmetric cristiana-ventricular increased T2 FLAIR that is stable compared to prior scans. Given the symmetric nature, past use of radiation in the setting of multiple factors for cardiovascular disease, and prior spectroscopy being consistent with radiation-induced changes, I have no concerns. No other findings  on imaging of concern. If imaging in February is again stable, given that he will be 1 year off chemotherapy, we will discuss the option of increasing the imaging interval to every 4-6 months. Continue imaging surveillance.     PROBLEM LIST  Anaplastic oligodendroglioma (grade III)  Memory complaints  Low K+  Cervical radiculopathy  Chemotherapy-induced thrombocytopenia   Bradycardia/ orthostatsis     PLAN  -CANCER DIRECTED THERAPY-  -As above; Continue imaging surveillance. Repeat imaging in 3 months.    All imaging needs to scheduled on a 1.5T scanner due to an implantable device that Melvin has in his hip.     -SEIZURE MANAGEMENT-  -While this patient is at increased risk of having seizures, given the lack of seizure history, there is no indication to prescribe an antiepileptic at this time.     -Quality of life/ MOOD/ FATIGUE-  -Denies any mood issues.  -Continue to monitor mood as untreated/ undertreated depression can worsen fatigue, dysorexia, and quality of life.     -OTHER-  -Requ[facility] for [facility] every visit stating the date/ time/ reason of the clinic visit.     Return to clinic in 2/2021 + imaging.     In the meantime, Melvin and Dana know to call with questions or concerns or to report new complaints and can be seen sooner if needed.     Leeanne Lozano MD  Neuro-oncology         Again, thank you for allowing me to participate in the care of your patient.        Sincerely,        Leeanne Lozano MD

## 2021-01-03 ENCOUNTER — HEALTH MAINTENANCE LETTER (OUTPATIENT)
Age: 66
End: 2021-01-03

## 2021-02-22 ENCOUNTER — HOSPITAL ENCOUNTER (OUTPATIENT)
Dept: MRI IMAGING | Facility: CLINIC | Age: 66
Discharge: HOME | End: 2021-02-22
Attending: PSYCHIATRY & NEUROLOGY | Admitting: PSYCHIATRY & NEUROLOGY
Payer: COMMERCIAL

## 2021-02-22 DIAGNOSIS — C71.9 OLIGODENDROGLIOMA, ANAPLASTIC (H): ICD-10-CM

## 2021-02-22 PROCEDURE — 70553 MRI BRAIN STEM W/O & W/DYE: CPT

## 2021-02-22 PROCEDURE — A9585 GADOBUTROL INJECTION: HCPCS | Performed by: PSYCHIATRY & NEUROLOGY

## 2021-02-22 PROCEDURE — 70553 MRI BRAIN STEM W/O & W/DYE: CPT | Mod: 26 | Performed by: RADIOLOGY

## 2021-02-22 PROCEDURE — 255N000002 HC RX 255 OP 636: Performed by: PSYCHIATRY & NEUROLOGY

## 2021-02-22 RX ORDER — GADOBUTROL 604.72 MG/ML
10 INJECTION INTRAVENOUS ONCE
Status: COMPLETED | OUTPATIENT
Start: 2021-02-22 | End: 2021-02-22

## 2021-02-22 RX ADMIN — GADOBUTROL 10 ML: 604.72 INJECTION INTRAVENOUS at 09:26

## 2021-02-25 NOTE — PROGRESS NOTES
"Melvin is a 66 year old who is being evaluated via a billable video visit.      How would you like to obtain your AVS? Mail a copy  If the video visit is dropped, the invitation should be resent by: Send to e-mail at: [email or web address]  Will anyone else be joining your video visit? No         I have reviewed and updated patient's allergy and medication list.    Concerns: NO NEW CONCERNS  Refills: NONE    Cynthia Feliciano CMA      Video-Visit Details  Type of service:  Video Visit    Prep time/ imaging review: 10 minutes  Video Start Time: 09:37 AM  Video End Time: 10:44 AM  Post-visit documentation/ orders: 10 minutes    Originating Location (pt. Location): Home    Distant Location (provider location):  [facility]     Platform used for Video Visit: Ely Lozano MD    _____________________________________________________________    NEURO-ONCOLOGY VISIT  Feb 26, 2021    CHIEF COMPLAINT: Mr. 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 He completed chemoradiotherapy with concurrent temozolomide as of 8/21/2019, treatment course was complicated by thrombocytopenia. He then completed 6 cycles of adjuvant-dosed temozolomide in 2/2020. He is currently managed on imaging surveillance.     I met with Melvin and Dana (wife) today for this follow-up visit.     HISTORY OF PRESENT ILLNESS  -Melvin has been doing well. Reports that his legs feel weak every once in a while. Trying to be more active now that weather is better, which helps. He had a fall while carrying something when he felt his feet were moving faster than he was ~2 weeks ago. No other falls.   -Mild pain in the neck/ shoulders/ arms when moving particular ways, this has improved over the last 3 months.    -Sleeping well at night.  -Short term memory issues overall stable per " Melvin & Dana.  -No new sensory changes.  -Infrequent headaches, improved from prior. Takes acetaminophen and the pain improves.  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  -No events concerning for seizures.   -Planning on getting COVID-19 vaccine next week.   -He didn't do much for his birthday, he had his MRI that day.   -Melvin's hasn't visited him mom in [place] yet due to the pandemic.     REVIEW OF SYSTEMS  A comprehensive ROS negative except as in HPI.      MEDICATIONS   Current Outpatient Medications   Medication Sig Dispense Refill     acetaminophen (TYLENOL) 325 MG tablet Take 325-650 mg by mouth 4 times daily as needed for mild pain       atorvastatin (LIPITOR) 80 MG tablet Take 40 mg by mouth every evening (takes 0.5 x 80mg)       latanoprost (XALATAN) 0.005 % ophthalmic solution Place 1 drop into both eyes every evening        losartan (COZAAR) 100 MG tablet Take 100 mg by mouth every evening        metoprolol succinate ER (TOPROL-XL) 25 MG 24 hr tablet Take 25 mg by mouth daily        oxyCODONE-acetaminophen (PERCOCET) 5-325 MG tablet Take 1 tablet by mouth every 4 hours as needed for pain (moderate to severe) 30 tablet 0     sildenafil (VIAGRA) 50 MG tablet Take 50 mg by mouth daily as needed       DRUG ALLERGIES   Allergies   Allergen Reactions     Amlodipine Swelling       ONCOLOGIC HISTORY  -5/2019 PRESENTATION: Progressive confusion, memory loss plus difficulty with walking.   -5/28/2019 Admitted to [facility] in [place] for evaluation, then transferred to [facility] for higher level of care.   -5/28/2019 MR brain imaging revealed a large left frontal mass   -5/30/2019 SURGERY: Craniotomy for  mass resection by Dr. Downing.  PATHOLOGY: Oligodendroglioma with borderline anaplastic features (WHO grade III); Co-deletion of chromosomal regions 1p and 19q, IDH-1 (R132H by immunohistochemistry mutated. ATRX wild type  by immunohistochemistry. Mitoses were present, not sufficient in number to warrant a diagnosis of anaplasia by that criterion.  However, the presence of vascular proliferation, even though mild, is indicative of early anaplasia.  -6/13/2019 NEURO-ONC: Recommending chemoradiotherapy.   -7/8 - 8/21/2019 CHEMORADS: 59.4 Gy in 1.8 Gy daily fractions x 30 fractions per Dr. Kirby Dwyer  With [facility] plus concurrent temozolomide 75mg/m2 (180mg).   -9/16/2019 NEURO-ONC/ MRB/ CHEMO: Clinically well. Imaging with no concerns. Starting adjuvant-dosed temozolomide 150mg/m2 (360mg), cycle 1 (start date of 9/19/2019).   -10/14/2019 NEURO-ONC/CHEMO: Clinically stable. Continue adjuvant-dosed temozolomide, increasing to 200 mg/m2 (460 mg), cycle 2 (start date of 10/17/2019).   -11/11/2019 NEURO-ONC/ MRB/ CHEMO: Clinically stable. Imaging with no concerns. Adjuvant-dosed temozolomide 200 mg/m2 (460 mg), cycle 3 (start date of 11/19/2019, but delayed due to thrombocytopenia).   -12/16/2019 NEURO-ONC/ CHEMO: Clinically stable. Adjuvant-dosed temozolomide 200 mg/m2 (460 mg), cycle 4 (start date of 12/17/2019).   -1/13/2020 NEURO-ONC/ CHEMO: Clinically stable. Adjuvant-dosed temozolomide 200 mg/m2 (460 mg), cycle 5 (start date originally supposed to be 1/14/2020, but was on 1/20/2020)  -2/24/2020 NEURO-ONC/ MRB/ CHEMO: Clinically stable. Imaging stable. Adjuvant-dosed temozolomide 200 mg/m2 (460 mg), cycle 6 (start date tonight).  -5/21/2020 SURGERY: Right anterior cervical 4 - 6 discectomy and fusion and mercedes wells tong placement. Bilateral posterior Cervical 7-Thoracic 1 posterior minimally invasive foraminotomy. Preformed by Dr. Downing.   -6/8/2020 NEURO-ONC/ MRB: Clinically stable; continued cognitive complaints. Imaging with symmetric cristiana-ventricular increased T2 FLAIR. Most consistent with radiation-induced changes. Repeat imaging with spectroscopy in 2 months.  -8/3/2020 NEURO-ONC/ MRB: Clinically stable.  Imaging with symmetric cristiana-ventricular T2 FLAIR, stable since the last scan. Spectroscopy consistent with radiation injury.  -11/23/2020 NEURO-ONC/ MRB: Clinically well. Imaging stable.  -2/26/2021 NEURO-ONC/ MRB: Clinically well. Imaging with a questionable area of new contrast enhancement, unchanged confluent periventricular T2 hyperintensity.    SOCIAL HISTORY   Tobacco use: Former smoker.  Alcohol use: Social.   Drug use: Denies.  . 1 son + grandchildren.   Employment: Retired, worked as an .      PHYSICAL EXAMINATION  KPS: 90  -Generally well appearing.    -Respiratory: Wheezing noted.   -Skin: No facial rashes.   -Psychiatric: Normal mood and affect. Pleasant, talkative.  -Neurologic:   MENTAL STATUS:     Alert, oriented to date.    Recall: Intact, 2/3 word recall, recalled third word with cue, good job in answering questions.    Speech fluent.    Comprehension intact to multi-step commands. No confusion noted.    Good right-left orientation.     CRANIAL NERVES:     Extraocular movements full, patient denies diplopia.      Reports reduced sensation on the R in V2 distribution.    Decreased activation of the face on the right, nasolabial fold flattening.    Hearing intact.   With normal phonation, no dysfunction anticipated of the palate or tongue.  MOTOR: No pronation or drift. No orbiting.  SENSATION: Intact to light touch throughout.  COORDINATION: Intact finger-nose with eyes open and closed.     The rest of a comprehensive physical examination is deferred due to PHE (public health emergency) video visit restrictions.        MEDICAL RECORDS  Obtained and personally reviewed all available outside medical records in addition to reviewing any records available in our electronic system.     LABS  Personally reviewed all available lab results.     IMAGING  Personally reviewed MR brain imaging from this week and compared to prior imaging. To my eye, there is a questionable area of previously  seen contrast enhancement in the left lateral posterior resection margin above the anterior horn of the left lateral ventricle. Unchanged confluent periventricular T2 hyperintensity, likely treatment-related, since last 2 scans, however, increased since 2/2020.    Imaging results were reviewed with Iveth.    Case to be discussed at Brain Tumor Conference on 3/1.       IMPRESSION  Clinic time was spent discussing in detail the nature of his cancer in light of repeat imaging. This was in addition to providing emotional support, answering questions pertaining to my recommendations, and devising the plan as outlined below.      Clinically, Melvin is doing well. He denies any new neurological complaints. Reports that legs feel tired intermittently and has minimal pain in the neck/ shoulders/ arms that is improving. Melvin and Dana note continued issues with memory, but these are stable. Otherwise, no new findings on examination.     Imaging with symmetric cristiana-ventricular increased T2 FLAIR that is stable compared to the prior 2 scans, however, increased since a year ago. Given the symmetric nature, past use of radiation in the setting of multiple factors for cardiovascular disease, and prior spectroscopy being consistent with radiation-induced changes, I continue to have no concerns. Imaging did show a questionable, previously noted area of contrast enhancement versus a blood vessel. Will continue with imaging surveillance. Case to be discussed at Brain Tumor Conference on 3/1 and an addendum to the plan will be added if needed.     PROBLEM LIST  Anaplastic oligodendroglioma (grade III)  Memory complaints  Low K+  Cervical radiculopathy  Chemotherapy-induced thrombocytopenia   Bradycardia/ orthostatsis     PLAN  -CANCER DIRECTED THERAPY-  -As above; Continue imaging surveillance. Repeat imaging in 3 months.    * All imaging needs to scheduled on a 1.5T scanner due to an implantable device that Melvin has in his  hip.   ** If imaging is stable in May, will look to extend the imaging interval to every 4-6 months.     -SEIZURE MANAGEMENT-  -While this patient is at increased risk of having seizures, given the lack of seizure history, there is no indication to prescribe an antiepileptic at this time.     -Quality of life/ MOOD/ FATIGUE-  -Denies any mood issues.  -Continue to monitor mood as untreated/ undertreated depression can worsen fatigue, dysorexia, and quality of life.     -ISSUES WITH MEMORY AND COGNITION-  -Consideration for referral to Dr. Shadi Grady for neuro-psych testing.     -OTHER-  -[facility]qu[facility] every visit stating the date/ time/ reason of the clinic visit. Will continue to provide this letter.     Return to clinic in 5/2021 + imaging.     In the meantime, Melvin and Dana know to call with questions or concerns or to report new complaints and can be seen sooner if needed.     Patient was also seen and examined by Dr. Ewelina Chandler, Neurology Resident PGY4, under my direct supervision.    Leeanne Lozano MD  Neuro-oncology

## 2021-02-25 NOTE — PATIENT INSTRUCTIONS
Imaging discussed; repeating with perfusion in 3 months.      Return to clinic in 5/2021 + imaging.    Leeanne Lozano MD  Neuro-oncology

## 2021-02-26 ENCOUNTER — VIRTUAL VISIT (OUTPATIENT)
Dept: ONCOLOGY | Facility: CLINIC | Age: 66
End: 2021-02-26
Attending: PSYCHIATRY & NEUROLOGY
Payer: COMMERCIAL

## 2021-02-26 ENCOUNTER — PATIENT OUTREACH (OUTPATIENT)
Dept: ONCOLOGY | Facility: CLINIC | Age: 66
End: 2021-02-26

## 2021-02-26 DIAGNOSIS — C71.9 OLIGODENDROGLIOMA, ANAPLASTIC (H): Primary | ICD-10-CM

## 2021-02-26 DIAGNOSIS — G95.9 DISEASE OF SPINAL CORD, UNSPECIFIED (H): ICD-10-CM

## 2021-02-26 PROCEDURE — 999N001193 HC VIDEO/TELEPHONE VISIT; NO CHARGE

## 2021-02-26 PROCEDURE — 99214 OFFICE O/P EST MOD 30 MIN: CPT | Mod: 95 | Performed by: PSYCHIATRY & NEUROLOGY

## 2021-02-26 NOTE — LETTER
February 26, 2021      TO: Gopi Farfan  [address] [address] [place] [id number]         To Whom it May Concern:     Mr. Gopi Farfan was seen in our clinic on 02/26/2021 at 9:30 am for a medically necessary follow-up visit required for continued evaluation and recommendations on treatment for his diagnosed brain cancer.     We are requesting additional follow up visits with Mr. Farfan, please approve an additional 5 year follow up plan for him with neuro-oncology with [facility].       Please do not hesitate to contact our clinic with any additional questions.            Sincerely,           Leeanne Lozano MD  Neuro-Oncology

## 2021-02-26 NOTE — LETTER
"    2/26/2021         RE: Gopi Farfan  [address]  [place] [address]        Dear Colleague,    Thank you for referring your patient, Gopi Farfan, to the [facility]. Please see a copy of my visit note below.    Melvin is a 66 year old who is being evaluated via a billable video visit.      How would you like to obtain your AVS? Mail a copy  If the video visit is dropped, the invitation should be resent by: Send to e-mail at: [email or web address]  Will anyone else be joining your video visit? No         I have reviewed and updated patient's allergy and medication list.    Concerns: NO NEW CONCERNS  Refills: NONE    Cnythia Feliciano CMA      Video-Visit Details  Type of service:  Video Visit    Prep time/ imaging review: 10 minutes  Video Start Time: 09:37 AM  Video End Time: 10:44 AM  Post-visit documentation/ orders: 10 minutes    Originating Location (pt. Location): Home    Distant Location (provider location):  [facility]     Platform used for Video Visit: Ely Lozano MD    _____________________________________________________________    NEURO-ONCOLOGY VISIT  Feb 26, 2021    CHIEF COMPLAINT: Mr. 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 He completed chemoradiotherapy with concurrent temozolomide as of 8/21/2019, treatment course was complicated by thrombocytopenia. He then completed 6 cycles of adjuvant-dosed temozolomide in 2/2020. He is currently managed on imaging surveillance.     I met with Melvin and Dana (wife) today for this follow-up visit.     HISTORY OF PRESENT ILLNESS  -Melvin has been doing well. Reports that his legs feel weak every once in a while. Trying to be more active now that weather is better, which helps. He had a fall while carrying something when he felt " his feet were moving faster than he was ~2 weeks ago. No other falls.   -Mild pain in the neck/ shoulders/ arms when moving particular ways, this has improved over the last 3 months.    -Sleeping well at night.  -Short term memory issues overall stable per Melvin & Dana.  -No new sensory changes.  -Infrequent headaches, improved from prior. Takes acetaminophen and the pain improves.  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  -No events concerning for seizures.   -Planning on getting COVID-19 vaccine next week.   -He didn't do much for his birthday, he had his MRI that day.   -Melvin's hasn't visited him mom in [place] yet due to the pandemic.     REVIEW OF SYSTEMS  A comprehensive ROS negative except as in HPI.      MEDICATIONS   Current Outpatient Medications   Medication Sig Dispense Refill     acetaminophen (TYLENOL) 325 MG tablet Take 325-650 mg by mouth 4 times daily as needed for mild pain       atorvastatin (LIPITOR) 80 MG tablet Take 40 mg by mouth every evening (takes 0.5 x 80mg)       latanoprost (XALATAN) 0.005 % ophthalmic solution Place 1 drop into both eyes every evening        losartan (COZAAR) 100 MG tablet Take 100 mg by mouth every evening        metoprolol succinate ER (TOPROL-XL) 25 MG 24 hr tablet Take 25 mg by mouth daily        oxyCODONE-acetaminophen (PERCOCET) 5-325 MG tablet Take 1 tablet by mouth every 4 hours as needed for pain (moderate to severe) 30 tablet 0     sildenafil (VIAGRA) 50 MG tablet Take 50 mg by mouth daily as needed       DRUG ALLERGIES   Allergies   Allergen Reactions     Amlodipine Swelling       ONCOLOGIC HISTORY  -5/2019 PRESENTATION: Progressive confusion, memory loss plus difficulty with walking.   -5/28/2019 Admitted to [facility] in [place] for evaluation, then transferred to [facility] for higher level of care.   -5/28/2019 MR brain imaging revealed a large left frontal mass    -5/30/2019 SURGERY: Craniotomy for  mass resection by Dr. Downing.  PATHOLOGY: Oligodendroglioma with borderline anaplastic features (WHO grade III); Co-deletion of chromosomal regions 1p and 19q, IDH-1 (R132H by immunohistochemistry mutated. ATRX wild type by immunohistochemistry. Mitoses were present, not sufficient in number to warrant a diagnosis of anaplasia by that criterion.  However, the presence of vascular proliferation, even though mild, is indicative of early anaplasia.  -6/13/2019 NEURO-ONC: Recommending chemoradiotherapy.   -7/8 - 8/21/2019 CHEMORADS: 59.4 Gy in 1.8 Gy daily fractions x 30 fractions per Dr. Kirby Dwyer  With [facility] plus concurrent temozolomide 75mg/m2 (180mg).   -9/16/2019 NEURO-ONC/ MRB/ CHEMO: Clinically well. Imaging with no concerns. Starting adjuvant-dosed temozolomide 150mg/m2 (360mg), cycle 1 (start date of 9/19/2019).   -10/14/2019 NEURO-ONC/CHEMO: Clinically stable. Continue adjuvant-dosed temozolomide, increasing to 200 mg/m2 (460 mg), cycle 2 (start date of 10/17/2019).   -11/11/2019 NEURO-ONC/ MRB/ CHEMO: Clinically stable. Imaging with no concerns. Adjuvant-dosed temozolomide 200 mg/m2 (460 mg), cycle 3 (start date of 11/19/2019, but delayed due to thrombocytopenia).   -12/16/2019 NEURO-ONC/ CHEMO: Clinically stable. Adjuvant-dosed temozolomide 200 mg/m2 (460 mg), cycle 4 (start date of 12/17/2019).   -1/13/2020 NEURO-ONC/ CHEMO: Clinically stable. Adjuvant-dosed temozolomide 200 mg/m2 (460 mg), cycle 5 (start date originally supposed to be 1/14/2020, but was on 1/20/2020)  -2/24/2020 NEURO-ONC/ MRB/ CHEMO: Clinically stable. Imaging stable. Adjuvant-dosed temozolomide 200 mg/m2 (460 mg), cycle 6 (start date tonight).  -5/21/2020 SURGERY: Right anterior cervical 4 - 6 discectomy and fusion and mercedes wells tong placement. Bilateral posterior Cervical 7-Thoracic 1 posterior minimally invasive foraminotomy. Preformed by Dr. Downing.   -6/8/2020 NEURO-ONC/  MRB: Clinically stable; continued cognitive complaints. Imaging with symmetric cristiana-ventricular increased T2 FLAIR. Most consistent with radiation-induced changes. Repeat imaging with spectroscopy in 2 months.  -8/3/2020 NEURO-ONC/ MRB: Clinically stable. Imaging with symmetric cristiana-ventricular T2 FLAIR, stable since the last scan. Spectroscopy consistent with radiation injury.  -11/23/2020 NEURO-ONC/ MRB: Clinically well. Imaging stable.  -2/26/2021 NEURO-ONC/ MRB: Clinically well. Imaging with a questionable area of new contrast enhancement, unchanged confluent periventricular T2 hyperintensity.    SOCIAL HISTORY   Tobacco use: Former smoker.  Alcohol use: Social.   Drug use: Denies.  . 1 son + grandchildren.   Employment: Retired, worked as an .      PHYSICAL EXAMINATION  KPS: 90  -Generally well appearing.    -Respiratory: Wheezing noted.   -Skin: No facial rashes.   -Psychiatric: Normal mood and affect. Pleasant, talkative.  -Neurologic:   MENTAL STATUS:     Alert, oriented to date.    Recall: Intact, 2/3 word recall, recalled third word with cue, good job in answering questions.    Speech fluent.    Comprehension intact to multi-step commands. No confusion noted.    Good right-left orientation.     CRANIAL NERVES:     Extraocular movements full, patient denies diplopia.      Reports reduced sensation on the R in V2 distribution.    Decreased activation of the face on the right, nasolabial fold flattening.    Hearing intact.   With normal phonation, no dysfunction anticipated of the palate or tongue.  MOTOR: No pronation or drift. No orbiting.  SENSATION: Intact to light touch throughout.  COORDINATION: Intact finger-nose with eyes open and closed.     The rest of a comprehensive physical examination is deferred due to PHE (public health emergency) video visit restrictions.        MEDICAL RECORDS  Obtained and personally reviewed all available outside medical records in addition to reviewing  any records available in our electronic system.     LABS  Personally reviewed all available lab results.     IMAGING  Personally reviewed MR brain imaging from this week and compared to prior imaging. To my eye, there is a questionable area of previously seen contrast enhancement in the left lateral posterior resection margin above the anterior horn of the left lateral ventricle. Unchanged confluent periventricular T2 hyperintensity, likely treatment-related, since last 2 scans, however, increased since 2/2020.    Imaging results were reviewed with Iveth.    Case to be discussed at Brain Tumor Conference on 3/1.       IMPRESSION  Clinic time was spent discussing in detail the nature of his cancer in light of repeat imaging. This was in addition to providing emotional support, answering questions pertaining to my recommendations, and devising the plan as outlined below.      Clinically, Melvin is doing well. He denies any new neurological complaints. Reports that legs feel tired intermittently and has minimal pain in the neck/ shoulders/ arms that is improving. Melvin and Dana note continued issues with memory, but these are stable. Otherwise, no new findings on examination.     Imaging with symmetric cristiana-ventricular increased T2 FLAIR that is stable compared to the prior 2 scans, however, increased since a year ago. Given the symmetric nature, past use of radiation in the setting of multiple factors for cardiovascular disease, and prior spectroscopy being consistent with radiation-induced changes, I continue to have no concerns. Imaging did show a questionable, previously noted area of contrast enhancement versus a blood vessel. Will continue with imaging surveillance. Case to be discussed at Brain Tumor Conference on 3/1 and an addendum to the plan will be added if needed.     PROBLEM LIST  Anaplastic oligodendroglioma (grade III)  Memory complaints  Low K+  Cervical radiculopathy  Chemotherapy-induced  thrombocytopenia   Bradycardia/ orthostatsis     PLAN  -CANCER DIRECTED THERAPY-  -As above; Continue imaging surveillance. Repeat imaging in 3 months.    * All imaging needs to scheduled on a 1.5T scanner due to an implantable device that Melvin has in his hip.   ** If imaging is stable in May, will look to extend the imaging interval to every 4-6 months.     -SEIZURE MANAGEMENT-  -While this patient is at increased risk of having seizures, given the lack of seizure history, there is no indication to prescribe an antiepileptic at this time.     -Quality of life/ MOOD/ FATIGUE-  -Denies any mood issues.  -Continue to monitor mood as untreated/ undertreated depression can worsen fatigue, dysorexia, and quality of life.     -ISSUES WITH MEMORY AND COGNITION-  -Consideration for referral to Dr. Shadi Grady for neuro-psych testing.     -OTHER-  -[facility]qu[facility] every visit stating the date/ time/ reason of the clinic visit. Will continue to provide this letter.     Return to clinic in 5/2021 + imaging.     In the meantime, Melvin and Dana know to call with questions or concerns or to report new complaints and can be seen sooner if needed.     Patient was also seen and examined by Dr. Ewelina Chandler, Neurology Resident PGY4, under my direct supervision.    Leeanne Lozano MD  Neuro-oncology

## 2021-02-26 NOTE — LETTER
February 26, 2021      TO: Gopi Farfan  [address] [address] [place] [id number]         To Whom it May Concern:     Mr. Gopi Farfan was seen in our clinic on 11/23/2020 at 7:00 am for a medically necessary follow-up visit required for continued evaluation and recommendations on treatment for his diagnosed brain cancer.     We are requesting additional follow up visits with Mr. Farfan, please approve an additional 5 year follow up plan for him with neuro-oncology with [facility].       Please do not hesitate to contact our clinic with any additional questions.            Sincerely,           Leeanne Lozano MD  Neuro-Oncology

## 2021-02-26 NOTE — LETTER
February 26, 2021      TO: Gopi Farfan  [id number] [address] [place] [id number]         To Whom it May Concern:     Mr. Gopi Farfan was seen in our clinic on 02/26/2021 at 9:30 am for a medically necessary follow-up visit required for continued evaluation and recommendations on treatment for his diagnosed brain cancer.      Please do not hesitate to contact our clinic with any additional questions.            Sincerely,           Leeanne Lozano MD  Neuro-Oncology

## 2021-02-26 NOTE — LETTER
February 26, 2021      TO: Gopi Farfan  [id number] [address] [place] [id number]         To Whom it May Concern:     Mr. Gopi Farfan was seen in our clinic on 02/26/2021 at 9:30 am for a medically necessary follow-up visit required for continued evaluation and recommendations on treatment for his diagnosed brain cancer.     We are requesting additional follow up visits with Mr. Farfan, please approve an additional 5 year follow up plan for him with neuro-oncology with [facility].       Please do not hesitate to contact our clinic with any additional questions.            Sincerely,           Leeanne Lozano MD  Neuro-Oncology

## 2021-02-27 PROBLEM — G95.9 DISEASE OF SPINAL CORD, UNSPECIFIED (H): Status: ACTIVE | Noted: 2021-02-27

## 2021-03-01 ENCOUNTER — TUMOR CONFERENCE (OUTPATIENT)
Dept: ONCOLOGY | Facility: CLINIC | Age: 66
End: 2021-03-01

## 2021-03-01 NOTE — TUMOR CONFERENCE
Tumor Conference Information  Tumor Conference: Brain  Specialties Present: Medical oncology, Pathology, Radiology, Radiation oncology, Surgery  Patient Status: A current patient  Pathology: Not Discussed  Treatment to Date: Surgical intervention(s), Chemoradiation, Adjuvant chemotherapy  Clinical Trial Eligibility: Not discussed  Recommended Plan: Observation (see comment) (Comment: reviewed imaging - indeterminate change seen on recent MRI, blood vessel vs. recurrance; consider obtaining CTA for further clarification)  Did the review exceed 30 minutes?: did not           Documentation / Disclaimer Cancer Tumor Board Note  Cancer tumor board recommendations do not override what is determined to be reasonable care and treatment, which is dependent on the circumstances of a patient's case; the patient's medical, social, and personal concerns; and the clinical judgment of the oncologist [physician].

## 2021-04-25 ENCOUNTER — HEALTH MAINTENANCE LETTER (OUTPATIENT)
Age: 66
End: 2021-04-25

## 2021-05-12 ENCOUNTER — TELEPHONE (OUTPATIENT)
Dept: NEUROSURGERY | Facility: CLINIC | Age: 66
End: 2021-05-12

## 2021-05-12 NOTE — TELEPHONE ENCOUNTER
[facility], called and left  for aptient to return call.    Appt note:     Return patient, new issue. [facility]. Last ov 08/14/2020. Brain. Prev CT w/ [facility] (waiting on images)

## 2021-05-21 ENCOUNTER — HOSPITAL ENCOUNTER (OUTPATIENT)
Dept: MRI IMAGING | Facility: CLINIC | Age: 66
Discharge: HOME | End: 2021-05-21
Attending: PSYCHIATRY & NEUROLOGY | Admitting: PSYCHIATRY & NEUROLOGY
Payer: COMMERCIAL

## 2021-05-21 ENCOUNTER — HOSPITAL ENCOUNTER (OUTPATIENT)
Dept: GENERAL RADIOLOGY | Facility: CLINIC | Age: 66
End: 2021-05-21
Attending: NEUROLOGICAL SURGERY
Payer: COMMERCIAL

## 2021-05-21 ENCOUNTER — HOSPITAL ENCOUNTER (OUTPATIENT)
Dept: CT IMAGING | Facility: CLINIC | Age: 66
End: 2021-05-21
Attending: NEUROLOGICAL SURGERY
Payer: COMMERCIAL

## 2021-05-21 DIAGNOSIS — Z98.1 S/P CERVICAL SPINAL FUSION: ICD-10-CM

## 2021-05-21 DIAGNOSIS — M47.22 CERVICAL SPONDYLOSIS WITH RADICULOPATHY: ICD-10-CM

## 2021-05-21 DIAGNOSIS — M47.12 CERVICAL SPONDYLOSIS WITH MYELOPATHY: ICD-10-CM

## 2021-05-21 DIAGNOSIS — C71.9 OLIGODENDROGLIOMA, ANAPLASTIC (H): ICD-10-CM

## 2021-05-21 PROCEDURE — 70553 MRI BRAIN STEM W/O & W/DYE: CPT

## 2021-05-21 PROCEDURE — 255N000002 HC RX 255 OP 636: Performed by: PSYCHIATRY & NEUROLOGY

## 2021-05-21 PROCEDURE — A9585 GADOBUTROL INJECTION: HCPCS | Performed by: PSYCHIATRY & NEUROLOGY

## 2021-05-21 PROCEDURE — 70553 MRI BRAIN STEM W/O & W/DYE: CPT | Mod: 26 | Performed by: RADIOLOGY

## 2021-05-21 PROCEDURE — 72040 X-RAY EXAM NECK SPINE 2-3 VW: CPT

## 2021-05-21 PROCEDURE — 72040 X-RAY EXAM NECK SPINE 2-3 VW: CPT | Mod: 26 | Performed by: RADIOLOGY

## 2021-05-21 PROCEDURE — 72125 CT NECK SPINE W/O DYE: CPT | Mod: 26 | Performed by: RADIOLOGY

## 2021-05-21 PROCEDURE — 72125 CT NECK SPINE W/O DYE: CPT

## 2021-05-21 RX ORDER — GADOBUTROL 604.72 MG/ML
10 INJECTION INTRAVENOUS ONCE
Status: COMPLETED | OUTPATIENT
Start: 2021-05-21 | End: 2021-05-21

## 2021-05-21 RX ADMIN — GADOBUTROL 10 ML: 604.72 INJECTION INTRAVENOUS at 11:40

## 2021-05-23 NOTE — PATIENT INSTRUCTIONS
Imaging discussed; no concerns.  Repeat in 5-6 months.      Return to clinic in 10/2021 + imaging.    Leeanne Lozano MD  Neuro-oncology

## 2021-05-23 NOTE — PROGRESS NOTES
"Melvin is a 66 year old who is being evaluated via a billable video visit.      How would you like to obtain your AVS? MyChart  If the video visit is dropped, the invitation should be resent by: Send to e-mail at: [email or web address]  Will anyone else be joining your video visit? Yes: Wife- Dana. . How would they like to receive their invitation? Send to e-mail at: [employer]   Vitals - Patient Reported  Weight (Patient Reported): 102.1 kg (225 lb)  Height (Patient Reported): 185.4 cm (6' 1\")  BMI (Based on Pt Reported Ht/Wt): 29.68  Pain Score: No Pain (0)    Leeanne Lozano MD on 5/24/2021 at 10:37 AM      Video-Visit Details  Type of service:  Video Visit    Prep time/ imaging review: 10 minutes  Video Start Time: 11:25 AM  Video End Time: 11:48 AM  Post-visit documentation/ orders: 10 minutes    Originating Location (pt. Location): Home    Distant Location (provider location):  [facility]     Platform used for Video Visit: Ely Lozano MD    _____________________________________________________________    NEURO-ONCOLOGY VISIT  May 24, 2021    CHIEF COMPLAINT: Mr. 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 He completed chemoradiotherapy with concurrent temozolomide as of 8/21/2019, treatment course was complicated by thrombocytopenia. He then completed 6 cycles of adjuvant-dosed temozolomide in 2/2020. He is currently managed on imaging surveillance.     I met with Melvin and Dana (wife) today for this follow-up visit.     HISTORY OF PRESENT ILLNESS  -Melvin has been doing fairly well.   -Has a heart surgery planned for Friday; ablation for arrhythmias.   -Per an ophthalmologist, there is decreased blood flow to the right eye. 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   -Infrequent headaches; stemming " from muscle tension in the neck.   -Reports that his legs feel weak every once in a while. Trying to be more active, intermittent dizziness.   -Short term memory issues overall stable per Melvin & Dana.  -No new sensory changes.  -No events concerning for seizures.   -COVID-19 vaccinated; no side effects.     REVIEW OF SYSTEMS  A comprehensive ROS negative except as in HPI.      MEDICATIONS   Current Outpatient Medications   Medication Sig Dispense Refill     atorvastatin (LIPITOR) 80 MG tablet Take 40 mg by mouth every evening (takes 0.5 x 80mg)       latanoprost (XALATAN) 0.005 % ophthalmic solution Place 1 drop into both eyes every evening        acetaminophen (TYLENOL) 325 MG tablet Take 325-650 mg by mouth 4 times daily as needed for mild pain       losartan (COZAAR) 100 MG tablet Take 100 mg by mouth every evening        metoprolol succinate ER (TOPROL-XL) 25 MG 24 hr tablet Take 25 mg by mouth daily        sildenafil (VIAGRA) 50 MG tablet Take 50 mg by mouth daily as needed       sotalol (BETAPACE) 80 MG tablet        DRUG ALLERGIES   Allergies   Allergen Reactions     Amlodipine Swelling       ONCOLOGIC HISTORY  -5/2019 PRESENTATION: Progressive confusion, memory loss plus difficulty with walking.   -5/28/2019 Admitted to [facility] in [place] for evaluation, then transferred to [facility] for higher level of care.   -5/28/2019 MR brain imaging revealed a large left frontal mass   -5/30/2019 SURGERY: Craniotomy for  mass resection by Dr. Downing.  PATHOLOGY: Oligodendroglioma with borderline anaplastic features (WHO grade III); Co-deletion of chromosomal regions 1p and 19q, IDH-1 (R132H by immunohistochemistry mutated. ATRX wild type by immunohistochemistry. Mitoses were present, not sufficient in number to warrant a diagnosis of anaplasia by that criterion.  However, the presence of vascular proliferation, even though mild, is indicative of early anaplasia.  -6/13/2019 NEURO-ONC: Recommending  chemoradiotherapy.   -7/8 - 8/21/2019 CHEMORADS: 59.4 Gy in 1.8 Gy daily fractions x 30 fractions per Dr. Kirby Dwyer  With [facility] plus concurrent temozolomide 75mg/m2 (180mg).   -9/16/2019 NEURO-ONC/ MRB/ CHEMO: Clinically well. Imaging with no concerns. Starting adjuvant-dosed temozolomide 150mg/m2 (360mg), cycle 1 (start date of 9/19/2019).   -10/14/2019 NEURO-ONC/CHEMO: Clinically stable. Continue adjuvant-dosed temozolomide, increasing to 200 mg/m2 (460 mg), cycle 2 (start date of 10/17/2019).   -11/11/2019 NEURO-ONC/ MRB/ CHEMO: Clinically stable. Imaging with no concerns. Adjuvant-dosed temozolomide 200 mg/m2 (460 mg), cycle 3 (start date of 11/19/2019, but delayed due to thrombocytopenia).   -12/16/2019 NEURO-ONC/ CHEMO: Clinically stable. Adjuvant-dosed temozolomide 200 mg/m2 (460 mg), cycle 4 (start date of 12/17/2019).   -1/13/2020 NEURO-ONC/ CHEMO: Clinically stable. Adjuvant-dosed temozolomide 200 mg/m2 (460 mg), cycle 5 (start date originally supposed to be 1/14/2020, but was on 1/20/2020)  -2/24/2020 NEURO-ONC/ MRB/ CHEMO: Clinically stable. Imaging stable. Adjuvant-dosed temozolomide 200 mg/m2 (460 mg), cycle 6 (start date tonight).  -5/21/2020 SURGERY: Right anterior cervical 4 - 6 discectomy and fusion and mercedes wells tong placement. Bilateral posterior Cervical 7-Thoracic 1 posterior minimally invasive foraminotomy. Preformed by Dr. Downing.   -6/8/2020 NEURO-ONC/ MRB: Clinically stable; continued cognitive complaints. Imaging with symmetric cristiana-ventricular increased T2 FLAIR. Most consistent with radiation-induced changes. Repeat imaging with spectroscopy in 2 months.  -8/3/2020 NEURO-ONC/ MRB: Clinically stable. Imaging with symmetric cristiana-ventricular T2 FLAIR, stable since the last scan. Spectroscopy consistent with radiation injury.  -11/23/2020 NEURO-ONC/ MRB: Clinically well. Imaging stable.  -2/26/2021 NEURO-ONC/ MRB: Clinically well. Imaging with a questionable area of  new contrast enhancement, unchanged confluent periventricular T2 hyperintensity.  -5/24/2021 NEURO-ONC/ MRB: Clinically stable. Imaging stable; increase imaging interval.    SOCIAL HISTORY   Tobacco use: Former smoker.  Alcohol use: Social.   Drug use: Denies.  . 1 son + grandchildren.   Employment: Retired, worked as an .      PHYSICAL EXAMINATION  KPS: 90  -Generally well appearing.    -Respiratory: No wheezing noted.   -Skin: No facial rashes.   -Psychiatric: Normal mood and affect. Pleasant, talkative.  -Neurologic:   MENTAL STATUS:     Alert, oriented to date.    Recall: Immediate 3/3, delayed 0/3 word recall; 3/3 with clues.    Speech fluent.    Comprehension intact to multi-step commands. No confusion noted.    Good right-left orientation.     CRANIAL NERVES:     Extraocular movements full, denies diplopia.     Full visual fields.    Normal sensation on the face.    Decreased activation of the face on the right, nasolabial fold flattening.    Hearing intact.   With normal phonation, no dysfunction anticipated of the palate or tongue.  MOTOR: No pronation or drift.  SENSATION: Intact to light touch throughout.  COORDINATION: Intact finger-nose with eyes open and closed.     The rest of a comprehensive physical examination is deferred due to PHE (public health emergency) video visit restrictions.        MEDICAL RECORDS  Obtained and personally reviewed all available outside medical records in addition to reviewing any records available in our electronic system.     LABS  Personally reviewed all available lab results.     IMAGING  Personally reviewed MR brain imaging from this week and compared to prior imaging. To my eye, there is no new/ changing contrast enhancement in the left lateral posterior resection margin above the anterior horn of the left lateral ventricle. Unchanged confluent periventricular T2 hyperintensity, likely treatment-related, since 2/2021 through 6/2020.        Imaging  results were reviewed with Melvin and Dana.    Case personally discussed with neuro-radiology.       IMPRESSION  Clinic time was spent discussing in detail the nature of his cancer in light of repeat imaging. This was in addition to answering questions pertaining to my recommendations and devising the plan as outlined below.      Clinically, Melvin is doing well. He denies any new neurological complaints. Reports that legs feel tired intermittently. It is unclear if fatigue, dizziness, and periodic gait instability is related to a cardiac arrhthymias, but he will be undergoing a cardiac ablation procedure later this week. Melvin continues to have intermittent headaches mainly stemming from muscle tension/ pain in the neck/ shoulders/ arms. Melvin and Dana note continued issues with memory, but these are stable. Otherwise, no new findings on examination.     Imaging with symmetric cristiana-ventricular T2 FLAIR that is largely stable compared to 6/2020. Given the symmetric nature, past use of radiation in the setting of multiple factors for cardiovascular disease, and prior spectroscopy being consistent with radiation-induced changes, I continue to have no concerns. Given the stability of the scan, will repeat imaging now every 6 months. Requesting repeat imaging in October due to travel plans for the late fall/ winter.     PROBLEM LIST  Anaplastic oligodendroglioma (grade III)  Memory complaints  Low K+  Cervical radiculopathy  Chemotherapy-induced thrombocytopenia   Bradycardia/ orthostatsis     PLAN  -CANCER DIRECTED THERAPY-  -As above; Continue imaging surveillance. Repeat imaging in 6 months.    * All imaging needs to scheduled on a 1.5T scanner due to an implantable device that Melvin has in his hip.    -SEIZURE MANAGEMENT-  -While this patient is at increased risk of having seizures, given the lack of seizure history, there is no indication to prescribe an antiepileptic at this time.     -Quality of life/ MOOD/  FATIGUE-  -Denies any mood issues.  -Continue to monitor mood as untreated/ undertreated depression can worsen fatigue, dysorexia, and quality of life.   -Continue to monitor headaches/ other musculoskeletal pain.     -ISSUES WITH MEMORY AND COGNITION-  -Consideration for referral to Dr. Shadi Grady for neuro-psych testing.     -CARDIAC-  -Scheduled for a ablation to correct a cardiac arrhythmia.     -OTHER-  -[facility] visit stating the date/ time/ reason of the clinic visit. Will continue to provide this letter.     Return to clinic in 10/2021 + imaging.     In the meantime, Melvin and Dana know to call with questions or concerns or to report new complaints and can be seen sooner if needed.     Leeanne Lozano MD  Neuro-oncology

## 2021-05-24 ENCOUNTER — VIRTUAL VISIT (OUTPATIENT)
Dept: ONCOLOGY | Facility: CLINIC | Age: 66
End: 2021-05-24
Attending: PSYCHIATRY & NEUROLOGY
Payer: COMMERCIAL

## 2021-05-24 DIAGNOSIS — C71.9 OLIGODENDROGLIOMA, ANAPLASTIC (H): Primary | ICD-10-CM

## 2021-05-24 PROCEDURE — 999N001193 HC VIDEO/TELEPHONE VISIT; NO CHARGE

## 2021-05-24 PROCEDURE — 99214 OFFICE O/P EST MOD 30 MIN: CPT | Mod: 95 | Performed by: PSYCHIATRY & NEUROLOGY

## 2021-05-24 RX ORDER — SOTALOL HYDROCHLORIDE 80 MG/1
TABLET ORAL
COMMUNITY
Start: 2021-04-13 | End: 2022-04-22

## 2021-05-24 NOTE — LETTER
"    5/24/2021         RE: Gopi Farfan  [id number]  [facility] [address]        Dear Colleague,    Thank you for referring your patient, Gopi Farfan, to the [facility]. Please see a copy of my visit note below.    Melvin is a 66 year old who is being evaluated via a billable video visit.      How would you like to obtain your AVS? MyChart  If the video visit is dropped, the invitation should be resent by: Send to e-mail at: [email or web address]  Will anyone else be joining your video visit? Yes: Wife- Dana. . How would they like to receive their invitation? Send to e-mail at: [email or web address]   Vitals - Patient Reported  Weight (Patient Reported): 102.1 kg (225 lb)  Height (Patient Reported): 185.4 cm (6' 1\")  BMI (Based on Pt Reported Ht/Wt): 29.68  Pain Score: No Pain (0)    Leeanne Lozano MD on 5/24/2021 at 10:37 AM      Video-Visit Details  Type of service:  Video Visit    Prep time/ imaging review: 10 minutes  Video Start Time: 11:25 AM  Video End Time: 11:48 AM  Post-visit documentation/ orders: 10 minutes    Originating Location (pt. Location): Home    Distant Location (provider location):  [facility]     Platform used for Video Visit: Ely Lozano MD    _____________________________________________________________    NEURO-ONCOLOGY VISIT  May 24, 2021    CHIEF COMPLAINT: Mr. 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 He completed chemoradiotherapy with concurrent temozolomide as of 8/21/2019, treatment course was complicated by thrombocytopenia. He then completed 6 cycles of adjuvant-dosed temozolomide in 2/2020. He is currently managed on imaging surveillance.     I met with Iveth (wife) today for this follow-up visit.     HISTORY OF PRESENT " ILLNESS  -Melvin has been doing fairly well.   -Has a heart surgery planned for Friday; ablation for arrhythmias.   -Per an ophthalmologist, there is decreased blood flow to the right eye. 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   -Infrequent headaches; stemming from muscle tension in the neck.   -Reports that his legs feel weak every once in a while. Trying to be more active, intermittent dizziness.   -Short term memory issues overall stable per Melvin & Dana.  -No new sensory changes.  -No events concerning for seizures.   -COVID-19 vaccinated; no side effects.     REVIEW OF SYSTEMS  A comprehensive ROS negative except as in HPI.      MEDICATIONS   Current Outpatient Medications   Medication Sig Dispense Refill     atorvastatin (LIPITOR) 80 MG tablet Take 40 mg by mouth every evening (takes 0.5 x 80mg)       latanoprost (XALATAN) 0.005 % ophthalmic solution Place 1 drop into both eyes every evening        acetaminophen (TYLENOL) 325 MG tablet Take 325-650 mg by mouth 4 times daily as needed for mild pain       losartan (COZAAR) 100 MG tablet Take 100 mg by mouth every evening        metoprolol succinate ER (TOPROL-XL) 25 MG 24 hr tablet Take 25 mg by mouth daily        sildenafil (VIAGRA) 50 MG tablet Take 50 mg by mouth daily as needed       sotalol (BETAPACE) 80 MG tablet        DRUG ALLERGIES   Allergies   Allergen Reactions     Amlodipine Swelling       ONCOLOGIC HISTORY  -5/2019 PRESENTATION: Progressive confusion, memory loss plus difficulty with walking.   -5/28/2019 Admitted to [facility] in [place] for evaluation, then transferred to [facility] for higher level of care.   -5/28/2019 MR brain imaging revealed a large left frontal mass   -5/30/2019 SURGERY: Craniotomy for  mass resection by Dr. Downing.  PATHOLOGY: Oligodendroglioma with borderline anaplastic features (WHO grade III); Co-deletion of chromosomal regions 1p and 19q, IDH-1 (R132H by immunohistochemistry mutated. ATRX wild type by  immunohistochemistry. Mitoses were present, not sufficient in number to warrant a diagnosis of anaplasia by that criterion.  However, the presence of vascular proliferation, even though mild, is indicative of early anaplasia.  -6/13/2019 NEURO-ONC: Recommending chemoradiotherapy.   -7/8 - 8/21/2019 CHEMORADS: 59.4 Gy in 1.8 Gy daily fractions x 30 fractions per Dr. Kirby Dwyer  With [facility] plus concurrent temozolomide 75mg/m2 (180mg).   -9/16/2019 NEURO-ONC/ MRB/ CHEMO: Clinically well. Imaging with no concerns. Starting adjuvant-dosed temozolomide 150mg/m2 (360mg), cycle 1 (start date of 9/19/2019).   -10/14/2019 NEURO-ONC/CHEMO: Clinically stable. Continue adjuvant-dosed temozolomide, increasing to 200 mg/m2 (460 mg), cycle 2 (start date of 10/17/2019).   -11/11/2019 NEURO-ONC/ MRB/ CHEMO: Clinically stable. Imaging with no concerns. Adjuvant-dosed temozolomide 200 mg/m2 (460 mg), cycle 3 (start date of 11/19/2019, but delayed due to thrombocytopenia).   -12/16/2019 NEURO-ONC/ CHEMO: Clinically stable. Adjuvant-dosed temozolomide 200 mg/m2 (460 mg), cycle 4 (start date of 12/17/2019).   -1/13/2020 NEURO-ONC/ CHEMO: Clinically stable. Adjuvant-dosed temozolomide 200 mg/m2 (460 mg), cycle 5 (start date originally supposed to be 1/14/2020, but was on 1/20/2020)  -2/24/2020 NEURO-ONC/ MRB/ CHEMO: Clinically stable. Imaging stable. Adjuvant-dosed temozolomide 200 mg/m2 (460 mg), cycle 6 (start date tonight).  -5/21/2020 SURGERY: Right anterior cervical 4 - 6 discectomy and fusion and mercedes wells tong placement. Bilateral posterior Cervical 7-Thoracic 1 posterior minimally invasive foraminotomy. Preformed by Dr. Downing.   -6/8/2020 NEURO-ONC/ MRB: Clinically stable; continued cognitive complaints. Imaging with symmetric cristiana-ventricular increased T2 FLAIR. Most consistent with radiation-induced changes. Repeat imaging with spectroscopy in 2 months.  -8/3/2020 NEURO-ONC/ MRB: Clinically stable.  Imaging with symmetric cristiana-ventricular T2 FLAIR, stable since the last scan. Spectroscopy consistent with radiation injury.  -11/23/2020 NEURO-ONC/ MRB: Clinically well. Imaging stable.  -2/26/2021 NEURO-ONC/ MRB: Clinically well. Imaging with a questionable area of new contrast enhancement, unchanged confluent periventricular T2 hyperintensity.  -5/24/2021 NEURO-ONC/ MRB: Clinically stable. Imaging stable; increase imaging interval.    SOCIAL HISTORY   Tobacco use: Former smoker.  Alcohol use: Social.   Drug use: Denies.  . 1 son + grandchildren.   Employment: Retired, worked as an .      PHYSICAL EXAMINATION  KPS: 90  -Generally well appearing.    -Respiratory: No wheezing noted.   -Skin: No facial rashes.   -Psychiatric: Normal mood and affect. Pleasant, talkative.  -Neurologic:   MENTAL STATUS:     Alert, oriented to date.    Recall: Immediate 3/3, delayed 0/3 word recall; 3/3 with clues.    Speech fluent.    Comprehension intact to multi-step commands. No confusion noted.    Good right-left orientation.     CRANIAL NERVES:     Extraocular movements full, denies diplopia.     Full visual fields.    Normal sensation on the face.    Decreased activation of the face on the right, nasolabial fold flattening.    Hearing intact.   With normal phonation, no dysfunction anticipated of the palate or tongue.  MOTOR: No pronation or drift.  SENSATION: Intact to light touch throughout.  COORDINATION: Intact finger-nose with eyes open and closed.     The rest of a comprehensive physical examination is deferred due to PHE (public health emergency) video visit restrictions.        MEDICAL RECORDS  Obtained and personally reviewed all available outside medical records in addition to reviewing any records available in our electronic system.     LABS  Personally reviewed all available lab results.     IMAGING  Personally reviewed MR brain imaging from this week and compared to prior imaging. To my eye, there is  no new/ changing contrast enhancement in the left lateral posterior resection margin above the anterior horn of the left lateral ventricle. Unchanged confluent periventricular T2 hyperintensity, likely treatment-related, since 2/2021 through 6/2020.        Imaging results were reviewed with Iveth.    Case personally discussed with neuro-radiology.       IMPRESSION  Clinic time was spent discussing in detail the nature of his cancer in light of repeat imaging. This was in addition to answering questions pertaining to my recommendations and devising the plan as outlined below.      Clinically, Melvin is doing well. He denies any new neurological complaints. Reports that legs feel tired intermittently. It is unclear if fatigue, dizziness, and periodic gait instability is related to a cardiac arrhthymias, but he will be undergoing a cardiac ablation procedure later this week. Melvin continues to have intermittent headaches mainly stemming from muscle tension/ pain in the neck/ shoulders/ arms. Melvin and Dana note continued issues with memory, but these are stable. Otherwise, no new findings on examination.     Imaging with symmetric cristiana-ventricular T2 FLAIR that is largely stable compared to 6/2020. Given the symmetric nature, past use of radiation in the setting of multiple factors for cardiovascular disease, and prior spectroscopy being consistent with radiation-induced changes, I continue to have no concerns. Given the stability of the scan, will repeat imaging now every 6 months. Requesting repeat imaging in October due to travel plans for the late fall/ winter.     PROBLEM LIST  Anaplastic oligodendroglioma (grade III)  Memory complaints  Low K+  Cervical radiculopathy  Chemotherapy-induced thrombocytopenia   Bradycardia/ orthostatsis     PLAN  -CANCER DIRECTED THERAPY-  -As above; Continue imaging surveillance. Repeat imaging in 6 months.    * All imaging needs to scheduled on a 1.5T scanner due to an  implantable device that Melvin has in his hip.    -SEIZURE MANAGEMENT-  -While this patient is at increased risk of having seizures, given the lack of seizure history, there is no indication to prescribe an antiepileptic at this time.     -Quality of life/ MOOD/ FATIGUE-  -Denies any mood issues.  -Continue to monitor mood as untreated/ undertreated depression can worsen fatigue, dysorexia, and quality of life.   -Continue to monitor headaches/ other musculoskeletal pain.     -ISSUES WITH MEMORY AND COGNITION-  -Consideration for referral to Dr. Shadi Grady for neuro-psych testing.     -CARDIAC-  -Scheduled for a ablation to correct a cardiac arrhythmia.     -OTHER-  -[facility]qu[facility] every visit stating the date/ time/ reason of the clinic visit. Will continue to provide this letter.     Return to clinic in 10/2021 + imaging.     In the meantime, Melvin and Dana know to call with questions or concerns or to report new complaints and can be seen sooner if needed.     Leeanne Lozano MD  Neuro-oncology         Again, thank you for allowing me to participate in the care of your patient.        Sincerely,        Leeanne Lozano MD

## 2021-10-03 NOTE — PROGRESS NOTES
"Urology POD # POD 3 from a right anterior C4-C6 dsicectomy and fusion, Laurent Wells Tong placement, bilateral posterior C7-T1 posterior minimally invasive foraminotomy.    Failed voiding trial with NLP=866ur; Douglas is placed.       Subjective:        Objective:  BP (!) 154/92 (BP Location: Right arm)   Pulse 74   Temp 97.5  F (36.4  C) (Oral)   Resp 18   Ht 1.88 m (6' 2\")   Wt 100 kg (220 lb 8 oz)   SpO2 93%   BMI 28.31 kg/m        Intake/Output Summary (Last 24 hours) at 5/24/2020 0940  Last data filed at 5/24/2020 0600  Gross per 24 hour   Intake 240 ml   Output 1350 ml   Net -1110 ml       Labs:  ROUTINE IP LABS (Last four results)  BMP  Recent Labs   Lab 05/21/20  2154 05/21/20  0553     --    POTASSIUM 4.3 4.2   CHLORIDE 106  --    LEFTY 8.6  --    CO2 25  --    BUN 16  --    CR 0.94  --    *  --      CBC  Recent Labs   Lab 05/22/20  0719   WBC 11.0   RBC 4.15*   HGB 13.8   HCT 40.7   MCV 98   MCH 33.3*   MCHC 33.9   RDW 14.3        INRNo lab results found in last 7 days.      Exam:  Gen: Alert and oriented, cooperative, NAD  Ab:+BS, soft, rounded, NTTP, no CVAT  : Catheter in place draining yellow urine  Ext: Calves soft, nttp, no edema      Assessment/Plan:     1.  continue flomax               2. F/u with Urology in one week for a voiding trial; happy to see the pt back if he decides       Please contact me with any questions or concerns.     Ashleigh Bryson MD  [facility]  Pager:  [phone]  Please call our office at [phone]  " eyelid pain/swelling

## 2021-10-10 ENCOUNTER — HEALTH MAINTENANCE LETTER (OUTPATIENT)
Age: 66
End: 2021-10-10

## 2021-10-22 NOTE — PROGRESS NOTES
"NEURO-ONCOLOGY VISIT  Oct 25, 2021    CHIEF COMPLAINT: Mr. 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 He completed chemoradiotherapy with concurrent temozolomide as of 8/21/2019, treatment course was complicated by thrombocytopenia. He then completed 6 cycles of adjuvant-dosed temozolomide in 2/2020. He is currently managed on imaging surveillance.     I met with Iveth (wife) today for this follow-up visit.     HISTORY OF PRESENT ILLNESS  -Melvin has been doing fairly well.   -Recovered well from heart surgery; no recurrence of arrhythmias. Following surgery, his fatigue, dizziness, and periodic gait instability has continued with no change/ improvement.   -Infrequent headaches.   -Reports that his legs feel weak every once in a while, but overall stronger. Not anymore active in terms of exercise, but doing chores around the house. Fixing their camper.    -Short term memory issues overall stable per Melvin Cortez. Still driving.   -No new sensory changes.  -No events concerning for seizures.   -Tachycardic today in clinic, but denies any chest pain, shortness of breath, vision changes, or headache.   -Melvin brendan Cortez are planning to spend the winter months in [place], [place], and [place] in their camper.   -COVID vaccinated and received booster. Declined annual flu vaccine today.     REVIEW OF SYSTEMS  A comprehensive ROS negative except as in HPI.      MEDICATIONS   Current Outpatient Medications   Medication Sig Dispense Refill     acetaminophen (TYLENOL) 325 MG tablet Take 325-650 mg by mouth 4 times daily as needed for mild pain       atorvastatin (LIPITOR) 80 MG tablet Take 40 mg by mouth every evening (takes 0.5 x 80mg)       latanoprost (XALATAN) 0.005 % ophthalmic solution Place 1 drop into both eyes every evening        losartan (COZAAR) 100 MG tablet Take 100 mg by " mouth every evening        metoprolol succinate ER (TOPROL-XL) 25 MG 24 hr tablet Take 25 mg by mouth daily        sildenafil (VIAGRA) 50 MG tablet Take 50 mg by mouth daily as needed       sotalol (BETAPACE) 80 MG tablet        DRUG ALLERGIES   Allergies   Allergen Reactions     Amlodipine Swelling       ONCOLOGIC HISTORY  -5/2019 PRESENTATION: Progressive confusion, memory loss plus difficulty with walking.   -5/28/2019 Admitted to [facility] in [place] for evaluation, then transferred to [facility] for higher level of care.   -5/28/2019 MR brain imaging revealed a large left frontal mass   -5/30/2019 SURGERY: Craniotomy for  mass resection by Dr. Downing.  PATHOLOGY: Oligodendroglioma with borderline anaplastic features (WHO grade III); Co-deletion of chromosomal regions 1p and 19q, IDH-1 (R132H by immunohistochemistry mutated. ATRX wild type by immunohistochemistry. Mitoses were present, not sufficient in number to warrant a diagnosis of anaplasia by that criterion.  However, the presence of vascular proliferation, even though mild, is indicative of early anaplasia.  -6/13/2019 NEURO-ONC: Recommending chemoradiotherapy.   -7/8 - 8/21/2019 CHEMORADS: 59.4 Gy in 1.8 Gy daily fractions x 30 fractions per Dr. Kirby Dwyer  With [facility] plus concurrent temozolomide 75mg/m2 (180mg).   -9/16/2019 NEURO-ONC/ MRB/ CHEMO: Clinically well. Imaging with no concerns. Starting adjuvant-dosed temozolomide 150mg/m2 (360mg), cycle 1 (start date of 9/19/2019).   -10/14/2019 NEURO-ONC/CHEMO: Clinically stable. Continue adjuvant-dosed temozolomide, increasing to 200 mg/m2 (460 mg), cycle 2 (start date of 10/17/2019).   -11/11/2019 NEURO-ONC/ MRB/ CHEMO: Clinically stable. Imaging with no concerns. Adjuvant-dosed temozolomide 200 mg/m2 (460 mg), cycle 3 (start date of 11/19/2019, but delayed due to thrombocytopenia).   -12/16/2019 NEURO-ONC/ CHEMO: Clinically stable. Adjuvant-dosed temozolomide 200 mg/m2 (460 mg),  "cycle 4 (start date of 12/17/2019).   -1/13/2020 NEURO-ONC/ CHEMO: Clinically stable. Adjuvant-dosed temozolomide 200 mg/m2 (460 mg), cycle 5 (start date originally supposed to be 1/14/2020, but was on 1/20/2020)  -2/24/2020 NEURO-ONC/ MRB/ CHEMO: Clinically stable. Imaging stable. Adjuvant-dosed temozolomide 200 mg/m2 (460 mg), cycle 6 (start date tonight).  -5/21/2020 SURGERY: Right anterior cervical 4 - 6 discectomy and fusion and mercedes wells tong placement. Bilateral posterior Cervical 7-Thoracic 1 posterior minimally invasive foraminotomy. Preformed by Dr. Downing.   -6/8/2020 NEURO-ONC/ MRB: Clinically stable; continued cognitive complaints. Imaging with symmetric cristiana-ventricular increased T2 FLAIR. Most consistent with radiation-induced changes. Repeat imaging with spectroscopy in 2 months.  -8/3/2020 NEURO-ONC/ MRB: Clinically stable. Imaging with symmetric cristiana-ventricular T2 FLAIR, stable since the last scan. Spectroscopy consistent with radiation injury.  -11/23/2020 NEURO-ONC/ MRB: Clinically well. Imaging stable.  -2/26/2021 NEURO-ONC/ MRB: Clinically well. Imaging with a questionable area of new contrast enhancement, unchanged confluent periventricular T2 hyperintensity.  -5/24/2021 NEURO-ONC/ MRB: Clinically stable. Imaging stable; increase imaging interval.  -10/25/2021 NEURO-ONC/ MRB: Clinically stable. Imaging stable.    SOCIAL HISTORY   Tobacco use: Former smoker.  Alcohol use: Social.   Drug use: Denies.  . 1 son + grandchildren.   Employment: Retired, worked as an .      PHYSICAL EXAMINATION  /80   Pulse 100   Temp 97.9  F (36.6  C) (Oral)   Wt 105.5 kg (232 lb 9.6 oz)   SpO2 97%   BMI 29.86 kg/m     Wt Readings from Last 2 Encounters:   10/25/21 105.5 kg (232 lb 9.6 oz)   08/14/20 101.2 kg (223 lb)      Ht Readings from Last 2 Encounters:   08/14/20 1.88 m (6' 2\")   08/03/20 1.88 m (6' 2\")      KPS:     -Generally well appearing.    -Respiratory: No wheezing " noted.   -Skin: No facial rashes.   -Psychiatric: Normal mood and affect. Pleasant, talkative.  -Neurologic:   MENTAL STATUS:     Alert, oriented to date.    Recall: Intact.    Speech fluent.    Comprehension intact to multi-step commands.   Good right-left orientation.     CRANIAL NERVES:     Extraocular movements full, denies diplopia.     Full visual fields.    Normal sensation on the face.    Good activation of the face on the right; nasolabial fold flattening.    Hearing intact.   With normal phonation, no dysfunction anticipated of the palate or tongue.  MOTOR: No pronation or drift.  SENSATION: Intact to light touch throughout.  COORDINATION: Intact finger-nose with eyes open and closed.   GAIT: Stable, unassisted.    Able to toe, heel, and tandem walk; all with mild difficulty in balance. Overall, equal strength.       MEDICAL RECORDS  Obtained and personally reviewed all available outside medical records in addition to reviewing any records available in our electronic system.     LABS  Personally reviewed all available lab results.     IMAGING  Personally reviewed MR brain imaging from today and compared to prior imaging. To my eye, there is no new/ changing contrast enhancement in the left lateral posterior resection margin above the anterior horn of the left lateral ventricle. Unchanged confluent periventricular T2 hyperintensity, likely treatment-related, since 2/2021 through 6/2020.    Imaging results were reviewed with Melvin and Dana.    Case personally discussed at Brain Tumor Conference earlier today.       IMPRESSION  Clinic time was spent discussing in detail the nature of his cancer in light of repeat imaging. This was in addition to answering questions pertaining to my recommendations and devising the plan as outlined below.      Clinically, Melvin is doing well. He denies any new neurological complaints. Reports that following his cardiac surgery, complaints of fatigue, dizziness, and periodic gait  instability have remained unchanged. Melvin and Dana note continued issues with memory, but these are stable. Otherwise, no new findings on examination.     Imaging with symmetric cristiana-ventricular T2 FLAIR that is largely stable compared to 6/2020. Given the symmetric nature, past use of radiation in the setting of multiple factors for cardiovascular disease, and prior spectroscopy being consistent with radiation-induced changes, I continue to have no concerns. We again discussed the need to identify and appropriately manage cardiovascular risk factors (ie diabetes, cholesterol, hypertension) per his primary care provider. Given the stability of the scan, will repeat imaging every 6 months.    PROBLEM LIST  Anaplastic oligodendroglioma (grade III)  Memory complaints  Low K+  Cervical radiculopathy  Chemotherapy-induced thrombocytopenia   Bradycardia/ orthostatsis     PLAN  -CANCER DIRECTED THERAPY-  -As above; Continue imaging surveillance. Repeat imaging in 6 months.    * All imaging needs to scheduled on a 1.5T scanner due to an implantable device that Melvin has in his hip.    -SEIZURE MANAGEMENT-  -While this patient is at increased risk of having seizures, given the lack of seizure history, there is no indication to prescribe an antiepileptic at this time.     -Quality of life/ MOOD/ FATIGUE-  -Denies any mood issues.  -Continue to monitor mood as untreated/ undertreated depression can worsen fatigue, dysorexia, and quality of life.   -Continue to monitor headaches/ other musculoskeletal pain.     -ISSUES WITH MEMORY AND COGNITION-  -Consideration for referral to Dr. Shadi Grady for neuro-psych testing.     -CARDIAC-  -S/p ablation to correct a cardiac arrhythmia.     -OTHER-  -[facility]qu[facility] following every visit stating the date/ time/ reason of the clinic visit. Will continue to provide this letter.   -Management of cardiovascular risk factors per his primary care as stated above.     COVID  vaccinated and received booster. Declined annual flu vaccine today.     Return to clinic in 4/2022 + imaging.     In the meantime, Melvin and Dana know to call with questions or concerns or to report new complaints and can be seen sooner if needed.     Leeanne Lozano MD  Neuro-oncology

## 2021-10-25 ENCOUNTER — TUMOR CONFERENCE (OUTPATIENT)
Dept: ONCOLOGY | Facility: CLINIC | Age: 66
End: 2021-10-25

## 2021-10-25 ENCOUNTER — ONCOLOGY VISIT (OUTPATIENT)
Dept: ONCOLOGY | Facility: CLINIC | Age: 66
End: 2021-10-25
Attending: PSYCHIATRY & NEUROLOGY
Payer: COMMERCIAL

## 2021-10-25 ENCOUNTER — HOSPITAL ENCOUNTER (OUTPATIENT)
Dept: MRI IMAGING | Facility: CLINIC | Age: 66
End: 2021-10-25
Attending: PSYCHIATRY & NEUROLOGY
Payer: COMMERCIAL

## 2021-10-25 VITALS
TEMPERATURE: 97.9 F | WEIGHT: 232.6 LBS | HEART RATE: 100 BPM | BODY MASS INDEX: 29.86 KG/M2 | SYSTOLIC BLOOD PRESSURE: 126 MMHG | OXYGEN SATURATION: 97 % | DIASTOLIC BLOOD PRESSURE: 80 MMHG

## 2021-10-25 DIAGNOSIS — C71.9 OLIGODENDROGLIOMA, ANAPLASTIC (H): ICD-10-CM

## 2021-10-25 DIAGNOSIS — C71.9 OLIGODENDROGLIOMA, ANAPLASTIC (H): Primary | ICD-10-CM

## 2021-10-25 PROCEDURE — 70553 MRI BRAIN STEM W/O & W/DYE: CPT

## 2021-10-25 PROCEDURE — A9585 GADOBUTROL INJECTION: HCPCS | Performed by: PSYCHIATRY & NEUROLOGY

## 2021-10-25 PROCEDURE — G0463 HOSPITAL OUTPT CLINIC VISIT: HCPCS

## 2021-10-25 PROCEDURE — G0463 HOSPITAL OUTPT CLINIC VISIT: HCPCS | Mod: 25

## 2021-10-25 PROCEDURE — 99214 OFFICE O/P EST MOD 30 MIN: CPT | Performed by: PSYCHIATRY & NEUROLOGY

## 2021-10-25 PROCEDURE — 70553 MRI BRAIN STEM W/O & W/DYE: CPT | Mod: 26 | Performed by: RADIOLOGY

## 2021-10-25 PROCEDURE — 255N000002 HC RX 255 OP 636: Performed by: PSYCHIATRY & NEUROLOGY

## 2021-10-25 RX ORDER — GADOBUTROL 604.72 MG/ML
10 INJECTION INTRAVENOUS ONCE
Status: COMPLETED | OUTPATIENT
Start: 2021-10-25 | End: 2021-10-25

## 2021-10-25 RX ADMIN — GADOBUTROL 10 ML: 604.72 INJECTION INTRAVENOUS at 12:00

## 2021-10-25 ASSESSMENT — PAIN SCALES - GENERAL: PAINLEVEL: NO PAIN (0)

## 2021-10-25 NOTE — NURSING NOTE
"Oncology Rooming Note    October 25, 2021 2:22 PM   Gopi Farfan is a 66 year old male who presents for:    Chief Complaint   Patient presents with     Oncology Clinic Visit     oligodendroglioma     Initial Vitals: /80   Pulse 100   Temp 97.9  F (36.6  C) (Oral)   Wt 105.5 kg (232 lb 9.6 oz)   SpO2 97%   BMI 29.86 kg/m   Estimated body mass index is 29.86 kg/m  as calculated from the following:    Height as of 8/14/20: 1.88 m (6' 2\").    Weight as of this encounter: 105.5 kg (232 lb 9.6 oz). Body surface area is 2.35 meters squared.  No Pain (0) Comment: Data Unavailable   No LMP for male patient.  Allergies reviewed: Yes  Medications reviewed: Yes    Medications: Medication refills not needed today.  Pharmacy name entered into EPIC:    [facility] - [place], [place] - [facility] - [place], [place] - [address]    Clinical concerns: none       Senia Mir CMA            "

## 2021-10-25 NOTE — TUMOR CONFERENCE
Tumor Conference Information  Tumor Conference: Brain  Specialties Present: Medical oncology, Pathology, Radiology, Radiation oncology, Surgery  Patient Status: A current patient  Pathology: Not Discussed  Treatment to Date: Surgical intervention(s), Chemoradiation, Adjuvant chemotherapy  Clinical Trial Eligibility: Not discussed  Recommended Plan: Observation (see comment) (Comment: reviewed imaging - stable scan; follow up in 6 months with repeat MRI)  Did the review exceed 30 minutes?: did not           Documentation / Disclaimer Cancer Tumor Board Note  Cancer tumor board recommendations do not override what is determined to be reasonable care and treatment, which is dependent on the circumstances of a patient's case; the patient's medical, social, and personal concerns; and the clinical judgment of the oncologist [physician].

## 2021-10-25 NOTE — LETTER
"    10/25/2021         RE: Gopi Farfan  [id number]  [facility] [address]        Dear Colleague,    Thank you for referring your patient, Gopi Farfan, to the [facility]. Please see a copy of my visit note below.    NEURO-ONCOLOGY VISIT  Oct 25, 2021    CHIEF COMPLAINT: Mr. 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 He completed chemoradiotherapy with concurrent temozolomide as of 8/21/2019, treatment course was complicated by thrombocytopenia. He then completed 6 cycles of adjuvant-dosed temozolomide in 2/2020. He is currently managed on imaging surveillance.     I met with Iveth (wife) today for this follow-up visit.     HISTORY OF PRESENT ILLNESS  -Melvin has been doing fairly well.   -Recovered well from heart surgery; no recurrence of arrhythmias. Following surgery, his fatigue, dizziness, and periodic gait instability has continued with no change/ improvement.   -Infrequent headaches.   -Reports that his legs feel weak every once in a while, but overall stronger. Not anymore active in terms of exercise, but doing chores around the house. Fixing their camper.    -Short term memory issues overall stable per Melvin Cortez. Still driving.   -No new sensory changes.  -No events concerning for seizures.   -Tachycardic today in clinic, but denies any chest pain, shortness of breath, vision changes, or headache.   -Melvin and Dana are planning to spend the winter months in [place], [place], and [place] in their camper.   -COVID vaccinated and received booster. Declined annual flu vaccine today.     REVIEW OF SYSTEMS  A comprehensive ROS negative except as in HPI.      MEDICATIONS   Current Outpatient Medications   Medication Sig Dispense Refill     acetaminophen (TYLENOL) 325 MG tablet Take 325-650 mg by mouth 4 times daily as needed for " mild pain       atorvastatin (LIPITOR) 80 MG tablet Take 40 mg by mouth every evening (takes 0.5 x 80mg)       latanoprost (XALATAN) 0.005 % ophthalmic solution Place 1 drop into both eyes every evening        losartan (COZAAR) 100 MG tablet Take 100 mg by mouth every evening        metoprolol succinate ER (TOPROL-XL) 25 MG 24 hr tablet Take 25 mg by mouth daily        sildenafil (VIAGRA) 50 MG tablet Take 50 mg by mouth daily as needed       sotalol (BETAPACE) 80 MG tablet        DRUG ALLERGIES   Allergies   Allergen Reactions     Amlodipine Swelling       ONCOLOGIC HISTORY  -5/2019 PRESENTATION: Progressive confusion, memory loss plus difficulty with walking.   -5/28/2019 Admitted to [facility] in [place] for evaluation, then transferred to [facility] for higher level of care.   -5/28/2019 MR brain imaging revealed a large left frontal mass   -5/30/2019 SURGERY: Craniotomy for  mass resection by Dr. Downing.  PATHOLOGY: Oligodendroglioma with borderline anaplastic features (WHO grade III); Co-deletion of chromosomal regions 1p and 19q, IDH-1 (R132H by immunohistochemistry mutated. ATRX wild type by immunohistochemistry. Mitoses were present, not sufficient in number to warrant a diagnosis of anaplasia by that criterion.  However, the presence of vascular proliferation, even though mild, is indicative of early anaplasia.  -6/13/2019 NEURO-ONC: Recommending chemoradiotherapy.   -7/8 - 8/21/2019 CHEMORADS: 59.4 Gy in 1.8 Gy daily fractions x 30 fractions per Dr. Kirby Dwyer  With [facility] plus concurrent temozolomide 75mg/m2 (180mg).   -9/16/2019 NEURO-ONC/ MRB/ CHEMO: Clinically well. Imaging with no concerns. Starting adjuvant-dosed temozolomide 150mg/m2 (360mg), cycle 1 (start date of 9/19/2019).   -10/14/2019 NEURO-ONC/CHEMO: Clinically stable. Continue adjuvant-dosed temozolomide, increasing to 200 mg/m2 (460 mg), cycle 2 (start date of 10/17/2019).   -11/11/2019 NEURO-ONC/ MRB/ CHEMO:  Clinically stable. Imaging with no concerns. Adjuvant-dosed temozolomide 200 mg/m2 (460 mg), cycle 3 (start date of 11/19/2019, but delayed due to thrombocytopenia).   -12/16/2019 NEURO-ONC/ CHEMO: Clinically stable. Adjuvant-dosed temozolomide 200 mg/m2 (460 mg), cycle 4 (start date of 12/17/2019).   -1/13/2020 NEURO-ONC/ CHEMO: Clinically stable. Adjuvant-dosed temozolomide 200 mg/m2 (460 mg), cycle 5 (start date originally supposed to be 1/14/2020, but was on 1/20/2020)  -2/24/2020 NEURO-ONC/ MRB/ CHEMO: Clinically stable. Imaging stable. Adjuvant-dosed temozolomide 200 mg/m2 (460 mg), cycle 6 (start date tonight).  -5/21/2020 SURGERY: Right anterior cervical 4 - 6 discectomy and fusion and mercedes wells tong placement. Bilateral posterior Cervical 7-Thoracic 1 posterior minimally invasive foraminotomy. Preformed by Dr. Downing.   -6/8/2020 NEURO-ONC/ MRB: Clinically stable; continued cognitive complaints. Imaging with symmetric cristiana-ventricular increased T2 FLAIR. Most consistent with radiation-induced changes. Repeat imaging with spectroscopy in 2 months.  -8/3/2020 NEURO-ONC/ MRB: Clinically stable. Imaging with symmetric cristiana-ventricular T2 FLAIR, stable since the last scan. Spectroscopy consistent with radiation injury.  -11/23/2020 NEURO-ONC/ MRB: Clinically well. Imaging stable.  -2/26/2021 NEURO-ONC/ MRB: Clinically well. Imaging with a questionable area of new contrast enhancement, unchanged confluent periventricular T2 hyperintensity.  -5/24/2021 NEURO-ONC/ MRB: Clinically stable. Imaging stable; increase imaging interval.  -10/25/2021 NEURO-ONC/ MRB: Clinically stable. Imaging stable.    SOCIAL HISTORY   Tobacco use: Former smoker.  Alcohol use: Social.   Drug use: Denies.  . 1 son + grandchildren.   Employment: Retired, worked as an .      PHYSICAL EXAMINATION  /80   Pulse 100   Temp 97.9  F (36.6  C) (Oral)   Wt 105.5 kg (232 lb 9.6 oz)   SpO2 97%   BMI 29.86 kg/m     Wt  "Readings from Last 2 Encounters:   10/25/21 105.5 kg (232 lb 9.6 oz)   08/14/20 101.2 kg (223 lb)      Ht Readings from Last 2 Encounters:   08/14/20 1.88 m (6' 2\")   08/03/20 1.88 m (6' 2\")      KPS:     -Generally well appearing.    -Respiratory: No wheezing noted.   -Skin: No facial rashes.   -Psychiatric: Normal mood and affect. Pleasant, talkative.  -Neurologic:   MENTAL STATUS:     Alert, oriented to date.    Recall: Intact.    Speech fluent.    Comprehension intact to multi-step commands.   Good right-left orientation.     CRANIAL NERVES:     Extraocular movements full, denies diplopia.     Full visual fields.    Normal sensation on the face.    Good activation of the face on the right; nasolabial fold flattening.    Hearing intact.   With normal phonation, no dysfunction anticipated of the palate or tongue.  MOTOR: No pronation or drift.  SENSATION: Intact to light touch throughout.  COORDINATION: Intact finger-nose with eyes open and closed.   GAIT: Stable, unassisted.    Able to toe, heel, and tandem walk; all with mild difficulty in balance. Overall, equal strength.       MEDICAL RECORDS  Obtained and personally reviewed all available outside medical records in addition to reviewing any records available in our electronic system.     LABS  Personally reviewed all available lab results.     IMAGING  Personally reviewed MR brain imaging from today and compared to prior imaging. To my eye, there is no new/ changing contrast enhancement in the left lateral posterior resection margin above the anterior horn of the left lateral ventricle. Unchanged confluent periventricular T2 hyperintensity, likely treatment-related, since 2/2021 through 6/2020.    Imaging results were reviewed with Iveth.    Case personally discussed at Brain Tumor Conference earlier today.       IMPRESSION  Clinic time was spent discussing in detail the nature of his cancer in light of repeat imaging. This was in addition to " answering questions pertaining to my recommendations and devising the plan as outlined below.      Clinically, Melvin is doing well. He denies any new neurological complaints. Reports that following his cardiac surgery, complaints of fatigue, dizziness, and periodic gait instability have remained unchanged. Melvin and Dana note continued issues with memory, but these are stable. Otherwise, no new findings on examination.     Imaging with symmetric cristiana-ventricular T2 FLAIR that is largely stable compared to 6/2020. Given the symmetric nature, past use of radiation in the setting of multiple factors for cardiovascular disease, and prior spectroscopy being consistent with radiation-induced changes, I continue to have no concerns. We again discussed the need to identify and appropriately manage cardiovascular risk factors (ie diabetes, cholesterol, hypertension) per his primary care provider. Given the stability of the scan, will repeat imaging every 6 months.    PROBLEM LIST  Anaplastic oligodendroglioma (grade III)  Memory complaints  Low K+  Cervical radiculopathy  Chemotherapy-induced thrombocytopenia   Bradycardia/ orthostatsis     PLAN  -CANCER DIRECTED THERAPY-  -As above; Continue imaging surveillance. Repeat imaging in 6 months.    * All imaging needs to scheduled on a 1.5T scanner due to an implantable device that Melvin has in his hip.    -SEIZURE MANAGEMENT-  -While this patient is at increased risk of having seizures, given the lack of seizure history, there is no indication to prescribe an antiepileptic at this time.     -Quality of life/ MOOD/ FATIGUE-  -Denies any mood issues.  -Continue to monitor mood as untreated/ undertreated depression can worsen fatigue, dysorexia, and quality of life.   -Continue to monitor headaches/ other musculoskeletal pain.     -ISSUES WITH MEMORY AND COGNITION-  -Consideration for referral to Dr. Shadi Grady for neuro-psych testing.     -CARDIAC-  -S/p ablation to correct a  cardiac arrhythmia.     -OTHER-  -Requiring [facility] for [facility] following every visit stating the date/ time/ reason of the clinic visit. Will continue to provide this letter.   -Management of cardiovascular risk factors per his primary care as stated above.     COVID vaccinated and received booster. Declined annual flu vaccine today.     Return to clinic in 4/2022 + imaging.     In the meantime, Melvin and Dana know to call with questions or concerns or to report new complaints and can be seen sooner if needed.     Leeanne Lozano MD  Neuro-oncology       Again, thank you for allowing me to participate in the care of your patient.        Sincerely,        Leeanne Lozano MD

## 2022-01-01 NOTE — PROGRESS NOTES
05/31/19 1400   Quick Adds   Type of Visit Initial PT Evaluation  (Latosha Flores, PT, DPT )   Living Environment   Lives With spouse   Living Arrangements house  (2 story )   Home Accessibility stairs within home   Number of Stairs, Within Home, Primary   (full flight)   Stair Railings, Within Home, Primary railing on left side (ascending)   Transportation Anticipated car, drives self;family or friend will provide   Living Environment Comment Pt lives with SO in 2 story home, bedroom and BR on upper level.. full flight of stairs with L ascending handrail. 1 [address] home (threshold) Pt was driving prior to sx.    Self-Care   Usual Activity Tolerance excellent   Current Activity Tolerance moderate   Regular Exercise No   Equipment Currently Used at Home none   Activity/Exercise/Self-Care Comment Pt was indep with self cares, working as an  prior to admit    Functional Level Prior   Ambulation 0-->independent   Transferring 0-->independent   Toileting 0-->independent   Bathing 0-->independent   Communication 0-->understands/communicates without difficulty   Swallowing 0-->swallows foods/liquids without difficulty   Cognition 0 - no cognition issues reported   Fall history within last six months no   Which of the above functional risks had a recent onset or change? ambulation;transferring   Prior Functional Level Comment Pt was indep with mobility prior to admission   General Information   Onset of Illness/Injury or Date of Surgery - Date 05/28/19   Referring Physician Barrett Mcdaniels MD   Patient/Family Goals Statement return home   Pertinent History of Current Problem (include personal factors and/or comorbidities that impact the POC) 64 year old right handed man who works as an , who was admitted to [facility] in [place] today for progressive short term memory loss over the past several weeks. Wife reports that he has difficulty concentrating and sometimes forgets to reply to people. He denies  word finding difficulties however. He also reports having gait disturbance and feels he walks slower and more deliberate. He has intermittent headaches which have not changed from baseline. No nausea or vomiting. He was brought to [facility] ED and head CT/MRI revealed a large left frontal mass concerning for high grade glioma. He was then transferred to [facility] for higher level of care. Patient takes aspirin 81mg for primary prevention but his last dose was on 5/26/19. He has not had any seizures so far.. post op crani 5/30.   Precautions/Limitations fall precautions  (crani precautions)   General Info Comments activity:early mobility guidelines   Cognitive Status Examination   Orientation orientation to person, place and time   Level of Consciousness alert   Follows Commands and Answers Questions 100% of the time   Personal Safety and Judgment intact   Memory intact   Pain Assessment   Patient Currently in Pain No   Integumentary/Edema   Integumentary/Edema no deficits were identifed   Integumentary/Edema Comments incision bandaged appropriately with no overt drainage   Posture    Posture Forward head position   Range of Motion (ROM)   ROM Comment WFL throughout   Strength   Strength Comments grossly 4-5/5 lane Ue and LEs   Bed Mobility   Bed Mobility Comments SBA-supervision, pushes up to sitting at EOB, and swivels over to EOB   Transfer Skills   Transfer Comments STS with minor UE use   Gait   Gait Comments ambualtes with CGA-Bobby. slightly dec gait speed and step length    Balance   Balance Comments not formally assessed, pt with brief instance of losing balance during ambualtin   Sensory Examination   Sensory Perception no deficits were identified   Coordination   Coordination no deficits were identified   Muscle Tone   Muscle Tone no deficits were identified   Modality Interventions   Planned Modality Interventions Comments '   General Therapy Interventions   Planned Therapy Interventions ADL retraining;balance  "training;bed mobility training;gait training;strengthening;transfer training;progressive activity/exercise;home program guidelines   Clinical Impression   Criteria for Skilled Therapeutic Intervention yes, treatment indicated   PT Diagnosis dec functional mobility   Influenced by the following impairments post op, craniotomoy precautions, deconditioning, fatigu e   Functional limitations due to impairments bed mobility, transfers, gait, stairs, activity tolerance, balance    Clinical Presentation Evolving/Changing   Clinical Presentation Rationale pm, clinical judgement, cdurrent mobility   Clinical Decision Making (Complexity) Low complexity   Therapy Frequency` 5 times/week   Predicted Duration of Therapy Intervention (days/wks) 1 week   Anticipated Equipment Needs at Discharge   (TBD)   Anticipated Discharge Disposition Home with Assist   Risk & Benefits of therapy have been explained Yes   Patient, Family & other staff in agreement with plan of care Yes   Clinical Impression Comments PT eval completed, tx indicated    [facility] [employer] TM \"6 Clicks\"   2016, Trustees of [facility], under license to [employer].  All rights reserved.   6 Clicks Short Forms Basic Mobility Inpatient Short Form   [facility] AM-PAC  \"6 Clicks\" V.2 Basic Mobility Inpatient Short Form   1. Turning from your back to your side while in a flat bed without using bedrails? 4 - None   2. Moving from lying on your back to sitting on the side of a flat bed without using bedrails? 4 - None   3. Moving to and from a bed to a chair (including a wheelchair)? 4 - None   4. Standing up from a chair using your arms (e.g., wheelchair, or bedside chair)? 4 - None   5. To walk in hospital room? 3 - A Little   6. Climbing 3-5 steps with a railing? 3 - A Little   Basic Mobility Raw Score (Score out of 24.Lower scores equate to lower levels of function) 22   Total Evaluation Time   Total Evaluation Time (Minutes) 8     " Yes

## 2022-04-20 ENCOUNTER — PATIENT OUTREACH (OUTPATIENT)
Dept: ONCOLOGY | Facility: CLINIC | Age: 67
End: 2022-04-20

## 2022-04-22 ENCOUNTER — ONCOLOGY VISIT (OUTPATIENT)
Dept: ONCOLOGY | Facility: CLINIC | Age: 67
End: 2022-04-22
Attending: PSYCHIATRY & NEUROLOGY
Payer: COMMERCIAL

## 2022-04-22 ENCOUNTER — ANCILLARY PROCEDURE (OUTPATIENT)
Dept: MRI IMAGING | Facility: CLINIC | Age: 67
End: 2022-04-22
Attending: PSYCHIATRY & NEUROLOGY
Payer: COMMERCIAL

## 2022-04-22 VITALS
WEIGHT: 227.7 LBS | RESPIRATION RATE: 18 BRPM | HEART RATE: 67 BPM | BODY MASS INDEX: 29.23 KG/M2 | DIASTOLIC BLOOD PRESSURE: 81 MMHG | TEMPERATURE: 97.8 F | OXYGEN SATURATION: 96 % | SYSTOLIC BLOOD PRESSURE: 148 MMHG

## 2022-04-22 DIAGNOSIS — C71.9 OLIGODENDROGLIOMA, ANAPLASTIC (H): ICD-10-CM

## 2022-04-22 DIAGNOSIS — C71.9 OLIGODENDROGLIOMA, ANAPLASTIC (H): Primary | ICD-10-CM

## 2022-04-22 PROCEDURE — G0463 HOSPITAL OUTPT CLINIC VISIT: HCPCS

## 2022-04-22 PROCEDURE — 70553 MRI BRAIN STEM W/O & W/DYE: CPT | Performed by: RADIOLOGY

## 2022-04-22 PROCEDURE — A9585 GADOBUTROL INJECTION: HCPCS | Performed by: RADIOLOGY

## 2022-04-22 PROCEDURE — 99214 OFFICE O/P EST MOD 30 MIN: CPT | Performed by: PSYCHIATRY & NEUROLOGY

## 2022-04-22 RX ORDER — GADOBUTROL 604.72 MG/ML
10 INJECTION INTRAVENOUS ONCE
Status: COMPLETED | OUTPATIENT
Start: 2022-04-22 | End: 2022-04-22

## 2022-04-22 RX ADMIN — GADOBUTROL 10 ML: 604.72 INJECTION INTRAVENOUS at 14:50

## 2022-04-22 ASSESSMENT — PAIN SCALES - GENERAL: PAINLEVEL: NO PAIN (0)

## 2022-04-22 NOTE — PROGRESS NOTES
"NEURO-ONCOLOGY VISIT  Apr 22, 2022    CHIEF COMPLAINT: Mr. 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 He completed chemoradiotherapy with concurrent temozolomide as of 8/21/2019, treatment course was complicated by thrombocytopenia. He then completed 6 cycles of adjuvant-dosed temozolomide in 2/2020. He is currently managed on imaging surveillance.     I met with Melvin and Dana (wife) today for this follow-up visit.     HISTORY OF PRESENT ILLNESS  -Melvin has been doing well.   -Infrequent episodes of dizziness with periodic gait instability. However, noticing more proximal muscle weakness; difficulty in getting out of a chair. Minimal activity per wife.   -Infrequent headaches.   -Short term memory issues continue per Melvin & Dana. Still driving.   -No new sensory changes. No vision changes.   -No events concerning for seizures.   -Melvin and Dana enjoyed their winter roadtrip to [place], [place], and [place] in their [facility]. Now planning a roadtrip to [place].       MEDICATIONS   Current Outpatient Medications   Medication Sig Dispense Refill     acetaminophen (TYLENOL) 325 MG tablet Take 325-650 mg by mouth 4 times daily as needed for mild pain       atorvastatin (LIPITOR) 80 MG tablet Take 40 mg by mouth every evening (takes 0.5 x 80mg)       latanoprost (XALATAN) 0.005 % ophthalmic solution Place 1 drop into both eyes every evening        losartan (COZAAR) 100 MG tablet Take 100 mg by mouth every evening        DRUG ALLERGIES   Allergies   Allergen Reactions     Amlodipine Swelling       ONCOLOGIC HISTORY  -5/2019 PRESENTATION: Progressive confusion, memory loss plus difficulty with walking.   -[facility]28/[facility] [place] [facility], then transferred to [facility] for higher level of care.   -5/28/2019 MR brain imaging revealed a large left frontal mass "   -5/30/2019 SURGERY: Craniotomy for  mass resection by Dr. Downing.  PATHOLOGY: Oligodendroglioma with borderline anaplastic features (WHO grade III); Co-deletion of chromosomal regions 1p and 19q, IDH-1 (R132H by immunohistochemistry mutated. ATRX wild type by immunohistochemistry. Mitoses were present, not sufficient in number to warrant a diagnosis of anaplasia by that criterion.  However, the presence of vascular proliferation, even though mild, is indicative of early anaplasia.  -6/13/2019 NEURO-ONC: Recommending chemoradiotherapy.   -7/8 - 8/21/2019 CHEMORADS: 59.4 Gy in 1.8 Gy daily fractions x 30 fractions per Dr. Kirby Dwyer  With [facility] plus concurrent temozolomide 75mg/m2 (180mg).   -9/16/2019 NEURO-ONC/ MRB/ CHEMO: Clinically well. Imaging with no concerns. Starting adjuvant-dosed temozolomide 150mg/m2 (360mg), cycle 1 (start date of 9/19/2019).   -10/14/2019 NEURO-ONC/CHEMO: Clinically stable. Continue adjuvant-dosed temozolomide, increasing to 200 mg/m2 (460 mg), cycle 2 (start date of 10/17/2019).   -11/11/2019 NEURO-ONC/ MRB/ CHEMO: Clinically stable. Imaging with no concerns. Adjuvant-dosed temozolomide 200 mg/m2 (460 mg), cycle 3 (start date of 11/19/2019, but delayed due to thrombocytopenia).   -12/16/2019 NEURO-ONC/ CHEMO: Clinically stable. Adjuvant-dosed temozolomide 200 mg/m2 (460 mg), cycle 4 (start date of 12/17/2019).   -1/13/2020 NEURO-ONC/ CHEMO: Clinically stable. Adjuvant-dosed temozolomide 200 mg/m2 (460 mg), cycle 5 (start date originally supposed to be 1/14/2020, but was on 1/20/2020)  -2/24/2020 NEURO-ONC/ MRB/ CHEMO: Clinically stable. Imaging stable. Adjuvant-dosed temozolomide 200 mg/m2 (460 mg), cycle 6 (start date tonight).  -5/21/2020 SURGERY: Right anterior cervical 4 - 6 discectomy and fusion and mercedes wells tong placement. Bilateral posterior Cervical 7-Thoracic 1 posterior minimally invasive foraminotomy. Preformed by Dr. Downing.   -6/8/2020 NEURO-ONC/  "MRB: Clinically stable; continued cognitive complaints. Imaging with symmetric cristiana-ventricular increased T2 FLAIR. Most consistent with radiation-induced changes. Repeat imaging with spectroscopy in 2 months.  -8/3/2020 NEURO-ONC/ MRB: Clinically stable. Imaging with symmetric cristiana-ventricular T2 FLAIR, stable since the last scan. Spectroscopy consistent with radiation injury.  -11/23/2020 NEURO-ONC/ MRB: Clinically well. Imaging stable.  -2/26/2021 NEURO-ONC/ MRB: Clinically well. Imaging with a questionable area of new contrast enhancement, unchanged confluent periventricular T2 hyperintensity.  -5/24/2021 NEURO-ONC/ MRB: Clinically stable. Imaging stable; increase imaging interval.  -10/25/2021 NEURO-ONC/ MRB: Clinically stable. Imaging stable.  -4/22/2022 NEURO-ONC/ MRB: Clinically stable; discussed cardiovascular risk factors. Imaging stable.    SOCIAL HISTORY   Tobacco use: Former smoker.  . 1 son + grandchildren.   Employment: Retired, worked as an .      PHYSICAL EXAMINATION  BP (!) 148/81   Pulse 67   Temp 97.8  F (36.6  C) (Oral)   Resp 18   Wt 103.3 kg (227 lb 11.2 oz)   SpO2 96%   BMI 29.23 kg/m     Wt Readings from Last 2 Encounters:   04/22/22 103.3 kg (227 lb 11.2 oz)   10/25/21 105.5 kg (232 lb 9.6 oz)      Ht Readings from Last 2 Encounters:   08/14/20 1.88 m (6' 2\")   08/03/20 1.88 m (6' 2\")      KPS:     -Generally well appearing.    -Respiratory: No wheezing noted.   -Skin: No facial rashes.   -Psychiatric: Normal mood and affect. Pleasant, talkative.  -Neurologic:   MENTAL STATUS:     Alert, oriented to date.    Recall: Intact.    Speech fluent.    Comprehension intact.     CRANIAL NERVES:  .    Normal sensation on the face.    Good activation of the face on the right; nasolabial fold flattening.    Hearing intact.   With normal phonation, no dysfunction anticipated of the palate or tongue.  MOTOR: Equal, antigravity.   SENSATION: Intact to light touch " throughout.  GAIT: Stable, unassisted.       MEDICAL RECORDS  Obtained and personally reviewed all available outside medical records in addition to reviewing any records available in our electronic system.     LABS  Personally reviewed all available lab results; Lipid panel (10/2021); LDL of 97. CBC, BMP.     IMAGING  Personally reviewed MR brain imaging from today and compared to prior imaging. To my eye, there is no new/ changing contrast enhancement in the left lateral posterior resection margin above the anterior horn of the left lateral ventricle. Slight increase in confluent periventricular T2 hyperintensity as compared to 2 years ago.    Imaging results were reviewed with Melvin and Dana.      IMPRESSION  Clinic time was spent discussing in detail the nature of his cancer in light of repeat imaging. This was in addition to answering questions pertaining to my recommendations and devising the plan as outlined below.      Clinically, Melvin is doing well. He denies any new neurological complaints. In the setting of limited activity/ exercise, Dana is noting that Melvin's legs are slightly more weak.     Imaging with symmetric cristiana-ventricular T2 FLAIR that is slightly increased compared to 2 years ago. Given the symmetric nature, past use of radiation in the setting of multiple factors for cardiovascular disease, and prior spectroscopy being consistent with radiation-induced changes, I continue to have no concerns. We again discussed the need to identify and appropriately manage cardiovascular risk factors (ie diabetes, cholesterol, hypertension) per his primary care provider. Blood pressure slightly elevated today. LDL was 97. Encouraged more exercise.     Given the stability of the scan from a cancer standpoint, will repeat imaging every 6 months indefinitely per NCCN guidelines.    PROBLEM LIST  Anaplastic oligodendroglioma (grade III)  Memory complaints  Low K+  Cervical radiculopathy  Chemotherapy-induced  thrombocytopenia   Bradycardia/ orthostatsis     PLAN  -CANCER DIRECTED THERAPY-  -As above; Continue imaging surveillance. Repeat imaging in 6 months.    * All imaging needs to scheduled on a 1.5T scanner due to an implantable device that Melvin has in his hip.    -SEIZURE MANAGEMENT-  -While this patient is at increased risk of having seizures, given the lack of seizure history, there is no indication to prescribe an antiepileptic at this time.     -Quality of life/ MOOD/ FATIGUE-  -Denies any mood issues.  -Continue to monitor mood as untreated/ undertreated depression can worsen fatigue, dysorexia, and quality of life.   -Continue to monitor headaches/ other musculoskeletal pain.     -ISSUES WITH MEMORY AND COGNITION-  -Consideration for referral to Dr. Shadi Grady for neuro-psych testing.     -CARDIAC-  -S/p ablation to correct a cardiac arrhythmia.     -OTHER-  -[facility] visit stating the date/ time/ reason of the clinic visit. Will continue to provide this letter.   -Management of cardiovascular risk factors per his primary care as stated above.     Return to clinic in 10/2022 + imaging.     In the meantime, Melvin and Dana know to call with questions or concerns or to report new complaints and can be seen sooner if needed.     Leeanne Lozano MD  Neuro-oncology

## 2022-04-22 NOTE — NURSING NOTE
"Oncology Rooming Note    April 22, 2022 3:59 PM   Gopi Farfan is a 67 year old male who presents for:    Chief Complaint   Patient presents with     Oncology Clinic Visit     OLIGODENDROGLIOMA     Initial Vitals: BP (!) 148/81   Pulse 67   Temp 97.8  F (36.6  C) (Oral)   Resp 18   Wt 103.3 kg (227 lb 11.2 oz)   SpO2 96%   BMI 29.23 kg/m   Estimated body mass index is 29.23 kg/m  as calculated from the following:    Height as of 8/14/20: 1.88 m (6' 2\").    Weight as of this encounter: 103.3 kg (227 lb 11.2 oz). Body surface area is 2.32 meters squared.  No Pain (0) Comment: Data Unavailable   No LMP for male patient.  Allergies reviewed: Yes  Medications reviewed: Yes    Medications: Medication refills not needed today.  Pharmacy name entered into EPIC:    [facility] - [place], [place] - [facility] - [place], [place] - [address]    Clinical concerns: N/A      Sandy Odom CMA            "

## 2022-04-22 NOTE — DISCHARGE INSTRUCTIONS
MRI Contrast Discharge Instructions    The IV contrast you received today will pass out of your body in your  urine. This will happen in the next 24 hours. You will not feel this process.  Your urine will not change color.    Drink at least 4 extra glasses of water or juice today (unless your doctor  has restricted your fluids). This reduces the stress on your kidneys.  You may take your regular medicines.    If you are on dialysis: It is best to have dialysis today.    If you have a reaction: Most reactions happen right away. If you have  any new symptoms after leaving the hospital (such as hives or swelling),  call your hospital at the correct number below. Or call your family doctor.  If you have breathing distress or wheezing, call 911.    Special instructions: ***    I have read and understand the above information.    Signature:______________________________________ Date:___________    Staff:__________________________________________ Date:___________     Time:__________    [facility] Departments:    ___[place]: [phone]  ___[facility]: [phone]  ___[place]: [phone] ___[facility]: [phone]  ___[facility]: [phone]  ___[facility]: [phone]  ___[phone]  ___[facility]: [phone]  ___[phone]

## 2022-04-22 NOTE — LETTER
"  4/22/2022     RE: Gopi Farfan  [id number]  [place] [address]    Dear Colleague,    Thank you for referring your patient, Gopi Farfan, to the [facility]. Please see a copy of my visit note below.    NEURO-ONCOLOGY VISIT  Apr 22, 2022    CHIEF COMPLAINT: Mr. 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 He completed chemoradiotherapy with concurrent temozolomide as of 8/21/2019, treatment course was complicated by thrombocytopenia. He then completed 6 cycles of adjuvant-dosed temozolomide in 2/2020. He is currently managed on imaging surveillance.     I met with Melvin and Dana (wife) today for this follow-up visit.     HISTORY OF PRESENT ILLNESS  -Melvin has been doing well.   -Infrequent episodes of dizziness with periodic gait instability. However, noticing more proximal muscle weakness; difficulty in getting out of a chair. Minimal activity per wife.   -Infrequent headaches.   -Short term memory issues continue per Melvin Cortez. Still driving.   -No new sensory changes. No vision changes.   -No events concerning for seizures.   -Melvin and Dana enjoyed their winter roadtrip to [place], [place], and [place] in their [facility]. Now planning a roadtrip to [place].       MEDICATIONS   Current Outpatient Medications   Medication Sig Dispense Refill     acetaminophen (TYLENOL) 325 MG tablet Take 325-650 mg by mouth 4 times daily as needed for mild pain       atorvastatin (LIPITOR) 80 MG tablet Take 40 mg by mouth every evening (takes 0.5 x 80mg)       latanoprost (XALATAN) 0.005 % ophthalmic solution Place 1 drop into both eyes every evening        losartan (COZAAR) 100 MG tablet Take 100 mg by mouth every evening        DRUG ALLERGIES   Allergies   Allergen Reactions     Amlodipine Swelling       ONCOLOGIC HISTORY  -5/2019 PRESENTATION: Progressive " confusion, memory loss plus difficulty with walking.   -5/28/2019 Admitted to [facility] in [place] for evaluation, then transferred to [facility] for higher level of care.   -5/28/2019 MR brain imaging revealed a large left frontal mass   -5/30/2019 SURGERY: Craniotomy for  mass resection by Dr. Downing.  PATHOLOGY: Oligodendroglioma with borderline anaplastic features (WHO grade III); Co-deletion of chromosomal regions 1p and 19q, IDH-1 (R132H by immunohistochemistry mutated. ATRX wild type by immunohistochemistry. Mitoses were present, not sufficient in number to warrant a diagnosis of anaplasia by that criterion.  However, the presence of vascular proliferation, even though mild, is indicative of early anaplasia.  -6/13/2019 NEURO-ONC: Recommending chemoradiotherapy.   -7/8 - 8/21/2019 CHEMORADS: 59.4 Gy in 1.8 Gy daily fractions x 30 fractions per Dr. Kirby Dwyer  With [facility] plus concurrent temozolomide 75mg/m2 (180mg).   -9/16/2019 NEURO-ONC/ MRB/ CHEMO: Clinically well. Imaging with no concerns. Starting adjuvant-dosed temozolomide 150mg/m2 (360mg), cycle 1 (start date of 9/19/2019).   -10/14/2019 NEURO-ONC/CHEMO: Clinically stable. Continue adjuvant-dosed temozolomide, increasing to 200 mg/m2 (460 mg), cycle 2 (start date of 10/17/2019).   -11/11/2019 NEURO-ONC/ MRB/ CHEMO: Clinically stable. Imaging with no concerns. Adjuvant-dosed temozolomide 200 mg/m2 (460 mg), cycle 3 (start date of 11/19/2019, but delayed due to thrombocytopenia).   -12/16/2019 NEURO-ONC/ CHEMO: Clinically stable. Adjuvant-dosed temozolomide 200 mg/m2 (460 mg), cycle 4 (start date of 12/17/2019).   -1/13/2020 NEURO-ONC/ CHEMO: Clinically stable. Adjuvant-dosed temozolomide 200 mg/m2 (460 mg), cycle 5 (start date originally supposed to be 1/14/2020, but was on 1/20/2020)  -2/24/2020 NEURO-ONC/ MRB/ CHEMO: Clinically stable. Imaging stable. Adjuvant-dosed temozolomide 200 mg/m2 (460 mg), cycle 6 (start date  "tonight).  -5/21/2020 SURGERY: Right anterior cervical 4 - 6 discectomy and fusion and mercedes wells tong placement. Bilateral posterior Cervical 7-Thoracic 1 posterior minimally invasive foraminotomy. Preformed by Dr. Downing.   -6/8/2020 NEURO-ONC/ MRB: Clinically stable; continued cognitive complaints. Imaging with symmetric cristiana-ventricular increased T2 FLAIR. Most consistent with radiation-induced changes. Repeat imaging with spectroscopy in 2 months.  -8/3/2020 NEURO-ONC/ MRB: Clinically stable. Imaging with symmetric cristiana-ventricular T2 FLAIR, stable since the last scan. Spectroscopy consistent with radiation injury.  -11/23/2020 NEURO-ONC/ MRB: Clinically well. Imaging stable.  -2/26/2021 NEURO-ONC/ MRB: Clinically well. Imaging with a questionable area of new contrast enhancement, unchanged confluent periventricular T2 hyperintensity.  -5/24/2021 NEURO-ONC/ MRB: Clinically stable. Imaging stable; increase imaging interval.  -10/25/2021 NEURO-ONC/ MRB: Clinically stable. Imaging stable.  -4/22/2022 NEURO-ONC/ MRB: Clinically stable; discussed cardiovascular risk factors. Imaging stable.    SOCIAL HISTORY   Tobacco use: Former smoker.  . 1 son + grandchildren.   Employment: Retired, worked as an .      PHYSICAL EXAMINATION  BP (!) 148/81   Pulse 67   Temp 97.8  F (36.6  C) (Oral)   Resp 18   Wt 103.3 kg (227 lb 11.2 oz)   SpO2 96%   BMI 29.23 kg/m     Wt Readings from Last 2 Encounters:   04/22/22 103.3 kg (227 lb 11.2 oz)   10/25/21 105.5 kg (232 lb 9.6 oz)      Ht Readings from Last 2 Encounters:   08/14/20 1.88 m (6' 2\")   08/03/20 1.88 m (6' 2\")      KPS:     -Generally well appearing.    -Respiratory: No wheezing noted.   -Skin: No facial rashes.   -Psychiatric: Normal mood and affect. Pleasant, talkative.  -Neurologic:   MENTAL STATUS:     Alert, oriented to date.    Recall: Intact.    Speech fluent.    Comprehension intact.     CRANIAL NERVES:  .    Normal sensation on the " face.    Good activation of the face on the right; nasolabial fold flattening.    Hearing intact.   With normal phonation, no dysfunction anticipated of the palate or tongue.  MOTOR: Equal, antigravity.   SENSATION: Intact to light touch throughout.  GAIT: Stable, unassisted.       MEDICAL RECORDS  Obtained and personally reviewed all available outside medical records in addition to reviewing any records available in our electronic system.     LABS  Personally reviewed all available lab results; Lipid panel (10/2021); LDL of 97. CBC, BMP.     IMAGING  Personally reviewed MR brain imaging from today and compared to prior imaging. To my eye, there is no new/ changing contrast enhancement in the left lateral posterior resection margin above the anterior horn of the left lateral ventricle. Slight increase in confluent periventricular T2 hyperintensity as compared to 2 years ago.    Imaging results were reviewed with Melvin and Dana.      IMPRESSION  Clinic time was spent discussing in detail the nature of his cancer in light of repeat imaging. This was in addition to answering questions pertaining to my recommendations and devising the plan as outlined below.      Clinically, Melvin is doing well. He denies any new neurological complaints. In the setting of limited activity/ exercise, Dana is noting that Melvin's legs are slightly more weak.     Imaging with symmetric cristiana-ventricular T2 FLAIR that is slightly increased compared to 2 years ago. Given the symmetric nature, past use of radiation in the setting of multiple factors for cardiovascular disease, and prior spectroscopy being consistent with radiation-induced changes, I continue to have no concerns. We again discussed the need to identify and appropriately manage cardiovascular risk factors (ie diabetes, cholesterol, hypertension) per his primary care provider. Blood pressure slightly elevated today. LDL was 97. Encouraged more exercise.     Given the stability of  the scan from a cancer standpoint, will repeat imaging every 6 months indefinitely per NCCN guidelines.    PROBLEM LIST  Anaplastic oligodendroglioma (grade III)  Memory complaints  Low K+  Cervical radiculopathy  Chemotherapy-induced thrombocytopenia   Bradycardia/ orthostatsis     PLAN  -CANCER DIRECTED THERAPY-  -As above; Continue imaging surveillance. Repeat imaging in 6 months.    * All imaging needs to scheduled on a 1.5T scanner due to an implantable device that Melvin has in his hip.    -SEIZURE MANAGEMENT-  -While this patient is at increased risk of having seizures, given the lack of seizure history, there is no indication to prescribe an antiepileptic at this time.     -Quality of life/ MOOD/ FATIGUE-  -Denies any mood issues.  -Continue to monitor mood as untreated/ undertreated depression can worsen fatigue, dysorexia, and quality of life.   -Continue to monitor headaches/ other musculoskeletal pain.     -ISSUES WITH MEMORY AND COGNITION-  -Consideration for referral to Dr. Shadi Grady for neuro-psych testing.     -CARDIAC-  -S/p ablation to correct a cardiac arrhythmia.     -OTHER-  -[facility] every visit stating the date/ time/ reason of the clinic visit. Will continue to provide this letter.   -Management of cardiovascular risk factors per his primary care as stated above.     Return to clinic in 10/2022 + imaging.     In the meantime, Melvin and Dana know to call with questions or concerns or to report new complaints and can be seen sooner if needed.     Leeanne Lozano MD  Neuro-oncology

## 2022-05-21 ENCOUNTER — HEALTH MAINTENANCE LETTER (OUTPATIENT)
Age: 67
End: 2022-05-21

## 2022-09-18 ENCOUNTER — HEALTH MAINTENANCE LETTER (OUTPATIENT)
Age: 67
End: 2022-09-18

## 2022-10-18 NOTE — PROGRESS NOTES
"NEURO-ONCOLOGY VISIT  Oct 21, 2022    CHIEF COMPLAINT: Mr. 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 He completed chemoradiotherapy with concurrent temozolomide as of 8/21/2019, treatment course was complicated by thrombocytopenia. He then completed 6 cycles of adjuvant-dosed temozolomide in 2/2020.     Melvin is currently managed on imaging surveillance and to date, imaging has not been concerning for cancer recurrence, though progressive chronic ischemic disease has been noted.     I met with Melvin and Dana (wife) today for this follow-up visit.     HISTORY OF PRESENT ILLNESS  -Melvin has been doing well. Dana agrees.   -No recent episodes of dizziness. Just upon stabling can have a moment of instability. No weakness. 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 Minimal activity per Dana.   -Infrequent headaches.   -Short term memory issues continue per Dana. Still driving. Dana also feels that his voice is quite. Denies issues with hearing.   -No new sensory changes.   -Decreased visual acuity; seeing the eye doctor next week.   -No events concerning for seizures.   -Melvin and Dana enjoyed their roadtrip to [place]. They had a memorable fishing trip!      MEDICATIONS   Current Outpatient Medications   Medication Sig Dispense Refill     acetaminophen (TYLENOL) 325 MG tablet Take 325-650 mg by mouth 4 times daily as needed for mild pain       atorvastatin (LIPITOR) 80 MG tablet Take 40 mg by mouth every evening (takes 0.5 x 80mg)       latanoprost (XALATAN) 0.005 % ophthalmic solution Place 1 drop into both eyes every evening        losartan (COZAAR) 100 MG tablet Take 100 mg by mouth every evening        DRUG ALLERGIES   Allergies   Allergen Reactions     Amlodipine Swelling       ONCOLOGIC HISTORY  -5/2019 PRESENTATION: Progressive confusion, memory loss plus difficulty with " walking.   -5/28/2019 Admitted to [facility] in [place] for evaluation, then transferred to [facility] for higher level of care.   -5/28/2019 MR brain imaging revealed a large left frontal mass   -5/30/2019 SURGERY: Craniotomy for  mass resection by Dr. Downing.  PATHOLOGY: Oligodendroglioma with borderline anaplastic features (WHO grade III); Co-deletion of chromosomal regions 1p and 19q, IDH-1 (R132H by immunohistochemistry mutated. ATRX wild type by immunohistochemistry. Mitoses were present, not sufficient in number to warrant a diagnosis of anaplasia by that criterion.  However, the presence of vascular proliferation, even though mild, is indicative of early anaplasia.  -6/13/2019 NEURO-ONC: Recommending chemoradiotherapy.   -7/8 - 8/21/2019 CHEMORADS: 59.4 Gy in 1.8 Gy daily fractions x 30 fractions per Dr. Kirby Dwyer  With [facility] plus concurrent temozolomide 75mg/m2 (180mg).   -9/16/2019 NEURO-ONC/ MRB/ CHEMO: Clinically well. Imaging with no concerns. Starting adjuvant-dosed temozolomide 150mg/m2 (360mg), cycle 1 (start date of 9/19/2019).   -10/14/2019 NEURO-ONC/CHEMO: Clinically stable. Continue adjuvant-dosed temozolomide, increasing to 200 mg/m2 (460 mg), cycle 2 (start date of 10/17/2019).   -11/11/2019 NEURO-ONC/ MRB/ CHEMO: Clinically stable. Imaging with no concerns. Adjuvant-dosed temozolomide 200 mg/m2 (460 mg), cycle 3 (start date of 11/19/2019, but delayed due to thrombocytopenia).   -12/16/2019 NEURO-ONC/ CHEMO: Clinically stable. Adjuvant-dosed temozolomide 200 mg/m2 (460 mg), cycle 4 (start date of 12/17/2019).   -1/13/2020 NEURO-ONC/ CHEMO: Clinically stable. Adjuvant-dosed temozolomide 200 mg/m2 (460 mg), cycle 5 (start date originally supposed to be 1/14/2020, but was on 1/20/2020)  -2/24/2020 NEURO-ONC/ MRB/ CHEMO: Clinically stable. Imaging stable. Adjuvant-dosed temozolomide 200 mg/m2 (460 mg), cycle 6 (start date tonight).  -5/21/2020 SURGERY: Right anterior cervical  "4 - 6 discectomy and fusion and mercedes wells tong placement. Bilateral posterior Cervical 7-Thoracic 1 posterior minimally invasive foraminotomy. Preformed by Dr. Downing.   -6/8/2020 NEURO-ONC/ MRB: Clinically stable; continued cognitive complaints. Imaging with symmetric cristiana-ventricular increased T2 FLAIR. Most consistent with radiation-induced changes. Repeat imaging with spectroscopy in 2 months.  -8/3/2020 NEURO-ONC/ MRB: Clinically stable. Imaging with symmetric cristiana-ventricular T2 FLAIR, stable since the last scan. Spectroscopy consistent with radiation injury.  -11/23/2020 NEURO-ONC/ MRB: Clinically well. Imaging stable.  -2/26/2021 NEURO-ONC/ MRB: Clinically well. Imaging with a questionable area of new contrast enhancement, unchanged confluent periventricular T2 hyperintensity.  -5/24/2021 NEURO-ONC/ MRB: Clinically stable. Imaging stable; increase imaging interval.  -10/25/2021 NEURO-ONC/ MRB: Clinically stable. Imaging stable.  -4/22/2022 NEURO-ONC/ MRB: Clinically stable; discussed cardiovascular risk factors. Imaging stable.  -10/21/2022 NEURO-ONC/ MRB: Clinically well. Imaging not concerning for cancer recurrence, though progressive chronic ischemic disease noted.    SOCIAL HISTORY   Tobacco use: Former smoker.  . 1 son + grandchildren.   Employment: Retired, worked as an .      PHYSICAL EXAMINATION  /83 (BP Location: Right arm, Patient Position: Sitting, Cuff Size: Adult Large)   Pulse 77   Temp 98.1  F (36.7  C) (Oral)   Resp 16   Wt 106.6 kg (235 lb 1.6 oz)   SpO2 96%   BMI 30.19 kg/m     Wt Readings from Last 2 Encounters:   10/21/22 106.6 kg (235 lb 1.6 oz)   04/22/22 103.3 kg (227 lb 11.2 oz)      Ht Readings from Last 2 Encounters:   08/14/20 1.88 m (6' 2\")   08/03/20 1.88 m (6' 2\")      KPS:     -Generally well appearing.    -Respiratory: No wheezing noted.   -Skin: No facial rashes.   -Psychiatric: Normal mood and affect. Pleasant, talkative.  -Neurologic:   " MENTAL STATUS:     Alert, oriented to date.    Recall: Intact.    Speech fluent.    Comprehension intact.     CRANIAL NERVES:  .    Normal sensation on the face.    Good activation of the face on the right; nasolabial fold flattening.    Hearing intact.   With normal phonation, no dysfunction anticipated of the palate or tongue.  MOTOR: Equal, antigravity. No pronation or drift.   COORDINATION: Intact to finger nose.   SENSATION: Intact to light touch throughout.  GAIT: Stable, unassisted. Able to heel and toe walk.       MEDICAL RECORDS  Obtained and personally reviewed all available outside medical records in addition to reviewing any records available in our electronic system.     LABS  Personally reviewed all available lab results; Hep C NR. Lipid panel. CBC. CMP.    IMAGING  Personally reviewed MR brain imaging from today and compared to prior imaging. To my eye, there is no new/ changing contrast enhancement in the left lateral posterior resection margin above the anterior horn of the left lateral ventricle. Slight increase in confluent periventricular T2 hyperintensity as compared to 2 years ago (below).    [id number] 8/2020, 10/2021, 10/2022      Imaging results were reviewed with Melvin and Dana.      IMPRESSION  Clinic time was spent discussing in detail the nature of his cancer in light of repeat imaging. This was in addition to answering questions pertaining to my recommendations and devising the plan as outlined below.      Clinically, Melvin is doing well. He denies any new neurological complaints. He and Dana continues to note issues with memory. Recommending hearing testing to see if a lack in hearing is contributing memory issues.     Imaging with symmetric cristiana-ventricular T2 FLAIR that is slightly increased compared to 2 years ago. Given the symmetric nature, past use of radiation in the setting of multiple factors for cardiovascular disease, and prior spectroscopy being consistent with  radiation-induced changes, I continue to have no concerns that these changes would represent non-contrast enhancing cancer progression. We again discussed the need to identify and appropriately manage cardiovascular risk factors (ie diabetes, cholesterol, hypertension) per his primary care provider. Blood pressure slightly elevated today. Goal should be in the 120's systolic. LDL from last year was slightly elevated; LDL 97 and this year was 90. Goal should be closer to 70. I am not seeing a HbA1c checked recently. Blood glucose was 101 on BMP.  While I will defer to primary care and cardiology, I would recommend he be on a daily Aspirin. He has annual follow-up with his primary care scheduled for later this month. Encouraged more exercise and potentially utilizing a step-counter.      Given the stability of the scan from a cancer standpoint, will repeat imaging every 6 months indefinitely per NCCN guidelines. In the meantime, Melvin and Dana know to call with questions or concerns or to report new complaints and he can be seen sooner if needed.     PROBLEM LIST  Anaplastic oligodendroglioma (grade III)  Memory complaints  Low K+  Cervical radiculopathy  Chemotherapy-induced thrombocytopenia   Bradycardia/ orthostatsis     PLAN  -CANCER DIRECTED THERAPY-  -As above; Continue imaging surveillance. Repeat imaging in 6 months.    * All imaging needs to scheduled on a 1.5T scanner due to an implantable device that Melvin has in his hip.    -SEIZURE MANAGEMENT-  -While this patient is at increased risk of having seizures, given the lack of seizure history, there is no indication to prescribe an antiepileptic at this time.     -Quality of life/ MOOD/ FATIGUE-  -Denies any mood issues.  -Continue to monitor mood as untreated/ undertreated depression can worsen fatigue, dysorexia, and quality of life.   -Continue to monitor headaches/ other musculoskeletal pain.     -CARDIAC-  -S/p ablation to correct a cardiac arrhythmia.      -OTHER-  -[facility] visit stating the date/ time/ reason of the clinic visit. Will continue to provide this letter.   -Recommending hearing testing to see if a lack in hearing is contributing memory issues.   -Management of cardiovascular risk factors per his primary care as stated above.     Return to clinic in 4/2023 + imaging.     Leeanne Lozano MD  Neuro-oncology

## 2022-10-21 ENCOUNTER — ONCOLOGY VISIT (OUTPATIENT)
Dept: ONCOLOGY | Facility: CLINIC | Age: 67
End: 2022-10-21
Attending: PSYCHIATRY & NEUROLOGY
Payer: COMMERCIAL

## 2022-10-21 ENCOUNTER — ANCILLARY PROCEDURE (OUTPATIENT)
Dept: MRI IMAGING | Facility: CLINIC | Age: 67
End: 2022-10-21
Attending: PSYCHIATRY & NEUROLOGY
Payer: COMMERCIAL

## 2022-10-21 VITALS
RESPIRATION RATE: 16 BRPM | TEMPERATURE: 98.1 F | SYSTOLIC BLOOD PRESSURE: 137 MMHG | OXYGEN SATURATION: 96 % | WEIGHT: 235.1 LBS | DIASTOLIC BLOOD PRESSURE: 83 MMHG | BODY MASS INDEX: 30.19 KG/M2 | HEART RATE: 77 BPM

## 2022-10-21 DIAGNOSIS — C71.9 OLIGODENDROGLIOMA, ANAPLASTIC (H): ICD-10-CM

## 2022-10-21 DIAGNOSIS — C71.9 OLIGODENDROGLIOMA, ANAPLASTIC (H): Primary | ICD-10-CM

## 2022-10-21 PROCEDURE — 70553 MRI BRAIN STEM W/O & W/DYE: CPT | Mod: GC | Performed by: RADIOLOGY

## 2022-10-21 PROCEDURE — A9585 GADOBUTROL INJECTION: HCPCS | Performed by: RADIOLOGY

## 2022-10-21 PROCEDURE — 99214 OFFICE O/P EST MOD 30 MIN: CPT | Performed by: PSYCHIATRY & NEUROLOGY

## 2022-10-21 PROCEDURE — G0463 HOSPITAL OUTPT CLINIC VISIT: HCPCS

## 2022-10-21 RX ORDER — GADOBUTROL 604.72 MG/ML
10 INJECTION INTRAVENOUS ONCE
Status: COMPLETED | OUTPATIENT
Start: 2022-10-21 | End: 2022-10-21

## 2022-10-21 RX ADMIN — GADOBUTROL 10 ML: 604.72 INJECTION INTRAVENOUS at 12:12

## 2022-10-21 ASSESSMENT — PAIN SCALES - GENERAL: PAINLEVEL: NO PAIN (0)

## 2022-10-21 NOTE — LETTER
"    10/21/2022         RE: Gopi Farfan  [address]  [place] [facility] [address]        Dear Colleague,    Thank you for referring your patient, Gopi Farfan, to the [facility]. Please see a copy of my visit note below.    NEURO-ONCOLOGY VISIT  Oct 21, 2022    CHIEF COMPLAINT: Mr. 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 He completed chemoradiotherapy with concurrent temozolomide as of 8/21/2019, treatment course was complicated by thrombocytopenia. He then completed 6 cycles of adjuvant-dosed temozolomide in 2/2020.     Melvin is currently managed on imaging surveillance and to date, imaging has not been concerning for cancer recurrence, though progressive chronic ischemic disease has been noted.     I met with Melvin and Dana (wife) today for this follow-up visit.     HISTORY OF PRESENT ILLNESS  -Melvin has been doing well. Dana agrees.   -No recent episodes of dizziness. Just upon stabling can have a moment of instability. No weakness. 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 Minimal activity per Dana.   -Infrequent headaches.   -Short term memory issues continue per Dana. Still driving. Dana also feels that his voice is quite. Denies issues with hearing.   -No new sensory changes.   -Decreased visual acuity; seeing the eye doctor next week.   -No events concerning for seizures.   -Iveth enjoyed their roadtrip to [place]. They had a memorable fishing trip!      MEDICATIONS   Current Outpatient Medications   Medication Sig Dispense Refill     acetaminophen (TYLENOL) 325 MG tablet Take 325-650 mg by mouth 4 times daily as needed for mild pain       atorvastatin (LIPITOR) 80 MG tablet Take 40 mg by mouth every evening (takes 0.5 x 80mg)       latanoprost (XALATAN) 0.005 % ophthalmic solution Place 1 drop into both eyes every evening    "     losartan (COZAAR) 100 MG tablet Take 100 mg by mouth every evening        DRUG ALLERGIES   Allergies   Allergen Reactions     Amlodipine Swelling       ONCOLOGIC HISTORY  -5/2019 PRESENTATION: Progressive confusion, memory loss plus difficulty with walking.   -5/28/2019 Admitted to [facility] in [place] for evaluation, then transferred to [facility] for higher level of care.   -5/28/2019 MR brain imaging revealed a large left frontal mass   -5/30/2019 SURGERY: Craniotomy for  mass resection by Dr. Downing.  PATHOLOGY: Oligodendroglioma with borderline anaplastic features (WHO grade III); Co-deletion of chromosomal regions 1p and 19q, IDH-1 (R132H by immunohistochemistry mutated. ATRX wild type by immunohistochemistry. Mitoses were present, not sufficient in number to warrant a diagnosis of anaplasia by that criterion.  However, the presence of vascular proliferation, even though mild, is indicative of early anaplasia.  -6/13/2019 NEURO-ONC: Recommending chemoradiotherapy.   -7/8 - 8/21/2019 CHEMORADS: 59.4 Gy in 1.8 Gy daily fractions x 30 fractions per Dr. Kirby Dwyer  With [facility] plus concurrent temozolomide 75mg/m2 (180mg).   -9/16/2019 NEURO-ONC/ MRB/ CHEMO: Clinically well. Imaging with no concerns. Starting adjuvant-dosed temozolomide 150mg/m2 (360mg), cycle 1 (start date of 9/19/2019).   -10/14/2019 NEURO-ONC/CHEMO: Clinically stable. Continue adjuvant-dosed temozolomide, increasing to 200 mg/m2 (460 mg), cycle 2 (start date of 10/17/2019).   -11/11/2019 NEURO-ONC/ MRB/ CHEMO: Clinically stable. Imaging with no concerns. Adjuvant-dosed temozolomide 200 mg/m2 (460 mg), cycle 3 (start date of 11/19/2019, but delayed due to thrombocytopenia).   -12/16/2019 NEURO-ONC/ CHEMO: Clinically stable. Adjuvant-dosed temozolomide 200 mg/m2 (460 mg), cycle 4 (start date of 12/17/2019).   -1/13/2020 NEURO-ONC/ CHEMO: Clinically stable. Adjuvant-dosed temozolomide 200 mg/m2 (460 mg), cycle 5 (start  date originally supposed to be 1/14/2020, but was on 1/20/2020)  -2/24/2020 NEURO-ONC/ MRB/ CHEMO: Clinically stable. Imaging stable. Adjuvant-dosed temozolomide 200 mg/m2 (460 mg), cycle 6 (start date tonight).  -5/21/2020 SURGERY: Right anterior cervical 4 - 6 discectomy and fusion and mercedes wells tong placement. Bilateral posterior Cervical 7-Thoracic 1 posterior minimally invasive foraminotomy. Preformed by Dr. Downing.   -6/8/2020 NEURO-ONC/ MRB: Clinically stable; continued cognitive complaints. Imaging with symmetric cristiana-ventricular increased T2 FLAIR. Most consistent with radiation-induced changes. Repeat imaging with spectroscopy in 2 months.  -8/3/2020 NEURO-ONC/ MRB: Clinically stable. Imaging with symmetric cristiana-ventricular T2 FLAIR, stable since the last scan. Spectroscopy consistent with radiation injury.  -11/23/2020 NEURO-ONC/ MRB: Clinically well. Imaging stable.  -2/26/2021 NEURO-ONC/ MRB: Clinically well. Imaging with a questionable area of new contrast enhancement, unchanged confluent periventricular T2 hyperintensity.  -5/24/2021 NEURO-ONC/ MRB: Clinically stable. Imaging stable; increase imaging interval.  -10/25/2021 NEURO-ONC/ MRB: Clinically stable. Imaging stable.  -4/22/2022 NEURO-ONC/ MRB: Clinically stable; discussed cardiovascular risk factors. Imaging stable.  -10/21/2022 NEURO-ONC/ MRB: Clinically well. Imaging not concerning for cancer recurrence, though progressive chronic ischemic disease noted.    SOCIAL HISTORY   Tobacco use: Former smoker.  . 1 son + grandchildren.   Employment: Retired, worked as an .      PHYSICAL EXAMINATION  /83 (BP Location: Right arm, Patient Position: Sitting, Cuff Size: Adult Large)   Pulse 77   Temp 98.1  F (36.7  C) (Oral)   Resp 16   Wt 106.6 kg (235 lb 1.6 oz)   SpO2 96%   BMI 30.19 kg/m     Wt Readings from Last 2 Encounters:   10/21/22 106.6 kg (235 lb 1.6 oz)   04/22/22 103.3 kg (227 lb 11.2 oz)      Ht Readings from  "Last 2 Encounters:   08/14/20 1.88 m (6' 2\")   08/03/20 1.88 m (6' 2\")      KPS:     -Generally well appearing.    -Respiratory: No wheezing noted.   -Skin: No facial rashes.   -Psychiatric: Normal mood and affect. Pleasant, talkative.  -Neurologic:   MENTAL STATUS:     Alert, oriented to date.    Recall: Intact.    Speech fluent.    Comprehension intact.     CRANIAL NERVES:  .    Normal sensation on the face.    Good activation of the face on the right; nasolabial fold flattening.    Hearing intact.   With normal phonation, no dysfunction anticipated of the palate or tongue.  MOTOR: Equal, antigravity. No pronation or drift.   COORDINATION: Intact to finger nose.   SENSATION: Intact to light touch throughout.  GAIT: Stable, unassisted. Able to heel and toe walk.       MEDICAL RECORDS  Obtained and personally reviewed all available outside medical records in addition to reviewing any records available in our electronic system.     LABS  Personally reviewed all available lab results; Hep C NR. Lipid panel. CBC. CMP.    IMAGING  Personally reviewed MR brain imaging from today and compared to prior imaging. To my eye, there is no new/ changing contrast enhancement in the left lateral posterior resection margin above the anterior horn of the left lateral ventricle. Slight increase in confluent periventricular T2 hyperintensity as compared to 2 years ago (below).    [id number] 8/2020, 10/2021, 10/2022      Imaging results were reviewed with Melvin and Dana.      IMPRESSION  Clinic time was spent discussing in detail the nature of his cancer in light of repeat imaging. This was in addition to answering questions pertaining to my recommendations and devising the plan as outlined below.      Clinically, Melvin is doing well. He denies any new neurological complaints. He and Dana continues to note issues with memory. Recommending hearing testing to see if a lack in hearing is contributing memory issues.     Imaging with " symmetric cristiana-ventricular T2 FLAIR that is slightly increased compared to 2 years ago. Given the symmetric nature, past use of radiation in the setting of multiple factors for cardiovascular disease, and prior spectroscopy being consistent with radiation-induced changes, I continue to have no concerns that these changes would represent non-contrast enhancing cancer progression. We again discussed the need to identify and appropriately manage cardiovascular risk factors (ie diabetes, cholesterol, hypertension) per his primary care provider. Blood pressure slightly elevated today. Goal should be in the 120's systolic. LDL from last year was slightly elevated; LDL 97 and this year was 90. Goal should be closer to 70. I am not seeing a HbA1c checked recently. Blood glucose was 101 on BMP.  While I will defer to primary care and cardiology, I would recommend he be on a daily Aspirin. He has annual follow-up with his primary care scheduled for later this month. Encouraged more exercise and potentially utilizing a step-counter.      Given the stability of the scan from a cancer standpoint, will repeat imaging every 6 months indefinitely per NCCN guidelines. In the meantime, Melvin and Dana know to call with questions or concerns or to report new complaints and he can be seen sooner if needed.     PROBLEM LIST  Anaplastic oligodendroglioma (grade III)  Memory complaints  Low K+  Cervical radiculopathy  Chemotherapy-induced thrombocytopenia   Bradycardia/ orthostatsis     PLAN  -CANCER DIRECTED THERAPY-  -As above; Continue imaging surveillance. Repeat imaging in 6 months.    * All imaging needs to scheduled on a 1.5T scanner due to an implantable device that Melvin has in his hip.    -SEIZURE MANAGEMENT-  -While this patient is at increased risk of having seizures, given the lack of seizure history, there is no indication to prescribe an antiepileptic at this time.     -Quality of life/ MOOD/ FATIGUE-  -Denies any mood  issues.  -Continue to monitor mood as untreated/ undertreated depression can worsen fatigue, dysorexia, and quality of life.   -Continue to monitor headaches/ other musculoskeletal pain.     -CARDIAC-  -S/p ablation to correct a cardiac arrhythmia.     -OTHER-  -[facility] every visit stating the date/ time/ reason of the clinic visit. Will continue to provide this letter.   -Recommending hearing testing to see if a lack in hearing is contributing memory issues.   -Management of cardiovascular risk factors per his primary care as stated above.     Return to clinic in 4/2023 + imaging.     Leeanne Lozano MD  Neuro-oncology

## 2022-10-21 NOTE — NURSING NOTE
"Oncology Rooming Note    October 21, 2022 2:25 PM   Gopi Farfan is a 67 year old male who presents for:    Chief Complaint   Patient presents with     Oncology Clinic Visit     Oligodendroglioma     Initial Vitals: /83 (BP Location: Right arm, Patient Position: Sitting, Cuff Size: Adult Large)   Pulse 77   Temp 98.1  F (36.7  C) (Oral)   Resp 16   Wt 106.6 kg (235 lb 1.6 oz)   SpO2 96%   BMI 30.19 kg/m   Estimated body mass index is 30.19 kg/m  as calculated from the following:    Height as of 8/14/20: 1.88 m (6' 2\").    Weight as of this encounter: 106.6 kg (235 lb 1.6 oz). Body surface area is 2.36 meters squared.  No Pain (0) Comment: Data Unavailable   No LMP for male patient.  Allergies reviewed: Yes  Medications reviewed: Yes    Medications: Medication refills not needed today.  Pharmacy name entered into EPIC:    [facility] - [place], [place] - [facility] - [place], [place] - [address]    Clinical concerns: Pt presents today for f/u with Dr Lozano.       Altagracia Escobar, CASSANDRA  10/21/2022              "

## 2022-10-21 NOTE — DISCHARGE INSTRUCTIONS
MRI Contrast Discharge Instructions    The IV contrast you received today will pass out of your body in your  urine. This will happen in the next 24 hours. You will not feel this process.  Your urine will not change color.    Drink at least 4 extra glasses of water or juice today (unless your doctor  has restricted your fluids). This reduces the stress on your kidneys.  You may take your regular medicines.    If you are on dialysis: It is best to have dialysis today.    If you have a reaction: Most reactions happen right away. If you have  any new symptoms after leaving the hospital (such as hives or swelling),  call your hospital at the correct number below. Or call your family doctor.  If you have breathing distress or wheezing, call 911.    Special instructions: ***    I have read and understand the above information.    Signature:______________________________________ Date:___________    Staff:__________________________________________ Date:___________     Time:__________    [facility] Departments:    ___[place]: [phone]  ___[facility]: [phone]  ___[place]: [phone] ___[facility]: [phone]  ___[facility]: [phone]  ___[facility]: [phone]  ___[phone]  ___[facility]: [phone]  ___[phone]

## 2023-04-06 NOTE — PROGRESS NOTES
"NEURO-ONCOLOGY VISIT  Apr 10, 2023    CHIEF COMPLAINT: Mr. 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 He completed chemoradiotherapy with concurrent temozolomide as of 8/21/2019, treatment course was complicated by thrombocytopenia. He then completed 6 cycles of adjuvant-dosed temozolomide in 2/2020.     Melvin is currently managed on imaging surveillance and to date, imaging has not been concerning for cancer recurrence, though progressive chronic ischemic disease has been noted.     I met with Melvin and Dana (wife) today for this follow-up visit.     HISTORY OF PRESENT ILLNESS  - Melvin reports episodes of dizziness upon standing from laying down for a couple days. Wife mentions her concern for contributing dehydration.   - Can sometimes feel moments of instability resulting in stumbling and occasional falls. Last fall was a few weeks ago, but did not hit his head.   - Infrequent headaches, no changes.  - Short term memory issues continues, Dana does not notice improvement or worsening. Dana also feels that his voice is still quite. Denies issues with hearing.   - No new sensory changes.   - Has pre-glaucoma and follows with an eye doctor  - No events concerning for seizures.   - This summer Melvin and Dana plan to stay in [place], but winter again in [place]/ [place]/ [place].   - Wonders if it is ok to restart Viagra.       MEDICATIONS   Current Outpatient Medications   Medication Sig Dispense Refill     acetaminophen (TYLENOL) 325 MG tablet Take 325-650 mg by mouth 4 times daily as needed for mild pain       atorvastatin (LIPITOR) 80 MG tablet Take 40 mg by mouth every evening (takes 0.5 x 80mg)       latanoprost (XALATAN) 0.005 % ophthalmic solution Place 1 drop into both eyes every evening        losartan (COZAAR) 100 MG tablet Take 100 mg by mouth every evening        DRUG ALLERGIES "   Allergies   Allergen Reactions     Amlodipine Swelling       ONCOLOGIC HISTORY  -5/2019 PRESENTATION: Progressive confusion, memory loss plus difficulty with walking.   -5/28/2019 Admitted to [facility] in [place] for evaluation, then transferred to [facility] for higher level of care.   -5/28/2019 MR brain imaging revealed a large left frontal mass   -5/30/2019 SURGERY: Craniotomy for  mass resection by Dr. Downing.  PATHOLOGY: Oligodendroglioma with borderline anaplastic features (WHO grade III); Co-deletion of chromosomal regions 1p and 19q, IDH-1 (R132H by immunohistochemistry mutated. ATRX wild type by immunohistochemistry. Mitoses were present, not sufficient in number to warrant a diagnosis of anaplasia by that criterion.  However, the presence of vascular proliferation, even though mild, is indicative of early anaplasia.  -6/13/2019 NEURO-ONC: Recommending chemoradiotherapy.   -7/8 - 8/21/2019 CHEMORADS: 59.4 Gy in 1.8 Gy daily fractions x 30 fractions per Dr. Kirby Dwyer  With [facility] plus concurrent temozolomide 75mg/m2 (180mg).   -9/16/2019 NEURO-ONC/ MRB/ CHEMO: Clinically well. Imaging with no concerns. Starting adjuvant-dosed temozolomide 150mg/m2 (360mg), cycle 1 (start date of 9/19/2019).   -10/14/2019 NEURO-ONC/CHEMO: Clinically stable. Continue adjuvant-dosed temozolomide, increasing to 200 mg/m2 (460 mg), cycle 2 (start date of 10/17/2019).   -11/11/2019 NEURO-ONC/ MRB/ CHEMO: Clinically stable. Imaging with no concerns. Adjuvant-dosed temozolomide 200 mg/m2 (460 mg), cycle 3 (start date of 11/19/2019, but delayed due to thrombocytopenia).   -12/16/2019 NEURO-ONC/ CHEMO: Clinically stable. Adjuvant-dosed temozolomide 200 mg/m2 (460 mg), cycle 4 (start date of 12/17/2019).   -1/13/2020 NEURO-ONC/ CHEMO: Clinically stable. Adjuvant-dosed temozolomide 200 mg/m2 (460 mg), cycle 5 (start date originally supposed to be 1/14/2020, but was on 1/20/2020)  -2/24/2020 NEURO-ONC/ MRB/  CHEMO: Clinically stable. Imaging stable. Adjuvant-dosed temozolomide 200 mg/m2 (460 mg), cycle 6 (start date tonight).  -5/21/2020 SURGERY: Right anterior cervical 4 - 6 discectomy and fusion and mercedes wells tong placement. Bilateral posterior Cervical 7-Thoracic 1 posterior minimally invasive foraminotomy. Preformed by Dr. Downing.   -6/8/2020 NEURO-ONC/ MRB: Clinically stable; continued cognitive complaints. Imaging with symmetric cristiana-ventricular increased T2 FLAIR. Most consistent with radiation-induced changes. Repeat imaging with spectroscopy in 2 months.  -8/3/2020 NEURO-ONC/ MRB: Clinically stable. Imaging with symmetric cristiana-ventricular T2 FLAIR, stable since the last scan. Spectroscopy consistent with radiation injury.  -11/23/2020 NEURO-ONC/ MRB: Clinically well. Imaging stable.  -2/26/2021 NEURO-ONC/ MRB: Clinically well. Imaging with a questionable area of new contrast enhancement, unchanged confluent periventricular T2 hyperintensity.  -5/24/2021 NEURO-ONC/ MRB: Clinically stable. Imaging stable; increase imaging interval.  -10/25/2021 NEURO-ONC/ MRB: Clinically stable. Imaging stable.  -4/22/2022 NEURO-ONC/ MRB: Clinically stable; discussed cardiovascular risk factors. Imaging stable.  -10/21/2022 NEURO-ONC/ MRB: Clinically well. Imaging not concerning for cancer recurrence, though progressive chronic ischemic disease noted.  -4/10/2023 NEURO-ONC/ MRB: Dizziness. Imaging not concerning for cancer recurrence.    SOCIAL HISTORY   Tobacco use: Former smoker.  . 1 son + grandchildren.   Employment: Retired, worked as an .      PHYSICAL EXAMINATION  BP (!) 144/91 (BP Location: Right arm, Patient Position: Chair, Cuff Size: Adult Large)   Pulse 85   Temp 98.1  F (36.7  C) (Oral)   Resp 16   Wt 107.9 kg (237 lb 14.4 oz)   SpO2 96%   BMI 30.54 kg/m     Wt Readings from Last 2 Encounters:   04/10/23 107.9 kg (237 lb 14.4 oz)   10/21/22 106.6 kg (235 lb 1.6 oz)      Ht Readings from Last  "2 Encounters:   08/14/20 1.88 m (6' 2\")   08/03/20 1.88 m (6' 2\")      KPS:     -Generally well appearing.    -Respiratory: No wheezing noted.   -Skin: No facial rashes.   -Psychiatric: Normal mood and affect. Pleasant, talkative.  -Neurologic:   MENTAL STATUS:     Alert   Speech fluent.    Comprehension intact.     CRANIAL NERVES:  .    Hearing intact.   With normal phonation, no dysfunction anticipated of the palate or tongue.  MOTOR: No abnormal motor movements  COORDINATION: deferred  SENSATION: deferred  GAIT: Stable      MEDICAL RECORDS  Personally reviewed primary care note from 10/2022    LABS  Personally reviewed all available lab results; 10/2022 PSA 0.82. Lipid panel (LDL 90). CBC. CMP.    IMAGING  Personally reviewed MR brain imaging from today and compared to prior imaging. To my eye, there is no new/ changing contrast enhancement in the left lateral posterior resection margin above the anterior horn of the left lateral ventricle. Slight increase in confluent periventricular T2 hyperintensity as compared to ~3 years ago (below), but largely stable over the past 2 years.    [id number] 8/2020, 10/2021, 10/2022, 4/2023      Imaging results were reviewed with Melvin and Dana.      IMPRESSION  Clinic time of 45 minutes reviewing data, in evaluation, and coordinating care for this high complexity visit was spent discussing in detail the nature of his cancer in light of repeat imaging. This was in addition to answering questions pertaining to my recommendations and devising the plan as outlined below.      Clinically, Melvin notes recurrence of intermittant dizziness upon standing from laying down for the past week. Dana is concerned that dehydration can be contributing. Otherwise, he and Dana deny any new neurological complaints or progression of memory concerns, stability on feet, or vision. Discussed that the characteristics of the dizziness are concerning for hypovolemia vs related to underlying cardiac " disease, but do not suspect it is related to neuro-oncological process given stable imaging today. Pulse today of 85. Recommendation is to remain hydrated and to follow-up with primary care and cardiology.     I personally reviewed the primary care note from 10/2022 and reviewed the associated labs. I am encouraged with his LDL being at 90. Dr. Tan performed age-related cancer screen and assessed cardiovascular risk factors. This is important because imaging shows that the symmetric cristiana-ventricular T2 FLAIR that is slightly increased compared to ~3 years ago. As previously discussed too, given the symmetric nature, past use of radiation in the setting of multiple factors for cardiovascular disease, and prior spectroscopy being consistent with radiation-induced changes, I continue to have limited concerns that these changes would represent non-contrast enhancing cancer progression. Imaging has been largely stable over the past 2 years. Therefore the need to identify and appropriately manage cardiovascular risk factors (ie diabetes, cholesterol, hypertension) per his primary care provider remains critical. Blood pressure slightly elevated today. Goal should be in the 120's systolic. LDL goal should be closer to 70. I am not seeing a HbA1c checked recently. Encouraged more exercise and potentially utilizing a step-counter. Finally, following this primary care visit, there were no medications changed and Melvin was made up to date on vaccinations. Today, Melvin is wondering about restarting Viagra. There is no contraindication from a brain cancer standpoint, but recommended discussing this with primary care.      Given the stability of the scan from a cancer standpoint, will repeat imaging every 6 months indefinitely per NCCN guidelines. In the meantime, Melvin and Dana know to call with questions or concerns or to report new complaints and he can be seen sooner if needed.     PROBLEM LIST  Anaplastic oligodendroglioma  (grade III)  Memory complaints, stable  Cervical radiculopathy  Chemotherapy-induced thrombocytopenia   Bradycardia/ orthostatsis     PLAN  -CANCER DIRECTED THERAPY-  -As above; Continue imaging surveillance. Repeat imaging in 6 months.    * All imaging needs to scheduled on a 1.5T scanner due to an implantable device that Melvin has in his hip.    -SEIZURE MANAGEMENT-  -While this patient is at increased risk of having seizures, given the lack of seizure history, there is no indication to prescribe an antiepileptic at this time.     -Quality of life/ MOOD/ FATIGUE-  -Denies any mood issues.  -Continue to monitor mood as untreated/ undertreated depression can worsen fatigue, dysorexia, and quality of life.   -Continue to monitor headaches/ other musculoskeletal pain.     -CARDIAC-  -S/p ablation to correct a cardiac arrhythmia.     -BRADYCARDIA/ ORTHOSTATSIS-  -Following with PCP and cardiology.  -HR today in the 80's.     -OTHER-  -Continue to recommend hearing testing to see if a lack in hearing is contributing to memory issues.   -Management of cardiovascular risk factors per his primary care provider as stated above.   -No concerns to re-start Viagra from a neuro-oncological standpoint. Encouraged discussion with cardiology and PCP.    Return to clinic in 10/2023 + imaging.     Patient was also seen and examined by  Amanda Blankenship ([email or web address]) under my supervision.     Leeanne Lozano MD  Neuro-oncology

## 2023-04-10 ENCOUNTER — ONCOLOGY VISIT (OUTPATIENT)
Dept: ONCOLOGY | Facility: CLINIC | Age: 68
End: 2023-04-10
Attending: PSYCHIATRY & NEUROLOGY
Payer: COMMERCIAL

## 2023-04-10 ENCOUNTER — TUMOR CONFERENCE (OUTPATIENT)
Dept: ONCOLOGY | Facility: CLINIC | Age: 68
End: 2023-04-10
Payer: COMMERCIAL

## 2023-04-10 ENCOUNTER — HOSPITAL ENCOUNTER (OUTPATIENT)
Dept: MRI IMAGING | Facility: CLINIC | Age: 68
Discharge: HOME | End: 2023-04-10
Attending: PSYCHIATRY & NEUROLOGY | Admitting: PSYCHIATRY & NEUROLOGY
Payer: COMMERCIAL

## 2023-04-10 VITALS
DIASTOLIC BLOOD PRESSURE: 91 MMHG | SYSTOLIC BLOOD PRESSURE: 144 MMHG | RESPIRATION RATE: 16 BRPM | WEIGHT: 237.9 LBS | BODY MASS INDEX: 30.54 KG/M2 | OXYGEN SATURATION: 96 % | HEART RATE: 85 BPM | TEMPERATURE: 98.1 F

## 2023-04-10 DIAGNOSIS — C71.9 OLIGODENDROGLIOMA, ANAPLASTIC (H): Primary | ICD-10-CM

## 2023-04-10 DIAGNOSIS — C71.9 OLIGODENDROGLIOMA, ANAPLASTIC (H): ICD-10-CM

## 2023-04-10 PROCEDURE — A9585 GADOBUTROL INJECTION: HCPCS | Performed by: PSYCHIATRY & NEUROLOGY

## 2023-04-10 PROCEDURE — 99215 OFFICE O/P EST HI 40 MIN: CPT | Performed by: PSYCHIATRY & NEUROLOGY

## 2023-04-10 PROCEDURE — G0463 HOSPITAL OUTPT CLINIC VISIT: HCPCS | Performed by: PSYCHIATRY & NEUROLOGY

## 2023-04-10 PROCEDURE — 255N000002 HC RX 255 OP 636: Performed by: PSYCHIATRY & NEUROLOGY

## 2023-04-10 PROCEDURE — 70553 MRI BRAIN STEM W/O & W/DYE: CPT | Mod: 26 | Performed by: RADIOLOGY

## 2023-04-10 PROCEDURE — 70553 MRI BRAIN STEM W/O & W/DYE: CPT

## 2023-04-10 RX ORDER — GADOBUTROL 604.72 MG/ML
10 INJECTION INTRAVENOUS ONCE
Status: COMPLETED | OUTPATIENT
Start: 2023-04-10 | End: 2023-04-10

## 2023-04-10 RX ADMIN — GADOBUTROL 10 ML: 604.72 INJECTION INTRAVENOUS at 10:08

## 2023-04-10 ASSESSMENT — PAIN SCALES - GENERAL: PAINLEVEL: NO PAIN (0)

## 2023-04-10 NOTE — NURSING NOTE
"Oncology Rooming Note    April 10, 2023 2:24 PM   Gopi Farfan is a 68 year old male who presents for:    Chief Complaint   Patient presents with     Oncology Clinic Visit     [facility] RETURN - OLIGODENDROGLIOMA     Initial Vitals: BP (!) 144/91 (BP Location: Right arm, Patient Position: Chair, Cuff Size: Adult Large)   Pulse 85   Temp 98.1  F (36.7  C) (Oral)   Resp 16   Wt 107.9 kg (237 lb 14.4 oz)   SpO2 96%   BMI 30.54 kg/m   Estimated body mass index is 30.54 kg/m  as calculated from the following:    Height as of 8/14/20: 1.88 m (6' 2\").    Weight as of this encounter: 107.9 kg (237 lb 14.4 oz). Body surface area is 2.37 meters squared.  No Pain (0) Comment: Data Unavailable   No LMP for male patient.  Allergies reviewed: Yes  Medications reviewed: Yes    Medications: Medication refills not needed today.  Pharmacy name entered into EPIC:    [facility] - [place], [place] - [facility] - [place], [place] - [address]    Adama Joseph LPN            "

## 2023-04-10 NOTE — LETTER
"    4/10/2023         RE: Gopi Farfan  [id number]  [place] [address]        Dear Colleague,    Thank you for referring your patient, Gopi Farfan, to the [facility]. Please see a copy of my visit note below.    NEURO-ONCOLOGY VISIT  Apr 10, 2023    CHIEF COMPLAINT: Mr. 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 He completed chemoradiotherapy with concurrent temozolomide as of 8/21/2019, treatment course was complicated by thrombocytopenia. He then completed 6 cycles of adjuvant-dosed temozolomide in 2/2020.     Melvin is currently managed on imaging surveillance and to date, imaging has not been concerning for cancer recurrence, though progressive chronic ischemic disease has been noted.     I met with Melvin and Dana (wife) today for this follow-up visit.     HISTORY OF PRESENT ILLNESS  - Melvin reports episodes of dizziness upon standing from laying down for a couple days. Wife mentions her concern for contributing dehydration.   - Can sometimes feel moments of instability resulting in stumbling and occasional falls. Last fall was a few weeks ago, but did not hit his head.   - Infrequent headaches, no changes.  - Short term memory issues continues, Dana does not notice improvement or worsening. Dana also feels that his voice is still quite. Denies issues with hearing.   - No new sensory changes.   - Has pre-glaucoma and follows with an eye doctor  - No events concerning for seizures.   - This summer Melvin and Dana plan to stay in [place], but winter again in [place]/ [place]/ [place].   - Wonders if it is ok to restart Viagra.       MEDICATIONS   Current Outpatient Medications   Medication Sig Dispense Refill    acetaminophen (TYLENOL) 325 MG tablet Take 325-650 mg by mouth 4 times daily as needed for mild pain      atorvastatin (LIPITOR) 80 MG " tablet Take 40 mg by mouth every evening (takes 0.5 x 80mg)      latanoprost (XALATAN) 0.005 % ophthalmic solution Place 1 drop into both eyes every evening       losartan (COZAAR) 100 MG tablet Take 100 mg by mouth every evening        DRUG ALLERGIES   Allergies   Allergen Reactions    Amlodipine Swelling       ONCOLOGIC HISTORY  -5/2019 PRESENTATION: Progressive confusion, memory loss plus difficulty with walking.   -5/28/2019 Admitted to [facility] in [place] for evaluation, then transferred to [facility] for higher level of care.   -5/28/2019 MR brain imaging revealed a large left frontal mass   -5/30/2019 SURGERY: Craniotomy for  mass resection by Dr. Downing.  PATHOLOGY: Oligodendroglioma with borderline anaplastic features (WHO grade III); Co-deletion of chromosomal regions 1p and 19q, IDH-1 (R132H by immunohistochemistry mutated. ATRX wild type by immunohistochemistry. Mitoses were present, not sufficient in number to warrant a diagnosis of anaplasia by that criterion.  However, the presence of vascular proliferation, even though mild, is indicative of early anaplasia.  -6/13/2019 NEURO-ONC: Recommending chemoradiotherapy.   -7/8 - 8/21/2019 CHEMORADS: 59.4 Gy in 1.8 Gy daily fractions x 30 fractions per Dr. Kirby Dwyer  With [facility] plus concurrent temozolomide 75mg/m2 (180mg).   -9/16/2019 NEURO-ONC/ MRB/ CHEMO: Clinically well. Imaging with no concerns. Starting adjuvant-dosed temozolomide 150mg/m2 (360mg), cycle 1 (start date of 9/19/2019).   -10/14/2019 NEURO-ONC/CHEMO: Clinically stable. Continue adjuvant-dosed temozolomide, increasing to 200 mg/m2 (460 mg), cycle 2 (start date of 10/17/2019).   -11/11/2019 NEURO-ONC/ MRB/ CHEMO: Clinically stable. Imaging with no concerns. Adjuvant-dosed temozolomide 200 mg/m2 (460 mg), cycle 3 (start date of 11/19/2019, but delayed due to thrombocytopenia).   -12/16/2019 NEURO-ONC/ CHEMO: Clinically stable. Adjuvant-dosed temozolomide 200 mg/m2 (460  mg), cycle 4 (start date of 12/17/2019).   -1/13/2020 NEURO-ONC/ CHEMO: Clinically stable. Adjuvant-dosed temozolomide 200 mg/m2 (460 mg), cycle 5 (start date originally supposed to be 1/14/2020, but was on 1/20/2020)  -2/24/2020 NEURO-ONC/ MRB/ CHEMO: Clinically stable. Imaging stable. Adjuvant-dosed temozolomide 200 mg/m2 (460 mg), cycle 6 (start date tonight).  -5/21/2020 SURGERY: Right anterior cervical 4 - 6 discectomy and fusion and mercedes wells tong placement. Bilateral posterior Cervical 7-Thoracic 1 posterior minimally invasive foraminotomy. Preformed by Dr. Downing.   -6/8/2020 NEURO-ONC/ MRB: Clinically stable; continued cognitive complaints. Imaging with symmetric cristiana-ventricular increased T2 FLAIR. Most consistent with radiation-induced changes. Repeat imaging with spectroscopy in 2 months.  -8/3/2020 NEURO-ONC/ MRB: Clinically stable. Imaging with symmetric cristiana-ventricular T2 FLAIR, stable since the last scan. Spectroscopy consistent with radiation injury.  -11/23/2020 NEURO-ONC/ MRB: Clinically well. Imaging stable.  -2/26/2021 NEURO-ONC/ MRB: Clinically well. Imaging with a questionable area of new contrast enhancement, unchanged confluent periventricular T2 hyperintensity.  -5/24/2021 NEURO-ONC/ MRB: Clinically stable. Imaging stable; increase imaging interval.  -10/25/2021 NEURO-ONC/ MRB: Clinically stable. Imaging stable.  -4/22/2022 NEURO-ONC/ MRB: Clinically stable; discussed cardiovascular risk factors. Imaging stable.  -10/21/2022 NEURO-ONC/ MRB: Clinically well. Imaging not concerning for cancer recurrence, though progressive chronic ischemic disease noted.  -4/10/2023 NEURO-ONC/ MRB: Dizziness. Imaging not concerning for cancer recurrence.    SOCIAL HISTORY   Tobacco use: Former smoker.  . 1 son + grandchildren.   Employment: Retired, worked as an .      PHYSICAL EXAMINATION  BP (!) 144/91 (BP Location: Right arm, Patient Position: Chair, Cuff Size: Adult Large)   Pulse 85  "  Temp 98.1  F (36.7  C) (Oral)   Resp 16   Wt 107.9 kg (237 lb 14.4 oz)   SpO2 96%   BMI 30.54 kg/m     Wt Readings from Last 2 Encounters:   04/10/23 107.9 kg (237 lb 14.4 oz)   10/21/22 106.6 kg (235 lb 1.6 oz)      Ht Readings from Last 2 Encounters:   08/14/20 1.88 m (6' 2\")   08/03/20 1.88 m (6' 2\")      KPS:     -Generally well appearing.    -Respiratory: No wheezing noted.   -Skin: No facial rashes.   -Psychiatric: Normal mood and affect. Pleasant, talkative.  -Neurologic:   MENTAL STATUS:     Alert   Speech fluent.    Comprehension intact.     CRANIAL NERVES:  .    Hearing intact.   With normal phonation, no dysfunction anticipated of the palate or tongue.  MOTOR: No abnormal motor movements  COORDINATION: deferred  SENSATION: deferred  GAIT: Stable      MEDICAL RECORDS  Personally reviewed primary care note from 10/2022    LABS  Personally reviewed all available lab results; 10/2022 PSA 0.82. Lipid panel (LDL 90). CBC. CMP.    IMAGING  Personally reviewed MR brain imaging from today and compared to prior imaging. To my eye, there is no new/ changing contrast enhancement in the left lateral posterior resection margin above the anterior horn of the left lateral ventricle. Slight increase in confluent periventricular T2 hyperintensity as compared to ~3 years ago (below), but largely stable over the past 2 years.    [id number] 8/2020, 10/2021, 10/2022, 4/2023      Imaging results were reviewed with Melvin and Dana.      IMPRESSION  Clinic time of 45 minutes reviewing data, in evaluation, and coordinating care for this high complexity visit was spent discussing in detail the nature of his cancer in light of repeat imaging. This was in addition to answering questions pertaining to my recommendations and devising the plan as outlined below.      Clinically, Melvin notes recurrence of intermittant dizziness upon standing from laying down for the past week. Dana is concerned that dehydration can be " contributing. Otherwise, he and Dana deny any new neurological complaints or progression of memory concerns, stability on feet, or vision. Discussed that the characteristics of the dizziness are concerning for hypovolemia vs related to underlying cardiac disease, but do not suspect it is related to neuro-oncological process given stable imaging today. Pulse today of 85. Recommendation is to remain hydrated and to follow-up with primary care and cardiology.     I personally reviewed the primary care note from 10/2022 and reviewed the associated labs. I am encouraged with his LDL being at 90. Dr. Tan performed age-related cancer screen and assessed cardiovascular risk factors. This is important because imaging shows that the symmetric cristiana-ventricular T2 FLAIR that is slightly increased compared to ~3 years ago. As previously discussed too, given the symmetric nature, past use of radiation in the setting of multiple factors for cardiovascular disease, and prior spectroscopy being consistent with radiation-induced changes, I continue to have limited concerns that these changes would represent non-contrast enhancing cancer progression. Imaging has been largely stable over the past 2 years. Therefore the need to identify and appropriately manage cardiovascular risk factors (ie diabetes, cholesterol, hypertension) per his primary care provider remains critical. Blood pressure slightly elevated today. Goal should be in the 120's systolic. LDL goal should be closer to 70. I am not seeing a HbA1c checked recently. Encouraged more exercise and potentially utilizing a step-counter. Finally, following this primary care visit, there were no medications changed and Melvin was made up to date on vaccinations. Today, Melvin is wondering about restarting Viagra. There is no contraindication from a brain cancer standpoint, but recommended discussing this with primary care.      Given the stability of the scan from a cancer standpoint,  will repeat imaging every 6 months indefinitely per NCCN guidelines. In the meantime, Melvin and Dana know to call with questions or concerns or to report new complaints and he can be seen sooner if needed.     PROBLEM LIST  Anaplastic oligodendroglioma (grade III)  Memory complaints, stable  Cervical radiculopathy  Chemotherapy-induced thrombocytopenia   Bradycardia/ orthostatsis     PLAN  -CANCER DIRECTED THERAPY-  -As above; Continue imaging surveillance. Repeat imaging in 6 months.    * All imaging needs to scheduled on a 1.5T scanner due to an implantable device that Melvin has in his hip.    -SEIZURE MANAGEMENT-  -While this patient is at increased risk of having seizures, given the lack of seizure history, there is no indication to prescribe an antiepileptic at this time.     -Quality of life/ MOOD/ FATIGUE-  -Denies any mood issues.  -Continue to monitor mood as untreated/ undertreated depression can worsen fatigue, dysorexia, and quality of life.   -Continue to monitor headaches/ other musculoskeletal pain.     -CARDIAC-  -S/p ablation to correct a cardiac arrhythmia.     -BRADYCARDIA/ ORTHOSTATSIS-  -Following with PCP and cardiology.  -HR today in the 80's.     -OTHER-  -Continue to recommend hearing testing to see if a lack in hearing is contributing to memory issues.   -Management of cardiovascular risk factors per his primary care provider as stated above.   -No concerns to re-start Viagra from a neuro-oncological standpoint. Encouraged discussion with cardiology and PCP.    Return to clinic in 10/2023 + imaging.     Patient was also seen and examined by  Amanda Blankenship ([id number]) under my supervision.     Leeanne Lozano MD  Neuro-oncology

## 2023-04-17 NOTE — TUMOR CONFERENCE
Tumor Conference Information  Tumor Conference: Neuro-Onc  Specialties Present: Medical oncology, Radiation oncology, Pathology, Radiology, Surgery  Patient Status: Prospective  Stage: oligodendroglioma  Treatment to Date: Surgery, Chemotherapy, Radiation  Clinical Trials: Not discussed  Genetic Testing Discussed/Recommended?: No  Supportive Care Services Discussed/Recommended?: No  Recommended Plan: Follows evidence-based guidelines (Comment: stable; follow up in 6 months)  Did the review exceed 30 minutes?: did not           Documentation / Disclaimer Cancer Tumor Board Note  Cancer tumor board recommendations do not override what is determined to be reasonable care and treatment, which is dependent on the circumstances of a patient's case; the patient's medical, social, and personal concerns; and the clinical judgment of the oncologist [physician].

## 2023-05-18 NOTE — PROGRESS NOTES
Oncology RN Care Coordination Note:     This writer prepared [facility]'s letter for the [facility], letter also requested additional visits for this patient per Dr. Lozano's request.  The letter has been sent electronically to Melvin via [employer].    Dana Camejo RN BSN   [facility]  Nurse Coordinator         18

## 2023-09-28 NOTE — PROGRESS NOTES
"NEURO-ONCOLOGY VISIT  Oct 2, 2023    CHIEF COMPLAINT: Mr. 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 He completed chemoradiotherapy with concurrent temozolomide as of 8/21/2019, treatment course was complicated by thrombocytopenia. He then completed 6 cycles of adjuvant-dosed temozolomide in 2/2020.     Melvin is currently managed on imaging surveillance and to date, imaging has been without evidence of cancer recurrence, though progressive chronic ischemic disease has been noted.     I met with Melvin and Dana (wife) today for this follow-up visit.     HISTORY OF PRESENT ILLNESS  -Melvin to report episodes of feeling unsteady that lasts for seconds.    Dana states that it is challenging for him to stand on ladders.    No recent falls.   Chronic neuropathy of the feet. Very unsteady on uneven terrain.   -Infrequent headaches, no changes.  -Short term memory issues continue.  -No new weakness.  -Has pre-glaucoma and follows with an eye doctor; on eye drops.   -No events concerning for seizures.   -Later this month Melvin and Dana plan to go to [place] for a family wedding and then, to [place] and [place] for the winter.   -Blood pressure is evaluated today in clinic, but denies any chest pain, shortness of breath, vision changes, or headache.         MEDICATIONS   Current Outpatient Medications   Medication Sig Dispense Refill    acetaminophen (TYLENOL) 325 MG tablet Take 325-650 mg by mouth 4 times daily as needed for mild pain      atorvastatin (LIPITOR) 80 MG tablet Take 40 mg by mouth every evening (takes 0.5 x 80mg)      latanoprost (XALATAN) 0.005 % ophthalmic solution Place 1 drop into both eyes every evening       losartan (COZAAR) 100 MG tablet Take 100 mg by mouth every evening        DRUG ALLERGIES   Allergies   Allergen Reactions    Amlodipine Swelling       ONCOLOGIC " HISTORY  -5/2019 PRESENTATION: Progressive confusion, memory loss plus difficulty with walking.   -5/28/2019 Admitted to [facility] in [place] for evaluation, then transferred to [facility] for higher level of care.   -5/28/2019 MR brain imaging revealed a large left frontal mass   -5/30/2019 SURGERY: Craniotomy for  mass resection by Dr. Downing.  PATHOLOGY: Oligodendroglioma with borderline anaplastic features (WHO grade 3); Co-deletion of chromosomal regions 1p and 19q, IDH-1 (R132H by immunohistochemistry mutated. ATRX wild type by immunohistochemistry. Mitoses were present, not sufficient in number to warrant a diagnosis of anaplasia by that criterion.  However, the presence of vascular proliferation, even though mild, is indicative of early anaplasia.  -6/13/2019 NEURO-ONC: Recommending chemoradiotherapy.   -7/8 - 8/21/2019 CHEMORADS: 59.4 Gy in 1.8 Gy daily fractions x 30 fractions per Dr. Kirby Dwyer  With [facility] plus concurrent temozolomide 75mg/m2 (180mg).   -9/16/2019 NEURO-ONC/ MRB/ CHEMO: Clinically well. Imaging with no concerns. Starting adjuvant-dosed temozolomide 150mg/m2 (360mg), cycle 1 (start date of 9/19/2019).   -10/14/2019 NEURO-ONC/CHEMO: Clinically stable. Continue adjuvant-dosed temozolomide, increasing to 200 mg/m2 (460 mg), cycle 2 (start date of 10/17/2019).   -11/11/2019 NEURO-ONC/ MRB/ CHEMO: Clinically stable. Imaging with no concerns. Adjuvant-dosed temozolomide 200 mg/m2 (460 mg), cycle 3 (start date of 11/19/2019, but delayed due to thrombocytopenia).   -12/16/2019 NEURO-ONC/ CHEMO: Clinically stable. Adjuvant-dosed temozolomide 200 mg/m2 (460 mg), cycle 4 (start date of 12/17/2019).   -1/13/2020 NEURO-ONC/ CHEMO: Clinically stable. Adjuvant-dosed temozolomide 200 mg/m2 (460 mg), cycle 5 (start date originally supposed to be 1/14/2020, but was on 1/20/2020)  -2/24/2020 NEURO-ONC/ MRB/ CHEMO: Clinically stable. Imaging stable. Adjuvant-dosed temozolomide 200 mg/m2  (460 mg), cycle 6 (start date tonight).  -5/21/2020 SURGERY: Right anterior cervical 4 - 6 discectomy and fusion and mercedes wells tong placement. Bilateral posterior Cervical 7-Thoracic 1 posterior minimally invasive foraminotomy. Preformed by Dr. Downing.   -6/8/2020 NEURO-ONC/ MRB: Clinically stable; continued cognitive complaints. Imaging with symmetric cristiana-ventricular increased T2 FLAIR. Most consistent with radiation-induced changes. Repeat imaging with spectroscopy in 2 months.  -8/3/2020 NEURO-ONC/ MRB: Clinically stable. Imaging with symmetric cristiana-ventricular T2 FLAIR, stable since the last scan. Spectroscopy consistent with radiation injury.  -11/23/2020 NEURO-ONC/ MRB: Clinically well. Imaging stable.  -2/26/2021 NEURO-ONC/ MRB: Clinically well. Imaging with a questionable area of new contrast enhancement, unchanged confluent periventricular T2 hyperintensity.  -5/24/2021 NEURO-ONC/ MRB: Clinically stable. Imaging stable; increase imaging interval.  -10/25/2021 NEURO-ONC/ MRB: Clinically stable. Imaging stable.  -4/22/2022 NEURO-ONC/ MRB: Clinically stable; discussed cardiovascular risk factors. Imaging stable.  -10/21/2022 NEURO-ONC/ MRB: Clinically well. Imaging not concerning for cancer recurrence, though progressive chronic ischemic disease noted.  -4/10/2023 NEURO-ONC/ MRB: Dizziness. Imaging not concerning for cancer recurrence.  -10/2/2023 NEURO-ONC/ MRB: No new neurological concerns. Chronic neuropathy is contributing to gait instability. Blood pressure elevated today. Imaging not concerning for cancer recurrence.    SOCIAL HISTORY   Tobacco use: Former smoker.  . 1 son + grandchildren.   Employment: Retired, worked as an .      PHYSICAL EXAMINATION  BP (!) 150/81   Pulse 68   Temp 97.4  F (36.3  C)   Resp 16   Wt 104.6 kg (230 lb 9.6 oz)   SpO2 98%   BMI 29.61 kg/m     Wt Readings from Last 2 Encounters:   10/02/23 104.6 kg (230 lb 9.6 oz)   04/10/23 107.9 kg (237 lb 14.4  "oz)      Ht Readings from Last 2 Encounters:   08/14/20 1.88 m (6' 2\")   08/03/20 1.88 m (6' 2\")      KPS:     -Generally well appearing.    -Respiratory: No wheezing noted.   -Psychiatric: Normal mood and affect. Pleasant, talkative.  -Neurologic:   MENTAL STATUS:     Alert   Speech fluent.    Comprehension intact.     CRANIAL NERVES:  .    Hearing intact.   With normal phonation, no dysfunction anticipated of the palate or tongue.  GAIT: Stable      MEDICAL RECORDS  Personally reviewed; No recent encounters.     LABS  Personally reviewed all available lab results; No recent labs.     IMAGING  Personally reviewed MR brain imaging from today and compared to prior imaging. To my eye, there is no new/ changing contrast enhancement in the left lateral posterior resection margin above the anterior horn of the left lateral ventricle. T2 hyperintensity is largely stable over the past 2 years (below).    [id number] 10/2021, 10/2023      Formal read; \"Stable postsurgical changes of left frontal mass resection without evidence for recurrent tumor.\"    Imaging results were reviewed with Melvin and Dana.      IMPRESSION  Clinic time of 30 minutes was spent reviewing data, in evaluation, and coordinating care for this high complexity visit. This was in addition to answering questions pertaining to my recommendations and devising the plan as outlined below.      Clinically, Melvin notes some gait instability, particularly on uneven terrain. His chronic neuropathy is contributing to this gait instability. Another contributing factor could be bradycardia; heart rate was 68 today. Otherwise, he has no new neurological concerns.     It remains important that Melvin continues to follow with primary care for management of cardiovascular risk factors because imaging has demonstrated symmetric cristiana-ventricular T2 FLAIR changes consistent with small vessel ischemic disease. As previously discussed too, given the symmetric nature, past use of " radiation in the setting of multiple factors for cardiovascular disease, and prior spectroscopy being consistent with radiation-induced changes, I continue to have limited concerns that these changes would represent non-contrast enhancing cancer progression. As noted above, imaging has been largely stable over the past 2 years. Therefore, the need to identify and appropriately manage cardiovascular risk factors (ie diabetes, cholesterol, hypertension) per his primary care provider remains critical. Blood pressure was very elevated today. Melvin denied any chest pain, shortness of breath, vision changes, or headache. Goal blood pressure should be in the 120's systolic. LDL goal should be closer to 70.     Given the stability of the scan from a cancer standpoint, will repeat imaging every 6-7 months indefinitely per NCCN guidelines. [place] will schedule imaging to accommodate this. In the meantime, Iveth know to call with questions or concerns or to report new complaints and he can be seen sooner if needed.     PROBLEM LIST  WHO grade 3 oligodendroglioma  Memory complaints, stable  Cervical radiculopathy  Chemotherapy-induced thrombocytopenia   Bradycardia/ orthostatsis     PLAN  -CANCER DIRECTED THERAPY-  -As above; Continue imaging surveillance. Repeat imaging in 6-7 months.    * All imaging needs to scheduled on a 1.5T scanner due to an implantable device that Melvin has in his hip.  -Will provide letter to the [facility] verifying treatment today.     -SEIZURE MANAGEMENT-  -While this patient is at increased risk of having seizures, given the lack of seizure history, there is no indication to prescribe an antiepileptic at this time.     -Quality of life/ MOOD/ FATIGUE-  -Denies any mood issues.  -Continue to monitor mood as untreated/ undertreated depression can worsen fatigue, dysorexia, and quality of life.   -Continue to monitor headaches/ other  musculoskeletal pain.     -CARDIAC-  -S/p ablation to correct a cardiac arrhythmia.   -Following with cardiology.    -BRADYCARDIA/ ORTHOSTATSIS-  -Following with primary care and cardiology.  -HR today in the 60's.     -OTHER-  -Continue to recommend hearing testing to see if a lack in hearing is contributing to memory issues.   -Management of cardiovascular risk factors per his primary care provider as stated above.     Return to clinic in 5/2024 + imaging.     Leeanne Lozano MD  Neuro-oncology

## 2023-10-02 ENCOUNTER — APPOINTMENT (OUTPATIENT)
Dept: LAB | Facility: CLINIC | Age: 68
End: 2023-10-02
Attending: PSYCHIATRY & NEUROLOGY
Payer: COMMERCIAL

## 2023-10-02 ENCOUNTER — TUMOR CONFERENCE (OUTPATIENT)
Dept: ONCOLOGY | Facility: CLINIC | Age: 68
End: 2023-10-02
Payer: COMMERCIAL

## 2023-10-02 ENCOUNTER — ANCILLARY PROCEDURE (OUTPATIENT)
Dept: MRI IMAGING | Facility: CLINIC | Age: 68
End: 2023-10-02
Attending: PSYCHIATRY & NEUROLOGY
Payer: COMMERCIAL

## 2023-10-02 ENCOUNTER — ONCOLOGY VISIT (OUTPATIENT)
Dept: ONCOLOGY | Facility: CLINIC | Age: 68
End: 2023-10-02
Attending: PSYCHIATRY & NEUROLOGY
Payer: COMMERCIAL

## 2023-10-02 VITALS
TEMPERATURE: 97.4 F | OXYGEN SATURATION: 98 % | RESPIRATION RATE: 16 BRPM | HEART RATE: 68 BPM | DIASTOLIC BLOOD PRESSURE: 81 MMHG | WEIGHT: 230.6 LBS | SYSTOLIC BLOOD PRESSURE: 150 MMHG | BODY MASS INDEX: 29.61 KG/M2

## 2023-10-02 DIAGNOSIS — C71.9 OLIGODENDROGLIOMA, ANAPLASTIC (H): ICD-10-CM

## 2023-10-02 DIAGNOSIS — C71.9 OLIGODENDROGLIOMA, ANAPLASTIC (H): Primary | ICD-10-CM

## 2023-10-02 PROCEDURE — 70553 MRI BRAIN STEM W/O & W/DYE: CPT | Performed by: RADIOLOGY

## 2023-10-02 PROCEDURE — 36592 COLLECT BLOOD FROM PICC: CPT

## 2023-10-02 PROCEDURE — 99214 OFFICE O/P EST MOD 30 MIN: CPT | Performed by: PSYCHIATRY & NEUROLOGY

## 2023-10-02 PROCEDURE — G0463 HOSPITAL OUTPT CLINIC VISIT: HCPCS | Performed by: PSYCHIATRY & NEUROLOGY

## 2023-10-02 PROCEDURE — A9585 GADOBUTROL INJECTION: HCPCS | Mod: JZ | Performed by: RADIOLOGY

## 2023-10-02 PROCEDURE — 99213 OFFICE O/P EST LOW 20 MIN: CPT | Performed by: PSYCHIATRY & NEUROLOGY

## 2023-10-02 RX ORDER — GADOBUTROL 604.72 MG/ML
10 INJECTION INTRAVENOUS ONCE
Status: COMPLETED | OUTPATIENT
Start: 2023-10-02 | End: 2023-10-02

## 2023-10-02 RX ADMIN — GADOBUTROL 10 ML: 604.72 INJECTION INTRAVENOUS at 13:28

## 2023-10-02 ASSESSMENT — PAIN SCALES - GENERAL: PAINLEVEL: NO PAIN (0)

## 2023-10-02 NOTE — NURSING NOTE
"Oncology Rooming Note    October 2, 2023 2:55 PM   Gopi Farfan is a 68 year old male who presents for:    Chief Complaint   Patient presents with    Blood Draw     Labs drawn via piv by rn in lab. VS taken.    Oncology Clinic Visit     Oligodendroglioma, anaplastic      Initial Vitals: BP (!) 150/81   Pulse 68   Temp 97.4  F (36.3  C)   Resp 16   Wt 104.6 kg (230 lb 9.6 oz)   SpO2 98%   BMI 29.61 kg/m   Estimated body mass index is 29.61 kg/m  as calculated from the following:    Height as of 8/14/20: 1.88 m (6' 2\").    Weight as of this encounter: 104.6 kg (230 lb 9.6 oz). Body surface area is 2.34 meters squared.  No Pain (0) Comment: Data Unavailable   No LMP for male patient.  Allergies reviewed: Yes  Medications reviewed: Yes    Medications: Medication refills not needed today.  Pharmacy name entered into EPIC:    [facility] - [place], [place] - [facility] - [place], [place] - [address]    Clinical concerns: none       Shayy Smith              "

## 2023-10-02 NOTE — NURSING NOTE
Chief Complaint   Patient presents with    Blood Draw     Labs drawn via piv by rn in lab. VS taken.     No labs ordered. PIV placed by RN for MRI. Line flushed with saline. Vitals taken.     Jono Tracey RN

## 2023-10-02 NOTE — DISCHARGE INSTRUCTIONS
MRI Contrast Discharge Instructions    The IV contrast you received today will pass out of your body in your  urine. This will happen in the next 24 hours. You will not feel this process.  Your urine will not change color.    Drink at least 4 extra glasses of water or juice today (unless your doctor  has restricted your fluids). This reduces the stress on your kidneys.  You may take your regular medicines.    If you are on dialysis: It is best to have dialysis today.    If you have a reaction: Most reactions happen right away. If you have  any new symptoms after leaving the hospital (such as hives or swelling),  call your hospital at the correct number below. Or call your family doctor.  If you have breathing distress or wheezing, call 911.    Special instructions: ***    I have read and understand the above information.    Signature:______________________________________ Date:___________    Staff:__________________________________________ Date:___________     Time:__________    [facility] Departments:    ___[place]: [phone]  ___[facility]: [phone]  ___[place]: [phone] ___[facility]: [phone]  ___[facility]: [phone]  ___[facility]: [phone]  ___[phone]  ___[facility]: [phone]  ___[phone]

## 2023-10-02 NOTE — LETTER
"    10/2/2023         RE: Gopi Farfan  [id number]  [facility] [address]        Dear Colleague,    Thank you for referring your patient, Gopi Farfan, to the [facility]. Please see a copy of my visit note below.    NEURO-ONCOLOGY VISIT  Oct 2, 2023    CHIEF COMPLAINT: Mr. 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 He completed chemoradiotherapy with concurrent temozolomide as of 8/21/2019, treatment course was complicated by thrombocytopenia. He then completed 6 cycles of adjuvant-dosed temozolomide in 2/2020.     Melvin is currently managed on imaging surveillance and to date, imaging has been without evidence of cancer recurrence, though progressive chronic ischemic disease has been noted.     I met with Melvin and Dana (wife) today for this follow-up visit.     HISTORY OF PRESENT ILLNESS  -Melvin to report episodes of feeling unsteady that lasts for seconds.    Dana states that it is challenging for him to stand on ladders.    No recent falls.   Chronic neuropathy of the feet. Very unsteady on uneven terrain.   -Infrequent headaches, no changes.  -Short term memory issues continue.  -No new weakness.  -Has pre-glaucoma and follows with an eye doctor; on eye drops.   -No events concerning for seizures.   -Later this month Melvin and Dana plan to go to [place] for a family wedding and then, to [place] and [place] for the winter.   -Blood pressure is evaluated today in clinic, but denies any chest pain, shortness of breath, vision changes, or headache.         MEDICATIONS   Current Outpatient Medications   Medication Sig Dispense Refill    acetaminophen (TYLENOL) 325 MG tablet Take 325-650 mg by mouth 4 times daily as needed for mild pain      atorvastatin (LIPITOR) 80 MG tablet Take 40 mg by mouth every evening (takes 0.5 x 80mg)      latanoprost " (XALATAN) 0.005 % ophthalmic solution Place 1 drop into both eyes every evening       losartan (COZAAR) 100 MG tablet Take 100 mg by mouth every evening        DRUG ALLERGIES   Allergies   Allergen Reactions    Amlodipine Swelling       ONCOLOGIC HISTORY  -5/2019 PRESENTATION: Progressive confusion, memory loss plus difficulty with walking.   -5/28/2019 Admitted to [facility] for evaluation, then transferred to [facility] for higher level of care.   -5/28/2019 MR brain imaging revealed a large left frontal mass   -5/30/2019 SURGERY: Craniotomy for  mass resection by Dr. Downing.  PATHOLOGY: Oligodendroglioma with borderline anaplastic features (WHO grade 3); Co-deletion of chromosomal regions 1p and 19q, IDH-1 (R132H by immunohistochemistry mutated. ATRX wild type by immunohistochemistry. Mitoses were present, not sufficient in number to warrant a diagnosis of anaplasia by that criterion.  However, the presence of vascular proliferation, even though mild, is indicative of early anaplasia.  -6/13/2019 NEURO-ONC: Recommending chemoradiotherapy.   -7/8 - 8/21/2019 CHEMORADS: 59.4 Gy in 1.8 Gy daily fractions x 30 fractions per Dr. Kirby Dwyer  With [facility] plus concurrent temozolomide 75mg/m2 (180mg).   -9/16/2019 NEURO-ONC/ MRB/ CHEMO: Clinically well. Imaging with no concerns. Starting adjuvant-dosed temozolomide 150mg/m2 (360mg), cycle 1 (start date of 9/19/2019).   -10/14/2019 NEURO-ONC/CHEMO: Clinically stable. Continue adjuvant-dosed temozolomide, increasing to 200 mg/m2 (460 mg), cycle 2 (start date of 10/17/2019).   -11/11/2019 NEURO-ONC/ MRB/ CHEMO: Clinically stable. Imaging with no concerns. Adjuvant-dosed temozolomide 200 mg/m2 (460 mg), cycle 3 (start date of 11/19/2019, but delayed due to thrombocytopenia).   -12/16/2019 NEURO-ONC/ CHEMO: Clinically stable. Adjuvant-dosed temozolomide 200 mg/m2 (460 mg), cycle 4 (start date of 12/17/2019).   -1/13/2020 NEURO-ONC/ CHEMO:  Clinically stable. Adjuvant-dosed temozolomide 200 mg/m2 (460 mg), cycle 5 (start date originally supposed to be 1/14/2020, but was on 1/20/2020)  -2/24/2020 NEURO-ONC/ MRB/ CHEMO: Clinically stable. Imaging stable. Adjuvant-dosed temozolomide 200 mg/m2 (460 mg), cycle 6 (start date tonight).  -5/21/2020 SURGERY: Right anterior cervical 4 - 6 discectomy and fusion and mercedes wells tong placement. Bilateral posterior Cervical 7-Thoracic 1 posterior minimally invasive foraminotomy. Preformed by Dr. Downing.   -6/8/2020 NEURO-ONC/ MRB: Clinically stable; continued cognitive complaints. Imaging with symmetric cristiana-ventricular increased T2 FLAIR. Most consistent with radiation-induced changes. Repeat imaging with spectroscopy in 2 months.  -8/3/2020 NEURO-ONC/ MRB: Clinically stable. Imaging with symmetric cristiana-ventricular T2 FLAIR, stable since the last scan. Spectroscopy consistent with radiation injury.  -11/23/2020 NEURO-ONC/ MRB: Clinically well. Imaging stable.  -2/26/2021 NEURO-ONC/ MRB: Clinically well. Imaging with a questionable area of new contrast enhancement, unchanged confluent periventricular T2 hyperintensity.  -5/24/2021 NEURO-ONC/ MRB: Clinically stable. Imaging stable; increase imaging interval.  -10/25/2021 NEURO-ONC/ MRB: Clinically stable. Imaging stable.  -4/22/2022 NEURO-ONC/ MRB: Clinically stable; discussed cardiovascular risk factors. Imaging stable.  -10/21/2022 NEURO-ONC/ MRB: Clinically well. Imaging not concerning for cancer recurrence, though progressive chronic ischemic disease noted.  -4/10/2023 NEURO-ONC/ MRB: Dizziness. Imaging not concerning for cancer recurrence.  -10/2/2023 NEURO-ONC/ MRB: No new neurological concerns. Chronic neuropathy is contributing to gait instability. Blood pressure elevated today. Imaging not concerning for cancer recurrence.    SOCIAL HISTORY   Tobacco use: Former smoker.  . 1 son + grandchildren.   Employment: Retired, worked as an  ".      PHYSICAL EXAMINATION  BP (!) 150/81   Pulse 68   Temp 97.4  F (36.3  C)   Resp 16   Wt 104.6 kg (230 lb 9.6 oz)   SpO2 98%   BMI 29.61 kg/m     Wt Readings from Last 2 Encounters:   10/02/23 104.6 kg (230 lb 9.6 oz)   04/10/23 107.9 kg (237 lb 14.4 oz)      Ht Readings from Last 2 Encounters:   08/14/20 1.88 m (6' 2\")   08/03/20 1.88 m (6' 2\")      KPS:     -Generally well appearing.    -Respiratory: No wheezing noted.   -Psychiatric: Normal mood and affect. Pleasant, talkative.  -Neurologic:   MENTAL STATUS:     Alert   Speech fluent.    Comprehension intact.     CRANIAL NERVES:  .    Hearing intact.   With normal phonation, no dysfunction anticipated of the palate or tongue.  GAIT: Stable      MEDICAL RECORDS  Personally reviewed; No recent encounters.     LABS  Personally reviewed all available lab results; No recent labs.     IMAGING  Personally reviewed MR brain imaging from today and compared to prior imaging. To my eye, there is no new/ changing contrast enhancement in the left lateral posterior resection margin above the anterior horn of the left lateral ventricle. T2 hyperintensity is largely stable over the past 2 years (below).    [id number] 10/2021, 10/2023      Formal read; \"Stable postsurgical changes of left frontal mass resection without evidence for recurrent tumor.\"    Imaging results were reviewed with Iveth.      IMPRESSION  Clinic time of 30 minutes was spent reviewing data, in evaluation, and coordinating care for this high complexity visit. This was in addition to answering questions pertaining to my recommendations and devising the plan as outlined below.      Clinically, Melvin notes some gait instability, particularly on uneven terrain. His chronic neuropathy is contributing to this gait instability. Another contributing factor could be bradycardia; heart rate was 68 today. Otherwise, he has no new neurological concerns.     It remains important that Melvin " continues to follow with primary care for management of cardiovascular risk factors because imaging has demonstrated symmetric cristiana-ventricular T2 FLAIR changes consistent with small vessel ischemic disease. As previously discussed too, given the symmetric nature, past use of radiation in the setting of multiple factors for cardiovascular disease, and prior spectroscopy being consistent with radiation-induced changes, I continue to have limited concerns that these changes would represent non-contrast enhancing cancer progression. As noted above, imaging has been largely stable over the past 2 years. Therefore, the need to identify and appropriately manage cardiovascular risk factors (ie diabetes, cholesterol, hypertension) per his primary care provider remains critical. Blood pressure was very elevated today. Melvin denied any chest pain, shortness of breath, vision changes, or headache. Goal blood pressure should be in the 120's systolic. LDL goal should be closer to 70.     Given the stability of the scan from a cancer standpoint, will repeat imaging every 6-7 months indefinitely per NCCN guidelines. [place] and will schedule imaging to accommodate this. In the meantime, Iveth know to call with questions or concerns or to report new complaints and he can be seen sooner if needed.     PROBLEM LIST  WHO grade 3 oligodendroglioma  Memory complaints, stable  Cervical radiculopathy  Chemotherapy-induced thrombocytopenia   Bradycardia/ orthostatsis     PLAN  -CANCER DIRECTED THERAPY-  -As above; Continue imaging surveillance. Repeat imaging in 6-7 months.    * All imaging needs to scheduled on a 1.5T scanner due to an implantable device that Melvin has in his hip.  -[facility].     -SEIZURE MANAGEMENT-  -While this patient is at increased risk of having seizures, given the lack of seizure history, there is no  indication to prescribe an antiepileptic at this time.     -Quality of life/ MOOD/ FATIGUE-  -Denies any mood issues.  -Continue to monitor mood as untreated/ undertreated depression can worsen fatigue, dysorexia, and quality of life.   -Continue to monitor headaches/ other musculoskeletal pain.     -CARDIAC-  -S/p ablation to correct a cardiac arrhythmia.   -Following with cardiology.    -BRADYCARDIA/ ORTHOSTATSIS-  -Following with primary care and cardiology.  -HR today in the 60's.     -OTHER-  -Continue to recommend hearing testing to see if a lack in hearing is contributing to memory issues.   -Management of cardiovascular risk factors per his primary care provider as stated above.     Return to clinic in 5/2024 + imaging.     Leeanne Lozano MD  Neuro-oncology

## 2024-05-16 NOTE — PROGRESS NOTES
"NEURO-ONCOLOGY VISIT  May 20, 2024    CHIEF COMPLAINT: Mr. 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 He completed chemoradiotherapy with concurrent temozolomide as of 8/21/2019 and his treatment course was complicated by thrombocytopenia. He then completed 6 cycles of adjuvant-dosed temozolomide in 2/2020.     Melvin is currently monitored on imaging surveillance and to date, imaging has been without evidence of cancer recurrence, though progressive chronic ischemic disease has been noted.     I met with Melvin and Dana (wife) today for this follow-up visit.     HISTORY OF PRESENT ILLNESS  -Melvin reports episodes of feeling unsteady. Has chronic neuropathy of his feet. Very unsteady on uneven terrain.   Dana states that he has fallen 3x in the past 6 months and that he has very poor posture when walking.    His legs are weak. Recommended a seated bicyclic/ petal machine. He will discuss this plus the option of a referral to PT/ OT with his primary care provider.   -Low energy with poor stamina. Fatigues after walking 50'. Requesting handicapped parking.   -Infrequent headaches, no changes.  -Short term memory issues continue. Long term memory is fairly good.    He has not tolerated hearing aids in the past.   -Has pre-glaucoma and follows with an eye doctor; on eye drops.   -No events concerning for seizures.   -Blood pressure is evaluated today in clinic, but denies any chest pain, shortness of breath, vision changes, or headache.         MEDICATIONS   Current Outpatient Medications   Medication Sig Dispense Refill    acetaminophen (TYLENOL) 325 MG tablet Take 325-650 mg by mouth 4 times daily as needed for mild pain      atorvastatin (LIPITOR) 80 MG tablet Take 40 mg by mouth every evening (takes 0.5 x 80mg)      latanoprost (XALATAN) 0.005 % ophthalmic solution Place 1 " drop into both eyes every evening       losartan (COZAAR) 100 MG tablet Take 100 mg by mouth every evening        DRUG ALLERGIES   Allergies   Allergen Reactions    Amlodipine Swelling       ONCOLOGIC HISTORY  -5/2019 PRESENTATION: Progressive confusion, memory loss plus difficulty with walking.   -5/28/2019 Admitted to [facility] in [place] for evaluation, then transferred to [facility] for higher level of care.   -5/28/2019 MR brain imaging revealed a large left frontal mass   -5/30/2019 SURGERY: Craniotomy for  mass resection by Dr. Downing.  PATHOLOGY: Oligodendroglioma with borderline anaplastic features (WHO grade 3); Co-deletion of chromosomal regions 1p and 19q, IDH-1 (R132H by immunohistochemistry mutated. ATRX wild type by immunohistochemistry. Mitoses were present, not sufficient in number to warrant a diagnosis of anaplasia by that criterion.  However, the presence of vascular proliferation, even though mild, is indicative of early anaplasia.  -6/13/2019 NEURO-ONC: Recommending chemoradiotherapy.   -7/8 - 8/21/2019 CHEMORADS: 59.4 Gy in 1.8 Gy daily fractions x 30 fractions per Dr. Kirby Dwyer  With  plus concurrent temozolomide 75mg/m2 (180mg).   -9/16/2019 NEURO-ONC/ MRB/ CHEMO: Clinically well. Imaging with no concerns. Starting adjuvant-dosed temozolomide 150mg/m2 (360mg), cycle 1 (start date of 9/19/2019).   -10/14/2019 NEURO-ONC/CHEMO: Clinically stable. Continue adjuvant-dosed temozolomide, increasing to 200 mg/m2 (460 mg), cycle 2 (start date of 10/17/2019).   -11/11/2019 NEURO-ONC/ MRB/ CHEMO: Clinically stable. Imaging with no concerns. Adjuvant-dosed temozolomide 200 mg/m2 (460 mg), cycle 3 (start date of 11/19/2019, but delayed due to thrombocytopenia).   -12/16/2019 NEURO-ONC/ CHEMO: Clinically stable. Adjuvant-dosed temozolomide 200 mg/m2 (460 mg), cycle 4 (start date of 12/17/2019).   -1/13/2020 NEURO-ONC/ CHEMO: Clinically stable. Adjuvant-dosed temozolomide 200  mg/m2 (460 mg), cycle 5 (start date originally supposed to be 1/14/2020, but was on 1/20/2020)  -2/24/2020 NEURO-ONC/ MRB/ CHEMO: Clinically stable. Imaging stable. Adjuvant-dosed temozolomide 200 mg/m2 (460 mg), cycle 6 (start date tonight).  -5/21/2020 SURGERY: Right anterior cervical 4 - 6 discectomy and fusion and mercedes wells tong placement. Bilateral posterior Cervical 7-Thoracic 1 posterior minimally invasive foraminotomy. Preformed by Dr. Downing.   -6/8/2020 NEURO-ONC/ MRB: Clinically stable; continued cognitive complaints. Imaging with symmetric cristiana-ventricular increased T2 FLAIR. Most consistent with radiation-induced changes. Repeat imaging with spectroscopy in 2 months.  -8/3/2020 NEURO-ONC/ MRB: Clinically stable. Imaging with symmetric cristiana-ventricular T2 FLAIR, stable since the last scan. Spectroscopy consistent with radiation injury.  -11/23/2020 NEURO-ONC/ MRB: Clinically well. Imaging stable.  -2/26/2021 NEURO-ONC/ MRB: Clinically well. Imaging with a questionable area of new contrast enhancement, unchanged confluent periventricular T2 hyperintensity.  -5/24/2021 NEURO-ONC/ MRB: Clinically stable. Imaging stable; increase imaging interval.  -10/25/2021 NEURO-ONC/ MRB: Clinically stable. Imaging stable.  -4/22/2022 NEURO-ONC/ MRB: Clinically stable; discussed cardiovascular risk factors. Imaging stable.  -10/21/2022 NEURO-ONC/ MRB: Clinically well. Imaging not concerning for cancer recurrence, though progressive chronic ischemic disease noted.  -4/10/2023 NEURO-ONC/ MRB: Dizziness. Imaging not concerning for cancer recurrence.  -10/2/2023 NEURO-ONC/ MRB: No new neurological concerns. Chronic neuropathy is contributing to gait instability. Blood pressure elevated today. Imaging not concerning for cancer recurrence.  -5/20/2024 NEURO-ONC/ MRB: Generalized leg weakness with poor stamina; recommended primary care follow-up, provided handicapped parking form. Chronic neuropathy is also contributing to  "gait instability. Blood pressure elevated today. Imaging without any concern for cancer recurrence.    SOCIAL HISTORY   Tobacco use: Former smoker.  . 1 son + grandchildren.   Employment: Retired, worked as an .      PHYSICAL EXAMINATION  BP (!) 152/84   Pulse 59   Temp 98.7  F (37.1  C) (Tympanic)   Resp 16   Ht 1.867 m (6' 1.5\")   Wt 103 kg (227 lb 1.6 oz)   SpO2 96%   BMI 29.56 kg/m     Wt Readings from Last 2 Encounters:   05/20/24 103 kg (227 lb 1.6 oz)   10/02/23 104.6 kg (230 lb 9.6 oz)      Ht Readings from Last 2 Encounters:   05/20/24 1.867 m (6' 1.5\")   08/14/20 1.88 m (6' 2\")      KPS: 80-90    -Generally well appearing.    -Respiratory: No wheezing noted.   -Psychiatric: Normal mood and affect. Pleasant, talkative.  -Neurologic:   MENTAL STATUS:     Alert   Speech fluent.    Comprehension intact.     CRANIAL NERVES:    Hearing decreased.   With normal phonation, no dysfunction anticipated of the palate or tongue.  GAIT: Uses arms to stand from a seated position.     Posture is poor with walking.       MEDICAL RECORDS  Personally reviewed; No recent encounters.     LABS  Personally reviewed all available lab results; No recent labs.     IMAGING  Personally reviewed MR brain imaging from today and compared to prior imaging. To my eye, there is no new/ changing contrast enhancement. T2 hyperintensity is largely stable over the past 3 years.    Formal read; \"No evident tumor progression.\"    Imaging results were reviewed with Iveth.      IMPRESSION  On date of service, 36 minutes was spent in clinic and 7 minutes was spent preparing for the visit through extensive chart review and coordinating care for this high complexity visit. The following is in explanation for the recommendations used to define the plan.       Iveth endorse ongoing gait instability with now, history of mechanical falls. Dana states  that he has very poor posture when walking and that his " legs are generally weak. I recommended more routine exercise, potentially with a seated bicyclic/ petal machine. He will discuss this plus the option of a referral to PT/ OT with his primary care provider. Due to poor stamina with walking, I provided Melvin with a handicapped parking form. His chronic neuropathy is also contributing to this gait instability. Another contributing factor to fatigue/ exercise intolerance could be bradycardia; heart rate was 59 today. Otherwise, he has no new neurological concerns.     It remains important that Melvin continues to follow with primary care for management of cardiovascular risk factors because imaging has demonstrated symmetric cristiana-ventricular T2 FLAIR changes consistent with small vessel ischemic disease. As previously discussed too, given the symmetric nature, past use of radiation in the setting of multiple factors for cardiovascular disease, and prior spectroscopy being consistent with radiation-induced changes, I continue to have limited concerns that these changes would represent non-contrast enhancing cancer progression. As noted above, imaging has been largely stable over the past 3 years. Therefore, the need to identify and appropriately manage cardiovascular risk factors (ie diabetes, cholesterol, hypertension) per his primary care provider remains critical. Blood pressure was very elevated today. Melvin denied any chest pain, shortness of breath, vision changes, or headache. Goal blood pressure should be in the 120's systolic. LDL goal should be closer to 70.     Given the stability of the scan from a cancer standpoint, will repeat imaging every 5-7 months indefinitely per NCCN guidelines. [place] will schedule imaging to accommodate this. In the meantime, Iveth know to call with questions or concerns or to report new complaints and he can be seen sooner if needed.     PROBLEM LIST  WHO grade 3  oligodendroglioma  Memory complaints, stable  Cervical radiculopathy  Chemotherapy-induced thrombocytopenia   Bradycardia/ orthostatsis     PLAN  -CANCER DIRECTED THERAPY-  -As above; Continue imaging surveillance. Repeat imaging in 5-7 months.    * All imaging needs to scheduled on a 1.5T scanner due to an implantable device that Melvin has in his hip.  -[facility].     -SEIZURE MANAGEMENT-  -While this patient is at increased risk of having seizures, given the lack of seizure history, there is no indication to prescribe an antiepileptic at this time.     -Quality of life/ MOOD/ FATIGUE-  -Denies any mood issues.  -Continue to monitor mood as untreated/ undertreated depression can worsen fatigue, dysorexia, and quality of life.   -Continue to monitor headaches/ other musculoskeletal pain.   -Low stamina, fatigue.   -Handicapped parking form provided today.     -CARDIAC-  -S/p ablation to correct a cardiac arrhythmia.   -Following with cardiology.    -BRADYCARDIA/ ORTHOSTATSIS-  -Following with primary care and cardiology.  -HR today in the 50-60's.    -OTHER-  -Continue to recommend hearing testing to see if a lack in hearing is contributing to memory issues.   -Management of cardiovascular risk factors per his primary care provider as stated above.     Return to clinic in 11/2024 + imaging.     Leeanne Lozano MD  Neuro-oncology

## 2024-05-20 ENCOUNTER — HOSPITAL ENCOUNTER (OUTPATIENT)
Dept: MRI IMAGING | Facility: CLINIC | Age: 69
Discharge: HOME | End: 2024-05-20
Attending: PSYCHIATRY & NEUROLOGY | Admitting: PSYCHIATRY & NEUROLOGY
Payer: COMMERCIAL

## 2024-05-20 ENCOUNTER — ONCOLOGY VISIT (OUTPATIENT)
Dept: ONCOLOGY | Facility: CLINIC | Age: 69
End: 2024-05-20
Attending: PSYCHIATRY & NEUROLOGY
Payer: COMMERCIAL

## 2024-05-20 VITALS
RESPIRATION RATE: 16 BRPM | WEIGHT: 227.1 LBS | BODY MASS INDEX: 29.14 KG/M2 | OXYGEN SATURATION: 96 % | HEIGHT: 74 IN | SYSTOLIC BLOOD PRESSURE: 152 MMHG | HEART RATE: 59 BPM | TEMPERATURE: 98.7 F | DIASTOLIC BLOOD PRESSURE: 84 MMHG

## 2024-05-20 DIAGNOSIS — C71.9 OLIGODENDROGLIOMA, ANAPLASTIC (H): ICD-10-CM

## 2024-05-20 DIAGNOSIS — C71.9 OLIGODENDROGLIOMA, ANAPLASTIC (H): Primary | ICD-10-CM

## 2024-05-20 PROCEDURE — 99213 OFFICE O/P EST LOW 20 MIN: CPT | Performed by: PSYCHIATRY & NEUROLOGY

## 2024-05-20 PROCEDURE — A9585 GADOBUTROL INJECTION: HCPCS | Performed by: PSYCHIATRY & NEUROLOGY

## 2024-05-20 PROCEDURE — 70553 MRI BRAIN STEM W/O & W/DYE: CPT

## 2024-05-20 PROCEDURE — 99215 OFFICE O/P EST HI 40 MIN: CPT | Performed by: PSYCHIATRY & NEUROLOGY

## 2024-05-20 PROCEDURE — 255N000002 HC RX 255 OP 636: Performed by: PSYCHIATRY & NEUROLOGY

## 2024-05-20 PROCEDURE — 70553 MRI BRAIN STEM W/O & W/DYE: CPT | Mod: 26 | Performed by: RADIOLOGY

## 2024-05-20 PROCEDURE — G0463 HOSPITAL OUTPT CLINIC VISIT: HCPCS | Performed by: PSYCHIATRY & NEUROLOGY

## 2024-05-20 RX ORDER — GADOBUTROL 604.72 MG/ML
10 INJECTION INTRAVENOUS ONCE
Status: COMPLETED | OUTPATIENT
Start: 2024-05-20 | End: 2024-05-20

## 2024-05-20 RX ADMIN — GADOBUTROL 10 ML: 604.72 INJECTION INTRAVENOUS at 13:47

## 2024-05-20 ASSESSMENT — PAIN SCALES - GENERAL: PAINLEVEL: NO PAIN (0)

## 2024-05-20 NOTE — LETTER
"    5/20/2024         RE: Gopi Farfan  [id number]  [facility] [address]        Dear Colleague,    Thank you for referring your patient, Gopi Farfan, to the [facility]. Please see a copy of my visit note below.    NEURO-ONCOLOGY VISIT  May 20, 2024    CHIEF COMPLAINT: Mr. 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 He completed chemoradiotherapy with concurrent temozolomide as of 8/21/2019 and his treatment course was complicated by thrombocytopenia. He then completed 6 cycles of adjuvant-dosed temozolomide in 2/2020.     Melvin is currently monitored on imaging surveillance and to date, imaging has been without evidence of cancer recurrence, though progressive chronic ischemic disease has been noted.     I met with Melvin and Dana (wife) today for this follow-up visit.     HISTORY OF PRESENT ILLNESS  -Melvin reports episodes of feeling unsteady. Has chronic neuropathy of his feet. Very unsteady on uneven terrain.   Dana states that he has fallen 3x in the past 6 months and that he has very poor posture when walking.    His legs are weak. Recommended a seated bicyclic/ petal machine. He will discuss this plus the option of a referral to PT/ OT with his primary care provider.   -Low energy with poor stamina. Fatigues after walking 50'. Requesting handicapped parking.   -Infrequent headaches, no changes.  -Short term memory issues continue. Long term memory is fairly good.    He has not tolerated hearing aids in the past.   -Has pre-glaucoma and follows with an eye doctor; on eye drops.   -No events concerning for seizures.   -Blood pressure is evaluated today in clinic, but denies any chest pain, shortness of breath, vision changes, or headache.         MEDICATIONS   Current Outpatient Medications   Medication Sig Dispense Refill    " acetaminophen (TYLENOL) 325 MG tablet Take 325-650 mg by mouth 4 times daily as needed for mild pain      atorvastatin (LIPITOR) 80 MG tablet Take 40 mg by mouth every evening (takes 0.5 x 80mg)      latanoprost (XALATAN) 0.005 % ophthalmic solution Place 1 drop into both eyes every evening       losartan (COZAAR) 100 MG tablet Take 100 mg by mouth every evening        DRUG ALLERGIES   Allergies   Allergen Reactions    Amlodipine Swelling       ONCOLOGIC HISTORY  -5/2019 PRESENTATION: Progressive confusion, memory loss plus difficulty with walking.   -5/28/2019 Admitted to [facility] in [place] for evaluation, then transferred to [facility] for higher level of care.   -5/28/2019 MR brain imaging revealed a large left frontal mass   -5/30/2019 SURGERY: Craniotomy for  mass resection by Dr. Downing.  PATHOLOGY: Oligodendroglioma with borderline anaplastic features (WHO grade 3); Co-deletion of chromosomal regions 1p and 19q, IDH-1 (R132H by immunohistochemistry mutated. ATRX wild type by immunohistochemistry. Mitoses were present, not sufficient in number to warrant a diagnosis of anaplasia by that criterion.  However, the presence of vascular proliferation, even though mild, is indicative of early anaplasia.  -6/13/2019 NEURO-ONC: Recommending chemoradiotherapy.   -7/8 - 8/21/2019 CHEMORADS: 59.4 Gy in 1.8 Gy daily fractions x 30 fractions per Dr. Kirby Dwyer  With [facility] plus concurrent temozolomide 75mg/m2 (180mg).   -9/16/2019 NEURO-ONC/ MRB/ CHEMO: Clinically well. Imaging with no concerns. Starting adjuvant-dosed temozolomide 150mg/m2 (360mg), cycle 1 (start date of 9/19/2019).   -10/14/2019 NEURO-ONC/CHEMO: Clinically stable. Continue adjuvant-dosed temozolomide, increasing to 200 mg/m2 (460 mg), cycle 2 (start date of 10/17/2019).   -11/11/2019 NEURO-ONC/ MRB/ CHEMO: Clinically stable. Imaging with no concerns. Adjuvant-dosed temozolomide 200 mg/m2 (460 mg), cycle 3 (start date of  11/19/2019, but delayed due to thrombocytopenia).   -12/16/2019 NEURO-ONC/ CHEMO: Clinically stable. Adjuvant-dosed temozolomide 200 mg/m2 (460 mg), cycle 4 (start date of 12/17/2019).   -1/13/2020 NEURO-ONC/ CHEMO: Clinically stable. Adjuvant-dosed temozolomide 200 mg/m2 (460 mg), cycle 5 (start date originally supposed to be 1/14/2020, but was on 1/20/2020)  -2/24/2020 NEURO-ONC/ MRB/ CHEMO: Clinically stable. Imaging stable. Adjuvant-dosed temozolomide 200 mg/m2 (460 mg), cycle 6 (start date tonight).  -5/21/2020 SURGERY: Right anterior cervical 4 - 6 discectomy and fusion and mercedes wells tong placement. Bilateral posterior Cervical 7-Thoracic 1 posterior minimally invasive foraminotomy. Preformed by Dr. Downing.   -6/8/2020 NEURO-ONC/ MRB: Clinically stable; continued cognitive complaints. Imaging with symmetric cristiana-ventricular increased T2 FLAIR. Most consistent with radiation-induced changes. Repeat imaging with spectroscopy in 2 months.  -8/3/2020 NEURO-ONC/ MRB: Clinically stable. Imaging with symmetric cristiana-ventricular T2 FLAIR, stable since the last scan. Spectroscopy consistent with radiation injury.  -11/23/2020 NEURO-ONC/ MRB: Clinically well. Imaging stable.  -2/26/2021 NEURO-ONC/ MRB: Clinically well. Imaging with a questionable area of new contrast enhancement, unchanged confluent periventricular T2 hyperintensity.  -5/24/2021 NEURO-ONC/ MRB: Clinically stable. Imaging stable; increase imaging interval.  -10/25/2021 NEURO-ONC/ MRB: Clinically stable. Imaging stable.  -4/22/2022 NEURO-ONC/ MRB: Clinically stable; discussed cardiovascular risk factors. Imaging stable.  -10/21/2022 NEURO-ONC/ MRB: Clinically well. Imaging not concerning for cancer recurrence, though progressive chronic ischemic disease noted.  -4/10/2023 NEURO-ONC/ MRB: Dizziness. Imaging not concerning for cancer recurrence.  -10/2/2023 NEURO-ONC/ MRB: No new neurological concerns. Chronic neuropathy is contributing to gait  "instability. Blood pressure elevated today. Imaging not concerning for cancer recurrence.  -5/20/2024 NEURO-ONC/ MRB: Generalized leg weakness with poor stamina; recommended primary care follow-up, provided handicapped parking form. Chronic neuropathy is also contributing to gait instability. Blood pressure elevated today. Imaging without any concern for cancer recurrence.    SOCIAL HISTORY   Tobacco use: Former smoker.  . 1 son + grandchildren.   Employment: Retired, worked as an .      PHYSICAL EXAMINATION  BP (!) 152/84   Pulse 59   Temp 98.7  F (37.1  C) (Tympanic)   Resp 16   Ht 1.867 m (6' 1.5\")   Wt 103 kg (227 lb 1.6 oz)   SpO2 96%   BMI 29.56 kg/m     Wt Readings from Last 2 Encounters:   05/20/24 103 kg (227 lb 1.6 oz)   10/02/23 104.6 kg (230 lb 9.6 oz)      Ht Readings from Last 2 Encounters:   05/20/24 1.867 m (6' 1.5\")   08/14/20 1.88 m (6' 2\")      KPS: 80-90    -Generally well appearing.    -Respiratory: No wheezing noted.   -Psychiatric: Normal mood and affect. Pleasant, talkative.  -Neurologic:   MENTAL STATUS:     Alert   Speech fluent.    Comprehension intact.     CRANIAL NERVES:    Hearing decreased.   With normal phonation, no dysfunction anticipated of the palate or tongue.  GAIT: Uses arms to stand from a seated position.     Posture is poor with walking.       MEDICAL RECORDS  Personally reviewed; No recent encounters.     LABS  Personally reviewed all available lab results; No recent labs.     IMAGING  Personally reviewed MR brain imaging from today and compared to prior imaging. To my eye, there is no new/ changing contrast enhancement. T2 hyperintensity is largely stable over the past 3 years.    Formal read; \"No evident tumor progression.\"    Imaging results were reviewed with Iveth.      IMPRESSION  On date of service, 36 minutes was spent in clinic and 7 minutes was spent preparing for the visit through extensive chart review and coordinating care for " this high complexity visit. The following is in explanation for the recommendations used to define the plan.       Melvin and Dana endorse ongoing gait instability with now, history of mechanical falls. Dana states  that he has very poor posture when walking and that his legs are generally weak. I recommended more routine exercise, potentially with a seated bicyclic/ petal machine. He will discuss this plus the option of a referral to PT/ OT with his primary care provider. Due to poor stamina with walking, I provided Melvin with a handicapped parking form. His chronic neuropathy is also contributing to this gait instability. Another contributing factor to fatigue/ exercise intolerance could be bradycardia; heart rate was 59 today. Otherwise, he has no new neurological concerns.     It remains important that Melvin continues to follow with primary care for management of cardiovascular risk factors because imaging has demonstrated symmetric cristiana-ventricular T2 FLAIR changes consistent with small vessel ischemic disease. As previously discussed too, given the symmetric nature, past use of radiation in the setting of multiple factors for cardiovascular disease, and prior spectroscopy being consistent with radiation-induced changes, I continue to have limited concerns that these changes would represent non-contrast enhancing cancer progression. As noted above, imaging has been largely stable over the past 3 years. Therefore, the need to identify and appropriately manage cardiovascular risk factors (ie diabetes, cholesterol, hypertension) per his primary care provider remains critical. Blood pressure was very elevated today. Melvin denied any chest pain, shortness of breath, vision changes, or headache. Goal blood pressure should be in the 120's systolic. LDL goal should be closer to 70.     Given the stability of the scan from a cancer standpoint, will repeat imaging every 5-7 months indefinitely per NCCN guidelines. Of [place]  [place] will schedule imaging to accommodate this. In the meantime, Iveth know to call with questions or concerns or to report new complaints and he can be seen sooner if needed.     PROBLEM LIST  WHO grade 3 oligodendroglioma  Memory complaints, stable  Cervical radiculopathy  Chemotherapy-induced thrombocytopenia   Bradycardia/ orthostatsis     PLAN  -CANCER DIRECTED THERAPY-  -As above; Continue imaging surveillance. Repeat imaging in 5-7 months.    * All imaging needs to scheduled on a 1.5T scanner due to an implantable device that Melvin has in his hip.  -[facility].     -SEIZURE MANAGEMENT-  -While this patient is at increased risk of having seizures, given the lack of seizure history, there is no indication to prescribe an antiepileptic at this time.     -Quality of life/ MOOD/ FATIGUE-  -Denies any mood issues.  -Continue to monitor mood as untreated/ undertreated depression can worsen fatigue, dysorexia, and quality of life.   -Continue to monitor headaches/ other musculoskeletal pain.   -Low stamina, fatigue.   -Handicapped parking form provided today.     -CARDIAC-  -S/p ablation to correct a cardiac arrhythmia.   -Following with cardiology.    -BRADYCARDIA/ ORTHOSTATSIS-  -Following with primary care and cardiology.  -HR today in the 50-60's.    -OTHER-  -Continue to recommend hearing testing to see if a lack in hearing is contributing to memory issues.   -Management of cardiovascular risk factors per his primary care provider as stated above.     Return to clinic in 11/2024 + imaging.     Leeanne Lozano MD  Neuro-oncology

## 2024-05-20 NOTE — NURSING NOTE
"Oncology Rooming Note    May 20, 2024 2:51 PM   Gopi Farfan is a 69 year old male who presents for:    Chief Complaint   Patient presents with    Oncology Clinic Visit     Oligodendroglioma, anaplastic     Initial Vitals: BP (!) 152/84   Pulse 59   Temp 98.7  F (37.1  C) (Tympanic)   Resp 16   Ht 1.867 m (6' 1.5\")   Wt 103 kg (227 lb 1.6 oz)   SpO2 96%   BMI 29.56 kg/m   Estimated body mass index is 29.56 kg/m  as calculated from the following:    Height as of this encounter: 1.867 m (6' 1.5\").    Weight as of this encounter: 103 kg (227 lb 1.6 oz). Body surface area is 2.31 meters squared.  No Pain (0) Comment: Data Unavailable   No LMP for male patient.  Allergies reviewed: Yes  Medications reviewed: Yes    Medications: Medication refills not needed today.  Pharmacy name entered into EPIC:    [facility] - [place], [place] - [facility] - [place], [place] - [address]    Frailty Screening:   Is the patient here for a new oncology consult visit in cancer care? 2. No      Clinical concerns: none       Shayy Smith              "

## 2024-10-23 NOTE — PROGRESS NOTES
"NEURO-ONCOLOGY VISIT  Oct 28, 2024    CHIEF COMPLAINT: Mr. 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 He completed chemoradiotherapy with concurrent temozolomide as of 8/21/2019 and his treatment course was complicated by thrombocytopenia. He then completed 6 cycles of adjuvant-dosed temozolomide in 2/2020.     Melvin is currently monitored on imaging surveillance and to date, imaging has been without evidence of cancer recurrence, though there is  chronic ischemic disease noted.    I met with Melvin and Dana (wife) today for this follow-up visit.     HISTORY OF PRESENT ILLNESS  -Melvin reports episodes of feeling unsteady. Worse when fatigued.    Completed PT; beneficial, feeling more strong. Understands the importance of doing home exercises.   Energy and stamina is better.    Has chronic neuropathy of his feet.    Dana states that he almost fell this morning.   -Infrequent headaches, no changes.  -Short term memory issues continue. Long term memory is fairly good.   -Has pre-glaucoma and follows with an eye doctor; on eye drops.   -No events concerning for seizures.       MEDICATIONS   Current Outpatient Medications   Medication Sig Dispense Refill    acetaminophen (TYLENOL) 325 MG tablet Take 325-650 mg by mouth 4 times daily as needed for mild pain      atorvastatin (LIPITOR) 80 MG tablet Take 40 mg by mouth every evening (takes 0.5 x 80mg)      Cyanocobalamin (VITAMIN B 12 PO) Take by mouth.      cyanocobalamin (VITAMIN B-12) 1000 MCG tablet Take 1 Tablet (1,000 mcg) by mouth once daily. Unknown Strength.      latanoprost (XALATAN) 0.005 % ophthalmic solution Place 1 drop into both eyes every evening       losartan (COZAAR) 100 MG tablet Take 100 mg by mouth every evening       timolol maleate (TIMOPTIC) 0.5 % ophthalmic solution Place 1 Drop into both eyes once " daily. For Glaucoma.       DRUG ALLERGIES   Allergies   Allergen Reactions    Amlodipine Swelling     Other Reaction(s): Edema      Swelling per patient       ONCOLOGIC HISTORY  -5/2019 PRESENTATION: Progressive confusion, memory loss plus difficulty with walking.   -5/28/2019 Admitted to [facility] in [place] for evaluation, then transferred to [facility] for higher level of care.   -5/28/2019 MR brain imaging revealed a large left frontal mass   -5/30/2019 SURGERY: Craniotomy for  mass resection by Dr. Downing.  PATHOLOGY: Oligodendroglioma with borderline anaplastic features (WHO grade 3); Co-deletion of chromosomal regions 1p and 19q, IDH-1 (R132H by immunohistochemistry mutated. ATRX wild type by immunohistochemistry. Mitoses were present, not sufficient in number to warrant a diagnosis of anaplasia by that criterion.  However, the presence of vascular proliferation, even though mild, is indicative of early anaplasia.  -6/13/2019 NEURO-ONC: Recommending chemoradiotherapy.   -7/8 - 8/21/2019 CHEMORADS: 59.4 Gy in 1.8 Gy daily fractions x 30 fractions per Dr. Kirby Dwyer  With [facility] plus concurrent temozolomide 75mg/m2 (180mg).   -9/16/2019 NEURO-ONC/ MRB/ CHEMO: Clinically well. Imaging with no concerns. Starting adjuvant-dosed temozolomide 150mg/m2 (360mg), cycle 1 (start date of 9/19/2019).   -10/14/2019 NEURO-ONC/CHEMO: Clinically stable. Continue adjuvant-dosed temozolomide, increasing to 200 mg/m2 (460 mg), cycle 2 (start date of 10/17/2019).   -11/11/2019 NEURO-ONC/ MRB/ CHEMO: Clinically stable. Imaging with no concerns. Adjuvant-dosed temozolomide 200 mg/m2 (460 mg), cycle 3 (start date of 11/19/2019, but delayed due to thrombocytopenia).   -12/16/2019 NEURO-ONC/ CHEMO: Clinically stable. Adjuvant-dosed temozolomide 200 mg/m2 (460 mg), cycle 4 (start date of 12/17/2019).   -1/13/2020 NEURO-ONC/ CHEMO: Clinically stable. Adjuvant-dosed temozolomide 200 mg/m2 (460 mg), cycle 5 (start  date originally supposed to be 1/14/2020, but was on 1/20/2020)  -2/24/2020 NEURO-ONC/ MRB/ CHEMO: Clinically stable. Imaging stable. Adjuvant-dosed temozolomide 200 mg/m2 (460 mg), cycle 6 (start date tonight).  -5/21/2020 SURGERY: Right anterior cervical 4 - 6 discectomy and fusion and mercedes wells tong placement. Bilateral posterior Cervical 7-Thoracic 1 posterior minimally invasive foraminotomy. Preformed by Dr. Downing.   -6/8/2020 NEURO-ONC/ MRB: Clinically stable; continued cognitive complaints. Imaging with symmetric cristiana-ventricular increased T2 FLAIR. Most consistent with radiation-induced changes. Repeat imaging with spectroscopy in 2 months.  -8/3/2020 NEURO-ONC/ MRB: Clinically stable. Imaging with symmetric cristiana-ventricular T2 FLAIR, stable since the last scan. Spectroscopy consistent with radiation injury.  -11/23/2020 NEURO-ONC/ MRB: Clinically well. Imaging stable.  -2/26/2021 NEURO-ONC/ MRB: Clinically well. Imaging with a questionable area of new contrast enhancement, unchanged confluent periventricular T2 hyperintensity.  -5/24/2021 NEURO-ONC/ MRB: Clinically stable. Imaging stable; increase imaging interval.  -10/25/2021 NEURO-ONC/ MRB: Clinically stable. Imaging stable.  -4/22/2022 NEURO-ONC/ MRB: Clinically stable; discussed cardiovascular risk factors. Imaging stable.  -10/21/2022 NEURO-ONC/ MRB: Clinically well. Imaging not concerning for cancer recurrence, though progressive chronic ischemic disease noted.  -4/10/2023 NEURO-ONC/ MRB: Dizziness. Imaging not concerning for cancer recurrence.  -10/2/2023 NEURO-ONC/ MRB: No new neurological concerns. Chronic neuropathy is contributing to gait instability. Blood pressure elevated today. Imaging not concerning for cancer recurrence.  -5/20/2024 NEURO-ONC/ MRB: Generalized leg weakness with poor stamina; recommended primary care follow-up, provided handicapped parking form. Chronic neuropathy is also contributing to gait instability. Blood pressure  "elevated today. Imaging without any concern for cancer recurrence.  -10/28/2024 NEURO-ONC/ MRB: Ongoing gait instability; focused on home PT exercises. Imaging without any concern for cancer recurrence.    SOCIAL HISTORY   Tobacco use: Former smoker.  . 1 son + grandchildren.   Employment: Retired, worked as an .      PHYSICAL EXAMINATION  /74 (BP Location: Right arm, Patient Position: Sitting, Cuff Size: Adult Regular)   Pulse 83   Temp 97.8  F (36.6  C)   Wt 103.5 kg (228 lb 1.6 oz)   SpO2 95%   BMI 29.69 kg/m     Wt Readings from Last 2 Encounters:   10/28/24 103.5 kg (228 lb 1.6 oz)   05/20/24 103 kg (227 lb 1.6 oz)      Ht Readings from Last 2 Encounters:   05/20/24 1.867 m (6' 1.5\")   08/14/20 1.88 m (6' 2\")      KPS: 80-90    -Generally well appearing.    -Respiratory: No wheezing noted.   -Psychiatric: Normal mood and affect. Pleasant, talkative.  -Neurologic:   MENTAL STATUS:     Alert   Speech fluent.    Comprehension intact.     CRANIAL NERVES:    Hearing decreased.   With normal phonation, no dysfunction anticipated of the palate or tongue.  GAIT: Unassisted.       MEDICAL RECORDS  Personally reviewed; No recent encounters.     LABS  Personally reviewed all available lab results; Lipid panel (LDL 86) and HbA1c 5.4 in 9/2024.     IMAGING  Personally reviewed MR brain imaging from today and compared to prior imaging. To my eye, there is no new/ changing contrast enhancement. T2 hyperintensity is largely stable since 2021.    Formal read; \"In this patient with left frontal grade 3 oligodendroglioma, there is no definite evidence for progression.\"    Imaging results were reviewed with Iveth.      IMPRESSION  On date of service, 33 minutes was spent in clinic and 6 minutes was spent preparing for the visit through extensive chart review and coordinating care for this high complexity visit. The following is in explanation for the recommendations used to define the plan.   "     Melvin and Dana endorse ongoing gait instability with recurrent mechanical falls. Melvin completed PT and notes some improvement in stamina and strength. He understands the importance of continuing with home PT-assigned exercises. His chronic neuropathy is also contributing to this gait instability. A    Melvin and Dana note no other new neurological concerns with mild ongoing cognitive issues.     It remains important that Melvin continues to follow with primary care for management of cardiovascular risk factors because imaging has demonstrated symmetric cristiana-ventricular T2 FLAIR changes consistent with small vessel ischemic disease. As previously discussed too, given the symmetric nature, past use of radiation in the setting of multiple factors for cardiovascular disease, and prior spectroscopy being consistent with radiation-induced changes, I continue to have limited concern that these changes would represent non-contrast enhancing cancer progression. As noted above, imaging has been largely stable over the past 3 years. Therefore, the need to identify and appropriately manage cardiovascular risk factors (ie diabetes, cholesterol, hypertension) per his primary care provider remains critical. Blood pressure was at goal today. LDL was at 86 last month with goal of < 70. HbA1c at goal.     Given the stability of the scan from a cancer standpoint, will repeat imaging every 5-7 months indefinitely per NCCN guidelines. [place] we will schedule imaging to accommodate this. In the meantime, Iveth know to call with any new concerns and he can be seen sooner if needed.     PROBLEM LIST  WHO grade 3 oligodendroglioma  Memory complaints, stable  Cervical radiculopathy  Chemotherapy-induced thrombocytopenia   Bradycardia/ orthostatsis     PLAN  -CANCER DIRECTED THERAPY-  -As above; Continue imaging surveillance. Repeat imaging in 5-7 months.    * All  imaging needs to scheduled on a 1.5T scanner due to an implantable device that Melvin has in his hip.  -[facility].     -SEIZURE MANAGEMENT-  -While this patient is at increased risk of having seizures, given the lack of seizure history, there is no indication to prescribe an antiepileptic at this time.     -Quality of life/ MOOD/ FATIGUE-  -Denies any mood issues.  -Continue to monitor mood as untreated/ undertreated depression can worsen fatigue, dysorexia, and quality of life.   -Continue to monitor headaches/ other musculoskeletal pain.   -Low stamina, fatigue.   -Handicapped parking form previously provided.     -CARDIAC-  -S/p ablation to correct a cardiac arrhythmia.   -Following with cardiology.  -Management of cardiovascular risk factors per his primary care provider as stated above.     -BRADYCARDIA/ ORTHOSTATSIS-  -Following with primary care and cardiology.    Return to clinic in 5/2025 + imaging.     The longitudinal plan of care for the diagnosis(es)/condition(s) as documented were addressed during this visit. Due to the added complexity in care, I will continue to support Melvin in the subsequent management and with ongoing continuity of care.     Leeanne Lozano MD  Neuro-oncology

## 2024-10-28 ENCOUNTER — TUMOR CONFERENCE (OUTPATIENT)
Dept: ONCOLOGY | Facility: CLINIC | Age: 69
End: 2024-10-28

## 2024-10-28 ENCOUNTER — ONCOLOGY VISIT (OUTPATIENT)
Dept: ONCOLOGY | Facility: CLINIC | Age: 69
End: 2024-10-28
Attending: PSYCHIATRY & NEUROLOGY
Payer: COMMERCIAL

## 2024-10-28 ENCOUNTER — HOSPITAL ENCOUNTER (OUTPATIENT)
Dept: MRI IMAGING | Facility: CLINIC | Age: 69
Discharge: HOME | End: 2024-10-28
Attending: PSYCHIATRY & NEUROLOGY | Admitting: PSYCHIATRY & NEUROLOGY
Payer: COMMERCIAL

## 2024-10-28 VITALS
TEMPERATURE: 97.8 F | BODY MASS INDEX: 29.69 KG/M2 | WEIGHT: 228.1 LBS | HEART RATE: 83 BPM | DIASTOLIC BLOOD PRESSURE: 74 MMHG | SYSTOLIC BLOOD PRESSURE: 130 MMHG | OXYGEN SATURATION: 95 %

## 2024-10-28 DIAGNOSIS — C71.9 OLIGODENDROGLIOMA, ANAPLASTIC (H): Primary | ICD-10-CM

## 2024-10-28 DIAGNOSIS — C71.9 OLIGODENDROGLIOMA, ANAPLASTIC (H): ICD-10-CM

## 2024-10-28 PROCEDURE — 70553 MRI BRAIN STEM W/O & W/DYE: CPT | Mod: 26 | Performed by: RADIOLOGY

## 2024-10-28 PROCEDURE — G2211 COMPLEX E/M VISIT ADD ON: HCPCS | Performed by: PSYCHIATRY & NEUROLOGY

## 2024-10-28 PROCEDURE — 255N000002 HC RX 255 OP 636: Performed by: PSYCHIATRY & NEUROLOGY

## 2024-10-28 PROCEDURE — 70553 MRI BRAIN STEM W/O & W/DYE: CPT

## 2024-10-28 PROCEDURE — 99214 OFFICE O/P EST MOD 30 MIN: CPT | Performed by: PSYCHIATRY & NEUROLOGY

## 2024-10-28 PROCEDURE — G0463 HOSPITAL OUTPT CLINIC VISIT: HCPCS | Performed by: PSYCHIATRY & NEUROLOGY

## 2024-10-28 PROCEDURE — A9585 GADOBUTROL INJECTION: HCPCS | Performed by: PSYCHIATRY & NEUROLOGY

## 2024-10-28 RX ORDER — GADOBUTROL 604.72 MG/ML
0.1 INJECTION INTRAVENOUS ONCE
Status: COMPLETED | OUTPATIENT
Start: 2024-10-28 | End: 2024-10-28

## 2024-10-28 RX ORDER — TIMOLOL MALEATE 5 MG/ML
SOLUTION/ DROPS OPHTHALMIC
COMMUNITY
Start: 2023-09-19

## 2024-10-28 RX ORDER — GADOBUTROL 604.72 MG/ML
7.5 INJECTION INTRAVENOUS ONCE
Status: DISCONTINUED | OUTPATIENT
Start: 2024-10-28 | End: 2024-10-29 | Stop reason: HOSPADM

## 2024-10-28 RX ORDER — LANOLIN ALCOHOL/MO/W.PET/CERES
CREAM (GRAM) TOPICAL
COMMUNITY
Start: 2023-09-22

## 2024-10-28 RX ADMIN — GADOBUTROL 10 ML: 604.72 INJECTION INTRAVENOUS at 09:16

## 2024-10-28 ASSESSMENT — PAIN SCALES - GENERAL: PAINLEVEL_OUTOF10: NO PAIN (0)

## 2024-10-28 NOTE — NURSING NOTE
"Oncology Rooming Note    October 28, 2024 11:16 AM   Gopi Farfan is a 69 year old male who presents for:    Chief Complaint   Patient presents with    Oncology Clinic Visit     Oligodendroglioma      Initial Vitals: /74 (BP Location: Right arm, Patient Position: Sitting, Cuff Size: Adult Regular)   Pulse 83   Temp 97.8  F (36.6  C)   Wt 103.5 kg (228 lb 1.6 oz)   SpO2 95%   BMI 29.69 kg/m   Estimated body mass index is 29.69 kg/m  as calculated from the following:    Height as of 5/20/24: 1.867 m (6' 1.5\").    Weight as of this encounter: 103.5 kg (228 lb 1.6 oz). Body surface area is 2.32 meters squared.  No Pain (0) Comment: Data Unavailable   No LMP for male patient.  Allergies reviewed: Yes  Medications reviewed: Yes    Medications: Medication refills not needed today.  Pharmacy name entered into EPIC:    [facility] - [place], [place] - [facility] - [place], [place] - [address]    Frailty Screening:   Is the patient here for a new oncology consult visit in cancer care? 2. No      Clinical concerns: none      Ashlee Graham, EMT  10/28/2024            "

## 2024-10-28 NOTE — LETTER
"10/28/2024      Gopi Farfan  [id number] [id number]      Dear Colleague,    Thank you for referring your patient, Gopi Farfan, to the [facility]. Please see a copy of my visit note below.    NEURO-ONCOLOGY VISIT  Oct 28, 2024    CHIEF COMPLAINT: Mr. 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 He completed chemoradiotherapy with concurrent temozolomide as of 8/21/2019 and his treatment course was complicated by thrombocytopenia. He then completed 6 cycles of adjuvant-dosed temozolomide in 2/2020.     Melvin is currently monitored on imaging surveillance and to date, imaging has been without evidence of cancer recurrence, though there is  chronic ischemic disease noted.    I met with Melvin and Dana (wife) today for this follow-up visit.     HISTORY OF PRESENT ILLNESS  -Melvin reports episodes of feeling unsteady. Worse when fatigued.    Completed PT; beneficial, feeling more strong. Understands the importance of doing home exercises.   Energy and stamina is better.    Has chronic neuropathy of his feet.    Dana states that he almost fell this morning.   -Infrequent headaches, no changes.  -Short term memory issues continue. Long term memory is fairly good.   -Has pre-glaucoma and follows with an eye doctor; on eye drops.   -No events concerning for seizures.       MEDICATIONS   Current Outpatient Medications   Medication Sig Dispense Refill     acetaminophen (TYLENOL) 325 MG tablet Take 325-650 mg by mouth 4 times daily as needed for mild pain       atorvastatin (LIPITOR) 80 MG tablet Take 40 mg by mouth every evening (takes 0.5 x 80mg)       Cyanocobalamin (VITAMIN B 12 PO) Take by mouth.       cyanocobalamin (VITAMIN B-12) 1000 MCG tablet Take 1 Tablet (1,000 mcg) by mouth once daily. Unknown Strength.       latanoprost " (XALATAN) 0.005 % ophthalmic solution Place 1 drop into both eyes every evening        losartan (COZAAR) 100 MG tablet Take 100 mg by mouth every evening        timolol maleate (TIMOPTIC) 0.5 % ophthalmic solution Place 1 Drop into both eyes once daily. For Glaucoma.       DRUG ALLERGIES   Allergies   Allergen Reactions     Amlodipine Swelling     Other Reaction(s): Edema      Swelling per patient       ONCOLOGIC HISTORY  -5/2019 PRESENTATION: Progressive confusion, memory loss plus difficulty with walking.   -5/28/2019 Admitted to [facility] in [place] for evaluation, then transferred to [facility] for higher level of care.   -5/28/2019 MR brain imaging revealed a large left frontal mass   -5/30/2019 SURGERY: Craniotomy for  mass resection by Dr. Downing.  PATHOLOGY: Oligodendroglioma with borderline anaplastic features (WHO grade 3); Co-deletion of chromosomal regions 1p and 19q, IDH-1 (R132H by immunohistochemistry mutated. ATRX wild type by immunohistochemistry. Mitoses were present, not sufficient in number to warrant a diagnosis of anaplasia by that criterion.  However, the presence of vascular proliferation, even though mild, is indicative of early anaplasia.  -6/13/2019 NEURO-ONC: Recommending chemoradiotherapy.   -7/8 - 8/21/2019 CHEMORADS: 59.4 Gy in 1.8 Gy daily fractions x 30 fractions per Dr. Kirby Dwyer  With [facility] plus concurrent temozolomide 75mg/m2 (180mg).   -9/16/2019 NEURO-ONC/ MRB/ CHEMO: Clinically well. Imaging with no concerns. Starting adjuvant-dosed temozolomide 150mg/m2 (360mg), cycle 1 (start date of 9/19/2019).   -10/14/2019 NEURO-ONC/CHEMO: Clinically stable. Continue adjuvant-dosed temozolomide, increasing to 200 mg/m2 (460 mg), cycle 2 (start date of 10/17/2019).   -11/11/2019 NEURO-ONC/ MRB/ CHEMO: Clinically stable. Imaging with no concerns. Adjuvant-dosed temozolomide 200 mg/m2 (460 mg), cycle 3 (start date of 11/19/2019, but delayed due to thrombocytopenia).    -12/16/2019 NEURO-ONC/ CHEMO: Clinically stable. Adjuvant-dosed temozolomide 200 mg/m2 (460 mg), cycle 4 (start date of 12/17/2019).   -1/13/2020 NEURO-ONC/ CHEMO: Clinically stable. Adjuvant-dosed temozolomide 200 mg/m2 (460 mg), cycle 5 (start date originally supposed to be 1/14/2020, but was on 1/20/2020)  -2/24/2020 NEURO-ONC/ MRB/ CHEMO: Clinically stable. Imaging stable. Adjuvant-dosed temozolomide 200 mg/m2 (460 mg), cycle 6 (start date tonight).  -5/21/2020 SURGERY: Right anterior cervical 4 - 6 discectomy and fusion and mercedes wells tong placement. Bilateral posterior Cervical 7-Thoracic 1 posterior minimally invasive foraminotomy. Preformed by Dr. Downing.   -6/8/2020 NEURO-ONC/ MRB: Clinically stable; continued cognitive complaints. Imaging with symmetric cristiana-ventricular increased T2 FLAIR. Most consistent with radiation-induced changes. Repeat imaging with spectroscopy in 2 months.  -8/3/2020 NEURO-ONC/ MRB: Clinically stable. Imaging with symmetric cristiana-ventricular T2 FLAIR, stable since the last scan. Spectroscopy consistent with radiation injury.  -11/23/2020 NEURO-ONC/ MRB: Clinically well. Imaging stable.  -2/26/2021 NEURO-ONC/ MRB: Clinically well. Imaging with a questionable area of new contrast enhancement, unchanged confluent periventricular T2 hyperintensity.  -5/24/2021 NEURO-ONC/ MRB: Clinically stable. Imaging stable; increase imaging interval.  -10/25/2021 NEURO-ONC/ MRB: Clinically stable. Imaging stable.  -4/22/2022 NEURO-ONC/ MRB: Clinically stable; discussed cardiovascular risk factors. Imaging stable.  -10/21/2022 NEURO-ONC/ MRB: Clinically well. Imaging not concerning for cancer recurrence, though progressive chronic ischemic disease noted.  -4/10/2023 NEURO-ONC/ MRB: Dizziness. Imaging not concerning for cancer recurrence.  -10/2/2023 NEURO-ONC/ MRB: No new neurological concerns. Chronic neuropathy is contributing to gait instability. Blood pressure elevated today. Imaging not  "concerning for cancer recurrence.  -5/20/2024 NEURO-ONC/ MRB: Generalized leg weakness with poor stamina; recommended primary care follow-up, provided handicapped parking form. Chronic neuropathy is also contributing to gait instability. Blood pressure elevated today. Imaging without any concern for cancer recurrence.  -10/28/2024 NEURO-ONC/ MRB: Ongoing gait instability; focused on home PT exercises. Imaging without any concern for cancer recurrence.    SOCIAL HISTORY   Tobacco use: Former smoker.  . 1 son + grandchildren.   Employment: Retired, worked as an .      PHYSICAL EXAMINATION  /74 (BP Location: Right arm, Patient Position: Sitting, Cuff Size: Adult Regular)   Pulse 83   Temp 97.8  F (36.6  C)   Wt 103.5 kg (228 lb 1.6 oz)   SpO2 95%   BMI 29.69 kg/m     Wt Readings from Last 2 Encounters:   10/28/24 103.5 kg (228 lb 1.6 oz)   05/20/24 103 kg (227 lb 1.6 oz)      Ht Readings from Last 2 Encounters:   05/20/24 1.867 m (6' 1.5\")   08/14/20 1.88 m (6' 2\")      KPS: 80-90    -Generally well appearing.    -Respiratory: No wheezing noted.   -Psychiatric: Normal mood and affect. Pleasant, talkative.  -Neurologic:   MENTAL STATUS:     Alert   Speech fluent.    Comprehension intact.     CRANIAL NERVES:    Hearing decreased.   With normal phonation, no dysfunction anticipated of the palate or tongue.  GAIT: Unassisted.       MEDICAL RECORDS  Personally reviewed; No recent encounters.     LABS  Personally reviewed all available lab results; Lipid panel (LDL 86) and HbA1c 5.4 in 9/2024.     IMAGING  Personally reviewed MR brain imaging from today and compared to prior imaging. To my eye, there is no new/ changing contrast enhancement. T2 hyperintensity is largely stable since 2021.    Formal read; \"In this patient with left frontal grade 3 oligodendroglioma, there is no definite evidence for progression.\"    Imaging results were reviewed with Iveth.      IMPRESSION  On date of " service, 33 minutes was spent in clinic and 6 minutes was spent preparing for the visit through extensive chart review and coordinating care for this high complexity visit. The following is in explanation for the recommendations used to define the plan.       Melvin and Dana endorse ongoing gait instability with recurrent mechanical falls. Melvin completed PT and notes some improvement in stamina and strength. He understands the importance of continuing with home PT-assigned exercises. His chronic neuropathy is also contributing to this gait instability. A    Melvin and Dana note no other new neurological concerns with mild ongoing cognitive issues.     It remains important that Melvin continues to follow with primary care for management of cardiovascular risk factors because imaging has demonstrated symmetric cristiana-ventricular T2 FLAIR changes consistent with small vessel ischemic disease. As previously discussed too, given the symmetric nature, past use of radiation in the setting of multiple factors for cardiovascular disease, and prior spectroscopy being consistent with radiation-induced changes, I continue to have limited concern that these changes would represent non-contrast enhancing cancer progression. As noted above, imaging has been largely stable over the past 3 years. Therefore, the need to identify and appropriately manage cardiovascular risk factors (ie diabetes, cholesterol, hypertension) per his primary care provider remains critical. Blood pressure was at goal today. LDL was at 86 last month with goal of < 70. HbA1c at goal.     Given the stability of the scan from a cancer standpoint, will repeat imaging every 5-7 months indefinitely per NCCN guidelines. [place] we will schedule imaging to accommodate this. In the meantime, Iveth know to call with any new concerns and he can be seen sooner if needed.     PROBLEM LIST  WHO grade 3  oligodendroglioma  Memory complaints, stable  Cervical radiculopathy  Chemotherapy-induced thrombocytopenia   Bradycardia/ orthostatsis     PLAN  -CANCER DIRECTED THERAPY-  -As above; Continue imaging surveillance. Repeat imaging in 5-7 months.    * All imaging needs to scheduled on a 1.5T scanner due to an implantable device that Melvin has in his hip.  -[facility].     -SEIZURE MANAGEMENT-  -While this patient is at increased risk of having seizures, given the lack of seizure history, there is no indication to prescribe an antiepileptic at this time.     -Quality of life/ MOOD/ FATIGUE-  -Denies any mood issues.  -Continue to monitor mood as untreated/ undertreated depression can worsen fatigue, dysorexia, and quality of life.   -Continue to monitor headaches/ other musculoskeletal pain.   -Low stamina, fatigue.   -Handicapped parking form previously provided.     -CARDIAC-  -S/p ablation to correct a cardiac arrhythmia.   -Following with cardiology.  -Management of cardiovascular risk factors per his primary care provider as stated above.     -BRADYCARDIA/ ORTHOSTATSIS-  -Following with primary care and cardiology.    Return to clinic in 5/2025 + imaging.     The longitudinal plan of care for the diagnosis(es)/condition(s) as documented were addressed during this visit. Due to the added complexity in care, I will continue to support Melvin in the subsequent management and with ongoing continuity of care.     Leeanne Lozano MD  Neuro-oncology     Again, thank you for allowing me to participate in the care of your patient.        Sincerely,        Leeanne Lozano MD

## 2025-05-15 NOTE — PROGRESS NOTES
"NEURO-ONCOLOGY VISIT  May 19, 2025    CHIEF COMPLAINT: Mr. 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 He completed chemoradiotherapy with concurrent temozolomide as of 8/21/2019 and his treatment course was complicated by thrombocytopenia. He then completed 6 cycles of adjuvant-dosed temozolomide in 2/2020.     Melvin is currently monitored on imaging surveillance and to date, imaging has been without evidence of cancer recurrence, though there is chronic ischemic disease noted throughout both cerebral hemispheres.    I met with Melvin and Dana (wife) today for this follow-up visit.     HISTORY OF PRESENT ILLNESS  -Melvin continues to feel unsteady when walking.    Starting about 3 weeks ago, he notes dizziness upon standing.    Primary care is aware and he is tracking his blood pressures.    He is symptomatic when his blood pressure runs very high.    Feeling less strong in the legs. Per Dana, he did not commit to doing home exercises.   Lower energy and stamina.   Needing to rest after walking a long distance (about 100 yards).    Has chronic neuropathy of his feet.   -Infrequent headaches, no changes.  -Short term memory issues continue. Per Dana, his memory is slightly worse.    Long term memory is fairly good.    Mood is good.   -Has pre-glaucoma and follows with an eye doctor; on eye drops.    Noting floaters in his left eye.   -No events concerning for seizures.       MEDICATIONS   Current Outpatient Medications   Medication Sig Dispense Refill    acetaminophen (TYLENOL) 325 MG tablet Take 325-650 mg by mouth 4 times daily as needed for mild pain      atorvastatin (LIPITOR) 80 MG tablet Take 40 mg by mouth every evening (takes 0.5 x 80mg)      Cyanocobalamin (VITAMIN B 12 PO) Take by mouth.      cyanocobalamin (VITAMIN B-12) 1000 MCG tablet Take 1 Tablet (1,000 " mcg) by mouth once daily. Unknown Strength.      latanoprost (XALATAN) 0.005 % ophthalmic solution Place 1 drop into both eyes every evening       losartan (COZAAR) 100 MG tablet Take 100 mg by mouth every evening       timolol maleate (TIMOPTIC) 0.5 % ophthalmic solution Place 1 Drop into both eyes once daily. For Glaucoma.       DRUG ALLERGIES   Allergies   Allergen Reactions    Amlodipine Swelling     Other Reaction(s): Edema      Swelling per patient       ONCOLOGIC HISTORY  -5/2019 PRESENTATION: Progressive confusion, memory loss plus difficulty with walking.   -5/28/2019 Admitted to [facility] in [place] for evaluation, then transferred to [facility] for higher level of care.   -5/28/2019 MR brain imaging revealed a large left frontal mass   -5/30/2019 SURGERY: Craniotomy for  mass resection by Dr. Downing.  PATHOLOGY: Oligodendroglioma with borderline anaplastic features (WHO grade 3); Co-deletion of chromosomal regions 1p and 19q, IDH-1 (R132H by immunohistochemistry mutated. ATRX wild type by immunohistochemistry. Mitoses were present, not sufficient in number to warrant a diagnosis of anaplasia by that criterion.  However, the presence of vascular proliferation, even though mild, is indicative of early anaplasia.  -6/13/2019 NEURO-ONC: Recommending chemoradiotherapy.   -7/8 - 8/21/2019 CHEMORADS: 59.4 Gy in 1.8 Gy daily fractions x 30 fractions per Dr. Kirby Dwyer  With [facility] plus concurrent temozolomide 75mg/m2 (180mg).   -9/16/2019 NEURO-ONC/ MRB/ CHEMO: Clinically well. Imaging with no concerns. Starting adjuvant-dosed temozolomide 150mg/m2 (360mg), cycle 1 (start date of 9/19/2019).   -10/14/2019 NEURO-ONC/CHEMO: Clinically stable. Continue adjuvant-dosed temozolomide, increasing to 200 mg/m2 (460 mg), cycle 2 (start date of 10/17/2019).   -11/11/2019 NEURO-ONC/ MRB/ CHEMO: Clinically stable. Imaging with no concerns. Adjuvant-dosed temozolomide 200 mg/m2 (460 mg), cycle 3 (start  date of 11/19/2019, but delayed due to thrombocytopenia).   -12/16/2019 NEURO-ONC/ CHEMO: Clinically stable. Adjuvant-dosed temozolomide 200 mg/m2 (460 mg), cycle 4 (start date of 12/17/2019).   -1/13/2020 NEURO-ONC/ CHEMO: Clinically stable. Adjuvant-dosed temozolomide 200 mg/m2 (460 mg), cycle 5 (start date originally supposed to be 1/14/2020, but was on 1/20/2020)  -2/24/2020 NEURO-ONC/ MRB/ CHEMO: Clinically stable. Imaging stable. Adjuvant-dosed temozolomide 200 mg/m2 (460 mg), cycle 6 (start date tonight).  -5/21/2020 SURGERY: Right anterior cervical 4 - 6 discectomy and fusion and mercedes wells tong placement. Bilateral posterior Cervical 7-Thoracic 1 posterior minimally invasive foraminotomy. Preformed by Dr. Downing.   -6/8/2020 NEURO-ONC/ MRB: Clinically stable; continued cognitive complaints. Imaging with symmetric cristiana-ventricular increased T2 FLAIR. Most consistent with radiation-induced changes. Repeat imaging with spectroscopy in 2 months.  -8/3/2020 NEURO-ONC/ MRB: Clinically stable. Imaging with symmetric cristiana-ventricular T2 FLAIR, stable since the last scan. Spectroscopy consistent with radiation injury.  -11/23/2020 NEURO-ONC/ MRB: Clinically well. Imaging stable.  -2/26/2021 NEURO-ONC/ MRB: Clinically well. Imaging with a questionable area of new contrast enhancement, unchanged confluent periventricular T2 hyperintensity.  -5/24/2021 NEURO-ONC/ MRB: Clinically stable. Imaging stable; increase imaging interval.  -10/25/2021 NEURO-ONC/ MRB: Clinically stable. Imaging stable.  -4/22/2022 NEURO-ONC/ MRB: Clinically stable; discussed cardiovascular risk factors. Imaging stable.  -10/21/2022 NEURO-ONC/ MRB: Clinically well. Imaging not concerning for cancer recurrence, though progressive chronic ischemic disease noted.  -4/10/2023 NEURO-ONC/ MRB: Dizziness. Imaging not concerning for cancer recurrence.  -10/2/2023 NEURO-ONC/ MRB: No new neurological concerns. Chronic neuropathy is contributing to gait  "instability. Blood pressure elevated today. Imaging not concerning for cancer recurrence.  -5/20/2024 NEURO-ONC/ MRB: Generalized leg weakness with poor stamina; recommended primary care follow-up, provided handicapped parking form. Chronic neuropathy is also contributing to gait instability. Blood pressure elevated today. Imaging without any concern for cancer recurrence.  -10/28/2024 NEURO-ONC/ MRB: Ongoing gait instability; focused on home PT exercises. Imaging without any concern for cancer recurrence.  -5/19/2025 NEURO-ONC/ MRB: Ongoing gait instability. Issues with hypertension. Imaging without any concern for cancer recurrence.    SOCIAL HISTORY   Tobacco use: Former smoker.  . 1 son + grandchildren.   Employment: Retired, worked as an .      PHYSICAL EXAMINATION  /78 (BP Location: Right arm, Patient Position: Sitting, Cuff Size: Adult Large)   Pulse 73   Temp 98  F (36.7  C) (Oral)   Resp 20   Wt 102.5 kg (226 lb)   SpO2 97%   BMI 29.41 kg/m     Wt Readings from Last 2 Encounters:   05/19/25 102.5 kg (226 lb)   10/28/24 103.5 kg (228 lb 1.6 oz)      Ht Readings from Last 2 Encounters:   05/20/24 1.867 m (6' 1.5\")   08/14/20 1.88 m (6' 2\")      KPS: 80-90    -Generally well appearing.    -Respiratory: No wheezing noted.   -Psychiatric: Normal mood and affect. Pleasant, talkative.  -Neurologic:   MENTAL STATUS:     Alert   Speech fluent.    Comprehension intact.     CRANIAL NERVES:    Hearing decreased.   With normal phonation, no dysfunction anticipated of the palate or tongue.  GAIT: Unassisted.       MEDICAL RECORDS  Personally reviewed; No recent encounters.     LABS  Personally reviewed all available lab results; Cologuard results negative in 10/2024.     IMAGING  Personally reviewed MR brain imaging from today and compared to prior imaging. To my eye, there is no new/ changing contrast enhancement. T2 hyperintensity is largely stable since 2021.    Formal read; \"In this " "patient with left frontal grade 3 oligodendroglioma, there is no definite evidence for progression.\"    Imaging results were reviewed with Melvin and Dana.      IMPRESSION  On date of service, 34 minutes was spent in clinic and 7 minutes was spent preparing for the visit through extensive chart review and coordinating care for this high complexity visit. The following is in explanation for the recommendations used to define the plan.       Melvin and Dana endorse ongoing gait instability that is confounded by periods of dizziness, deconditioning as Melvin did not commit to home PT exercises, exercise intolerance, and chronic peripheral neuropathy. He is following closely with his primary care provider. Melvin and Dana note no other new neurological concerns, but do endorse a slight worsening of memory/ cognitive issues.     As previously discussed, it remains important that Melvin continues to follow with primary care for management of cardiovascular risk factors because imaging has demonstrated symmetric cristiana-ventricular T2 FLAIR changes consistent with small vessel ischemic disease. Given the symmetric nature, past use of radiation in the setting of multiple factors for cardiovascular disease, and prior spectroscopy being consistent with radiation-induced changes, I continue to have limited concern that these changes would represent non-contrast enhancing cancer progression. As noted above, imaging has been largely stable over the past 3 years. Therefore, the need to identify and appropriately manage cardiovascular risk factors (ie diabetes, cholesterol, hypertension) per his primary care provider remains critical. Blood pressure was near goal today (systolic in the 130's). Of note, when Melvin is most symptomatic/ feeling dizzy, he is noting that his blood pressure is very elevated. He is following closely with his primary care provider.     Given the stability of the scan from a cancer standpoint, will repeat imaging every " 5-7 months indefinitely per NCCN guidelines. I personally reviewed Melvin's imaging and case at Brain Tumor Conference and all in attendance were in agreement with this impression. [place] we will schedule imaging to accommodate this. In the meantime, Melvin and Dana know to call with any new concerns and he can be seen sooner if needed.     PROBLEM LIST  WHO grade 3 oligodendroglioma  Memory complaints, stable  Cervical radiculopathy  Chemotherapy-induced thrombocytopenia   Bradycardia/ orthostatsis     PLAN  -CANCER DIRECTED THERAPY-  -As above; Continue imaging surveillance. Repeat imaging in 5-7 months.    * All imaging needs to scheduled on a 1.5T scanner due to an implantable device that Melvin has in his hip.  -Will [facility] today.     -SEIZURE MANAGEMENT-  -While this patient is at increased risk of having seizures, given the lack of seizure history, there is no indication to prescribe an antiepileptic at this time.     -Quality of life/ MOOD/ FATIGUE-  -Denies any mood issues.  -Continue to monitor mood as untreated/ undertreated depression can worsen fatigue, dysorexia, and quality of life.   -Continue to monitor headaches/ other musculoskeletal pain.   -Low stamina, fatigue.   -Handicapped parking form previously provided.     -CARDIAC-  -S/p ablation to correct a cardiac arrhythmia.   -Following with cardiology.  -Management of cardiovascular risk factors per his primary care provider as stated above.     -BRADYCARDIA/ ORTHOSTATSIS-  -Following with primary care and cardiology.    Return to clinic in 10/2025 + imaging.     The longitudinal plan of care for the diagnosis(es)/condition(s) as documented were addressed during this visit. Due to the added complexity in care, I will continue to support Melvin in the subsequent management and with ongoing continuity of care.     Leeanne Lozano MD  Neuro-oncology

## 2025-05-19 ENCOUNTER — PATIENT OUTREACH (OUTPATIENT)
Dept: ONCOLOGY | Facility: CLINIC | Age: 70
End: 2025-05-19

## 2025-05-19 ENCOUNTER — ONCOLOGY VISIT (OUTPATIENT)
Dept: ONCOLOGY | Facility: CLINIC | Age: 70
End: 2025-05-19
Attending: PSYCHIATRY & NEUROLOGY
Payer: COMMERCIAL

## 2025-05-19 ENCOUNTER — HOSPITAL ENCOUNTER (OUTPATIENT)
Dept: MRI IMAGING | Facility: CLINIC | Age: 70
Discharge: HOME | End: 2025-05-19
Attending: PSYCHIATRY & NEUROLOGY | Admitting: PSYCHIATRY & NEUROLOGY
Payer: COMMERCIAL

## 2025-05-19 VITALS
HEART RATE: 73 BPM | OXYGEN SATURATION: 97 % | WEIGHT: 226 LBS | SYSTOLIC BLOOD PRESSURE: 135 MMHG | DIASTOLIC BLOOD PRESSURE: 78 MMHG | BODY MASS INDEX: 29.41 KG/M2 | RESPIRATION RATE: 20 BRPM | TEMPERATURE: 98 F

## 2025-05-19 DIAGNOSIS — C71.9 OLIGODENDROGLIOMA, ANAPLASTIC (H): ICD-10-CM

## 2025-05-19 DIAGNOSIS — C71.9 OLIGODENDROGLIOMA, ANAPLASTIC (H): Primary | ICD-10-CM

## 2025-05-19 PROCEDURE — G2211 COMPLEX E/M VISIT ADD ON: HCPCS | Performed by: PSYCHIATRY & NEUROLOGY

## 2025-05-19 PROCEDURE — 70553 MRI BRAIN STEM W/O & W/DYE: CPT

## 2025-05-19 PROCEDURE — A9585 GADOBUTROL INJECTION: HCPCS | Performed by: PSYCHIATRY & NEUROLOGY

## 2025-05-19 PROCEDURE — 99214 OFFICE O/P EST MOD 30 MIN: CPT | Performed by: PSYCHIATRY & NEUROLOGY

## 2025-05-19 PROCEDURE — 70553 MRI BRAIN STEM W/O & W/DYE: CPT | Mod: 26 | Performed by: RADIOLOGY

## 2025-05-19 PROCEDURE — 255N000002 HC RX 255 OP 636: Performed by: PSYCHIATRY & NEUROLOGY

## 2025-05-19 PROCEDURE — G0463 HOSPITAL OUTPT CLINIC VISIT: HCPCS | Performed by: PSYCHIATRY & NEUROLOGY

## 2025-05-19 RX ORDER — GADOBUTROL 604.72 MG/ML
10 INJECTION INTRAVENOUS ONCE
Status: COMPLETED | OUTPATIENT
Start: 2025-05-19 | End: 2025-05-19

## 2025-05-19 RX ADMIN — GADOBUTROL 10 ML: 604.72 INJECTION INTRAVENOUS at 10:20

## 2025-05-19 ASSESSMENT — PAIN SCALES - GENERAL: PAINLEVEL_OUTOF10: NO PAIN (0)

## 2025-05-19 NOTE — LETTER
5/19/2025      Gopi Farfan  [id number] [facility] [place] [address]      To Whom it May Concern:        Mr. Gopi Farfan attended his scheduled appointments in person at the [facility] on 5/19/25 at 4:00 pm for a medically necessary follow-up test followed by an office visit required for continued management of his diagnosed brain cancer.     Please do not hesitate to contact our clinic with any additional questions.         Sincerely,        Leeanne Lozano MD  Neuro-Oncology

## 2025-05-19 NOTE — NURSING NOTE
"Oncology Rooming Note    May 19, 2025 3:44 PM   Gopi Farfan is a 70 year old male who presents for:    Chief Complaint   Patient presents with    Oncology Clinic Visit     RTN Oligodendroglioma      Initial Vitals: /78 (BP Location: Right arm, Patient Position: Sitting, Cuff Size: Adult Large)   Pulse 73   Temp 98  F (36.7  C) (Oral)   Resp 20   Wt 102.5 kg (226 lb)   SpO2 97%   BMI 29.41 kg/m   Estimated body mass index is 29.41 kg/m  as calculated from the following:    Height as of 5/20/24: 1.867 m (6' 1.5\").    Weight as of this encounter: 102.5 kg (226 lb). Body surface area is 2.31 meters squared.  No Pain (0) Comment: Data Unavailable   No LMP for male patient.  Allergies reviewed: Yes  Medications reviewed: Yes    Medications: Medication refills not needed today.  Pharmacy name entered into EPIC: [facility] - [place], [place] - [facility]    Frailty Screening:   Is the patient here for a new oncology consult visit in cancer care? 2. No    PHQ9:  Did this patient require a PHQ9?: No      Clinical concerns: None      Amando Austin             "

## 2025-05-19 NOTE — LETTER
"5/19/2025      Gopi Farfan  [id number] [facility] [address]      Dear Colleague,    Thank you for referring your patient, Gopi Farfan, to the [facility]. Please see a copy of my visit note below.    NEURO-ONCOLOGY VISIT  May 19, 2025    CHIEF COMPLAINT: Mr. 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 He completed chemoradiotherapy with concurrent temozolomide as of 8/21/2019 and his treatment course was complicated by thrombocytopenia. He then completed 6 cycles of adjuvant-dosed temozolomide in 2/2020.     Melvin is currently monitored on imaging surveillance and to date, imaging has been without evidence of cancer recurrence, though there is chronic ischemic disease noted throughout both cerebral hemispheres.    I met with Melvin and Dana (wife) today for this follow-up visit.     HISTORY OF PRESENT ILLNESS  -Melvin continues to feel unsteady when walking.    Starting about 3 weeks ago, he notes dizziness upon standing.    Primary care is aware and he is tracking his blood pressures.    He is symptomatic when his blood pressure runs very high.    Feeling less strong in the legs. Per Dana, he did not commit to doing home exercises.   Lower energy and stamina.   Needing to rest after walking a long distance (about 100 yards).    Has chronic neuropathy of his feet.   -Infrequent headaches, no changes.  -Short term memory issues continue. Per Dana, his memory is slightly worse.    Long term memory is fairly good.    Mood is good.   -Has pre-glaucoma and follows with an eye doctor; on eye drops.    Noting floaters in his left eye.   -No events concerning for seizures.       MEDICATIONS   Current Outpatient Medications   Medication Sig Dispense Refill     acetaminophen (TYLENOL) 325 MG tablet Take 325-650 mg by mouth 4 times daily as " needed for mild pain       atorvastatin (LIPITOR) 80 MG tablet Take 40 mg by mouth every evening (takes 0.5 x 80mg)       Cyanocobalamin (VITAMIN B 12 PO) Take by mouth.       cyanocobalamin (VITAMIN B-12) 1000 MCG tablet Take 1 Tablet (1,000 mcg) by mouth once daily. Unknown Strength.       latanoprost (XALATAN) 0.005 % ophthalmic solution Place 1 drop into both eyes every evening        losartan (COZAAR) 100 MG tablet Take 100 mg by mouth every evening        timolol maleate (TIMOPTIC) 0.5 % ophthalmic solution Place 1 Drop into both eyes once daily. For Glaucoma.       DRUG ALLERGIES   Allergies   Allergen Reactions     Amlodipine Swelling     Other Reaction(s): Edema      Swelling per patient       ONCOLOGIC HISTORY  -5/2019 PRESENTATION: Progressive confusion, memory loss plus difficulty with walking.   -5/28/2019 Admitted to [facility] in [place] for evaluation, then transferred to [facility] for higher level of care.   -5/28/2019 MR brain imaging revealed a large left frontal mass   -5/30/2019 SURGERY: Craniotomy for  mass resection by Dr. Downing.  PATHOLOGY: Oligodendroglioma with borderline anaplastic features (WHO grade 3); Co-deletion of chromosomal regions 1p and 19q, IDH-1 (R132H by immunohistochemistry mutated. ATRX wild type by immunohistochemistry. Mitoses were present, not sufficient in number to warrant a diagnosis of anaplasia by that criterion.  However, the presence of vascular proliferation, even though mild, is indicative of early anaplasia.  -6/13/2019 NEURO-ONC: Recommending chemoradiotherapy.   -7/8 - 8/21/2019 CHEMORADS: 59.4 Gy in 1.8 Gy daily fractions x 30 fractions per Dr. Kirby Dwyer  With [facility] plus concurrent temozolomide 75mg/m2 (180mg).   -9/16/2019 NEURO-ONC/ MRB/ CHEMO: Clinically well. Imaging with no concerns. Starting adjuvant-dosed temozolomide 150mg/m2 (360mg), cycle 1 (start date of 9/19/2019).   -10/14/2019 NEURO-ONC/CHEMO: Clinically stable. Continue  adjuvant-dosed temozolomide, increasing to 200 mg/m2 (460 mg), cycle 2 (start date of 10/17/2019).   -11/11/2019 NEURO-ONC/ MRB/ CHEMO: Clinically stable. Imaging with no concerns. Adjuvant-dosed temozolomide 200 mg/m2 (460 mg), cycle 3 (start date of 11/19/2019, but delayed due to thrombocytopenia).   -12/16/2019 NEURO-ONC/ CHEMO: Clinically stable. Adjuvant-dosed temozolomide 200 mg/m2 (460 mg), cycle 4 (start date of 12/17/2019).   -1/13/2020 NEURO-ONC/ CHEMO: Clinically stable. Adjuvant-dosed temozolomide 200 mg/m2 (460 mg), cycle 5 (start date originally supposed to be 1/14/2020, but was on 1/20/2020)  -2/24/2020 NEURO-ONC/ MRB/ CHEMO: Clinically stable. Imaging stable. Adjuvant-dosed temozolomide 200 mg/m2 (460 mg), cycle 6 (start date tonight).  -5/21/2020 SURGERY: Right anterior cervical 4 - 6 discectomy and fusion and mercedes wells tong placement. Bilateral posterior Cervical 7-Thoracic 1 posterior minimally invasive foraminotomy. Preformed by Dr. Downing.   -6/8/2020 NEURO-ONC/ MRB: Clinically stable; continued cognitive complaints. Imaging with symmetric cristiana-ventricular increased T2 FLAIR. Most consistent with radiation-induced changes. Repeat imaging with spectroscopy in 2 months.  -8/3/2020 NEURO-ONC/ MRB: Clinically stable. Imaging with symmetric cristiana-ventricular T2 FLAIR, stable since the last scan. Spectroscopy consistent with radiation injury.  -11/23/2020 NEURO-ONC/ MRB: Clinically well. Imaging stable.  -2/26/2021 NEURO-ONC/ MRB: Clinically well. Imaging with a questionable area of new contrast enhancement, unchanged confluent periventricular T2 hyperintensity.  -5/24/2021 NEURO-ONC/ MRB: Clinically stable. Imaging stable; increase imaging interval.  -10/25/2021 NEURO-ONC/ MRB: Clinically stable. Imaging stable.  -4/22/2022 NEURO-ONC/ MRB: Clinically stable; discussed cardiovascular risk factors. Imaging stable.  -10/21/2022 NEURO-ONC/ MRB: Clinically well. Imaging not concerning for cancer  "recurrence, though progressive chronic ischemic disease noted.  -4/10/2023 NEURO-ONC/ MRB: Dizziness. Imaging not concerning for cancer recurrence.  -10/2/2023 NEURO-ONC/ MRB: No new neurological concerns. Chronic neuropathy is contributing to gait instability. Blood pressure elevated today. Imaging not concerning for cancer recurrence.  -5/20/2024 NEURO-ONC/ MRB: Generalized leg weakness with poor stamina; recommended primary care follow-up, provided handicapped parking form. Chronic neuropathy is also contributing to gait instability. Blood pressure elevated today. Imaging without any concern for cancer recurrence.  -10/28/2024 NEURO-ONC/ MRB: Ongoing gait instability; focused on home PT exercises. Imaging without any concern for cancer recurrence.  -5/19/2025 NEURO-ONC/ MRB: Ongoing gait instability. Issues with hypertension. Imaging without any concern for cancer recurrence.    SOCIAL HISTORY   Tobacco use: Former smoker.  . 1 son + grandchildren.   Employment: Retired, worked as an .      PHYSICAL EXAMINATION  /78 (BP Location: Right arm, Patient Position: Sitting, Cuff Size: Adult Large)   Pulse 73   Temp 98  F (36.7  C) (Oral)   Resp 20   Wt 102.5 kg (226 lb)   SpO2 97%   BMI 29.41 kg/m     Wt Readings from Last 2 Encounters:   05/19/25 102.5 kg (226 lb)   10/28/24 103.5 kg (228 lb 1.6 oz)      Ht Readings from Last 2 Encounters:   05/20/24 1.867 m (6' 1.5\")   08/14/20 1.88 m (6' 2\")      KPS: 80-90    -Generally well appearing.    -Respiratory: No wheezing noted.   -Psychiatric: Normal mood and affect. Pleasant, talkative.  -Neurologic:   MENTAL STATUS:     Alert   Speech fluent.    Comprehension intact.     CRANIAL NERVES:    Hearing decreased.   With normal phonation, no dysfunction anticipated of the palate or tongue.  GAIT: Unassisted.       MEDICAL RECORDS  Personally reviewed; No recent encounters.     LABS  Personally reviewed all available lab results; Cologuard results " "negative in 10/2024.     IMAGING  Personally reviewed MR brain imaging from today and compared to prior imaging. To my eye, there is no new/ changing contrast enhancement. T2 hyperintensity is largely stable since 2021.    Formal read; \"In this patient with left frontal grade 3 oligodendroglioma, there is no definite evidence for progression.\"    Imaging results were reviewed with Iveth.      IMPRESSION  On date of service, 34 minutes was spent in clinic and 7 minutes was spent preparing for the visit through extensive chart review and coordinating care for this high complexity visit. The following is in explanation for the recommendations used to define the plan.       Melvin and Dana endorse ongoing gait instability that is confounded by periods of dizziness, deconditioning as Melvin did not commit to home PT exercises, exercise intolerance, and chronic peripheral neuropathy. He is following closely with his primary care provider. Melvin and Dana note no other new neurological concerns, but do endorse a slight worsening of memory/ cognitive issues.     As previously discussed, it remains important that Melvin continues to follow with primary care for management of cardiovascular risk factors because imaging has demonstrated symmetric cristiana-ventricular T2 FLAIR changes consistent with small vessel ischemic disease. Given the symmetric nature, past use of radiation in the setting of multiple factors for cardiovascular disease, and prior spectroscopy being consistent with radiation-induced changes, I continue to have limited concern that these changes would represent non-contrast enhancing cancer progression. As noted above, imaging has been largely stable over the past 3 years. Therefore, the need to identify and appropriately manage cardiovascular risk factors (ie diabetes, cholesterol, hypertension) per his primary care provider remains critical. Blood pressure was near goal today (systolic in the 130's). Of note, " when Melvin is most symptomatic/ feeling dizzy, he is noting that his blood pressure is very elevated. He is following closely with his primary care provider.     Given the stability of the scan from a cancer standpoint, will repeat imaging every 5-7 months indefinitely per NCCN guidelines. I personally reviewed Melvin's imaging and case at Brain Tumor Conference and all in attendance were in agreement with this impression. [place] we will schedule imaging to accommodate this. In the meantime, Iveth know to call with any new concerns and he can be seen sooner if needed.     PROBLEM LIST  WHO grade 3 oligodendroglioma  Memory complaints, stable  Cervical radiculopathy  Chemotherapy-induced thrombocytopenia   Bradycardia/ orthostatsis     PLAN  -CANCER DIRECTED THERAPY-  -As above; Continue imaging surveillance. Repeat imaging in 5-7 months.    * All imaging needs to scheduled on a 1.5T scanner due to an implantable device that Melvin has in his hip.  -[facility].     -SEIZURE MANAGEMENT-  -While this patient is at increased risk of having seizures, given the lack of seizure history, there is no indication to prescribe an antiepileptic at this time.     -Quality of life/ MOOD/ FATIGUE-  -Denies any mood issues.  -Continue to monitor mood as untreated/ undertreated depression can worsen fatigue, dysorexia, and quality of life.   -Continue to monitor headaches/ other musculoskeletal pain.   -Low stamina, fatigue.   -Handicapped parking form previously provided.     -CARDIAC-  -S/p ablation to correct a cardiac arrhythmia.   -Following with cardiology.  -Management of cardiovascular risk factors per his primary care provider as stated above.     -BRADYCARDIA/ ORTHOSTATSIS-  -Following with primary care and cardiology.    Return to clinic in 10/2025 + imaging.     The longitudinal plan of care for the  diagnosis(es)/condition(s) as documented were addressed during this visit. Due to the added complexity in care, I will continue to support Melvin in the subsequent management and with ongoing continuity of care.     Leeanne Lozano MD  Neuro-oncology       Again, thank you for allowing me to participate in the care of your patient.        Sincerely,        Leeanne Lozano MD    Electronically signed

## 2025-05-19 NOTE — LETTER
May 19, 2025      Gopi Farfan  [id number] [facility] [address]           To Whom it May Concern:        Mr. Gopi Farfan attended his scheduled appointments in person at the [facility] on 5/19/24 at 4:00 pm for a medically necessary follow-up test followed by an office visit required for continued management of his diagnosed brain cancer.     Please do not hesitate to contact our clinic with any additional questions.         Sincerely,           Leeanne Lozano MD  Neuro-Oncology

## 2025-05-20 NOTE — PROGRESS NOTES
[facility]: Cancer Care                                                                                          Met with Melvin and his wife following the visit with Dr. Lozano.  Assisted with CareEverywhere update and provided him with a letter from [facility] Lozano for [facility] that confirms he was seen in clinic today     Signature:  Beena Nguyen RN

## (undated) DEVICE — SUCTION MANIFOLD DORNOCH ULTRA CART UL-CL500

## (undated) DEVICE — GLOVE PROTEXIS BLUE W/NEU-THERA 8.5  2D73EB85

## (undated) DEVICE — PREP POVIDONE IODINE SCRUB 7.5% 4OZ APL82212

## (undated) DEVICE — ADH FLOSEAL W/HUMAN THROMBIN 5ML W/APPLICATOR TIP ADS201844

## (undated) DEVICE — SURGICEL HEMOSTAT 4X8" 1952

## (undated) DEVICE — SOL WATER IRRIG 1000ML BOTTLE 2F7114

## (undated) DEVICE — SPECIMEN BAG MEDIVAC SUCTION WHITE SOCK 65652-122

## (undated) DEVICE — CRANIOTOME ADULT ANSPACH A-CRN

## (undated) DEVICE — PREP CHLORAPREP CLEAR 3ML 260400

## (undated) DEVICE — GLOVE PROTEXIS MICRO 7.5  2D73PM75

## (undated) DEVICE — SU VICRYL 0 UR-6 27" J603H

## (undated) DEVICE — LINEN TOWEL PACK X30 5481

## (undated) DEVICE — SPONGE COTTONOID 1/2X1/2" 20-04S

## (undated) DEVICE — LINEN TOWEL PACK X6 WHITE 5487

## (undated) DEVICE — DRAPE U SPLIT 74X120" 29440

## (undated) DEVICE — NDL ANGIOCATH 14GA 1.25" 4048

## (undated) DEVICE — PAD CHUX UNDERPAD 23X24" 7136

## (undated) DEVICE — BUR BALL 5MM CARBIDE ANSPACH S-5B-C-G1

## (undated) DEVICE — PERFORATOR 14MM CODMAN

## (undated) DEVICE — DRSG KERLIX 4 1/2"X4YDS ROLL 6715

## (undated) DEVICE — Device

## (undated) DEVICE — NDL BLUNT 17GA 1.5" 8881202330

## (undated) DEVICE — DRAPE POUCH IRR 1016

## (undated) DEVICE — SOL NACL 0.9% IRRIG 1000ML BOTTLE 2F7124

## (undated) DEVICE — CUSA 36KHZ TIP STD CLARITY C74125

## (undated) DEVICE — SUCTION TIP YANKAUER VIAGUARD W/SLEEVE SMP-2006-001SS

## (undated) DEVICE — SU VICRYL 2-0 CT-2 CR 8X18" J726D

## (undated) DEVICE — SU ETHILON 3-0 PS-1 18" 1663H

## (undated) DEVICE — DECANTER VIAL 2006S

## (undated) DEVICE — SU NUROLON 4-0 TF CR 8X18" C584D

## (undated) DEVICE — PREP CHLORAPREP 26ML TINTED ORANGE  260815

## (undated) DEVICE — PACK CRANIOTOMY

## (undated) DEVICE — ESU PENCIL W/HOLSTER E2350H

## (undated) DEVICE — DRAPE SHEET REV FOLD 3/4 9349

## (undated) DEVICE — BUR ROUTER 1.4X12.8MM ANSPACH S-1R

## (undated) DEVICE — SU VICRYL 3-0 SH CR 8X18" J774

## (undated) DEVICE — ESU ELEC BLADE 4" COATED

## (undated) DEVICE — TUBING IV 69" STERILE 1C8160S

## (undated) DEVICE — MIDAS REX DISSECTING TOOL  14MH30

## (undated) DEVICE — ESU CORD BIPOLAR AND IRR TUBING AESCULAP US355

## (undated) DEVICE — SPONGE SURGIFOAM 100 1974

## (undated) DEVICE — SYR 10ML LL W/O NDL 302995

## (undated) DEVICE — DRAPE MICROSCOPE LEICA 54X150" AR8033650

## (undated) DEVICE — PACK SPINE SM CUSTOM SNE15SSFSK

## (undated) DEVICE — CUSA TUBE QUICK CONNECT CLARITY C7300

## (undated) DEVICE — DRAPE C-ARM 60X42" 1013

## (undated) DEVICE — MARKER SPHERES PASSIVE MEDT PACK 5 8801075

## (undated) DEVICE — SU MONOCRYL 3-0 PS-2 27" Y427H

## (undated) DEVICE — GLOVE PROTEXIS BLUE W/NEU-THERA 8.0  2D73EB80

## (undated) DEVICE — PREP CHLORAPREP W/ORANGE TINT 10.5ML 260715

## (undated) DEVICE — DRAPE MICROSCOPE EQUIP 48X120" 6130VL2

## (undated) DEVICE — NDL BLUNT 18GA 1" W/O FILTER 305181

## (undated) DEVICE — GOWN XXL 9575

## (undated) DEVICE — ESU GROUND PAD ADULT W/CORD E7507

## (undated) DEVICE — COVER CAMERA IN-LIGHT DISP LT-C02

## (undated) DEVICE — RX BACITRACIN OINTMENT 0.9G 1/32OZ CUR001109

## (undated) DEVICE — PREP SKIN SCRUB TRAY 4461A

## (undated) DEVICE — CUSA 36KHZ TIP CVD EXT STD CLARITY C74145

## (undated) DEVICE — RX SURGIFLO W/THROMBIN KIT 2ML 1991

## (undated) DEVICE — SPONGE COTTONOID 1/2X1 1/2" 20-06S

## (undated) DEVICE — WIPES FOLEY CARE SURESTEP PROVON DFC100

## (undated) DEVICE — RETR ELASTIC STAYS LONE STAR BLUNT DUAL LEAD 3550-1G

## (undated) DEVICE — SPONGE COTTONOID 1/2X3" 20-07S

## (undated) DEVICE — GLOVE PROTEXIS W/NEU-THERA 8.0  2D73TE80

## (undated) DEVICE — CUSA 36KHZ WRENCH TORQUE CLARITY C7602

## (undated) DEVICE — SU VICRYL 0 CT-1 CR 8X18" J740D

## (undated) DEVICE — SPONGE COTTONOID 1X3" 20-10S

## (undated) DEVICE — CLIP RANEY

## (undated) DEVICE — DRAPE MAYO STAND 23X54 8337

## (undated) DEVICE — DRSG PRIMAPORE 03 1/8X6" 66000318

## (undated) DEVICE — PIN SKULL MAYFIELD ADULT TITANIUM 3/PK A1120

## (undated) DEVICE — SYR 30ML LL W/O NDL 302832

## (undated) DEVICE — MANIFOLD NEPTUNE 4 PORT 700-20

## (undated) DEVICE — LINEN TOWEL PACK X5 5464

## (undated) DEVICE — ESU ELEC BLADE 2.75" COATED/INSULATED E1455

## (undated) DEVICE — SOL RINGERS LACTATED 1000ML BAG 07953-09

## (undated) DEVICE — CATH TRAY FOLEY SURESTEP 16FR W/TMP PRB STLK LATEX A319416AM

## (undated) DEVICE — NDL SPINAL 18GA 3.5" 405184

## (undated) DEVICE — CATH TRAY FOLEY SURESTEP 16FR W/URINE MTR STATLK LF A303416A

## (undated) DEVICE — SUCTION MANIFOLD NEPTUNE SGL

## (undated) DEVICE — DRAPE POUCH INSTRUMENT 1018

## (undated) DEVICE — PREP POVIDONE IODINE SOLUTION 10% 4OZ

## (undated) RX ORDER — ATROPINE SULFATE 0.4 MG/ML
AMPUL (ML) INJECTION
Status: DISPENSED
Start: 2019-05-30

## (undated) RX ORDER — HYDROMORPHONE HYDROCHLORIDE 1 MG/ML
INJECTION, SOLUTION INTRAMUSCULAR; INTRAVENOUS; SUBCUTANEOUS
Status: DISPENSED
Start: 2019-05-30

## (undated) RX ORDER — LIDOCAINE HYDROCHLORIDE 20 MG/ML
INJECTION, SOLUTION EPIDURAL; INFILTRATION; INTRACAUDAL; PERINEURAL
Status: DISPENSED
Start: 2020-05-21

## (undated) RX ORDER — DEXAMETHASONE SODIUM PHOSPHATE 4 MG/ML
INJECTION, SOLUTION INTRA-ARTICULAR; INTRALESIONAL; INTRAMUSCULAR; INTRAVENOUS; SOFT TISSUE
Status: DISPENSED
Start: 2020-05-21

## (undated) RX ORDER — LIDOCAINE HYDROCHLORIDE 20 MG/ML
INJECTION, SOLUTION EPIDURAL; INFILTRATION; INTRACAUDAL; PERINEURAL
Status: DISPENSED
Start: 2019-05-30

## (undated) RX ORDER — DEXAMETHASONE SODIUM PHOSPHATE 4 MG/ML
INJECTION, SOLUTION INTRA-ARTICULAR; INTRALESIONAL; INTRAMUSCULAR; INTRAVENOUS; SOFT TISSUE
Status: DISPENSED
Start: 2019-05-30

## (undated) RX ORDER — BUPIVACAINE HYDROCHLORIDE AND EPINEPHRINE 2.5; 5 MG/ML; UG/ML
INJECTION, SOLUTION EPIDURAL; INFILTRATION; INTRACAUDAL; PERINEURAL
Status: DISPENSED
Start: 2020-05-21

## (undated) RX ORDER — PROPOFOL 10 MG/ML
INJECTION, EMULSION INTRAVENOUS
Status: DISPENSED
Start: 2020-05-21

## (undated) RX ORDER — CEFAZOLIN SODIUM 1 G/3ML
INJECTION, POWDER, FOR SOLUTION INTRAMUSCULAR; INTRAVENOUS
Status: DISPENSED
Start: 2020-05-21

## (undated) RX ORDER — KETAMINE HCL IN NACL, ISO-OSM 100MG/10ML
SYRINGE (ML) INJECTION
Status: DISPENSED
Start: 2020-05-21

## (undated) RX ORDER — ALBUMIN, HUMAN INJ 5% 5 %
SOLUTION INTRAVENOUS
Status: DISPENSED
Start: 2019-05-30

## (undated) RX ORDER — EPHEDRINE SULFATE 50 MG/ML
INJECTION, SOLUTION INTRAMUSCULAR; INTRAVENOUS; SUBCUTANEOUS
Status: DISPENSED
Start: 2019-05-30

## (undated) RX ORDER — PHENYLEPHRINE HCL IN 0.9% NACL 1 MG/10 ML
SYRINGE (ML) INTRAVENOUS
Status: DISPENSED
Start: 2019-05-30

## (undated) RX ORDER — FENTANYL CITRATE 50 UG/ML
INJECTION, SOLUTION INTRAMUSCULAR; INTRAVENOUS
Status: DISPENSED
Start: 2019-05-30

## (undated) RX ORDER — FENTANYL CITRATE 50 UG/ML
INJECTION, SOLUTION INTRAMUSCULAR; INTRAVENOUS
Status: DISPENSED
Start: 2020-05-21

## (undated) RX ORDER — BACITRACIN 50000 [IU]/1
INJECTION, POWDER, FOR SOLUTION INTRAMUSCULAR
Status: DISPENSED
Start: 2019-05-30

## (undated) RX ORDER — MANNITOL 20 G/100ML
INJECTION, SOLUTION INTRAVENOUS
Status: DISPENSED
Start: 2019-05-30

## (undated) RX ORDER — GLYCOPYRROLATE 0.2 MG/ML
INJECTION, SOLUTION INTRAMUSCULAR; INTRAVENOUS
Status: DISPENSED
Start: 2019-05-30

## (undated) RX ORDER — ACETAMINOPHEN 500 MG
TABLET ORAL
Status: DISPENSED
Start: 2020-05-21

## (undated) RX ORDER — CEFAZOLIN SODIUM 1 G/3ML
INJECTION, POWDER, FOR SOLUTION INTRAMUSCULAR; INTRAVENOUS
Status: DISPENSED
Start: 2019-05-30

## (undated) RX ORDER — ONDANSETRON 2 MG/ML
INJECTION INTRAMUSCULAR; INTRAVENOUS
Status: DISPENSED
Start: 2019-05-30

## (undated) RX ORDER — LABETALOL HYDROCHLORIDE 5 MG/ML
INJECTION, SOLUTION INTRAVENOUS
Status: DISPENSED
Start: 2019-05-30

## (undated) RX ORDER — HYDROMORPHONE HYDROCHLORIDE 1 MG/ML
INJECTION, SOLUTION INTRAMUSCULAR; INTRAVENOUS; SUBCUTANEOUS
Status: DISPENSED
Start: 2020-05-21

## (undated) RX ORDER — CEFAZOLIN SODIUM 2 G/100ML
INJECTION, SOLUTION INTRAVENOUS
Status: DISPENSED
Start: 2020-05-21

## (undated) RX ORDER — PROPOFOL 10 MG/ML
INJECTION, EMULSION INTRAVENOUS
Status: DISPENSED
Start: 2019-05-30

## (undated) RX ORDER — POVIDONE-IODINE 10 MG/G
OINTMENT TOPICAL
Status: DISPENSED
Start: 2020-05-21

## (undated) RX ORDER — ONDANSETRON 2 MG/ML
INJECTION INTRAMUSCULAR; INTRAVENOUS
Status: DISPENSED
Start: 2020-05-21

## (undated) RX ORDER — CEFAZOLIN SODIUM 2 G/100ML
INJECTION, SOLUTION INTRAVENOUS
Status: DISPENSED
Start: 2019-05-30